# Patient Record
Sex: MALE | Race: WHITE | Employment: OTHER | URBAN - METROPOLITAN AREA
[De-identification: names, ages, dates, MRNs, and addresses within clinical notes are randomized per-mention and may not be internally consistent; named-entity substitution may affect disease eponyms.]

---

## 2017-01-01 ENCOUNTER — HOSPITAL ENCOUNTER (OUTPATIENT)
Dept: GENERAL RADIOLOGY | Age: 82
Discharge: HOME | End: 2017-09-13
Payer: MEDICARE

## 2017-01-01 ENCOUNTER — ANESTHESIA EVENT (OUTPATIENT)
Dept: ENDOSCOPY | Age: 82
End: 2017-01-01
Payer: MEDICARE

## 2017-01-01 ENCOUNTER — ANESTHESIA (OUTPATIENT)
Dept: ENDOSCOPY | Age: 82
End: 2017-01-01
Payer: MEDICARE

## 2017-01-01 ENCOUNTER — OFFICE VISIT (OUTPATIENT)
Dept: CARDIOLOGY CLINIC | Age: 82
End: 2017-01-01

## 2017-01-01 ENCOUNTER — TELEPHONE (OUTPATIENT)
Dept: NEUROLOGY | Age: 82
End: 2017-01-01

## 2017-01-01 ENCOUNTER — HOSPITAL ENCOUNTER (OUTPATIENT)
Age: 82
Setting detail: OUTPATIENT SURGERY
Discharge: HOME | End: 2017-03-06
Attending: INTERNAL MEDICINE | Admitting: INTERNAL MEDICINE
Payer: MEDICARE

## 2017-01-01 VITALS
HEART RATE: 72 BPM | BODY MASS INDEX: 29.31 KG/M2 | HEIGHT: 72 IN | DIASTOLIC BLOOD PRESSURE: 78 MMHG | SYSTOLIC BLOOD PRESSURE: 138 MMHG | WEIGHT: 216.4 LBS

## 2017-01-01 VITALS
HEART RATE: 56 BPM | BODY MASS INDEX: 27.36 KG/M2 | RESPIRATION RATE: 19 BRPM | OXYGEN SATURATION: 97 % | HEIGHT: 72 IN | TEMPERATURE: 97.6 F | SYSTOLIC BLOOD PRESSURE: 146 MMHG | WEIGHT: 202 LBS | DIASTOLIC BLOOD PRESSURE: 67 MMHG

## 2017-01-01 DIAGNOSIS — E78.2 MIXED HYPERLIPIDEMIA: ICD-10-CM

## 2017-01-01 DIAGNOSIS — Z01.818 PRE-OP EVALUATION: ICD-10-CM

## 2017-01-01 DIAGNOSIS — M54.10 RADICULOPATHY: ICD-10-CM

## 2017-01-01 DIAGNOSIS — I10 ESSENTIAL HYPERTENSION: Primary | ICD-10-CM

## 2017-01-01 PROCEDURE — 77030009426 HC FCPS BIOP ENDOSC BSC -B: Performed by: INTERNAL MEDICINE

## 2017-01-01 PROCEDURE — 88305 TISSUE EXAM BY PATHOLOGIST: CPT | Performed by: INTERNAL MEDICINE

## 2017-01-01 PROCEDURE — 77030027957 HC TBNG IRR ENDOGTR BUSS -B: Performed by: INTERNAL MEDICINE

## 2017-01-01 PROCEDURE — 76060000031 HC ANESTHESIA FIRST 0.5 HR: Performed by: INTERNAL MEDICINE

## 2017-01-01 PROCEDURE — 72100 X-RAY EXAM L-S SPINE 2/3 VWS: CPT

## 2017-01-01 PROCEDURE — 76040000019: Performed by: INTERNAL MEDICINE

## 2017-01-01 PROCEDURE — 74011000250 HC RX REV CODE- 250

## 2017-01-01 PROCEDURE — 74011250636 HC RX REV CODE- 250/636

## 2017-01-01 RX ORDER — PROPOFOL 10 MG/ML
INJECTION, EMULSION INTRAVENOUS AS NEEDED
Status: DISCONTINUED | OUTPATIENT
Start: 2017-01-01 | End: 2017-01-01 | Stop reason: HOSPADM

## 2017-01-01 RX ORDER — ATROPINE SULFATE 0.1 MG/ML
0.5 INJECTION INTRAVENOUS
Status: DISCONTINUED | OUTPATIENT
Start: 2017-01-01 | End: 2017-01-01 | Stop reason: HOSPADM

## 2017-01-01 RX ORDER — SODIUM CHLORIDE 0.9 % (FLUSH) 0.9 %
5-10 SYRINGE (ML) INJECTION AS NEEDED
Status: DISCONTINUED | OUTPATIENT
Start: 2017-01-01 | End: 2017-01-01 | Stop reason: HOSPADM

## 2017-01-01 RX ORDER — DEXTROMETHORPHAN/PSEUDOEPHED 2.5-7.5/.8
1.2 DROPS ORAL
Status: DISCONTINUED | OUTPATIENT
Start: 2017-01-01 | End: 2017-01-01 | Stop reason: HOSPADM

## 2017-01-01 RX ORDER — SODIUM CHLORIDE 0.9 % (FLUSH) 0.9 %
5-10 SYRINGE (ML) INJECTION EVERY 8 HOURS
Status: DISCONTINUED | OUTPATIENT
Start: 2017-01-01 | End: 2017-01-01 | Stop reason: HOSPADM

## 2017-01-01 RX ORDER — EPINEPHRINE 0.1 MG/ML
1 INJECTION INTRACARDIAC; INTRAVENOUS
Status: DISCONTINUED | OUTPATIENT
Start: 2017-01-01 | End: 2017-01-01 | Stop reason: HOSPADM

## 2017-01-01 RX ORDER — FENTANYL CITRATE 50 UG/ML
200 INJECTION, SOLUTION INTRAMUSCULAR; INTRAVENOUS
Status: DISCONTINUED | OUTPATIENT
Start: 2017-01-01 | End: 2017-01-01 | Stop reason: HOSPADM

## 2017-01-01 RX ORDER — SODIUM CHLORIDE 9 MG/ML
100 INJECTION, SOLUTION INTRAVENOUS CONTINUOUS
Status: DISCONTINUED | OUTPATIENT
Start: 2017-01-01 | End: 2017-01-01 | Stop reason: HOSPADM

## 2017-01-01 RX ORDER — SODIUM CHLORIDE 9 MG/ML
INJECTION, SOLUTION INTRAVENOUS
Status: DISCONTINUED | OUTPATIENT
Start: 2017-01-01 | End: 2017-01-01 | Stop reason: HOSPADM

## 2017-01-01 RX ORDER — NALOXONE HYDROCHLORIDE 0.4 MG/ML
0.4 INJECTION, SOLUTION INTRAMUSCULAR; INTRAVENOUS; SUBCUTANEOUS
Status: DISCONTINUED | OUTPATIENT
Start: 2017-01-01 | End: 2017-01-01 | Stop reason: HOSPADM

## 2017-01-01 RX ORDER — MIDAZOLAM HYDROCHLORIDE 1 MG/ML
.25-1 INJECTION, SOLUTION INTRAMUSCULAR; INTRAVENOUS
Status: DISCONTINUED | OUTPATIENT
Start: 2017-01-01 | End: 2017-01-01 | Stop reason: HOSPADM

## 2017-01-01 RX ORDER — LIDOCAINE HYDROCHLORIDE 20 MG/ML
INJECTION, SOLUTION EPIDURAL; INFILTRATION; INTRACAUDAL; PERINEURAL AS NEEDED
Status: DISCONTINUED | OUTPATIENT
Start: 2017-01-01 | End: 2017-01-01 | Stop reason: HOSPADM

## 2017-01-01 RX ORDER — FLUMAZENIL 0.1 MG/ML
0.2 INJECTION INTRAVENOUS
Status: DISCONTINUED | OUTPATIENT
Start: 2017-01-01 | End: 2017-01-01 | Stop reason: HOSPADM

## 2017-01-01 RX ADMIN — PROPOFOL 25 MG: 10 INJECTION, EMULSION INTRAVENOUS at 08:53

## 2017-01-01 RX ADMIN — PROPOFOL 25 MG: 10 INJECTION, EMULSION INTRAVENOUS at 08:47

## 2017-01-01 RX ADMIN — PROPOFOL 25 MG: 10 INJECTION, EMULSION INTRAVENOUS at 08:50

## 2017-01-01 RX ADMIN — LIDOCAINE HYDROCHLORIDE 40 MG: 20 INJECTION, SOLUTION EPIDURAL; INFILTRATION; INTRACAUDAL; PERINEURAL at 08:47

## 2017-01-01 RX ADMIN — PROPOFOL 25 MG: 10 INJECTION, EMULSION INTRAVENOUS at 08:51

## 2017-01-01 RX ADMIN — PROPOFOL 25 MG: 10 INJECTION, EMULSION INTRAVENOUS at 08:48

## 2017-01-01 RX ADMIN — SODIUM CHLORIDE: 9 INJECTION, SOLUTION INTRAVENOUS at 08:25

## 2017-01-18 ENCOUNTER — TELEPHONE (OUTPATIENT)
Dept: CARDIOLOGY CLINIC | Age: 82
End: 2017-01-18

## 2017-01-18 NOTE — TELEPHONE ENCOUNTER
Dr. Juanis Koehler called regarding patient. She wanted to discuss whether patient should be on blood thinner and and if he was to f/u with us (I believe he told her we would see prn, but he never followed as planned after echo). I told her I see Dr. Salud Cook noted patient should be on blood thinner. I let her know when I called patient, he did not wish to make f/u with Dr. Salud Cook at that time and had told me he was going to be seeing PCP. She would like to discuss patient's case with Dr. Salud Cook. I told her he would be back tomorrow. She said that was fine and gave phone # [phone]. Dr. Salud Cook- please call Dr. Juanis Koehler when you have a chance. Thank you. phone # [phone].

## 2017-03-06 NOTE — PROGRESS NOTES
Anesthesia reports 125mg Propofol, 0mg Lidocaine and 4000mL NS given during procedure. Received report from anesthesia staff on vital signs and status of patient.

## 2017-03-06 NOTE — H&P
The patient is a [de-identified]year old male who presents for a screening colonscopy. The patient presents for a screening colonoscopy evaluation (had a colonic resection for colon cancer last year. Due for his routine colonoscopy post surgery. CEA was normal at Dr Taran Strauss). There has been no associated family history of colon cancer, change in bowel habits, hematochezia, weight loss or abdominal pain. The frequency of bowel movements has been the quality of stools show of normal consistency, without bright red blood per rectum. The patient had a colonoscopy 1 year(s) ago. Problem List/Past Medical Vivienne Sandoval; 2/9/2017 9:55 AM)  Arthritis   History of colon polyps (V12.72  Z86.010)   GERD (gastroesophageal reflux disease) (530.81  K21.9)   Colon Cancer   Diabetes Mellitus, Type II   Hypertension   Hypercholesterolemia     Past Surgical History Vivienne Sandoval; 2/9/2017 9:55 AM)  History of colon resection (V45.89  Z90.49)     Allergies (Silas Sandoval; 2/9/2017 9:55 AM)  Losartan Potassium *ANTIHYPERTENSIVES*  In larger dosages. Penicillins   Xarelto *ANTICOAGULANTS*   Doxazosin Mesylate *ANTIHYPERTENSIVES*   AmLODIPine Besylate *CALCIUM CHANNEL BLOCKERS*   Lodine *ANALGESICS - ANTI-INFLAMMATORY*   TraMADol HCl *ANALGESICS - OPIOID*     Medication History (Silas Sandoval; 2/9/2017 9:58 AM)  MetFORMIN HCl  (500MG Tablet, Oral twice a day) Active. GlipiZIDE  (5MG Tablet, Oral twice a day) Active. Eliquis  (2.5MG Tablet, Oral twice a day) Active. Maxzide-25  (37.5-25MG Tablet, Oral daily) Active. Losartan Potassium  (25MG Tablet, Oral daily) Active. Atorvastatin Calcium  (40MG Tablet, Oral daily) Active. Pantoprazole Sodium  (40MG Tablet DR, Oral daily) Active. Aspirin  (81MG Tablet DR, Oral daily) Active. Tylenol Extra Strength  (500MG Tablet, Oral 2 tabs in the morning) Active.   Medications Reconciled     Family History (Silas Sandoval; 2/9/2017 9:59 AM)  No pertinent family history  First Degree Relatives. Social History (Chuy Olvera; 2/9/2017 9:55 AM)  Marital status  Single. Tobacco Use  Former smoker. Employment status  Retired. Blood Transfusion  Yes. Alcohol Use  Occasional alcohol use. Diagnostic Studies History Skip BRADFORD Walker; 2/9/2017 9:55 AM)  Colonoscopy     Health Maintenance History Skip Sandoval; 2/9/2017 9:55 AM)  Flu Vaccine  none  Pneumovax  Date: 1/18/2017. Review of Systems (Silas Sandoval; 2/9/2017 9:59 AM)  General Present- Weight Loss. Not Present- Chronic Fatigue, Poor Appetite and Weight Gain. Skin Not Present- Itching, Rash and Skin Color Changes. HEENT Not Present- Hearing Loss and Vertigo. Respiratory Not Present- Difficulty Breathing and TB exposure. Cardiovascular Not Present- Chest Pain, Use of Antibiotics before Dental Procedures and Use of Blood Thinners. Gastrointestinal Present- See HPI. Musculoskeletal Present- Arthritis. Not Present- Hip Replacement Surgery and Knee Replacement Surgery. Neurological Not Present- Weakness. Psychiatric Not Present- Depression. Endocrine Present- Diabetes. Not Present- Thyroid Problems. Hematology Not Present- Anemia. Vitals (Silas Miguel Severe; 2/9/2017 9:58 AM)  2/9/2017 9:55 AM  Weight: 200 lb   Height: 72 in    Body Surface Area: 2.15 m²   Body Mass Index: 27.12 kg/m²  Pulse: 72 (Regular)    Resp.: 20 (Unlabored)     BP: 170/98 (Sitting, Left Arm, Standard)        Physical Exam (Angela Castro [facility]; 2/9/2017 12:10 PM)  General  Posture - Normal posture. Integumentary  Global Assessment  Examination of related systems reveals - Well-developed, well-nourished and in no acute distress; alert and oriented x 3. Eye  Pupil - Bilateral - Normal, Equal and Round. ENMT  Global Assessment  Examination of related systems reveals - normal voice with no communication aids required. Abdomen  Inspection  Inspection of the abdomen reveals - Soft. Contour - Obese.     Peripheral Vascular  Upper Extremity  Inspection - Bilateral - Acrocyanotic. Neurologic  Neurologic evaluation reveals  - normal attention span and ability to concentrate. Neuropsychiatric  Mental status exam performed with findings of - thought content normal with ability to perform basic computations and apply abstract reasoning, associations are intact and demonstrates appropriate judgment and insight. The patient's mood and affect are described as  - full, not anxious, not agitated. Assessment & Plan (Angela Mir; 2/9/2017 12:12 PM)  Colon cancer (153.9  C18.9)  Impression: s/p resection last year. Takes a Eliquis for h/o strokes. This will need to be help prior to the procedure. We have also discussed holding his oral diabetic medications the day of the procedure and prep as his blood sugars are very well controlled. Sample of Suprep given  Current Plans    Colonoscopy (20959) (Discussed risks and benefits with the patient to include:; perforation, post polypectomy, or post biopsy bleeding, missed lesions, and sedation reactions.)  Pt Education - How to access health information online: discussed with patient and provided information. Date of Surgery Update:  Kb Pichardo was seen and examined. History and physical has been reviewed. The patient has been examined.  There have been no significant clinical changes since the completion of the originally dated History and Physical.    Signed By: Leonidas Radford MD     March 6, 2017 8:42 AM

## 2017-03-06 NOTE — DISCHARGE INSTRUCTIONS
[address] [facility]    COLON DISCHARGE INSTRUCTIONS    Doreen Recinos  [id number]  1935    Discomfort:  Redness at IV site- apply warm compress to area; if redness or soreness persist- contact your physician  There may be a slight amount of blood passed from the rectum  Gaseous discomfort- walking, belching will help relieve any discomfort  You may not operate a vehicle for 12 hours  You may not engage in an occupation involving machinery or appliances for rest of today  You may not drink alcoholic beverages for at least 12 hours  Avoid making any critical decisions for at least 24 hour  DIET:  You may resume your regular diet - however -  remember your colon is empty and a heavy meal will produce gas. Avoid these foods:  vegetables, fried / greasy foods, carbonated drinks     ACTIVITY:  You may  resume your normal daily activities it is recommended that you spend the remainder of the day resting -  avoid any strenuous activity. CALL M.D. ANY SIGN OF:   Increasing pain, nausea, vomiting  Abdominal distension (swelling)  New increased bleeding (oral or rectal)  Fever (chills)  Pain in chest area  Bloody discharge from nose or mouth  Shortness of breath      Follow-up Instructions:   Call Dr. Conor Horton for any questions or problems at [address] [place] [address] tomorrow on 3/7/17   High fiber diet          ENDOSCOPY FINDINGS:   Your colonoscopy showed 3 small polyps i removed and diverticulosis.   Telephone # [phone]      Signed By: Conor Horton MD     3/6/2017  9:07 AM       DISCHARGE SUMMARY from Nurse    The following personal items collected during your admission are returned to you:   Dental Appliance: Dental Appliances: None  Vision: Visual Aid: None  Hearing Aid:    Jewelry:    Clothing:    Other Valuables:    Valuables sent to safe:

## 2017-03-06 NOTE — IP AVS SNAPSHOT
[address] 
[phone] Patient: Joshua Echevarria MRN: [id number] WBD:[id number] You are allergic to the following Allergen Reactions Penicillins Rash Metoprolol Rash Tramadol Other (comments) Unsure of reaction. Lodine (Etodolac) Vertigo Recent Documentation Height Weight BMI Smoking Status 1.829 m 91.6 kg 27.4 kg/m2 Former Smoker Emergency Contacts Name Discharge Info Relation Home Work Mobile Nicole Lewisstefany DISCHARGE CAREGIVER [3] Girlfriend [18] [phone] Tevin Harris  Child [2] [phone] About your hospitalization You were admitted on:  March 6, 2017 You last received care in the:  [facility] ENDOSCOPY You were discharged on:  March 6, 2017 Unit phone number:  [phone] Why you were hospitalized Your primary diagnosis was:  Not on File Providers Seen During Your Hospitalizations Provider Role Specialty Primary office phone Earle Walker MD Attending Provider Gastroenterology [phone] Your Primary Care Physician (PCP) Primary Care Physician Office Phone Office Fax Julia Tai [phone] Follow-up Information None Current Discharge Medication List  
  
CONTINUE these medications which have NOT CHANGED Dose & Instructions Dispensing Information Comments Morning Noon Evening Bedtime  
 amLODIPine 2.5 mg tablet Commonly known as:  Alma Nagy Your next dose is: Today, Tomorrow Other:  _________ Dose:  2.5 mg Take 2.5 mg by mouth nightly. Refills:  0  
     
   
   
   
  
 aspirin delayed-release 81 mg tablet Your next dose is: Today, Tomorrow Other:  _________ Dose:  81 mg Take 1 Tab by mouth daily. Refills:  0  
     
   
   
   
  
 atorvastatin 40 mg tablet Commonly known as:  LIPITOR Your next dose is: Today, Tomorrow Other:  _________ Take  by mouth daily. Refills:  0  
     
   
   
   
  
 ferrous sulfate 325 mg (65 mg iron) tablet Your next dose is: Today, Tomorrow Other:  _________ Dose:  325 mg Take 325 mg by mouth daily. Refills:  0  
     
   
   
   
  
 glipiZIDE 5 mg tablet Commonly known as:  Brooksie Fillers Your next dose is: Today, Tomorrow Other:  _________ Dose:  5 mg Take 5 mg by mouth two (2) times a day. Refills:  0  
     
   
   
   
  
 losartan 50 mg tablet Commonly known as:  COZAAR Your next dose is: Today, Tomorrow Other:  _________ Dose:  25 mg Take 25 mg by mouth daily. (dose = 0.5 x 50 mg tablet) Refills:  0  
     
   
   
   
  
 lovastatin 40 mg tablet Commonly known as:  MEVACOR Your next dose is: Today, Tomorrow Other:  _________ Dose:  40 mg Take 40 mg by mouth nightly. Refills:  0  
     
   
   
   
  
 metFORMIN 500 mg tablet Commonly known as:  GLUCOPHAGE Your next dose is: Today, Tomorrow Other:  _________ Dose:  500 mg Take 500 mg by mouth two (2) times daily (with meals). Refills:  0  
     
   
   
   
  
 OTHER Your next dose is: Today, Tomorrow Other:  _________ Outpatient PT/OT evaluation Dx: stroke Quantity:  1 Device Refills:  0  
     
   
   
   
  
 pantoprazole 40 mg tablet Commonly known as:  PROTONIX Your next dose is: Today, Tomorrow Other:  _________ Dose:  40 mg Take 40 mg by mouth daily. Refills:  0  
     
   
   
   
  
 triamterene-hydroCHLOROthiazide 37.5-25 mg per tablet Commonly known as:  Rocio Bong Your next dose is: Today, Tomorrow Other:  _________ Dose:  1 Tab Take 1 Tab by mouth daily. Refills:  0 STOP taking these medications   
 apixaban 2.5 mg tablet Commonly known as:  Julio Cesar Bailon Discharge Instructions [address] 
[address] [facility], [facility] COLON DISCHARGE INSTRUCTIONS Abbey Lopez [id number] 
1935 Discomfort: 
Redness at IV site- apply warm compress to area; if redness or soreness persist- contact your physician There may be a slight amount of blood passed from the rectum Gaseous discomfort- walking, belching will help relieve any discomfort You may not operate a vehicle for 12 hours You may not engage in an occupation involving machinery or appliances for rest of today You may not drink alcoholic beverages for at least 12 hours Avoid making any critical decisions for at least 24 hour DIET: 
You may resume your regular diet  however -  remember your colon is empty and a heavy meal will produce gas. Avoid these foods:  vegetables, fried / greasy foods, carbonated drinks ACTIVITY: 
You may  resume your normal daily activities it is recommended that you spend the remainder of the day resting -  avoid any strenuous activity. CALL M.D. ANY SIGN OF: Increasing pain, nausea, vomiting Abdominal distension (swelling) New increased bleeding (oral or rectal) Fever (chills) Pain in chest area Bloody discharge from nose or mouth Shortness of breath Follow-up Instructions: 
 Call Dr. Marvin Martinez for any questions or problems at [phone] Resume jeromeis tomorrow on 3/7/17 High fiber diet ENDOSCOPY FINDINGS: 
 Your colonoscopy showed 3 small polyps i removed and diverticulosis. Telephone # [phone] Signed By: Marvin Martinez MD   
 3/6/2017  9:07 AM 
  
 
DISCHARGE SUMMARY from Nurse The following personal items collected during your admission are returned to you:  
Dental Appliance: Dental Appliances: None Vision: Visual Aid: None Hearing Aid:   
Jewelry:   
Clothing: Other Valuables:   
Valuables sent to safe:   
 
 
 
Discharge Orders None [facility]! Dear Sushil Palisades: 
Thank you for requesting a [employer] account. Our records indicate that you already have an active [employer] account. You can access your account anytime at [email or web address]. [email or web address]/[email or web address] Did you know that you can access your hospital and ER discharge instructions at any time in [employer]? You can also review all of your test results from your hospital stay or ER visit. Additional Information If you have questions, please visit the Frequently Asked Questions section of the [employer] website at [email or web address]. [email or web address]/[facility]/. Remember, [employer] is NOT to be used for urgent needs. For medical emergencies, dial 911. Now available from your iPhone and Android! General Information Please provide this summary of care documentation to your next provider. Patient Signature:  ____________________________________________________________ Date:  ____________________________________________________________  
  
Caleb Causey Provider Signature:  ____________________________________________________________ Date:  ____________________________________________________________

## 2017-03-06 NOTE — PROCEDURES
[address]  [facility], [address] [facility]      Colonoscopy Operative Report    Radu Bernal  [id number]  1935      Procedure Type:   Colonoscopy with polypectomy (hot biopsy)     Indications:    Personal history of colon cancer (screening only) s/p right hemicolectomy last year by Dr. Thompson Penn  Date of last colonoscopy: last year, Polyps  Yes    Pre-operative Diagnosis: see indication above    Post-operative Diagnosis:  See findings below    :  Pratibha Trent MD      Referring Provider: Raul Fox MD      Sedation:  MAC anesthesia Propofol      Procedure Details:  After informed consent was obtained with all risks and benefits of procedure explained and preoperative exam completed, the patient was taken to the endoscopy suite and placed in the left lateral decubitus position. Upon sequential sedation as per above, a digital rectal exam was performed demonstrating internal hemorrhoids. The Olympus videocolonoscope  was inserted in the rectum and carefully advanced to the terminal ileum. The cecum was identified by the ileocecal valve and appendiceal orifice. The quality of preparation was excellent. The colonoscope was slowly withdrawn with careful evaluation between folds. Retroflexion in the rectum was completed . Findings:   Rectum: normal  Sigmoid: moderate diverticulosis  Descending Colon: mild diverticulosis  Transverse Colon: mild diverticulosis  3 polyps around 9 mm each removed by hot biopsies  Ascending Colon: no mucosal lesion appreciated  Cecum: surgically absent  Terminal Ileum: normal      Specimen Removed:  as above    Complications: None. EBL:  None. Impression:    see findings    Recommendations: --Await pathology.       Recommendation for next colonscopy in 1 year  Resume eliquis since needed because of h/o stroke    Signed By: Pratibha Trent MD     3/6/2017  9:03 AM

## 2017-03-06 NOTE — ROUTINE PROCESS
[address]  1935  [id number]    Situation:  Verbal report received from: Leisa Nieves  Procedure: Procedure(s):  COLONOSCOPY  ENDOSCOPIC POLYPECTOMY    Background:    Preoperative diagnosis: COLON CANCER   Postoperative diagnosis: transverse polyp; diverticulosiss     :  Dr. Benji Rodriguez  Assistant(s): Endoscopy Technician-1: Marcy Claire  Endoscopy RN-1: Lupis Spann RN    Specimens:   ID Type Source Tests Collected by Time Destination   1 : transverse polyps bx Preservative   Amanda Mendez MD 3/6/2017 1098 Pathology     H. Pylori  no    Assessment:  Intra-procedure medications   Anesthesia gave intra-procedure sedation and medications, see anesthesia flow sheet yes    Intravenous fluids: NS@ KVO     Vital signs stable yes    Abdominal assessment: round and soft  Yes     Recommendation:  Discharge patient per MD order yes .   Return to floor na  Family or Friend  fam  Permission to share finding with family or friend yes

## 2017-03-06 NOTE — ANESTHESIA PREPROCEDURE EVALUATION
Anesthetic History   No history of anesthetic complications            Review of Systems / Medical History  Patient summary reviewed, nursing notes reviewed and pertinent labs reviewed    Pulmonary        Sleep apnea        Comments: Does not use cpap because of sinus problems, uses breathe right strips   Neuro/Psych       CVA  TIA     Cardiovascular    Hypertension: well controlled              Exercise tolerance: >4 METS     GI/Hepatic/Renal     GERD      PUD    Comments: Post hemicolectomy Endo/Other    Diabetes    Arthritis     Other Findings            Physical Exam    Airway  Mallampati: II  TM Distance: > 6 cm  Neck ROM: normal range of motion   Mouth opening: Normal     Cardiovascular    Rhythm: regular  Rate: normal         Dental  No notable dental hx       Pulmonary  Breath sounds clear to auscultation               Abdominal  Abdominal exam normal       Other Findings            Anesthetic Plan    ASA: 3  Anesthesia type: MAC          Induction: Intravenous  Anesthetic plan and risks discussed with: Patient

## 2017-03-06 NOTE — ANESTHESIA POSTPROCEDURE EVALUATION
Post-Anesthesia Evaluation and Assessment    Patient: Darylene Smock MRN: [id number]  SSN: [id number]    YOB: 1935  Age: [de-identified] y.o. Sex: male       Cardiovascular Function/Vital Signs  Visit Vitals    /67    Pulse (!) 56    Temp 36.4 °C (97.6 °F)    Resp 19    Ht 6' (1.829 m)    Wt 91.6 kg (202 lb)    SpO2 97%    BMI 27.4 kg/m2       Patient is status post MAC anesthesia for Procedure(s):  COLONOSCOPY  ENDOSCOPIC POLYPECTOMY. Nausea/Vomiting: None    Postoperative hydration reviewed and adequate. Pain:  Pain Scale 1: Numeric (0 - 10) (03/06/17 0929)  Pain Intensity 1: 0 (03/06/17 0929)   Managed    Neurological Status: At baseline    Mental Status and Level of Consciousness: Arousable    Pulmonary Status:   O2 Device: Room air (03/06/17 0929)   Adequate oxygenation and airway patent    Complications related to anesthesia: None    Post-anesthesia assessment completed.  No concerns    Signed By: Santosh العلي MD     March 6, 2017

## 2017-12-19 NOTE — TELEPHONE ENCOUNTER
Dr. Nika Herrmann calling to speak to Dr. Akash Noonan in regards to patient.  Please call Dr. Nika Herrmann directly, [phone]

## 2017-12-29 NOTE — PROGRESS NOTES
HISTORY OF PRESENT ILLNESS  Esa Cline is a 81 y.o. 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 (HCC) (Chronic) ICD-10-CM: I82.5Z1  ICD-9-CM: 453.52  6/16/2016        History of GI bleed (Chronic) ICD-10-CM: Z87.19  ICD-9-CM: V12.79  6/16/2016        Colon cancer ([facility] ICD-10-CM: C18.9  ICD-9-CM: 153.9  5/16/2016        Pre-op evaluation ICD-10-CM: A62.573  ICD-9-CM: V72.84  4/8/2016        Cerebral thrombosis with cerebral infarction [facility]) ICD-10-CM: I63.30  ICD-9-CM: 434.01  1/22/2015        Uncontrolled hypertension ICD-10-CM: I10  ICD-9-CM: 401.9  1/22/2015        History of prostate cancer ICD-10-CM: Z85.46  ICD-9-CM: V10.46  3/7/2013        Cervical spondylosis ICD-10-CM: M47.812  ICD-9-CM: 721.0  7/11/2012        Balance disorder ICD-10-CM: R26.89  ICD-9-CM: 781.99  3/7/2012        Diabetes mellitus ([facility] ICD-10-CM: E11.9  ICD-9-CM: 250.00  11/2/2011        Sinusitis ICD-10-CM: J32.9  ICD-9-CM: 473.9  11/2/2011        Hypertension ICD-10-CM: I10  ICD-9-CM: 401.9  11/2/2011    Overview Addendum 8/1/2016  7:03 AM by George Stoll MD     4/16 abnormal lexiscan cardiolyte with fixed inf defect, lvef 37%  4/16 echo normal lvef, lae, mild mr, mild tr with pa pressure 37mm. Lipomatous hypertrophy intraatrial septum without defect                   Current Outpatient Prescriptions on File Prior to Visit   Medication Sig    atorvastatin (LIPITOR) 40 mg tablet Take  by mouth daily.  aspirin delayed-release 81 mg tablet Take 1 Tab by mouth daily.     OTHER Outpatient PT/OT evaluation  Dx: stroke    losartan (COZAAR) 50 mg tablet Take 25 mg by mouth daily. (dose = 0.5 x 50 mg tablet)    pantoprazole (PROTONIX) 40 mg tablet Take 40 mg by mouth daily.  glipiZIDE (GLUCOTROL) 5 mg tablet Take 5 mg by mouth two (2) times a day.  metFORMIN (GLUCOPHAGE) 500 mg tablet Take 500 mg by mouth two (2) times daily (with meals).  triamterene-hydrochlorothiazide (MAXZIDE) 37.5-25 mg per tablet Take 1 Tab by mouth daily.  amLODIPine (NORVASC) 2.5 mg tablet Take 2.5 mg by mouth nightly.  ferrous sulfate 325 mg (65 mg iron) tablet Take 325 mg by mouth daily.  lovastatin (MEVACOR) 40 mg tablet Take 40 mg by mouth nightly. No current facility-administered medications on file prior to visit. CARDIOLOGY STUDIES TO DATE:  4/16 abnormal lexiscan cardiolyte with fixed inf defect, lvef 37%  4/16 echo normal lvef, lae, mild mr, mild tr with pa pressure 37mm. Lipomatous hypertrophy intraatrial septum without defect  8/16 positive echo bubble study with evidence of probe patent pfo        Chief Complaint   Patient presents with    Pre-op Exam     HPI :  Mr. Kenya Hicks needs to have a cystoscopy and that is one of the reasons he is here today. He also brought up the issue of whether he needs to continue on Eliquis indefinitely. He had one fall a couple of months ago, but other than that no bleeding or previous events.           CARDIAC ROS:   negative for chest pain, dyspnea, palpitations, syncope, orthopnea, paroxysmal nocturnal dyspnea, exertional chest pressure/discomfort, claudication, lower extremity edema    Family History   Problem Relation Age of Onset    Heart Disease Father     Lung Disease Father      EMPHASEMA    Cancer Brother      SKIN    Heart Attack Brother     Heart Disease Brother     Anesth Problems Neg Hx        Past Medical History:   Diagnosis Date    Arthritis     neck,chronic    Bladder cancer ([address] 3/13    bladder    Chronic pain     NECK    Colon cancer ([address] 3/16    COLON    Diabetes [facility]     GERD (gastroesophageal reflux disease) 2008    at times, NOT continous    Hypercholesterolemia     Hypertension     Kidney stones 1969    SEVERAL     Nephrolithiasis     Prostate cancer ([facility] 9/2003    prostate cancer - radiation    PUD (peptic ulcer disease) 2008    Thromboembolus ([facility] 2/8/16    2 RIGHT LEG, 3 IN LUNGS (STARTED ON XARELTO IN ER, 1 WEEK LATER WAS IN ER WITH INTERNAL BLEEDING)    Transient ischemic attack 2005    TIA - no deficits 2005 AND 2015    Unspecified sleep apnea     does not use CPAP/uses nasal strips       GENERAL ROS:  A comprehensive review of systems was negative except for that written in the HPI.     Visit Vitals    /78    Pulse 72    Ht 6' (1.829 m)    Wt 216 lb 6.4 oz (98.2 kg)    BMI 29.35 kg/m2       Wt Readings from Last 3 Encounters:   12/29/17 216 lb 6.4 oz (98.2 kg)   03/06/17 202 lb (91.6 kg)   09/27/16 206 lb (93.4 kg)            BP Readings from Last 3 Encounters:   12/29/17 138/78   03/06/17 146/67   09/27/16 146/82       PHYSICAL EXAM  General appearance: alert, cooperative, no distress, appears stated age  Neck: supple, symmetrical, trachea midline, no adenopathy, no carotid bruit and no JVD  Lungs: clear to auscultation bilaterally  Heart: regular rate and rhythm, S1, S2 normal, no murmur, click, rub or gallop  Extremities: extremities normal, atraumatic, no cyanosis or edema    Lab Results   Component Value Date/Time    Cholesterol, total 110 06/17/2016 02:24 AM    Cholesterol, total 149 01/23/2015 02:47 AM    Cholesterol, total 168 03/04/2013 09:14 AM    Cholesterol, total 164 07/09/2012 12:00 AM    Cholesterol, total 176 10/31/2011 08:51 AM    HDL Cholesterol 35 06/17/2016 02:24 AM    HDL Cholesterol 50 01/23/2015 02:47 AM    HDL Cholesterol 57 03/04/2013 09:14 AM    HDL Cholesterol 53 07/09/2012 12:00 AM    HDL Cholesterol 62 10/31/2011 08:51 AM    LDL, calculated 50 06/17/2016 02:24 AM    LDL, calculated 66.2 01/23/2015 02:47 AM    LDL, calculated 81 03/04/2013 09:14 AM    LDL, calculated 78 07/09/2012 12:00 AM    LDL, calculated 85 10/31/2011 08:51 AM    Triglyceride 125 06/17/2016 02:24 AM    Triglyceride 164 01/23/2015 02:47 AM    Triglyceride 150 03/04/2013 09:14 AM    Triglyceride 165 07/09/2012 12:00 AM    Triglyceride 146 10/31/2011 08:51 AM    CHOL/HDL Ratio 3.1 06/17/2016 02:24 AM    CHOL/HDL Ratio 3.0 01/23/2015 02:47 AM    CHOL/HDL Ratio 2.7 03/10/2010 09:45 AM    CHOL/HDL Ratio 2.8 04/13/2009 09:22 AM     ASSESSMENT  Mr. Efraín Nicole is stable from a cardiac standpoint and should be fine for bladder surgery without special precautions or further cardiac testing. He should be off aspirin for seven days and Eliquis for two days prior to the procedure. Given his history of TIA and associated with a DVT and then a subsequent middle cerebral artery stroke and the finding of a probe patent PFO, I think he should be on aspirin and Eliquis indefinitely unless he starts having frequent falls or unexplained bleeding. current treatment plan is effective, no change in therapy  lab results and schedule of future lab studies reviewed with patient  reviewed diet, exercise and weight control    Encounter Diagnoses   Name Primary?  Essential hypertension Yes    Mixed hyperlipidemia     Pre-op evaluation      Orders Placed This Encounter    AMB POC EKG ROUTINE W/ 12 LEADS, INTER & REP       Follow-up Disposition:  Return in about 1 year (around 12/29/2018).     Masoud Crenshaw MD  12/29/2017

## 2017-12-29 NOTE — MR AVS SNAPSHOT
Visit Information Date & Time Provider Department Dept. Phone Encounter #  
 12/29/2017 10:20 AM Jean Carlos Underwood MD [facility] [phone] [id number] Follow-up Instructions Return in about 1 year (around 12/29/2018). Your Appointments 12/28/2018 10:20 AM  
ESTABLISHED PATIENT with Jean Carlos Underwood MD  
[facility] ([facility]) Appt Note: 1 yr f/u  
 [address] [place] [address]  
[address] [id number] Upcoming Health Maintenance Date Due  
 FOOT EXAM Q1 8/23/1946 DTaP/Tdap/Td series (1 - Tdap) 8/23/1957 ZOSTER VACCINE AGE 60> 6/23/1996 Pneumococcal 65+ High/Highest Risk (1 of 2 - PCV13) 8/23/2001 MEDICARE YEARLY EXAM 8/23/2001 EYE EXAM RETINAL OR DILATED Q1 12/26/2013 MICROALBUMIN Q1 3/4/2014 GLAUCOMA SCREENING Q2Y 12/26/2014 HEMOGLOBIN A1C Q6M 12/17/2016 LIPID PANEL Q1 6/17/2017 Influenza Age 5 to Adult 8/1/2017 Allergies as of 12/29/2017  Review Complete On: 12/29/2017 By: Jean Carlos Underwood MD  
  
 Severity Noted Reaction Type Reactions Penicillins Medium 08/01/2011   Topical Rash Metoprolol  11/05/2014    Rash Tramadol  11/02/2011   Intolerance Other (comments) Unsure of reaction. Lodine [Etodolac] Low 11/07/2012   Side Effect Vertigo Current Immunizations  Reviewed on 5/17/2016 No immunizations on file. Not reviewed this visit You Were Diagnosed With   
  
 Codes Comments Essential hypertension    -  Primary ICD-10-CM: I10 
ICD-9-CM: 401.9 Mixed hyperlipidemia     ICD-10-CM: E78.2 ICD-9-CM: 272.2 Pre-op evaluation     ICD-10-CM: N55.265 ICD-9-CM: V72.84 Vitals BP Pulse Height(growth percentile) Weight(growth percentile) BMI Smoking Status 138/78 72 6' (1.829 m) 216 lb 6.4 oz (98.2 kg) 29.35 kg/m2 Former Smoker Vitals History BMI and BSA Data Body Mass Index Body Surface Area  
 29.35 kg/m 2 2.23 m 2 Preferred Pharmacy Pharmacy Name Phone [facility] [address] [facility] [phone] Your Updated Medication List  
  
   
This list is accurate as of: 12/29/17 10:51 AM.  Always use your most recent med list. amLODIPine 2.5 mg tablet Commonly known as:  Fay Cater Take 2.5 mg by mouth nightly. aspirin delayed-release 81 mg tablet Take 1 Tab by mouth daily. atorvastatin 40 mg tablet Commonly known as:  LIPITOR Take  by mouth daily. ELIQUIS 5 mg tablet Generic drug:  apixaban Take 5 mg by mouth two (2) times a day. ferrous sulfate 325 mg (65 mg iron) tablet Take 325 mg by mouth daily. glipiZIDE 5 mg tablet Commonly known as:  Loura Allison Take 5 mg by mouth two (2) times a day. losartan 50 mg tablet Commonly known as:  COZAAR Take 25 mg by mouth daily. (dose = 0.5 x 50 mg tablet) lovastatin 40 mg tablet Commonly known as:  MEVACOR Take 40 mg by mouth nightly. metFORMIN 500 mg tablet Commonly known as:  GLUCOPHAGE Take 500 mg by mouth two (2) times daily (with meals). OTHER Outpatient PT/OT evaluation Dx: stroke  
  
 pantoprazole 40 mg tablet Commonly known as:  PROTONIX Take 40 mg by mouth daily. triamterene-hydroCHLOROthiazide 37.5-25 mg per tablet Commonly known as:  Lourdes Prescott Take 1 Tab by mouth daily. We Performed the Following AMB POC EKG ROUTINE W/ 12 LEADS, INTER & REP [77799 CPT(R)] Follow-up Instructions Return in about 1 year (around 12/29/2018). Introducing [address]! [employer]: 
Thank you for requesting a [facility] account. Our records indicate that you already have an active [facility] account. You can access your account anytime at [email or web address] Did you know that you can access your hospital and ER discharge instructions at any time in [employer]? You can also review all of your test results from your hospital stay or ER visit. Additional Information If you have questions, please visit the Frequently Asked Questions section of the [employer] website at [email or web address] Remember, [employer] is NOT to be used for urgent needs. For medical emergencies, dial 911. Now available from your iPhone and Android! Please provide this summary of care documentation to your next provider. Your primary care clinician is listed as [address]. If you have any questions after today's visit, please call [phone].

## 2018-01-01 ENCOUNTER — APPOINTMENT (OUTPATIENT)
Dept: GENERAL RADIOLOGY | Age: 83
DRG: 025 | End: 2018-01-01
Attending: INTERNAL MEDICINE
Payer: MEDICARE

## 2018-01-01 ENCOUNTER — APPOINTMENT (OUTPATIENT)
Dept: GENERAL RADIOLOGY | Age: 83
DRG: 025 | End: 2018-01-01
Attending: HOSPITALIST
Payer: MEDICARE

## 2018-01-01 ENCOUNTER — HOSPITAL ENCOUNTER (INPATIENT)
Age: 83
LOS: 24 days | DRG: 025 | End: 2018-02-16
Attending: EMERGENCY MEDICINE | Admitting: HOSPITALIST
Payer: MEDICARE

## 2018-01-01 ENCOUNTER — APPOINTMENT (OUTPATIENT)
Dept: CT IMAGING | Age: 83
DRG: 025 | End: 2018-01-01
Attending: INTERNAL MEDICINE
Payer: MEDICARE

## 2018-01-01 ENCOUNTER — APPOINTMENT (OUTPATIENT)
Dept: CT IMAGING | Age: 83
DRG: 025 | End: 2018-01-01
Attending: NURSE PRACTITIONER
Payer: MEDICARE

## 2018-01-01 ENCOUNTER — APPOINTMENT (OUTPATIENT)
Dept: CT IMAGING | Age: 83
DRG: 025 | End: 2018-01-01
Attending: NEUROLOGICAL SURGERY
Payer: MEDICARE

## 2018-01-01 ENCOUNTER — APPOINTMENT (OUTPATIENT)
Dept: GENERAL RADIOLOGY | Age: 83
DRG: 025 | End: 2018-01-01
Attending: FAMILY MEDICINE
Payer: MEDICARE

## 2018-01-01 ENCOUNTER — ANESTHESIA (OUTPATIENT)
Dept: SURGERY | Age: 83
DRG: 025 | End: 2018-01-01
Payer: MEDICARE

## 2018-01-01 ENCOUNTER — ANESTHESIA EVENT (OUTPATIENT)
Dept: SURGERY | Age: 83
DRG: 025 | End: 2018-01-01
Payer: MEDICARE

## 2018-01-01 ENCOUNTER — APPOINTMENT (OUTPATIENT)
Dept: CT IMAGING | Age: 83
DRG: 025 | End: 2018-01-01
Attending: EMERGENCY MEDICINE
Payer: MEDICARE

## 2018-01-01 VITALS
SYSTOLIC BLOOD PRESSURE: 64 MMHG | DIASTOLIC BLOOD PRESSURE: 40 MMHG | BODY MASS INDEX: 32.6 KG/M2 | HEART RATE: 75 BPM | HEIGHT: 72 IN | RESPIRATION RATE: 21 BRPM | WEIGHT: 240.7 LBS | TEMPERATURE: 98.4 F | OXYGEN SATURATION: 63 %

## 2018-01-01 DIAGNOSIS — R53.81 DEBILITY: ICD-10-CM

## 2018-01-01 DIAGNOSIS — R41.82 ALTERED MENTAL STATUS, UNSPECIFIED ALTERED MENTAL STATUS TYPE: ICD-10-CM

## 2018-01-01 DIAGNOSIS — S06.5XAA SDH (SUBDURAL HEMATOMA): ICD-10-CM

## 2018-01-01 DIAGNOSIS — S06.5XAA SUBDURAL HEMATOMA: Primary | ICD-10-CM

## 2018-01-01 DIAGNOSIS — R06.02 SHORTNESS OF BREATH: ICD-10-CM

## 2018-01-01 DIAGNOSIS — R40.0 SOMNOLENCE: ICD-10-CM

## 2018-01-01 DIAGNOSIS — Z71.89 GOALS OF CARE, COUNSELING/DISCUSSION: ICD-10-CM

## 2018-01-01 DIAGNOSIS — R63.30 FEEDING DIFFICULTIES: ICD-10-CM

## 2018-01-01 LAB
ALBUMIN SERPL-MCNC: 1.6 G/DL (ref 3.5–5)
ALBUMIN SERPL-MCNC: 1.7 G/DL (ref 3.5–5)
ALBUMIN SERPL-MCNC: 1.7 G/DL (ref 3.5–5)
ALBUMIN SERPL-MCNC: 1.8 G/DL (ref 3.5–5)
ALBUMIN SERPL-MCNC: 2.1 G/DL (ref 3.5–5)
ALBUMIN SERPL-MCNC: 2.1 G/DL (ref 3.5–5)
ALBUMIN SERPL-MCNC: 2.2 G/DL (ref 3.5–5)
ALBUMIN SERPL-MCNC: 2.3 G/DL (ref 3.5–5)
ALBUMIN SERPL-MCNC: 3.1 G/DL (ref 3.5–5)
ALBUMIN SERPL-MCNC: 3.1 G/DL (ref 3.5–5)
ALBUMIN SERPL-MCNC: 3.5 G/DL (ref 3.5–5)
ALBUMIN/GLOB SERPL: 0.4 {RATIO} (ref 1.1–2.2)
ALBUMIN/GLOB SERPL: 0.5 {RATIO} (ref 1.1–2.2)
ALBUMIN/GLOB SERPL: 0.6 {RATIO} (ref 1.1–2.2)
ALBUMIN/GLOB SERPL: 0.7 {RATIO} (ref 1.1–2.2)
ALBUMIN/GLOB SERPL: 0.9 {RATIO} (ref 1.1–2.2)
ALBUMIN/GLOB SERPL: 1 {RATIO} (ref 1.1–2.2)
ALBUMIN/GLOB SERPL: 1 {RATIO} (ref 1.1–2.2)
ALP SERPL-CCNC: 102 U/L (ref 45–117)
ALP SERPL-CCNC: 109 U/L (ref 45–117)
ALP SERPL-CCNC: 50 U/L (ref 45–117)
ALP SERPL-CCNC: 50 U/L (ref 45–117)
ALP SERPL-CCNC: 55 U/L (ref 45–117)
ALP SERPL-CCNC: 56 U/L (ref 45–117)
ALP SERPL-CCNC: 58 U/L (ref 45–117)
ALP SERPL-CCNC: 61 U/L (ref 45–117)
ALP SERPL-CCNC: 65 U/L (ref 45–117)
ALP SERPL-CCNC: 69 U/L (ref 45–117)
ALP SERPL-CCNC: 77 U/L (ref 45–117)
ALP SERPL-CCNC: 86 U/L (ref 45–117)
ALP SERPL-CCNC: 91 U/L (ref 45–117)
ALT SERPL-CCNC: 14 U/L (ref 12–78)
ALT SERPL-CCNC: 20 U/L (ref 12–78)
ALT SERPL-CCNC: 21 U/L (ref 12–78)
ALT SERPL-CCNC: 21 U/L (ref 12–78)
ALT SERPL-CCNC: 29 U/L (ref 12–78)
ALT SERPL-CCNC: 39 U/L (ref 12–78)
ALT SERPL-CCNC: 47 U/L (ref 12–78)
ALT SERPL-CCNC: 53 U/L (ref 12–78)
ALT SERPL-CCNC: 57 U/L (ref 12–78)
ALT SERPL-CCNC: 60 U/L (ref 12–78)
ALT SERPL-CCNC: 60 U/L (ref 12–78)
AMMONIA PLAS-SCNC: 17 UMOL/L
ANION GAP SERPL CALC-SCNC: 10 MMOL/L (ref 5–15)
ANION GAP SERPL CALC-SCNC: 10 MMOL/L (ref 5–15)
ANION GAP SERPL CALC-SCNC: 11 MMOL/L (ref 5–15)
ANION GAP SERPL CALC-SCNC: 11 MMOL/L (ref 5–15)
ANION GAP SERPL CALC-SCNC: 4 MMOL/L (ref 5–15)
ANION GAP SERPL CALC-SCNC: 5 MMOL/L (ref 5–15)
ANION GAP SERPL CALC-SCNC: 5 MMOL/L (ref 5–15)
ANION GAP SERPL CALC-SCNC: 6 MMOL/L (ref 5–15)
ANION GAP SERPL CALC-SCNC: 6 MMOL/L (ref 5–15)
ANION GAP SERPL CALC-SCNC: 7 MMOL/L (ref 5–15)
ANION GAP SERPL CALC-SCNC: 8 MMOL/L (ref 5–15)
ANION GAP SERPL CALC-SCNC: 9 MMOL/L (ref 5–15)
APPEARANCE FLD: ABNORMAL
APTT PPP: 25.8 SEC (ref 22.1–32.5)
APTT PPP: 28.6 SEC (ref 22.1–32.5)
ARTERIAL PATENCY WRIST A: ABNORMAL
ARTERIAL PATENCY WRIST A: NO
ARTERIAL PATENCY WRIST A: YES
AST SERPL-CCNC: 14 U/L (ref 15–37)
AST SERPL-CCNC: 15 U/L (ref 15–37)
AST SERPL-CCNC: 19 U/L (ref 15–37)
AST SERPL-CCNC: 20 U/L (ref 15–37)
AST SERPL-CCNC: 21 U/L (ref 15–37)
AST SERPL-CCNC: 27 U/L (ref 15–37)
AST SERPL-CCNC: 27 U/L (ref 15–37)
AST SERPL-CCNC: 29 U/L (ref 15–37)
AST SERPL-CCNC: 30 U/L (ref 15–37)
AST SERPL-CCNC: 30 U/L (ref 15–37)
AST SERPL-CCNC: 37 U/L (ref 15–37)
AST SERPL-CCNC: 42 U/L (ref 15–37)
AST SERPL-CCNC: 44 U/L (ref 15–37)
ATRIAL RATE: 468 BPM
ATRIAL RATE: 94 BPM
BACTERIA SPEC CULT: ABNORMAL
BACTERIA SPEC CULT: NORMAL
BACTERIA SPEC CULT: NORMAL
BASE DEFICIT BLD-SCNC: 1 MMOL/L
BASE DEFICIT BLD-SCNC: 2 MMOL/L
BASE DEFICIT BLD-SCNC: 4 MMOL/L
BASE DEFICIT BLD-SCNC: 5 MMOL/L
BASE EXCESS BLD CALC-SCNC: 0 MMOL/L
BASE EXCESS BLD CALC-SCNC: 1 MMOL/L
BASE EXCESS BLD CALC-SCNC: 1 MMOL/L
BASE EXCESS BLD CALC-SCNC: 2 MMOL/L
BASE EXCESS BLD CALC-SCNC: 4 MMOL/L
BASE EXCESS BLD CALC-SCNC: 4 MMOL/L
BASE EXCESS BLD CALC-SCNC: 6 MMOL/L
BASOPHILS # BLD: 0 K/UL (ref 0–0.1)
BASOPHILS NFR BLD: 0 % (ref 0–1)
BASOPHILS NFR BLD: 1 % (ref 0–1)
BDY SITE: ABNORMAL
BDY SITE: NORMAL
BILIRUB SERPL-MCNC: 0.3 MG/DL (ref 0.2–1)
BILIRUB SERPL-MCNC: 0.4 MG/DL (ref 0.2–1)
BILIRUB SERPL-MCNC: 0.5 MG/DL (ref 0.2–1)
BILIRUB SERPL-MCNC: 0.6 MG/DL (ref 0.2–1)
BILIRUB SERPL-MCNC: 0.7 MG/DL (ref 0.2–1)
BILIRUB SERPL-MCNC: 0.8 MG/DL (ref 0.2–1)
BILIRUB SERPL-MCNC: 0.9 MG/DL (ref 0.2–1)
BUN SERPL-MCNC: 26 MG/DL (ref 6–20)
BUN SERPL-MCNC: 28 MG/DL (ref 6–20)
BUN SERPL-MCNC: 31 MG/DL (ref 6–20)
BUN SERPL-MCNC: 33 MG/DL (ref 6–20)
BUN SERPL-MCNC: 34 MG/DL (ref 6–20)
BUN SERPL-MCNC: 35 MG/DL (ref 6–20)
BUN SERPL-MCNC: 37 MG/DL (ref 6–20)
BUN SERPL-MCNC: 39 MG/DL (ref 6–20)
BUN SERPL-MCNC: 40 MG/DL (ref 6–20)
BUN SERPL-MCNC: 41 MG/DL (ref 6–20)
BUN SERPL-MCNC: 41 MG/DL (ref 6–20)
BUN SERPL-MCNC: 43 MG/DL (ref 6–20)
BUN SERPL-MCNC: 44 MG/DL (ref 6–20)
BUN SERPL-MCNC: 45 MG/DL (ref 6–20)
BUN SERPL-MCNC: 46 MG/DL (ref 6–20)
BUN SERPL-MCNC: 47 MG/DL (ref 6–20)
BUN SERPL-MCNC: 48 MG/DL (ref 6–20)
BUN SERPL-MCNC: 48 MG/DL (ref 6–20)
BUN SERPL-MCNC: 52 MG/DL (ref 6–20)
BUN/CREAT SERPL: 15 (ref 12–20)
BUN/CREAT SERPL: 16 (ref 12–20)
BUN/CREAT SERPL: 16 (ref 12–20)
BUN/CREAT SERPL: 17 (ref 12–20)
BUN/CREAT SERPL: 17 (ref 12–20)
BUN/CREAT SERPL: 19 (ref 12–20)
BUN/CREAT SERPL: 19 (ref 12–20)
BUN/CREAT SERPL: 21 (ref 12–20)
BUN/CREAT SERPL: 21 (ref 12–20)
BUN/CREAT SERPL: 22 (ref 12–20)
BUN/CREAT SERPL: 23 (ref 12–20)
BUN/CREAT SERPL: 24 (ref 12–20)
BUN/CREAT SERPL: 25 (ref 12–20)
BUN/CREAT SERPL: 25 (ref 12–20)
BUN/CREAT SERPL: 26 (ref 12–20)
BUN/CREAT SERPL: 26 (ref 12–20)
BUN/CREAT SERPL: 27 (ref 12–20)
BUN/CREAT SERPL: 28 (ref 12–20)
BUN/CREAT SERPL: 29 (ref 12–20)
C DIFF TOX GENS STL QL NAA+PROBE: NEGATIVE
CALCIUM SERPL-MCNC: 8.2 MG/DL (ref 8.5–10.1)
CALCIUM SERPL-MCNC: 8.3 MG/DL (ref 8.5–10.1)
CALCIUM SERPL-MCNC: 8.3 MG/DL (ref 8.5–10.1)
CALCIUM SERPL-MCNC: 8.4 MG/DL (ref 8.5–10.1)
CALCIUM SERPL-MCNC: 8.5 MG/DL (ref 8.5–10.1)
CALCIUM SERPL-MCNC: 8.6 MG/DL (ref 8.5–10.1)
CALCIUM SERPL-MCNC: 8.7 MG/DL (ref 8.5–10.1)
CALCIUM SERPL-MCNC: 8.8 MG/DL (ref 8.5–10.1)
CALCIUM SERPL-MCNC: 8.8 MG/DL (ref 8.5–10.1)
CALCIUM SERPL-MCNC: 8.9 MG/DL (ref 8.5–10.1)
CALCIUM SERPL-MCNC: 9 MG/DL (ref 8.5–10.1)
CALCIUM SERPL-MCNC: 9.1 MG/DL (ref 8.5–10.1)
CALCIUM SERPL-MCNC: 9.3 MG/DL (ref 8.5–10.1)
CALCULATED P AXIS, ECG09: 33 DEGREES
CALCULATED R AXIS, ECG10: -41 DEGREES
CALCULATED R AXIS, ECG10: 11 DEGREES
CALCULATED T AXIS, ECG11: 105 DEGREES
CALCULATED T AXIS, ECG11: 15 DEGREES
CHLORIDE SERPL-SCNC: 103 MMOL/L (ref 97–108)
CHLORIDE SERPL-SCNC: 105 MMOL/L (ref 97–108)
CHLORIDE SERPL-SCNC: 106 MMOL/L (ref 97–108)
CHLORIDE SERPL-SCNC: 107 MMOL/L (ref 97–108)
CHLORIDE SERPL-SCNC: 108 MMOL/L (ref 97–108)
CHLORIDE SERPL-SCNC: 108 MMOL/L (ref 97–108)
CHLORIDE SERPL-SCNC: 109 MMOL/L (ref 97–108)
CHLORIDE SERPL-SCNC: 111 MMOL/L (ref 97–108)
CHLORIDE SERPL-SCNC: 111 MMOL/L (ref 97–108)
CHLORIDE SERPL-SCNC: 112 MMOL/L (ref 97–108)
CHLORIDE SERPL-SCNC: 112 MMOL/L (ref 97–108)
CHLORIDE SERPL-SCNC: 113 MMOL/L (ref 97–108)
CHLORIDE SERPL-SCNC: 113 MMOL/L (ref 97–108)
CHLORIDE SERPL-SCNC: 114 MMOL/L (ref 97–108)
CHLORIDE SERPL-SCNC: 114 MMOL/L (ref 97–108)
CHLORIDE SERPL-SCNC: 115 MMOL/L (ref 97–108)
CHLORIDE SERPL-SCNC: 115 MMOL/L (ref 97–108)
CHLORIDE SERPL-SCNC: 116 MMOL/L (ref 97–108)
CHLORIDE SERPL-SCNC: 117 MMOL/L (ref 97–108)
CHLORIDE SERPL-SCNC: 119 MMOL/L (ref 97–108)
CHOLEST SERPL-MCNC: 118 MG/DL
CK MB CFR SERPL CALC: 1.1 % (ref 0–2.5)
CK MB SERPL-MCNC: 2.3 NG/ML (ref 5–25)
CK SERPL-CCNC: 215 U/L (ref 39–308)
CLOSURE TME COLL+EPINEP BLD: 45 SEC (ref 82–150)
CO2 SERPL-SCNC: 24 MMOL/L (ref 21–32)
CO2 SERPL-SCNC: 25 MMOL/L (ref 21–32)
CO2 SERPL-SCNC: 26 MMOL/L (ref 21–32)
CO2 SERPL-SCNC: 27 MMOL/L (ref 21–32)
CO2 SERPL-SCNC: 28 MMOL/L (ref 21–32)
CO2 SERPL-SCNC: 30 MMOL/L (ref 21–32)
COLOR FLD: ABNORMAL
CREAT SERPL-MCNC: 1.53 MG/DL (ref 0.7–1.3)
CREAT SERPL-MCNC: 1.55 MG/DL (ref 0.7–1.3)
CREAT SERPL-MCNC: 1.6 MG/DL (ref 0.7–1.3)
CREAT SERPL-MCNC: 1.61 MG/DL (ref 0.7–1.3)
CREAT SERPL-MCNC: 1.62 MG/DL (ref 0.7–1.3)
CREAT SERPL-MCNC: 1.64 MG/DL (ref 0.7–1.3)
CREAT SERPL-MCNC: 1.66 MG/DL (ref 0.7–1.3)
CREAT SERPL-MCNC: 1.69 MG/DL (ref 0.7–1.3)
CREAT SERPL-MCNC: 1.69 MG/DL (ref 0.7–1.3)
CREAT SERPL-MCNC: 1.77 MG/DL (ref 0.7–1.3)
CREAT SERPL-MCNC: 1.78 MG/DL (ref 0.7–1.3)
CREAT SERPL-MCNC: 1.81 MG/DL (ref 0.7–1.3)
CREAT SERPL-MCNC: 1.81 MG/DL (ref 0.7–1.3)
CREAT SERPL-MCNC: 1.83 MG/DL (ref 0.7–1.3)
CREAT SERPL-MCNC: 1.88 MG/DL (ref 0.7–1.3)
CREAT SERPL-MCNC: 1.94 MG/DL (ref 0.7–1.3)
CREAT SERPL-MCNC: 1.97 MG/DL (ref 0.7–1.3)
CREAT SERPL-MCNC: 2 MG/DL (ref 0.7–1.3)
CREAT SERPL-MCNC: 2.03 MG/DL (ref 0.7–1.3)
CREAT SERPL-MCNC: 2.03 MG/DL (ref 0.7–1.3)
CREAT SERPL-MCNC: 2.07 MG/DL (ref 0.7–1.3)
CREAT SERPL-MCNC: 2.1 MG/DL (ref 0.7–1.3)
CREAT SERPL-MCNC: 2.11 MG/DL (ref 0.7–1.3)
CREAT SERPL-MCNC: 2.12 MG/DL (ref 0.7–1.3)
CREAT SERPL-MCNC: 2.12 MG/DL (ref 0.7–1.3)
CREAT SERPL-MCNC: 2.13 MG/DL (ref 0.7–1.3)
CREAT SERPL-MCNC: 2.47 MG/DL (ref 0.7–1.3)
DATE LAST DOSE: ABNORMAL
DIAGNOSIS, 93000: NORMAL
DIAGNOSIS, 93000: NORMAL
DIFFERENTIAL METHOD BLD: ABNORMAL
EOSINOPHIL # BLD: 0 K/UL (ref 0–0.4)
EOSINOPHIL # BLD: 0.1 K/UL (ref 0–0.4)
EOSINOPHIL # BLD: 0.2 K/UL (ref 0–0.4)
EOSINOPHIL # BLD: 0.3 K/UL (ref 0–0.4)
EOSINOPHIL # BLD: 0.4 K/UL (ref 0–0.4)
EOSINOPHIL NFR BLD: 0 % (ref 0–7)
EOSINOPHIL NFR BLD: 2 % (ref 0–7)
EOSINOPHIL NFR BLD: 3 % (ref 0–7)
EOSINOPHIL NFR BLD: 3 % (ref 0–7)
EOSINOPHIL NFR BLD: 4 % (ref 0–7)
EOSINOPHIL NFR BLD: 5 % (ref 0–7)
EOSINOPHIL NFR BLD: 6 % (ref 0–7)
EOSINOPHIL NFR BLD: 6 % (ref 0–7)
EOSINOPHIL NFR BLD: 7 % (ref 0–7)
EOSINOPHIL NFR BLD: 7 % (ref 0–7)
ERYTHROCYTE [DISTWIDTH] IN BLOOD BY AUTOMATED COUNT: 13.2 % (ref 11.5–14.5)
ERYTHROCYTE [DISTWIDTH] IN BLOOD BY AUTOMATED COUNT: 13.3 % (ref 11.5–14.5)
ERYTHROCYTE [DISTWIDTH] IN BLOOD BY AUTOMATED COUNT: 13.4 % (ref 11.5–14.5)
ERYTHROCYTE [DISTWIDTH] IN BLOOD BY AUTOMATED COUNT: 13.5 % (ref 11.5–14.5)
ERYTHROCYTE [DISTWIDTH] IN BLOOD BY AUTOMATED COUNT: 13.6 % (ref 11.5–14.5)
ERYTHROCYTE [DISTWIDTH] IN BLOOD BY AUTOMATED COUNT: 13.7 % (ref 11.5–14.5)
ERYTHROCYTE [DISTWIDTH] IN BLOOD BY AUTOMATED COUNT: 13.9 % (ref 11.5–14.5)
ERYTHROCYTE [DISTWIDTH] IN BLOOD BY AUTOMATED COUNT: 14 % (ref 11.5–14.5)
ERYTHROCYTE [DISTWIDTH] IN BLOOD BY AUTOMATED COUNT: 14.2 % (ref 11.5–14.5)
ERYTHROCYTE [DISTWIDTH] IN BLOOD BY AUTOMATED COUNT: 14.2 % (ref 11.5–14.5)
EST. AVERAGE GLUCOSE BLD GHB EST-MCNC: 137 MG/DL
GAS FLOW.O2 O2 DELIVERY SYS: ABNORMAL L/MIN
GAS FLOW.O2 O2 DELIVERY SYS: NORMAL L/MIN
GAS FLOW.O2 SETTING OXYMISER: 12 BPM
GAS FLOW.O2 SETTING OXYMISER: 2 L/M
GAS FLOW.O2 SETTING OXYMISER: 6 L/M
GLOBULIN SER CALC-MCNC: 3.2 G/DL (ref 2–4)
GLOBULIN SER CALC-MCNC: 3.3 G/DL (ref 2–4)
GLOBULIN SER CALC-MCNC: 3.5 G/DL (ref 2–4)
GLOBULIN SER CALC-MCNC: 3.6 G/DL (ref 2–4)
GLOBULIN SER CALC-MCNC: 3.7 G/DL (ref 2–4)
GLOBULIN SER CALC-MCNC: 3.8 G/DL (ref 2–4)
GLUCOSE BLD STRIP.AUTO-MCNC: 103 MG/DL (ref 65–100)
GLUCOSE BLD STRIP.AUTO-MCNC: 115 MG/DL (ref 65–100)
GLUCOSE BLD STRIP.AUTO-MCNC: 117 MG/DL (ref 65–100)
GLUCOSE BLD STRIP.AUTO-MCNC: 119 MG/DL (ref 65–100)
GLUCOSE BLD STRIP.AUTO-MCNC: 125 MG/DL (ref 65–100)
GLUCOSE BLD STRIP.AUTO-MCNC: 125 MG/DL (ref 65–100)
GLUCOSE BLD STRIP.AUTO-MCNC: 126 MG/DL (ref 65–100)
GLUCOSE BLD STRIP.AUTO-MCNC: 127 MG/DL (ref 65–100)
GLUCOSE BLD STRIP.AUTO-MCNC: 133 MG/DL (ref 65–100)
GLUCOSE BLD STRIP.AUTO-MCNC: 134 MG/DL (ref 65–100)
GLUCOSE BLD STRIP.AUTO-MCNC: 135 MG/DL (ref 65–100)
GLUCOSE BLD STRIP.AUTO-MCNC: 137 MG/DL (ref 65–100)
GLUCOSE BLD STRIP.AUTO-MCNC: 138 MG/DL (ref 65–100)
GLUCOSE BLD STRIP.AUTO-MCNC: 143 MG/DL (ref 65–100)
GLUCOSE BLD STRIP.AUTO-MCNC: 145 MG/DL (ref 65–100)
GLUCOSE BLD STRIP.AUTO-MCNC: 147 MG/DL (ref 65–100)
GLUCOSE BLD STRIP.AUTO-MCNC: 155 MG/DL (ref 65–100)
GLUCOSE BLD STRIP.AUTO-MCNC: 161 MG/DL (ref 65–100)
GLUCOSE BLD STRIP.AUTO-MCNC: 164 MG/DL (ref 65–100)
GLUCOSE BLD STRIP.AUTO-MCNC: 165 MG/DL (ref 65–100)
GLUCOSE BLD STRIP.AUTO-MCNC: 165 MG/DL (ref 65–100)
GLUCOSE BLD STRIP.AUTO-MCNC: 167 MG/DL (ref 65–100)
GLUCOSE BLD STRIP.AUTO-MCNC: 167 MG/DL (ref 65–100)
GLUCOSE BLD STRIP.AUTO-MCNC: 168 MG/DL (ref 65–100)
GLUCOSE BLD STRIP.AUTO-MCNC: 172 MG/DL (ref 65–100)
GLUCOSE BLD STRIP.AUTO-MCNC: 175 MG/DL (ref 65–100)
GLUCOSE BLD STRIP.AUTO-MCNC: 175 MG/DL (ref 65–100)
GLUCOSE BLD STRIP.AUTO-MCNC: 178 MG/DL (ref 65–100)
GLUCOSE BLD STRIP.AUTO-MCNC: 182 MG/DL (ref 65–100)
GLUCOSE BLD STRIP.AUTO-MCNC: 184 MG/DL (ref 65–100)
GLUCOSE BLD STRIP.AUTO-MCNC: 187 MG/DL (ref 65–100)
GLUCOSE BLD STRIP.AUTO-MCNC: 188 MG/DL (ref 65–100)
GLUCOSE BLD STRIP.AUTO-MCNC: 190 MG/DL (ref 65–100)
GLUCOSE BLD STRIP.AUTO-MCNC: 190 MG/DL (ref 65–100)
GLUCOSE BLD STRIP.AUTO-MCNC: 191 MG/DL (ref 65–100)
GLUCOSE BLD STRIP.AUTO-MCNC: 192 MG/DL (ref 65–100)
GLUCOSE BLD STRIP.AUTO-MCNC: 194 MG/DL (ref 65–100)
GLUCOSE BLD STRIP.AUTO-MCNC: 195 MG/DL (ref 65–100)
GLUCOSE BLD STRIP.AUTO-MCNC: 196 MG/DL (ref 65–100)
GLUCOSE BLD STRIP.AUTO-MCNC: 196 MG/DL (ref 65–100)
GLUCOSE BLD STRIP.AUTO-MCNC: 197 MG/DL (ref 65–100)
GLUCOSE BLD STRIP.AUTO-MCNC: 198 MG/DL (ref 65–100)
GLUCOSE BLD STRIP.AUTO-MCNC: 198 MG/DL (ref 65–100)
GLUCOSE BLD STRIP.AUTO-MCNC: 199 MG/DL (ref 65–100)
GLUCOSE BLD STRIP.AUTO-MCNC: 199 MG/DL (ref 65–100)
GLUCOSE BLD STRIP.AUTO-MCNC: 201 MG/DL (ref 65–100)
GLUCOSE BLD STRIP.AUTO-MCNC: 201 MG/DL (ref 65–100)
GLUCOSE BLD STRIP.AUTO-MCNC: 202 MG/DL (ref 65–100)
GLUCOSE BLD STRIP.AUTO-MCNC: 202 MG/DL (ref 65–100)
GLUCOSE BLD STRIP.AUTO-MCNC: 204 MG/DL (ref 65–100)
GLUCOSE BLD STRIP.AUTO-MCNC: 205 MG/DL (ref 65–100)
GLUCOSE BLD STRIP.AUTO-MCNC: 206 MG/DL (ref 65–100)
GLUCOSE BLD STRIP.AUTO-MCNC: 207 MG/DL (ref 65–100)
GLUCOSE BLD STRIP.AUTO-MCNC: 207 MG/DL (ref 65–100)
GLUCOSE BLD STRIP.AUTO-MCNC: 210 MG/DL (ref 65–100)
GLUCOSE BLD STRIP.AUTO-MCNC: 210 MG/DL (ref 65–100)
GLUCOSE BLD STRIP.AUTO-MCNC: 213 MG/DL (ref 65–100)
GLUCOSE BLD STRIP.AUTO-MCNC: 214 MG/DL (ref 65–100)
GLUCOSE BLD STRIP.AUTO-MCNC: 218 MG/DL (ref 65–100)
GLUCOSE BLD STRIP.AUTO-MCNC: 219 MG/DL (ref 65–100)
GLUCOSE BLD STRIP.AUTO-MCNC: 221 MG/DL (ref 65–100)
GLUCOSE BLD STRIP.AUTO-MCNC: 222 MG/DL (ref 65–100)
GLUCOSE BLD STRIP.AUTO-MCNC: 224 MG/DL (ref 65–100)
GLUCOSE BLD STRIP.AUTO-MCNC: 226 MG/DL (ref 65–100)
GLUCOSE BLD STRIP.AUTO-MCNC: 230 MG/DL (ref 65–100)
GLUCOSE BLD STRIP.AUTO-MCNC: 230 MG/DL (ref 65–100)
GLUCOSE BLD STRIP.AUTO-MCNC: 234 MG/DL (ref 65–100)
GLUCOSE BLD STRIP.AUTO-MCNC: 234 MG/DL (ref 65–100)
GLUCOSE BLD STRIP.AUTO-MCNC: 235 MG/DL (ref 65–100)
GLUCOSE BLD STRIP.AUTO-MCNC: 237 MG/DL (ref 65–100)
GLUCOSE BLD STRIP.AUTO-MCNC: 239 MG/DL (ref 65–100)
GLUCOSE BLD STRIP.AUTO-MCNC: 240 MG/DL (ref 65–100)
GLUCOSE BLD STRIP.AUTO-MCNC: 241 MG/DL (ref 65–100)
GLUCOSE BLD STRIP.AUTO-MCNC: 244 MG/DL (ref 65–100)
GLUCOSE BLD STRIP.AUTO-MCNC: 244 MG/DL (ref 65–100)
GLUCOSE BLD STRIP.AUTO-MCNC: 245 MG/DL (ref 65–100)
GLUCOSE BLD STRIP.AUTO-MCNC: 251 MG/DL (ref 65–100)
GLUCOSE BLD STRIP.AUTO-MCNC: 252 MG/DL (ref 65–100)
GLUCOSE BLD STRIP.AUTO-MCNC: 255 MG/DL (ref 65–100)
GLUCOSE BLD STRIP.AUTO-MCNC: 257 MG/DL (ref 65–100)
GLUCOSE BLD STRIP.AUTO-MCNC: 258 MG/DL (ref 65–100)
GLUCOSE BLD STRIP.AUTO-MCNC: 261 MG/DL (ref 65–100)
GLUCOSE BLD STRIP.AUTO-MCNC: 267 MG/DL (ref 65–100)
GLUCOSE BLD STRIP.AUTO-MCNC: 272 MG/DL (ref 65–100)
GLUCOSE BLD STRIP.AUTO-MCNC: 273 MG/DL (ref 65–100)
GLUCOSE BLD STRIP.AUTO-MCNC: 275 MG/DL (ref 65–100)
GLUCOSE BLD STRIP.AUTO-MCNC: 276 MG/DL (ref 65–100)
GLUCOSE BLD STRIP.AUTO-MCNC: 278 MG/DL (ref 65–100)
GLUCOSE BLD STRIP.AUTO-MCNC: 284 MG/DL (ref 65–100)
GLUCOSE BLD STRIP.AUTO-MCNC: 289 MG/DL (ref 65–100)
GLUCOSE BLD STRIP.AUTO-MCNC: 294 MG/DL (ref 65–100)
GLUCOSE BLD STRIP.AUTO-MCNC: 81 MG/DL (ref 65–100)
GLUCOSE BLD STRIP.AUTO-MCNC: 87 MG/DL (ref 65–100)
GLUCOSE BLD STRIP.AUTO-MCNC: 95 MG/DL (ref 65–100)
GLUCOSE SERPL-MCNC: 102 MG/DL (ref 65–100)
GLUCOSE SERPL-MCNC: 118 MG/DL (ref 65–100)
GLUCOSE SERPL-MCNC: 124 MG/DL (ref 65–100)
GLUCOSE SERPL-MCNC: 127 MG/DL (ref 65–100)
GLUCOSE SERPL-MCNC: 160 MG/DL (ref 65–100)
GLUCOSE SERPL-MCNC: 162 MG/DL (ref 65–100)
GLUCOSE SERPL-MCNC: 167 MG/DL (ref 65–100)
GLUCOSE SERPL-MCNC: 168 MG/DL (ref 65–100)
GLUCOSE SERPL-MCNC: 174 MG/DL (ref 65–100)
GLUCOSE SERPL-MCNC: 183 MG/DL (ref 65–100)
GLUCOSE SERPL-MCNC: 190 MG/DL (ref 65–100)
GLUCOSE SERPL-MCNC: 191 MG/DL (ref 65–100)
GLUCOSE SERPL-MCNC: 194 MG/DL (ref 65–100)
GLUCOSE SERPL-MCNC: 195 MG/DL (ref 65–100)
GLUCOSE SERPL-MCNC: 200 MG/DL (ref 65–100)
GLUCOSE SERPL-MCNC: 203 MG/DL (ref 65–100)
GLUCOSE SERPL-MCNC: 207 MG/DL (ref 65–100)
GLUCOSE SERPL-MCNC: 210 MG/DL (ref 65–100)
GLUCOSE SERPL-MCNC: 217 MG/DL (ref 65–100)
GLUCOSE SERPL-MCNC: 227 MG/DL (ref 65–100)
GLUCOSE SERPL-MCNC: 236 MG/DL (ref 65–100)
GLUCOSE SERPL-MCNC: 240 MG/DL (ref 65–100)
GLUCOSE SERPL-MCNC: 241 MG/DL (ref 65–100)
GLUCOSE SERPL-MCNC: 258 MG/DL (ref 65–100)
GLUCOSE SERPL-MCNC: 261 MG/DL (ref 65–100)
GLUCOSE SERPL-MCNC: 277 MG/DL (ref 65–100)
GLUCOSE SERPL-MCNC: 87 MG/DL (ref 65–100)
GRAM STN SPEC: ABNORMAL
GRAM STN SPEC: NORMAL
HBA1C MFR BLD: 6.4 % (ref 4.2–6.3)
HCO3 BLD-SCNC: 19.7 MMOL/L (ref 22–26)
HCO3 BLD-SCNC: 21.1 MMOL/L (ref 22–26)
HCO3 BLD-SCNC: 22.4 MMOL/L (ref 22–26)
HCO3 BLD-SCNC: 23.8 MMOL/L (ref 22–26)
HCO3 BLD-SCNC: 24 MMOL/L (ref 22–26)
HCO3 BLD-SCNC: 24.7 MMOL/L (ref 22–26)
HCO3 BLD-SCNC: 25.4 MMOL/L (ref 22–26)
HCO3 BLD-SCNC: 25.5 MMOL/L (ref 22–26)
HCO3 BLD-SCNC: 25.9 MMOL/L (ref 22–26)
HCO3 BLD-SCNC: 25.9 MMOL/L (ref 22–26)
HCO3 BLD-SCNC: 26.5 MMOL/L (ref 22–26)
HCO3 BLD-SCNC: 27.3 MMOL/L (ref 22–26)
HCO3 BLD-SCNC: 27.9 MMOL/L (ref 22–26)
HCO3 BLD-SCNC: 28.1 MMOL/L (ref 22–26)
HCO3 BLD-SCNC: 29.7 MMOL/L (ref 22–26)
HCT VFR BLD AUTO: 25.4 % (ref 36.6–50.3)
HCT VFR BLD AUTO: 25.5 % (ref 36.6–50.3)
HCT VFR BLD AUTO: 25.7 % (ref 36.6–50.3)
HCT VFR BLD AUTO: 26 % (ref 36.6–50.3)
HCT VFR BLD AUTO: 26.2 % (ref 36.6–50.3)
HCT VFR BLD AUTO: 26.3 % (ref 36.6–50.3)
HCT VFR BLD AUTO: 28.3 % (ref 36.6–50.3)
HCT VFR BLD AUTO: 28.6 % (ref 36.6–50.3)
HCT VFR BLD AUTO: 28.6 % (ref 36.6–50.3)
HCT VFR BLD AUTO: 28.7 % (ref 36.6–50.3)
HCT VFR BLD AUTO: 29.6 % (ref 36.6–50.3)
HCT VFR BLD AUTO: 29.7 % (ref 36.6–50.3)
HCT VFR BLD AUTO: 29.9 % (ref 36.6–50.3)
HCT VFR BLD AUTO: 29.9 % (ref 36.6–50.3)
HCT VFR BLD AUTO: 30.2 % (ref 36.6–50.3)
HCT VFR BLD AUTO: 30.4 % (ref 36.6–50.3)
HCT VFR BLD AUTO: 34.8 % (ref 36.6–50.3)
HCT VFR BLD AUTO: 35.1 % (ref 36.6–50.3)
HCT VFR BLD AUTO: 36.5 % (ref 36.6–50.3)
HCT VFR BLD AUTO: 37.7 % (ref 36.6–50.3)
HCT VFR BLD AUTO: 39.9 % (ref 36.6–50.3)
HDLC SERPL-MCNC: 53 MG/DL
HDLC SERPL: 2.2 {RATIO} (ref 0–5)
HGB BLD-MCNC: 10.7 G/DL (ref 12.1–17)
HGB BLD-MCNC: 11.3 G/DL (ref 12.1–17)
HGB BLD-MCNC: 11.7 G/DL (ref 12.1–17)
HGB BLD-MCNC: 13 G/DL (ref 12.1–17)
HGB BLD-MCNC: 8 G/DL (ref 12.1–17)
HGB BLD-MCNC: 8.1 G/DL (ref 12.1–17)
HGB BLD-MCNC: 8.1 G/DL (ref 12.1–17)
HGB BLD-MCNC: 8.2 G/DL (ref 12.1–17)
HGB BLD-MCNC: 8.3 G/DL (ref 12.1–17)
HGB BLD-MCNC: 8.3 G/DL (ref 12.1–17)
HGB BLD-MCNC: 8.7 G/DL (ref 12.1–17)
HGB BLD-MCNC: 8.9 G/DL (ref 12.1–17)
HGB BLD-MCNC: 9.2 G/DL (ref 12.1–17)
HGB BLD-MCNC: 9.3 G/DL (ref 12.1–17)
HGB BLD-MCNC: 9.4 G/DL (ref 12.1–17)
HGB BLD-MCNC: 9.5 G/DL (ref 12.1–17)
HGB BLD-MCNC: 9.6 G/DL (ref 12.1–17)
IMM GRANULOCYTES # BLD: 0 K/UL
IMM GRANULOCYTES # BLD: 0 K/UL (ref 0–0.04)
IMM GRANULOCYTES # BLD: 0.1 K/UL (ref 0–0.04)
IMM GRANULOCYTES NFR BLD AUTO: 0 %
IMM GRANULOCYTES NFR BLD AUTO: 0 % (ref 0–0.5)
IMM GRANULOCYTES NFR BLD AUTO: 1 % (ref 0–0.5)
INR PPP: 1 (ref 0.9–1.1)
INR PPP: 1 (ref 0.9–1.1)
KOH PREP SPEC: NORMAL
LDLC SERPL CALC-MCNC: 20 MG/DL (ref 0–100)
LIPID PROFILE,FLP: ABNORMAL
LYMPHOCYTES # BLD: 0.4 K/UL (ref 0.8–3.5)
LYMPHOCYTES # BLD: 0.5 K/UL (ref 0.8–3.5)
LYMPHOCYTES # BLD: 0.6 K/UL (ref 0.8–3.5)
LYMPHOCYTES # BLD: 0.6 K/UL (ref 0.8–3.5)
LYMPHOCYTES # BLD: 0.7 K/UL (ref 0.8–3.5)
LYMPHOCYTES # BLD: 0.8 K/UL (ref 0.8–3.5)
LYMPHOCYTES # BLD: 1.3 K/UL (ref 0.8–3.5)
LYMPHOCYTES NFR BLD: 10 % (ref 12–49)
LYMPHOCYTES NFR BLD: 11 % (ref 12–49)
LYMPHOCYTES NFR BLD: 11 % (ref 12–49)
LYMPHOCYTES NFR BLD: 12 % (ref 12–49)
LYMPHOCYTES NFR BLD: 19 % (ref 12–49)
LYMPHOCYTES NFR BLD: 5 % (ref 12–49)
LYMPHOCYTES NFR BLD: 6 % (ref 12–49)
LYMPHOCYTES NFR BLD: 6 % (ref 12–49)
LYMPHOCYTES NFR BLD: 7 % (ref 12–49)
LYMPHOCYTES NFR BLD: 8 % (ref 12–49)
LYMPHOCYTES NFR BLD: 9 % (ref 12–49)
LYMPHOCYTES NFR FLD: 17 %
MAGNESIUM SERPL-MCNC: 1.4 MG/DL (ref 1.6–2.4)
MAGNESIUM SERPL-MCNC: 1.8 MG/DL (ref 1.6–2.4)
MAGNESIUM SERPL-MCNC: 1.9 MG/DL (ref 1.6–2.4)
MAGNESIUM SERPL-MCNC: 2.1 MG/DL (ref 1.6–2.4)
MAGNESIUM SERPL-MCNC: 2.2 MG/DL (ref 1.6–2.4)
MAGNESIUM SERPL-MCNC: 2.3 MG/DL (ref 1.6–2.4)
MCH RBC QN AUTO: 29 PG (ref 26–34)
MCH RBC QN AUTO: 29.1 PG (ref 26–34)
MCH RBC QN AUTO: 29.2 PG (ref 26–34)
MCH RBC QN AUTO: 29.5 PG (ref 26–34)
MCH RBC QN AUTO: 29.6 PG (ref 26–34)
MCH RBC QN AUTO: 29.8 PG (ref 26–34)
MCH RBC QN AUTO: 29.9 PG (ref 26–34)
MCH RBC QN AUTO: 29.9 PG (ref 26–34)
MCH RBC QN AUTO: 30.1 PG (ref 26–34)
MCH RBC QN AUTO: 30.2 PG (ref 26–34)
MCH RBC QN AUTO: 30.2 PG (ref 26–34)
MCH RBC QN AUTO: 30.3 PG (ref 26–34)
MCH RBC QN AUTO: 30.3 PG (ref 26–34)
MCH RBC QN AUTO: 30.5 PG (ref 26–34)
MCH RBC QN AUTO: 30.6 PG (ref 26–34)
MCH RBC QN AUTO: 30.8 PG (ref 26–34)
MCHC RBC AUTO-ENTMCNC: 30.4 G/DL (ref 30–36.5)
MCHC RBC AUTO-ENTMCNC: 30.5 G/DL (ref 30–36.5)
MCHC RBC AUTO-ENTMCNC: 30.9 G/DL (ref 30–36.5)
MCHC RBC AUTO-ENTMCNC: 31.3 G/DL (ref 30–36.5)
MCHC RBC AUTO-ENTMCNC: 31.4 G/DL (ref 30–36.5)
MCHC RBC AUTO-ENTMCNC: 31.4 G/DL (ref 30–36.5)
MCHC RBC AUTO-ENTMCNC: 31.5 G/DL (ref 30–36.5)
MCHC RBC AUTO-ENTMCNC: 31.5 G/DL (ref 30–36.5)
MCHC RBC AUTO-ENTMCNC: 31.6 G/DL (ref 30–36.5)
MCHC RBC AUTO-ENTMCNC: 31.6 G/DL (ref 30–36.5)
MCHC RBC AUTO-ENTMCNC: 31.8 G/DL (ref 30–36.5)
MCHC RBC AUTO-ENTMCNC: 31.9 G/DL (ref 30–36.5)
MCHC RBC AUTO-ENTMCNC: 32.1 G/DL (ref 30–36.5)
MCHC RBC AUTO-ENTMCNC: 32.1 G/DL (ref 30–36.5)
MCHC RBC AUTO-ENTMCNC: 32.5 G/DL (ref 30–36.5)
MCHC RBC AUTO-ENTMCNC: 32.5 G/DL (ref 30–36.5)
MCHC RBC AUTO-ENTMCNC: 32.6 G/DL (ref 30–36.5)
MCV RBC AUTO: 90.9 FL (ref 80–99)
MCV RBC AUTO: 90.9 FL (ref 80–99)
MCV RBC AUTO: 91.7 FL (ref 80–99)
MCV RBC AUTO: 92.3 FL (ref 80–99)
MCV RBC AUTO: 92.5 FL (ref 80–99)
MCV RBC AUTO: 92.6 FL (ref 80–99)
MCV RBC AUTO: 93.1 FL (ref 80–99)
MCV RBC AUTO: 93.5 FL (ref 80–99)
MCV RBC AUTO: 94.8 FL (ref 80–99)
MCV RBC AUTO: 95 FL (ref 80–99)
MCV RBC AUTO: 95.3 FL (ref 80–99)
MCV RBC AUTO: 95.5 FL (ref 80–99)
MCV RBC AUTO: 95.5 FL (ref 80–99)
MCV RBC AUTO: 95.6 FL (ref 80–99)
MCV RBC AUTO: 95.8 FL (ref 80–99)
MCV RBC AUTO: 96.1 FL (ref 80–99)
MCV RBC AUTO: 96.1 FL (ref 80–99)
MCV RBC AUTO: 96.3 FL (ref 80–99)
MCV RBC AUTO: 96.8 FL (ref 80–99)
MCV RBC AUTO: 97.3 FL (ref 80–99)
MONOCYTES # BLD: 0.2 K/UL (ref 0–1)
MONOCYTES # BLD: 0.2 K/UL (ref 0–1)
MONOCYTES # BLD: 0.4 K/UL (ref 0–1)
MONOCYTES # BLD: 0.4 K/UL (ref 0–1)
MONOCYTES # BLD: 0.5 K/UL (ref 0–1)
MONOCYTES # BLD: 0.6 K/UL (ref 0–1)
MONOCYTES # BLD: 0.7 K/UL (ref 0–1)
MONOCYTES # BLD: 0.7 K/UL (ref 0–1)
MONOCYTES # BLD: 1 K/UL (ref 0–1)
MONOCYTES # BLD: 1.2 K/UL (ref 0–1)
MONOCYTES NFR BLD: 10 % (ref 5–13)
MONOCYTES NFR BLD: 11 % (ref 5–13)
MONOCYTES NFR BLD: 12 % (ref 5–13)
MONOCYTES NFR BLD: 3 % (ref 5–13)
MONOCYTES NFR BLD: 4 % (ref 5–13)
MONOCYTES NFR BLD: 5 % (ref 5–13)
MONOCYTES NFR BLD: 7 % (ref 5–13)
MONOCYTES NFR BLD: 8 % (ref 5–13)
MONOCYTES NFR BLD: 9 % (ref 5–13)
MONOS+MACROS NFR FLD: 13 %
NEUTROPHILS NFR FLD: 70 %
NEUTS SEG # BLD: 10.5 K/UL (ref 1.8–8)
NEUTS SEG # BLD: 3.5 K/UL (ref 1.8–8)
NEUTS SEG # BLD: 3.6 K/UL (ref 1.8–8)
NEUTS SEG # BLD: 3.6 K/UL (ref 1.8–8)
NEUTS SEG # BLD: 3.7 K/UL (ref 1.8–8)
NEUTS SEG # BLD: 3.8 K/UL (ref 1.8–8)
NEUTS SEG # BLD: 3.9 K/UL (ref 1.8–8)
NEUTS SEG # BLD: 4 K/UL (ref 1.8–8)
NEUTS SEG # BLD: 4.1 K/UL (ref 1.8–8)
NEUTS SEG # BLD: 4.4 K/UL (ref 1.8–8)
NEUTS SEG # BLD: 4.5 K/UL (ref 1.8–8)
NEUTS SEG # BLD: 4.6 K/UL (ref 1.8–8)
NEUTS SEG # BLD: 4.8 K/UL (ref 1.8–8)
NEUTS SEG # BLD: 5 K/UL (ref 1.8–8)
NEUTS SEG # BLD: 5.2 K/UL (ref 1.8–8)
NEUTS SEG # BLD: 5.7 K/UL (ref 1.8–8)
NEUTS SEG # BLD: 6.4 K/UL (ref 1.8–8)
NEUTS SEG # BLD: 6.7 K/UL (ref 1.8–8)
NEUTS SEG # BLD: 7.7 K/UL (ref 1.8–8)
NEUTS SEG # BLD: 9 K/UL (ref 1.8–8)
NEUTS SEG NFR BLD: 69 % (ref 32–75)
NEUTS SEG NFR BLD: 72 % (ref 32–75)
NEUTS SEG NFR BLD: 73 % (ref 32–75)
NEUTS SEG NFR BLD: 74 % (ref 32–75)
NEUTS SEG NFR BLD: 75 % (ref 32–75)
NEUTS SEG NFR BLD: 76 % (ref 32–75)
NEUTS SEG NFR BLD: 78 % (ref 32–75)
NEUTS SEG NFR BLD: 79 % (ref 32–75)
NEUTS SEG NFR BLD: 80 % (ref 32–75)
NEUTS SEG NFR BLD: 80 % (ref 32–75)
NEUTS SEG NFR BLD: 83 % (ref 32–75)
NEUTS SEG NFR BLD: 84 % (ref 32–75)
NEUTS SEG NFR BLD: 87 % (ref 32–75)
NEUTS SEG NFR BLD: 89 % (ref 32–75)
NRBC # BLD: 0 K/UL (ref 0–0.01)
NRBC BLD-RTO: 0 PER 100 WBC
NUC CELL # FLD: 860 /CU MM
O2/TOTAL GAS SETTING VFR VENT: 0.24 %
O2/TOTAL GAS SETTING VFR VENT: 0.4 %
O2/TOTAL GAS SETTING VFR VENT: 0.5 %
O2/TOTAL GAS SETTING VFR VENT: 100 %
O2/TOTAL GAS SETTING VFR VENT: 100 %
O2/TOTAL GAS SETTING VFR VENT: 30 %
O2/TOTAL GAS SETTING VFR VENT: 40 %
O2/TOTAL GAS SETTING VFR VENT: 40 %
O2/TOTAL GAS SETTING VFR VENT: 50 %
O2/TOTAL GAS SETTING VFR VENT: 50 %
P-R INTERVAL, ECG05: 132 MS
PCO2 BLD: 32.2 MMHG (ref 35–45)
PCO2 BLD: 33.1 MMHG (ref 35–45)
PCO2 BLD: 33.8 MMHG (ref 35–45)
PCO2 BLD: 34.2 MMHG (ref 35–45)
PCO2 BLD: 36 MMHG (ref 35–45)
PCO2 BLD: 36 MMHG (ref 35–45)
PCO2 BLD: 36.1 MMHG (ref 35–45)
PCO2 BLD: 37.2 MMHG (ref 35–45)
PCO2 BLD: 37.5 MMHG (ref 35–45)
PCO2 BLD: 37.6 MMHG (ref 35–45)
PCO2 BLD: 38.1 MMHG (ref 35–45)
PCO2 BLD: 40.3 MMHG (ref 35–45)
PCO2 BLD: 42.9 MMHG (ref 35–45)
PCO2 BLD: 43.3 MMHG (ref 35–45)
PCO2 BLD: 51.9 MMHG (ref 35–45)
PEEP RESPIRATORY: 5 CMH2O
PEEP RESPIRATORY: 8 CMH2O
PH BLD: 7.34 [PH] (ref 7.35–7.45)
PH BLD: 7.39 [PH] (ref 7.35–7.45)
PH BLD: 7.4 [PH] (ref 7.35–7.45)
PH BLD: 7.4 [PH] (ref 7.35–7.45)
PH BLD: 7.41 [PH] (ref 7.35–7.45)
PH BLD: 7.43 [PH] (ref 7.35–7.45)
PH BLD: 7.44 [PH] (ref 7.35–7.45)
PH BLD: 7.45 [PH] (ref 7.35–7.45)
PH BLD: 7.46 [PH] (ref 7.35–7.45)
PH BLD: 7.47 [PH] (ref 7.35–7.45)
PH BLD: 7.48 [PH] (ref 7.35–7.45)
PHOSPHATE SERPL-MCNC: 1.7 MG/DL (ref 2.6–4.7)
PHOSPHATE SERPL-MCNC: 2.1 MG/DL (ref 2.6–4.7)
PHOSPHATE SERPL-MCNC: 2.2 MG/DL (ref 2.6–4.7)
PHOSPHATE SERPL-MCNC: 2.5 MG/DL (ref 2.6–4.7)
PHOSPHATE SERPL-MCNC: 2.5 MG/DL (ref 2.6–4.7)
PHOSPHATE SERPL-MCNC: 2.8 MG/DL (ref 2.6–4.7)
PHOSPHATE SERPL-MCNC: 3.1 MG/DL (ref 2.6–4.7)
PHOSPHATE SERPL-MCNC: 3.1 MG/DL (ref 2.6–4.7)
PHOSPHATE SERPL-MCNC: 3.5 MG/DL (ref 2.6–4.7)
PHOSPHATE SERPL-MCNC: 4 MG/DL (ref 2.6–4.7)
PIP ISTAT,IPIP: 12
PIP ISTAT,IPIP: 20
PIP ISTAT,IPIP: 25
PLATELET # BLD AUTO: 101 K/UL (ref 150–400)
PLATELET # BLD AUTO: 102 K/UL (ref 150–400)
PLATELET # BLD AUTO: 107 K/UL (ref 150–400)
PLATELET # BLD AUTO: 111 K/UL (ref 150–400)
PLATELET # BLD AUTO: 112 K/UL (ref 150–400)
PLATELET # BLD AUTO: 120 K/UL (ref 150–400)
PLATELET # BLD AUTO: 123 K/UL (ref 150–400)
PLATELET # BLD AUTO: 126 K/UL (ref 150–400)
PLATELET # BLD AUTO: 136 K/UL (ref 150–400)
PLATELET # BLD AUTO: 137 K/UL (ref 150–400)
PLATELET # BLD AUTO: 142 K/UL (ref 150–400)
PLATELET # BLD AUTO: 144 K/UL (ref 150–400)
PLATELET # BLD AUTO: 144 K/UL (ref 150–400)
PLATELET # BLD AUTO: 146 K/UL (ref 150–400)
PLATELET # BLD AUTO: 148 K/UL (ref 150–400)
PLATELET # BLD AUTO: 151 K/UL (ref 150–400)
PLATELET # BLD AUTO: 157 K/UL (ref 150–400)
PLATELET # BLD AUTO: 167 K/UL (ref 150–400)
PLATELET # BLD AUTO: 185 K/UL (ref 150–400)
PLATELET # BLD AUTO: 189 K/UL (ref 150–400)
PLATELET # BLD AUTO: 98 K/UL (ref 150–400)
PLATELET COMMENTS,PCOM: ABNORMAL
PLATELET COMMENTS,PCOM: ABNORMAL
PMV BLD AUTO: 12.2 FL (ref 8.9–12.9)
PMV BLD AUTO: 12.3 FL (ref 8.9–12.9)
PMV BLD AUTO: 12.5 FL (ref 8.9–12.9)
PMV BLD AUTO: 12.7 FL (ref 8.9–12.9)
PMV BLD AUTO: 12.8 FL (ref 8.9–12.9)
PMV BLD AUTO: 12.8 FL (ref 8.9–12.9)
PMV BLD AUTO: 12.9 FL (ref 8.9–12.9)
PMV BLD AUTO: 13 FL (ref 8.9–12.9)
PMV BLD AUTO: 13 FL (ref 8.9–12.9)
PO2 BLD: 101 MMHG (ref 80–100)
PO2 BLD: 115 MMHG (ref 80–100)
PO2 BLD: 115 MMHG (ref 80–100)
PO2 BLD: 151 MMHG (ref 80–100)
PO2 BLD: 207 MMHG (ref 80–100)
PO2 BLD: 446 MMHG (ref 80–100)
PO2 BLD: 52 MMHG (ref 80–100)
PO2 BLD: 60 MMHG (ref 80–100)
PO2 BLD: 62 MMHG (ref 80–100)
PO2 BLD: 63 MMHG (ref 80–100)
PO2 BLD: 64 MMHG (ref 80–100)
PO2 BLD: 65 MMHG (ref 80–100)
PO2 BLD: 80 MMHG (ref 80–100)
PO2 BLD: 82 MMHG (ref 80–100)
PO2 BLD: 83 MMHG (ref 80–100)
POTASSIUM SERPL-SCNC: 3.1 MMOL/L (ref 3.5–5.1)
POTASSIUM SERPL-SCNC: 3.3 MMOL/L (ref 3.5–5.1)
POTASSIUM SERPL-SCNC: 3.3 MMOL/L (ref 3.5–5.1)
POTASSIUM SERPL-SCNC: 3.4 MMOL/L (ref 3.5–5.1)
POTASSIUM SERPL-SCNC: 3.4 MMOL/L (ref 3.5–5.1)
POTASSIUM SERPL-SCNC: 3.5 MMOL/L (ref 3.5–5.1)
POTASSIUM SERPL-SCNC: 3.5 MMOL/L (ref 3.5–5.1)
POTASSIUM SERPL-SCNC: 3.6 MMOL/L (ref 3.5–5.1)
POTASSIUM SERPL-SCNC: 3.7 MMOL/L (ref 3.5–5.1)
POTASSIUM SERPL-SCNC: 3.8 MMOL/L (ref 3.5–5.1)
POTASSIUM SERPL-SCNC: 3.9 MMOL/L (ref 3.5–5.1)
POTASSIUM SERPL-SCNC: 4 MMOL/L (ref 3.5–5.1)
POTASSIUM SERPL-SCNC: 4.1 MMOL/L (ref 3.5–5.1)
POTASSIUM SERPL-SCNC: 4.2 MMOL/L (ref 3.5–5.1)
POTASSIUM SERPL-SCNC: 4.5 MMOL/L (ref 3.5–5.1)
PRESSURE SUPPORT SETTING VENT: 5 CMH2O
PRESSURE SUPPORT SETTING VENT: 7 CMH2O
PROT SERPL-MCNC: 5.1 G/DL (ref 6.4–8.2)
PROT SERPL-MCNC: 5.2 G/DL (ref 6.4–8.2)
PROT SERPL-MCNC: 5.4 G/DL (ref 6.4–8.2)
PROT SERPL-MCNC: 5.5 G/DL (ref 6.4–8.2)
PROT SERPL-MCNC: 5.6 G/DL (ref 6.4–8.2)
PROT SERPL-MCNC: 5.8 G/DL (ref 6.4–8.2)
PROT SERPL-MCNC: 5.8 G/DL (ref 6.4–8.2)
PROT SERPL-MCNC: 6.3 G/DL (ref 6.4–8.2)
PROT SERPL-MCNC: 6.7 G/DL (ref 6.4–8.2)
PROT SERPL-MCNC: 7.1 G/DL (ref 6.4–8.2)
PROTHROMBIN TIME: 10.4 SEC (ref 9–11.1)
PROTHROMBIN TIME: 10.6 SEC (ref 9–11.1)
Q-T INTERVAL, ECG07: 320 MS
Q-T INTERVAL, ECG07: 364 MS
QRS DURATION, ECG06: 126 MS
QRS DURATION, ECG06: 82 MS
QTC CALCULATION (BEZET), ECG08: 400 MS
QTC CALCULATION (BEZET), ECG08: 440 MS
RBC # BLD AUTO: 2.68 M/UL (ref 4.1–5.7)
RBC # BLD AUTO: 2.74 M/UL (ref 4.1–5.7)
RBC # BLD AUTO: 2.75 M/UL (ref 4.1–5.7)
RBC # BLD AUTO: 2.81 M/UL (ref 4.1–5.7)
RBC # BLD AUTO: 2.83 M/UL (ref 4.1–5.7)
RBC # BLD AUTO: 2.85 M/UL (ref 4.1–5.7)
RBC # BLD AUTO: 2.94 M/UL (ref 4.1–5.7)
RBC # BLD AUTO: 2.94 M/UL (ref 4.1–5.7)
RBC # BLD AUTO: 3.01 M/UL (ref 4.1–5.7)
RBC # BLD AUTO: 3.1 M/UL (ref 4.1–5.7)
RBC # BLD AUTO: 3.11 M/UL (ref 4.1–5.7)
RBC # BLD AUTO: 3.11 M/UL (ref 4.1–5.7)
RBC # BLD AUTO: 3.12 M/UL (ref 4.1–5.7)
RBC # BLD AUTO: 3.12 M/UL (ref 4.1–5.7)
RBC # BLD AUTO: 3.14 M/UL (ref 4.1–5.7)
RBC # BLD AUTO: 3.18 M/UL (ref 4.1–5.7)
RBC # BLD AUTO: 3.67 M/UL (ref 4.1–5.7)
RBC # BLD AUTO: 3.8 M/UL (ref 4.1–5.7)
RBC # BLD AUTO: 3.83 M/UL (ref 4.1–5.7)
RBC # BLD AUTO: 4.39 M/UL (ref 4.1–5.7)
RBC # FLD: >100 /CU MM
RBC MORPH BLD: ABNORMAL
REPORTED DOSE,DOSE: ABNORMAL UNITS
REPORTED DOSE/TIME,TMG: 1419
SAO2 % BLD: 100 % (ref 92–97)
SAO2 % BLD: 100 % (ref 92–97)
SAO2 % BLD: 89 % (ref 92–97)
SAO2 % BLD: 92 % (ref 92–97)
SAO2 % BLD: 92 % (ref 92–97)
SAO2 % BLD: 93 % (ref 92–97)
SAO2 % BLD: 93 % (ref 92–97)
SAO2 % BLD: 94 % (ref 92–97)
SAO2 % BLD: 96 % (ref 92–97)
SAO2 % BLD: 98 % (ref 92–97)
SAO2 % BLD: 99 % (ref 92–97)
SERVICE CMNT-IMP: ABNORMAL
SERVICE CMNT-IMP: NORMAL
SODIUM SERPL-SCNC: 141 MMOL/L (ref 136–145)
SODIUM SERPL-SCNC: 142 MMOL/L (ref 136–145)
SODIUM SERPL-SCNC: 144 MMOL/L (ref 136–145)
SODIUM SERPL-SCNC: 144 MMOL/L (ref 136–145)
SODIUM SERPL-SCNC: 145 MMOL/L (ref 136–145)
SODIUM SERPL-SCNC: 146 MMOL/L (ref 136–145)
SODIUM SERPL-SCNC: 147 MMOL/L (ref 136–145)
SODIUM SERPL-SCNC: 148 MMOL/L (ref 136–145)
SODIUM SERPL-SCNC: 150 MMOL/L (ref 136–145)
SODIUM SERPL-SCNC: 150 MMOL/L (ref 136–145)
SODIUM SERPL-SCNC: 152 MMOL/L (ref 136–145)
SODIUM SERPL-SCNC: 152 MMOL/L (ref 136–145)
SPECIMEN SOURCE FLD: ABNORMAL
SPECIMEN SOURCE: ABNORMAL
SPECIMEN SOURCE: ABNORMAL
SPECIMEN TYPE: ABNORMAL
SPECIMEN TYPE: NORMAL
T4 FREE SERPL-MCNC: 1 NG/DL (ref 0.8–1.5)
THERAPEUTIC RANGE,PTTT: NORMAL SECS (ref 58–77)
THERAPEUTIC RANGE,PTTT: NORMAL SECS (ref 58–77)
TOTAL RESP. RATE, ITRR: 12
TOTAL RESP. RATE, ITRR: 14
TOTAL RESP. RATE, ITRR: 18
TOTAL RESP. RATE, ITRR: 24
TOTAL RESP. RATE, ITRR: 24
TOTAL RESP. RATE, ITRR: 26
TOTAL RESP. RATE, ITRR: 29
TOTAL RESP. RATE, ITRR: 29
TOTAL RESP. RATE, ITRR: 30
TOTAL RESP. RATE, ITRR: 35
TRIGL SERPL-MCNC: 225 MG/DL (ref ?–150)
TROPONIN I SERPL-MCNC: 0.05 NG/ML
TSH SERPL DL<=0.05 MIU/L-ACNC: 0.16 UIU/ML (ref 0.36–3.74)
TSH SERPL DL<=0.05 MIU/L-ACNC: 1.33 UIU/ML (ref 0.36–3.74)
VANCOMYCIN SERPL-MCNC: 11.8 UG/ML
VANCOMYCIN SERPL-MCNC: 16.6 UG/ML
VANCOMYCIN SERPL-MCNC: 16.7 UG/ML
VANCOMYCIN SERPL-MCNC: 20 UG/ML
VANCOMYCIN SERPL-MCNC: 21.2 UG/ML
VANCOMYCIN SERPL-MCNC: 26.6 UG/ML
VANCOMYCIN TROUGH SERPL-MCNC: 22.4 UG/ML (ref 5–10)
VENTILATION MODE VENT: ABNORMAL
VENTRICULAR RATE, ECG03: 88 BPM
VENTRICULAR RATE, ECG03: 94 BPM
VIRUS SPEC CULT: ABNORMAL
VIRUS SPEC CULT: ABNORMAL
VLDLC SERPL CALC-MCNC: 45 MG/DL
VOLUME CONTROL IVLC: YES
VOLUME CONTROL IVLC: YES
VT SETTING VENT: 550 ML
VT SETTING VENT: 600 ML
WBC # BLD AUTO: 10.1 K/UL (ref 4.1–11.1)
WBC # BLD AUTO: 12.5 K/UL (ref 4.1–11.1)
WBC # BLD AUTO: 4.6 K/UL (ref 4.1–11.1)
WBC # BLD AUTO: 4.9 K/UL (ref 4.1–11.1)
WBC # BLD AUTO: 5 K/UL (ref 4.1–11.1)
WBC # BLD AUTO: 5 K/UL (ref 4.1–11.1)
WBC # BLD AUTO: 5.1 K/UL (ref 4.1–11.1)
WBC # BLD AUTO: 5.2 K/UL (ref 4.1–11.1)
WBC # BLD AUTO: 5.3 K/UL (ref 4.1–11.1)
WBC # BLD AUTO: 5.6 K/UL (ref 4.1–11.1)
WBC # BLD AUTO: 5.7 K/UL (ref 4.1–11.1)
WBC # BLD AUTO: 5.8 K/UL (ref 4.1–11.1)
WBC # BLD AUTO: 6.4 K/UL (ref 4.1–11.1)
WBC # BLD AUTO: 6.4 K/UL (ref 4.1–11.1)
WBC # BLD AUTO: 6.7 K/UL (ref 4.1–11.1)
WBC # BLD AUTO: 6.9 K/UL (ref 4.1–11.1)
WBC # BLD AUTO: 7.3 K/UL (ref 4.1–11.1)
WBC # BLD AUTO: 7.4 K/UL (ref 4.1–11.1)
WBC # BLD AUTO: 8.3 K/UL (ref 4.1–11.1)
WBC # BLD AUTO: 9.3 K/UL (ref 4.1–11.1)

## 2018-01-01 PROCEDURE — 93306 TTE W/DOPPLER COMPLETE: CPT

## 2018-01-01 PROCEDURE — 74011000250 HC RX REV CODE- 250: Performed by: INTERNAL MEDICINE

## 2018-01-01 PROCEDURE — 94003 VENT MGMT INPAT SUBQ DAY: CPT

## 2018-01-01 PROCEDURE — 83735 ASSAY OF MAGNESIUM: CPT | Performed by: INTERNAL MEDICINE

## 2018-01-01 PROCEDURE — 74011250636 HC RX REV CODE- 250/636: Performed by: HOSPITALIST

## 2018-01-01 PROCEDURE — 36415 COLL VENOUS BLD VENIPUNCTURE: CPT | Performed by: NEUROLOGICAL SURGERY

## 2018-01-01 PROCEDURE — 74011250637 HC RX REV CODE- 250/637: Performed by: INTERNAL MEDICINE

## 2018-01-01 PROCEDURE — 77030018836 HC SOL IRR NACL ICUM -A: Performed by: NEUROLOGICAL SURGERY

## 2018-01-01 PROCEDURE — 74011250636 HC RX REV CODE- 250/636: Performed by: INTERNAL MEDICINE

## 2018-01-01 PROCEDURE — 74011250636 HC RX REV CODE- 250/636

## 2018-01-01 PROCEDURE — 80053 COMPREHEN METABOLIC PANEL: CPT | Performed by: INTERNAL MEDICINE

## 2018-01-01 PROCEDURE — 71045 X-RAY EXAM CHEST 1 VIEW: CPT

## 2018-01-01 PROCEDURE — 74011000258 HC RX REV CODE- 258: Performed by: INTERNAL MEDICINE

## 2018-01-01 PROCEDURE — 74011636637 HC RX REV CODE- 636/637: Performed by: FAMILY MEDICINE

## 2018-01-01 PROCEDURE — 65610000006 HC RM INTENSIVE CARE

## 2018-01-01 PROCEDURE — 82803 BLOOD GASES ANY COMBINATION: CPT

## 2018-01-01 PROCEDURE — 74011636637 HC RX REV CODE- 636/637: Performed by: INTERNAL MEDICINE

## 2018-01-01 PROCEDURE — 77030013140 HC MSK NEB VYRM -A

## 2018-01-01 PROCEDURE — 80048 BASIC METABOLIC PNL TOTAL CA: CPT | Performed by: FAMILY MEDICINE

## 2018-01-01 PROCEDURE — 94640 AIRWAY INHALATION TREATMENT: CPT

## 2018-01-01 PROCEDURE — 77030014355 HC CVR BUR H TI BIOM -C: Performed by: NEUROLOGICAL SURGERY

## 2018-01-01 PROCEDURE — 84100 ASSAY OF PHOSPHORUS: CPT | Performed by: INTERNAL MEDICINE

## 2018-01-01 PROCEDURE — 74018 RADEX ABDOMEN 1 VIEW: CPT

## 2018-01-01 PROCEDURE — 85025 COMPLETE CBC W/AUTO DIFF WBC: CPT | Performed by: INTERNAL MEDICINE

## 2018-01-01 PROCEDURE — 36415 COLL VENOUS BLD VENIPUNCTURE: CPT | Performed by: INTERNAL MEDICINE

## 2018-01-01 PROCEDURE — 36569 INSJ PICC 5 YR+ W/O IMAGING: CPT | Performed by: INTERNAL MEDICINE

## 2018-01-01 PROCEDURE — 85014 HEMATOCRIT: CPT | Performed by: NEUROLOGICAL SURGERY

## 2018-01-01 PROCEDURE — 80202 ASSAY OF VANCOMYCIN: CPT

## 2018-01-01 PROCEDURE — 80048 BASIC METABOLIC PNL TOTAL CA: CPT | Performed by: HOSPITALIST

## 2018-01-01 PROCEDURE — 74011250636 HC RX REV CODE- 250/636: Performed by: NEUROLOGICAL SURGERY

## 2018-01-01 PROCEDURE — 82962 GLUCOSE BLOOD TEST: CPT

## 2018-01-01 PROCEDURE — 70450 CT HEAD/BRAIN W/O DYE: CPT

## 2018-01-01 PROCEDURE — 80048 BASIC METABOLIC PNL TOTAL CA: CPT | Performed by: INTERNAL MEDICINE

## 2018-01-01 PROCEDURE — 36600 WITHDRAWAL OF ARTERIAL BLOOD: CPT

## 2018-01-01 PROCEDURE — 94002 VENT MGMT INPAT INIT DAY: CPT

## 2018-01-01 PROCEDURE — 87210 SMEAR WET MOUNT SALINE/INK: CPT | Performed by: INTERNAL MEDICINE

## 2018-01-01 PROCEDURE — 74011636637 HC RX REV CODE- 636/637: Performed by: HOSPITALIST

## 2018-01-01 PROCEDURE — 36591 DRAW BLOOD OFF VENOUS DEVICE: CPT

## 2018-01-01 PROCEDURE — 77030002946 HC SUT NRLN J&J -B: Performed by: NEUROLOGICAL SURGERY

## 2018-01-01 PROCEDURE — 77030012879 HC MSK CPAP FLL FAC PHIL -B

## 2018-01-01 PROCEDURE — C9254 INJECTION, LACOSAMIDE: HCPCS | Performed by: NURSE PRACTITIONER

## 2018-01-01 PROCEDURE — 97164 PT RE-EVAL EST PLAN CARE: CPT

## 2018-01-01 PROCEDURE — 85025 COMPLETE CBC W/AUTO DIFF WBC: CPT | Performed by: EMERGENCY MEDICINE

## 2018-01-01 PROCEDURE — 74011250637 HC RX REV CODE- 250/637: Performed by: NURSE PRACTITIONER

## 2018-01-01 PROCEDURE — 87186 SC STD MICRODIL/AGAR DIL: CPT | Performed by: INTERNAL MEDICINE

## 2018-01-01 PROCEDURE — 89050 BODY FLUID CELL COUNT: CPT | Performed by: INTERNAL MEDICINE

## 2018-01-01 PROCEDURE — 77030014647 HC SEAL FBRN TISSL BAXT -D: Performed by: NEUROLOGICAL SURGERY

## 2018-01-01 PROCEDURE — 77030004472 HC BUR TAPR MEDT -B: Performed by: NEUROLOGICAL SURGERY

## 2018-01-01 PROCEDURE — 92526 ORAL FUNCTION THERAPY: CPT | Performed by: SPEECH-LANGUAGE PATHOLOGIST

## 2018-01-01 PROCEDURE — 77010033678 HC OXYGEN DAILY

## 2018-01-01 PROCEDURE — C9113 INJ PANTOPRAZOLE SODIUM, VIA: HCPCS | Performed by: INTERNAL MEDICINE

## 2018-01-01 PROCEDURE — 74011250637 HC RX REV CODE- 250/637: Performed by: FAMILY MEDICINE

## 2018-01-01 PROCEDURE — 94660 CPAP INITIATION&MGMT: CPT

## 2018-01-01 PROCEDURE — 36592 COLLECT BLOOD FROM PICC: CPT

## 2018-01-01 PROCEDURE — 74176 CT ABD & PELVIS W/O CONTRAST: CPT

## 2018-01-01 PROCEDURE — 97110 THERAPEUTIC EXERCISES: CPT

## 2018-01-01 PROCEDURE — 74011000258 HC RX REV CODE- 258: Performed by: NURSE PRACTITIONER

## 2018-01-01 PROCEDURE — 31500 INSERT EMERGENCY AIRWAY: CPT

## 2018-01-01 PROCEDURE — 87147 CULTURE TYPE IMMUNOLOGIC: CPT | Performed by: INTERNAL MEDICINE

## 2018-01-01 PROCEDURE — 76010000379 HC BRONCHOSCOPY DIAG/THERAPEUTIC

## 2018-01-01 PROCEDURE — 74011250636 HC RX REV CODE- 250/636: Performed by: NURSE PRACTITIONER

## 2018-01-01 PROCEDURE — 84443 ASSAY THYROID STIM HORMONE: CPT | Performed by: HOSPITALIST

## 2018-01-01 PROCEDURE — 85025 COMPLETE CBC W/AUTO DIFF WBC: CPT | Performed by: NEUROLOGICAL SURGERY

## 2018-01-01 PROCEDURE — 74011000250 HC RX REV CODE- 250: Performed by: NURSE PRACTITIONER

## 2018-01-01 PROCEDURE — 82140 ASSAY OF AMMONIA: CPT | Performed by: NURSE PRACTITIONER

## 2018-01-01 PROCEDURE — 87070 CULTURE OTHR SPECIMN AEROBIC: CPT | Performed by: INTERNAL MEDICINE

## 2018-01-01 PROCEDURE — 80061 LIPID PANEL: CPT | Performed by: FAMILY MEDICINE

## 2018-01-01 PROCEDURE — 76010000171 HC OR TIME 2 TO 2.5 HR INTENSV-TIER 1: Performed by: NEUROLOGICAL SURGERY

## 2018-01-01 PROCEDURE — 85025 COMPLETE CBC W/AUTO DIFF WBC: CPT | Performed by: HOSPITALIST

## 2018-01-01 PROCEDURE — 74011000250 HC RX REV CODE- 250: Performed by: PHYSICAL MEDICINE & REHABILITATION

## 2018-01-01 PROCEDURE — 77030004391 HC BUR FLUT MEDT -C: Performed by: NEUROLOGICAL SURGERY

## 2018-01-01 PROCEDURE — 77030012940 HC DRSG HYDRCOIL BMS -B

## 2018-01-01 PROCEDURE — 77030020847 HC STATLOK BARD -A

## 2018-01-01 PROCEDURE — 85730 THROMBOPLASTIN TIME PARTIAL: CPT | Performed by: NEUROLOGICAL SURGERY

## 2018-01-01 PROCEDURE — 76210000002 HC OR PH I REC 3 TO 3.5 HR: Performed by: NEUROLOGICAL SURGERY

## 2018-01-01 PROCEDURE — 36415 COLL VENOUS BLD VENIPUNCTURE: CPT | Performed by: NURSE PRACTITIONER

## 2018-01-01 PROCEDURE — 77030010545

## 2018-01-01 PROCEDURE — 87102 FUNGUS ISOLATION CULTURE: CPT | Performed by: INTERNAL MEDICINE

## 2018-01-01 PROCEDURE — 77030013567 HC DRN WND RESERV BARD -A: Performed by: NEUROLOGICAL SURGERY

## 2018-01-01 PROCEDURE — 76937 US GUIDE VASCULAR ACCESS: CPT

## 2018-01-01 PROCEDURE — 76060000035 HC ANESTHESIA 2 TO 2.5 HR: Performed by: NEUROLOGICAL SURGERY

## 2018-01-01 PROCEDURE — 97162 PT EVAL MOD COMPLEX 30 MIN: CPT

## 2018-01-01 PROCEDURE — 36415 COLL VENOUS BLD VENIPUNCTURE: CPT

## 2018-01-01 PROCEDURE — 83735 ASSAY OF MAGNESIUM: CPT | Performed by: NEUROLOGICAL SURGERY

## 2018-01-01 PROCEDURE — 5A1955Z RESPIRATORY VENTILATION, GREATER THAN 96 CONSECUTIVE HOURS: ICD-10-PCS | Performed by: INTERNAL MEDICINE

## 2018-01-01 PROCEDURE — 80048 BASIC METABOLIC PNL TOTAL CA: CPT | Performed by: NEUROLOGICAL SURGERY

## 2018-01-01 PROCEDURE — 77030032490 HC SLV COMPR SCD KNE COVD -B

## 2018-01-01 PROCEDURE — 76450000000

## 2018-01-01 PROCEDURE — 36415 COLL VENOUS BLD VENIPUNCTURE: CPT | Performed by: HOSPITALIST

## 2018-01-01 PROCEDURE — 92610 EVALUATE SWALLOWING FUNCTION: CPT | Performed by: SPEECH-LANGUAGE PATHOLOGIST

## 2018-01-01 PROCEDURE — 77030002996 HC SUT SLK J&J -A

## 2018-01-01 PROCEDURE — 84100 ASSAY OF PHOSPHORUS: CPT | Performed by: NEUROLOGICAL SURGERY

## 2018-01-01 PROCEDURE — 80053 COMPREHEN METABOLIC PANEL: CPT | Performed by: EMERGENCY MEDICINE

## 2018-01-01 PROCEDURE — 77030037404 HC CATH FOL COUDE SURESTEP BARD -B

## 2018-01-01 PROCEDURE — 77030008895 HC ADPT UROSTMY TU HOLL -A

## 2018-01-01 PROCEDURE — 87252 VIRUS INOCULATION TISSUE: CPT | Performed by: INTERNAL MEDICINE

## 2018-01-01 PROCEDURE — 77030011256 HC DRSG MEPILEX <16IN NO BORD MOLN -A

## 2018-01-01 PROCEDURE — 85730 THROMBOPLASTIN TIME PARTIAL: CPT | Performed by: EMERGENCY MEDICINE

## 2018-01-01 PROCEDURE — 88112 CYTOPATH CELL ENHANCE TECH: CPT | Performed by: FAMILY MEDICINE

## 2018-01-01 PROCEDURE — 77030020186 HC BOOT HL PROTCT SAGE -B

## 2018-01-01 PROCEDURE — 77030010520

## 2018-01-01 PROCEDURE — 77030002916 HC SUT ETHLN J&J -A: Performed by: NEUROLOGICAL SURGERY

## 2018-01-01 PROCEDURE — 77030020268 HC MISC GENERAL SUPPLY: Performed by: NEUROLOGICAL SURGERY

## 2018-01-01 PROCEDURE — 0BC78ZZ EXTIRPATION OF MATTER FROM LEFT MAIN BRONCHUS, VIA NATURAL OR ARTIFICIAL OPENING ENDOSCOPIC: ICD-10-PCS | Performed by: INTERNAL MEDICINE

## 2018-01-01 PROCEDURE — 74011000258 HC RX REV CODE- 258: Performed by: PHYSICAL MEDICINE & REHABILITATION

## 2018-01-01 PROCEDURE — 77030014007 HC SPNG HEMSTAT J&J -B: Performed by: NEUROLOGICAL SURGERY

## 2018-01-01 PROCEDURE — 74011000272 HC RX REV CODE- 272: Performed by: NEUROLOGICAL SURGERY

## 2018-01-01 PROCEDURE — 77030018719 HC DRSG PTCH ANTIMIC J&J -A

## 2018-01-01 PROCEDURE — 77030003029 HC SUT VCRL J&J -B: Performed by: NEUROLOGICAL SURGERY

## 2018-01-01 PROCEDURE — 87205 SMEAR GRAM STAIN: CPT | Performed by: INTERNAL MEDICINE

## 2018-01-01 PROCEDURE — 74011250636 HC RX REV CODE- 250/636: Performed by: PHYSICAL MEDICINE & REHABILITATION

## 2018-01-01 PROCEDURE — 87077 CULTURE AEROBIC IDENTIFY: CPT | Performed by: INTERNAL MEDICINE

## 2018-01-01 PROCEDURE — 0B978ZZ DRAINAGE OF LEFT MAIN BRONCHUS, VIA NATURAL OR ARTIFICIAL OPENING ENDOSCOPIC: ICD-10-PCS | Performed by: INTERNAL MEDICINE

## 2018-01-01 PROCEDURE — 74011000258 HC RX REV CODE- 258: Performed by: NEUROLOGICAL SURGERY

## 2018-01-01 PROCEDURE — 77030012602 HC SPNG PTTY NEUR J&J -B: Performed by: NEUROLOGICAL SURGERY

## 2018-01-01 PROCEDURE — 77030032490 HC SLV COMPR SCD KNE COVD -B: Performed by: NEUROLOGICAL SURGERY

## 2018-01-01 PROCEDURE — 84100 ASSAY OF PHOSPHORUS: CPT | Performed by: HOSPITALIST

## 2018-01-01 PROCEDURE — 0B938ZZ DRAINAGE OF RIGHT MAIN BRONCHUS, VIA NATURAL OR ARTIFICIAL OPENING ENDOSCOPIC: ICD-10-PCS | Performed by: INTERNAL MEDICINE

## 2018-01-01 PROCEDURE — C1713 ANCHOR/SCREW BN/BN,TIS/BN: HCPCS | Performed by: NEUROLOGICAL SURGERY

## 2018-01-01 PROCEDURE — 83735 ASSAY OF MAGNESIUM: CPT | Performed by: HOSPITALIST

## 2018-01-01 PROCEDURE — 93005 ELECTROCARDIOGRAM TRACING: CPT

## 2018-01-01 PROCEDURE — 77030018798 HC PMP KT ENTRL FED COVD -A

## 2018-01-01 PROCEDURE — 77030008683 HC TU ET CUF COVD -A

## 2018-01-01 PROCEDURE — 77030010541

## 2018-01-01 PROCEDURE — 74011250636 HC RX REV CODE- 250/636: Performed by: ANESTHESIOLOGY

## 2018-01-01 PROCEDURE — 80202 ASSAY OF VANCOMYCIN: CPT | Performed by: HOSPITALIST

## 2018-01-01 PROCEDURE — 0BH17EZ INSERTION OF ENDOTRACHEAL AIRWAY INTO TRACHEA, VIA NATURAL OR ARTIFICIAL OPENING: ICD-10-PCS | Performed by: INTERNAL MEDICINE

## 2018-01-01 PROCEDURE — 77030034850

## 2018-01-01 PROCEDURE — 80048 BASIC METABOLIC PNL TOTAL CA: CPT

## 2018-01-01 PROCEDURE — 92610 EVALUATE SWALLOWING FUNCTION: CPT

## 2018-01-01 PROCEDURE — 74011250637 HC RX REV CODE- 250/637: Performed by: HOSPITALIST

## 2018-01-01 PROCEDURE — 0BH17EZ INSERTION OF ENDOTRACHEAL AIRWAY INTO TRACHEA, VIA NATURAL OR ARTIFICIAL OPENING: ICD-10-PCS | Performed by: ANESTHESIOLOGY

## 2018-01-01 PROCEDURE — 84439 ASSAY OF FREE THYROXINE: CPT | Performed by: HOSPITALIST

## 2018-01-01 PROCEDURE — 80202 ASSAY OF VANCOMYCIN: CPT | Performed by: INTERNAL MEDICINE

## 2018-01-01 PROCEDURE — 82553 CREATINE MB FRACTION: CPT | Performed by: INTERNAL MEDICINE

## 2018-01-01 PROCEDURE — 85610 PROTHROMBIN TIME: CPT | Performed by: NEUROLOGICAL SURGERY

## 2018-01-01 PROCEDURE — 77030019908 HC STETH ESOPH SIMS -A: Performed by: ANESTHESIOLOGY

## 2018-01-01 PROCEDURE — 87493 C DIFF AMPLIFIED PROBE: CPT | Performed by: INTERNAL MEDICINE

## 2018-01-01 PROCEDURE — 77030005402 HC CATH RAD ART LN KT TELE -B

## 2018-01-01 PROCEDURE — 85610 PROTHROMBIN TIME: CPT | Performed by: EMERGENCY MEDICINE

## 2018-01-01 PROCEDURE — 77030019563 HC DEV ATTCH FEED HOLL -A

## 2018-01-01 PROCEDURE — 77030012406 HC DRN WND PENRS BARD -A: Performed by: NEUROLOGICAL SURGERY

## 2018-01-01 PROCEDURE — C9254 INJECTION, LACOSAMIDE: HCPCS | Performed by: PHYSICAL MEDICINE & REHABILITATION

## 2018-01-01 PROCEDURE — 74011000250 HC RX REV CODE- 250: Performed by: NEUROLOGICAL SURGERY

## 2018-01-01 PROCEDURE — 009200Z DRAINAGE OF DURA MATER WITH DRAINAGE DEVICE, OPEN APPROACH: ICD-10-PCS | Performed by: NEUROLOGICAL SURGERY

## 2018-01-01 PROCEDURE — 85025 COMPLETE CBC W/AUTO DIFF WBC: CPT | Performed by: NURSE PRACTITIONER

## 2018-01-01 PROCEDURE — 97165 OT EVAL LOW COMPLEX 30 MIN: CPT

## 2018-01-01 PROCEDURE — 5A1945Z RESPIRATORY VENTILATION, 24-96 CONSECUTIVE HOURS: ICD-10-PCS | Performed by: ANESTHESIOLOGY

## 2018-01-01 PROCEDURE — G8988 SELF CARE GOAL STATUS: HCPCS

## 2018-01-01 PROCEDURE — G8987 SELF CARE CURRENT STATUS: HCPCS

## 2018-01-01 PROCEDURE — 87116 MYCOBACTERIA CULTURE: CPT | Performed by: INTERNAL MEDICINE

## 2018-01-01 PROCEDURE — C1751 CATH, INF, PER/CENT/MIDLINE: HCPCS

## 2018-01-01 PROCEDURE — 95816 EEG AWAKE AND DROWSY: CPT | Performed by: PHYSICAL MEDICINE & REHABILITATION

## 2018-01-01 PROCEDURE — 02HV33Z INSERTION OF INFUSION DEVICE INTO SUPERIOR VENA CAVA, PERCUTANEOUS APPROACH: ICD-10-PCS | Performed by: INTERNAL MEDICINE

## 2018-01-01 PROCEDURE — 74011636320 HC RX REV CODE- 636/320: Performed by: HOSPITALIST

## 2018-01-01 PROCEDURE — 99285 EMERGENCY DEPT VISIT HI MDM: CPT

## 2018-01-01 PROCEDURE — 77030020782 HC GWN BAIR PAWS FLX 3M -B

## 2018-01-01 PROCEDURE — 83036 HEMOGLOBIN GLYCOSYLATED A1C: CPT | Performed by: FAMILY MEDICINE

## 2018-01-01 PROCEDURE — 77030008684 HC TU ET CUF COVD -B: Performed by: ANESTHESIOLOGY

## 2018-01-01 PROCEDURE — 93041 RHYTHM ECG TRACING: CPT

## 2018-01-01 PROCEDURE — 74011000250 HC RX REV CODE- 250

## 2018-01-01 PROCEDURE — 77030013797 HC KT TRNSDUC PRSSR EDWD -A

## 2018-01-01 PROCEDURE — 95816 EEG AWAKE AND DROWSY: CPT | Performed by: NURSE PRACTITIONER

## 2018-01-01 PROCEDURE — 77030020365 HC SOL INJ SOD CL 0.9% 50ML

## 2018-01-01 PROCEDURE — 84484 ASSAY OF TROPONIN QUANT: CPT | Performed by: INTERNAL MEDICINE

## 2018-01-01 PROCEDURE — 77030026438 HC STYL ET INTUB CARD -A: Performed by: ANESTHESIOLOGY

## 2018-01-01 PROCEDURE — 84443 ASSAY THYROID STIM HORMONE: CPT | Performed by: INTERNAL MEDICINE

## 2018-01-01 DEVICE — COVER BUR H L DIA18.5MM THK0.5MM 5 H NEURO TI W/O TAB: Type: IMPLANTABLE DEVICE | Site: SKULL | Status: FUNCTIONAL

## 2018-01-01 DEVICE — SCREW BNE L4MM DIA1.5MM CORT MAXILLOMANDIBULAR GRN TI SELF: Type: IMPLANTABLE DEVICE | Site: SKULL | Status: FUNCTIONAL

## 2018-01-01 RX ORDER — VANCOMYCIN HYDROCHLORIDE
1250 ONCE
Status: COMPLETED | OUTPATIENT
Start: 2018-01-01 | End: 2018-01-01

## 2018-01-01 RX ORDER — SODIUM CHLORIDE, SODIUM LACTATE, POTASSIUM CHLORIDE, CALCIUM CHLORIDE 600; 310; 30; 20 MG/100ML; MG/100ML; MG/100ML; MG/100ML
1000 INJECTION, SOLUTION INTRAVENOUS CONTINUOUS
Status: DISCONTINUED | OUTPATIENT
Start: 2018-01-01 | End: 2018-01-01 | Stop reason: HOSPADM

## 2018-01-01 RX ORDER — DEXTROSE 50 % IN WATER (D50W) INTRAVENOUS SYRINGE
12.5-25 AS NEEDED
Status: DISCONTINUED | OUTPATIENT
Start: 2018-01-01 | End: 2018-01-01 | Stop reason: SDUPTHER

## 2018-01-01 RX ORDER — LOSARTAN POTASSIUM 25 MG/1
50 TABLET ORAL DAILY
Status: DISCONTINUED | OUTPATIENT
Start: 2018-01-01 | End: 2018-01-01

## 2018-01-01 RX ORDER — BUPIVACAINE HYDROCHLORIDE AND EPINEPHRINE 5; 5 MG/ML; UG/ML
INJECTION, SOLUTION EPIDURAL; INTRACAUDAL; PERINEURAL AS NEEDED
Status: DISCONTINUED | OUTPATIENT
Start: 2018-01-01 | End: 2018-01-01 | Stop reason: HOSPADM

## 2018-01-01 RX ORDER — DILTIAZEM HCL/D5W 125 MG/125
0-15 PLASTIC BAG, INJECTION (ML) INTRAVENOUS
Status: DISCONTINUED | OUTPATIENT
Start: 2018-01-01 | End: 2018-01-01 | Stop reason: SDUPTHER

## 2018-01-01 RX ORDER — INSULIN LISPRO 100 [IU]/ML
INJECTION, SOLUTION INTRAVENOUS; SUBCUTANEOUS EVERY 6 HOURS
Status: DISCONTINUED | OUTPATIENT
Start: 2018-01-01 | End: 2018-01-01

## 2018-01-01 RX ORDER — PROPOFOL 10 MG/ML
0-50 VIAL (ML) INTRAVENOUS
Status: DISCONTINUED | OUTPATIENT
Start: 2018-01-01 | End: 2018-01-01

## 2018-01-01 RX ORDER — LORAZEPAM 2 MG/ML
INJECTION INTRAMUSCULAR
Status: COMPLETED
Start: 2018-01-01 | End: 2018-01-01

## 2018-01-01 RX ORDER — SUCCINYLCHOLINE CHLORIDE 20 MG/ML
INJECTION INTRAMUSCULAR; INTRAVENOUS
Status: COMPLETED
Start: 2018-01-01 | End: 2018-01-01

## 2018-01-01 RX ORDER — DEXAMETHASONE SODIUM PHOSPHATE 4 MG/ML
INJECTION, SOLUTION INTRA-ARTICULAR; INTRALESIONAL; INTRAMUSCULAR; INTRAVENOUS; SOFT TISSUE AS NEEDED
Status: DISCONTINUED | OUTPATIENT
Start: 2018-01-01 | End: 2018-01-01 | Stop reason: HOSPADM

## 2018-01-01 RX ORDER — ETOMIDATE 2 MG/ML
20 INJECTION INTRAVENOUS ONCE
Status: COMPLETED | OUTPATIENT
Start: 2018-01-01 | End: 2018-01-01

## 2018-01-01 RX ORDER — SODIUM CHLORIDE 0.9 % (FLUSH) 0.9 %
10-40 SYRINGE (ML) INJECTION EVERY 8 HOURS
Status: DISCONTINUED | OUTPATIENT
Start: 2018-01-01 | End: 2018-01-01 | Stop reason: HOSPADM

## 2018-01-01 RX ORDER — FENTANYL CITRATE 50 UG/ML
25 INJECTION, SOLUTION INTRAMUSCULAR; INTRAVENOUS
Status: DISCONTINUED | OUTPATIENT
Start: 2018-01-01 | End: 2018-01-01 | Stop reason: HOSPADM

## 2018-01-01 RX ORDER — SODIUM CHLORIDE 0.9 % (FLUSH) 0.9 %
10-30 SYRINGE (ML) INJECTION AS NEEDED
Status: DISCONTINUED | OUTPATIENT
Start: 2018-01-01 | End: 2018-01-01 | Stop reason: HOSPADM

## 2018-01-01 RX ORDER — HYDROMORPHONE HYDROCHLORIDE 1 MG/ML
.5-1 INJECTION, SOLUTION INTRAMUSCULAR; INTRAVENOUS; SUBCUTANEOUS
Status: DISCONTINUED | OUTPATIENT
Start: 2018-01-01 | End: 2018-01-01 | Stop reason: HOSPADM

## 2018-01-01 RX ORDER — VANCOMYCIN 2 GRAM/500 ML IN 0.9 % SODIUM CHLORIDE INTRAVENOUS
2000 ONCE
Status: COMPLETED | OUTPATIENT
Start: 2018-01-01 | End: 2018-01-01

## 2018-01-01 RX ORDER — VANCOMYCIN/0.9 % SOD CHLORIDE 1.5G/250ML
1500 PLASTIC BAG, INJECTION (ML) INTRAVENOUS ONCE
Status: COMPLETED | OUTPATIENT
Start: 2018-01-01 | End: 2018-01-01

## 2018-01-01 RX ORDER — SODIUM CHLORIDE 0.9 % (FLUSH) 0.9 %
5-10 SYRINGE (ML) INJECTION EVERY 8 HOURS
Status: DISCONTINUED | OUTPATIENT
Start: 2018-01-01 | End: 2018-01-01 | Stop reason: HOSPADM

## 2018-01-01 RX ORDER — SODIUM CHLORIDE 0.9 % (FLUSH) 0.9 %
5-10 SYRINGE (ML) INJECTION AS NEEDED
Status: DISCONTINUED | OUTPATIENT
Start: 2018-01-01 | End: 2018-01-01 | Stop reason: HOSPADM

## 2018-01-01 RX ORDER — BUDESONIDE 0.5 MG/2ML
500 INHALANT ORAL
Status: DISCONTINUED | OUTPATIENT
Start: 2018-01-01 | End: 2018-01-01 | Stop reason: HOSPADM

## 2018-01-01 RX ORDER — HYDROCODONE BITARTRATE AND ACETAMINOPHEN 5; 325 MG/1; MG/1
1 TABLET ORAL AS NEEDED
Status: DISCONTINUED | OUTPATIENT
Start: 2018-01-01 | End: 2018-01-01 | Stop reason: HOSPADM

## 2018-01-01 RX ORDER — METOPROLOL TARTRATE 5 MG/5ML
2.5 INJECTION INTRAVENOUS EVERY 6 HOURS
Status: DISCONTINUED | OUTPATIENT
Start: 2018-01-01 | End: 2018-01-01

## 2018-01-01 RX ORDER — FAMOTIDINE 20 MG/1
20 TABLET, FILM COATED ORAL DAILY
Status: DISCONTINUED | OUTPATIENT
Start: 2018-01-01 | End: 2018-01-01

## 2018-01-01 RX ORDER — MIDAZOLAM HYDROCHLORIDE 1 MG/ML
1 INJECTION, SOLUTION INTRAMUSCULAR; INTRAVENOUS AS NEEDED
Status: DISCONTINUED | OUTPATIENT
Start: 2018-01-01 | End: 2018-01-01 | Stop reason: HOSPADM

## 2018-01-01 RX ORDER — ALBUMIN HUMAN 250 G/1000ML
SOLUTION INTRAVENOUS
Status: DISPENSED
Start: 2018-01-01 | End: 2018-01-01

## 2018-01-01 RX ORDER — PROPOFOL 10 MG/ML
100 INJECTION, EMULSION INTRAVENOUS
Status: COMPLETED | OUTPATIENT
Start: 2018-01-01 | End: 2018-01-01

## 2018-01-01 RX ORDER — VANCOMYCIN/0.9 % SOD CHLORIDE 1.5G/250ML
1500 PLASTIC BAG, INJECTION (ML) INTRAVENOUS EVERY 24 HOURS
Status: DISCONTINUED | OUTPATIENT
Start: 2018-01-01 | End: 2018-01-01

## 2018-01-01 RX ORDER — PROPOFOL 10 MG/ML
INJECTION, EMULSION INTRAVENOUS
Status: DISPENSED
Start: 2018-01-01 | End: 2018-01-01

## 2018-01-01 RX ORDER — PROPOFOL 10 MG/ML
50 INJECTION, EMULSION INTRAVENOUS
Status: COMPLETED | OUTPATIENT
Start: 2018-01-01 | End: 2018-01-01

## 2018-01-01 RX ORDER — POTASSIUM CHLORIDE 20MEQ/15ML
40 LIQUID (ML) ORAL
Status: COMPLETED | OUTPATIENT
Start: 2018-01-01 | End: 2018-01-01

## 2018-01-01 RX ORDER — SODIUM CHLORIDE 9 MG/ML
INJECTION, SOLUTION INTRAVENOUS
Status: DISCONTINUED | OUTPATIENT
Start: 2018-01-01 | End: 2018-01-01 | Stop reason: HOSPADM

## 2018-01-01 RX ORDER — MAGNESIUM SULFATE 100 %
4 CRYSTALS MISCELLANEOUS AS NEEDED
Status: DISCONTINUED | OUTPATIENT
Start: 2018-01-01 | End: 2018-01-01 | Stop reason: HOSPADM

## 2018-01-01 RX ORDER — POTASSIUM CHLORIDE AND SODIUM CHLORIDE 900; 300 MG/100ML; MG/100ML
INJECTION, SOLUTION INTRAVENOUS CONTINUOUS
Status: DISCONTINUED | OUTPATIENT
Start: 2018-01-01 | End: 2018-01-01

## 2018-01-01 RX ORDER — VANCOMYCIN/0.9 % SOD CHLORIDE 1.5G/250ML
1500 PLASTIC BAG, INJECTION (ML) INTRAVENOUS
Status: DISCONTINUED | OUTPATIENT
Start: 2018-01-01 | End: 2018-01-01 | Stop reason: HOSPADM

## 2018-01-01 RX ORDER — ALBUTEROL SULFATE 0.83 MG/ML
2.5 SOLUTION RESPIRATORY (INHALATION) AS NEEDED
Status: DISCONTINUED | OUTPATIENT
Start: 2018-01-01 | End: 2018-01-01 | Stop reason: HOSPADM

## 2018-01-01 RX ORDER — INSULIN LISPRO 100 [IU]/ML
INJECTION, SOLUTION INTRAVENOUS; SUBCUTANEOUS EVERY 6 HOURS
Status: DISCONTINUED | OUTPATIENT
Start: 2018-01-01 | End: 2018-01-01 | Stop reason: HOSPADM

## 2018-01-01 RX ORDER — SODIUM CHLORIDE 0.9 % (FLUSH) 0.9 %
20 SYRINGE (ML) INJECTION EVERY 24 HOURS
Status: DISCONTINUED | OUTPATIENT
Start: 2018-01-01 | End: 2018-01-01 | Stop reason: HOSPADM

## 2018-01-01 RX ORDER — SODIUM CHLORIDE 9 MG/ML
25 INJECTION, SOLUTION INTRAVENOUS CONTINUOUS
Status: DISCONTINUED | OUTPATIENT
Start: 2018-01-01 | End: 2018-01-01

## 2018-01-01 RX ORDER — ATORVASTATIN CALCIUM 40 MG/1
40 TABLET, FILM COATED ORAL DAILY
Status: DISCONTINUED | OUTPATIENT
Start: 2018-01-01 | End: 2018-01-01

## 2018-01-01 RX ORDER — ETOMIDATE 2 MG/ML
INJECTION INTRAVENOUS
Status: DISPENSED
Start: 2018-01-01 | End: 2018-01-01

## 2018-01-01 RX ORDER — METFORMIN HYDROCHLORIDE 500 MG/1
1000 TABLET ORAL DAILY
Status: DISCONTINUED | OUTPATIENT
Start: 2018-01-01 | End: 2018-01-01

## 2018-01-01 RX ORDER — INSULIN GLARGINE 100 [IU]/ML
10 INJECTION, SOLUTION SUBCUTANEOUS
Status: DISCONTINUED | OUTPATIENT
Start: 2018-01-01 | End: 2018-01-01

## 2018-01-01 RX ORDER — SODIUM CHLORIDE 0.9 % (FLUSH) 0.9 %
10 SYRINGE (ML) INJECTION EVERY 24 HOURS
Status: DISCONTINUED | OUTPATIENT
Start: 2018-01-01 | End: 2018-01-01 | Stop reason: HOSPADM

## 2018-01-01 RX ORDER — MORPHINE SULFATE 10 MG/ML
2 INJECTION, SOLUTION INTRAMUSCULAR; INTRAVENOUS
Status: DISCONTINUED | OUTPATIENT
Start: 2018-01-01 | End: 2018-01-01 | Stop reason: HOSPADM

## 2018-01-01 RX ORDER — SODIUM,POTASSIUM PHOSPHATES 280-250MG
1 POWDER IN PACKET (EA) ORAL 4 TIMES DAILY
Status: COMPLETED | OUTPATIENT
Start: 2018-01-01 | End: 2018-01-01

## 2018-01-01 RX ORDER — DIPHENHYDRAMINE HYDROCHLORIDE 50 MG/ML
12.5 INJECTION, SOLUTION INTRAMUSCULAR; INTRAVENOUS AS NEEDED
Status: DISCONTINUED | OUTPATIENT
Start: 2018-01-01 | End: 2018-01-01 | Stop reason: HOSPADM

## 2018-01-01 RX ORDER — VANCOMYCIN HYDROCHLORIDE
1250 EVERY 24 HOURS
Status: DISCONTINUED | OUTPATIENT
Start: 2018-01-01 | End: 2018-01-01 | Stop reason: DRUGHIGH

## 2018-01-01 RX ORDER — DILTIAZEM HYDROCHLORIDE 120 MG/1
120 CAPSULE, COATED, EXTENDED RELEASE ORAL DAILY
Status: DISCONTINUED | OUTPATIENT
Start: 2018-01-01 | End: 2018-01-01

## 2018-01-01 RX ORDER — FENTANYL CITRATE 50 UG/ML
50 INJECTION, SOLUTION INTRAMUSCULAR; INTRAVENOUS AS NEEDED
Status: DISCONTINUED | OUTPATIENT
Start: 2018-01-01 | End: 2018-01-01 | Stop reason: HOSPADM

## 2018-01-01 RX ORDER — SODIUM CHLORIDE 0.9 % (FLUSH) 0.9 %
10 SYRINGE (ML) INJECTION AS NEEDED
Status: DISCONTINUED | OUTPATIENT
Start: 2018-01-01 | End: 2018-01-01 | Stop reason: HOSPADM

## 2018-01-01 RX ORDER — DEXTROSE 50 % IN WATER (D50W) INTRAVENOUS SYRINGE
12.5-25 AS NEEDED
Status: DISCONTINUED | OUTPATIENT
Start: 2018-01-01 | End: 2018-01-01 | Stop reason: HOSPADM

## 2018-01-01 RX ORDER — MAGNESIUM SULFATE HEPTAHYDRATE 40 MG/ML
2 INJECTION, SOLUTION INTRAVENOUS ONCE
Status: COMPLETED | OUTPATIENT
Start: 2018-01-01 | End: 2018-01-01

## 2018-01-01 RX ORDER — LORAZEPAM 2 MG/ML
1 INJECTION INTRAMUSCULAR
Status: DISCONTINUED | OUTPATIENT
Start: 2018-01-01 | End: 2018-01-01 | Stop reason: HOSPADM

## 2018-01-01 RX ORDER — DEXTROSE MONOHYDRATE 50 MG/ML
100 INJECTION, SOLUTION INTRAVENOUS CONTINUOUS
Status: DISCONTINUED | OUTPATIENT
Start: 2018-01-01 | End: 2018-01-01

## 2018-01-01 RX ORDER — BACITRACIN 500 UNIT/G
1 PACKET (EA) TOPICAL AS NEEDED
Status: DISCONTINUED | OUTPATIENT
Start: 2018-01-01 | End: 2018-01-01 | Stop reason: HOSPADM

## 2018-01-01 RX ORDER — INSULIN GLARGINE 100 [IU]/ML
50 INJECTION, SOLUTION SUBCUTANEOUS
Status: DISCONTINUED | OUTPATIENT
Start: 2018-01-01 | End: 2018-01-01 | Stop reason: HOSPADM

## 2018-01-01 RX ORDER — NEOSTIGMINE METHYLSULFATE 1 MG/ML
INJECTION INTRAVENOUS AS NEEDED
Status: DISCONTINUED | OUTPATIENT
Start: 2018-01-01 | End: 2018-01-01 | Stop reason: HOSPADM

## 2018-01-01 RX ORDER — INSULIN GLARGINE 100 [IU]/ML
30 INJECTION, SOLUTION SUBCUTANEOUS
Status: DISCONTINUED | OUTPATIENT
Start: 2018-01-01 | End: 2018-01-01

## 2018-01-01 RX ORDER — FENTANYL CITRATE 50 UG/ML
100 INJECTION, SOLUTION INTRAMUSCULAR; INTRAVENOUS
Status: ACTIVE | OUTPATIENT
Start: 2018-01-01 | End: 2018-01-01

## 2018-01-01 RX ORDER — HYDROCODONE BITARTRATE AND ACETAMINOPHEN 5; 325 MG/1; MG/1
1 TABLET ORAL
Status: DISCONTINUED | OUTPATIENT
Start: 2018-01-01 | End: 2018-01-01 | Stop reason: HOSPADM

## 2018-01-01 RX ORDER — INSULIN GLARGINE 100 [IU]/ML
20 INJECTION, SOLUTION SUBCUTANEOUS
Status: DISCONTINUED | OUTPATIENT
Start: 2018-01-01 | End: 2018-01-01

## 2018-01-01 RX ORDER — METFORMIN HYDROCHLORIDE 1000 MG/1
1000 TABLET ORAL DAILY
COMMUNITY

## 2018-01-01 RX ORDER — FENTANYL CITRATE 50 UG/ML
50-100 INJECTION, SOLUTION INTRAMUSCULAR; INTRAVENOUS
Status: DISCONTINUED | OUTPATIENT
Start: 2018-01-01 | End: 2018-01-01

## 2018-01-01 RX ORDER — GLYCOPYRROLATE 0.2 MG/ML
0.2 INJECTION INTRAMUSCULAR; INTRAVENOUS
Status: DISCONTINUED | OUTPATIENT
Start: 2018-01-01 | End: 2018-01-01 | Stop reason: HOSPADM

## 2018-01-01 RX ORDER — ONDANSETRON 2 MG/ML
4 INJECTION INTRAMUSCULAR; INTRAVENOUS AS NEEDED
Status: DISCONTINUED | OUTPATIENT
Start: 2018-01-01 | End: 2018-01-01 | Stop reason: HOSPADM

## 2018-01-01 RX ORDER — FUROSEMIDE 10 MG/ML
40 INJECTION INTRAMUSCULAR; INTRAVENOUS ONCE
Status: COMPLETED | OUTPATIENT
Start: 2018-01-01 | End: 2018-01-01

## 2018-01-01 RX ORDER — MORPHINE SULFATE 2 MG/ML
2 INJECTION, SOLUTION INTRAMUSCULAR; INTRAVENOUS
Status: DISCONTINUED | OUTPATIENT
Start: 2018-01-01 | End: 2018-01-01 | Stop reason: SDUPTHER

## 2018-01-01 RX ORDER — CHLORHEXIDINE GLUCONATE 1.2 MG/ML
15 RINSE ORAL 2 TIMES DAILY
Status: DISCONTINUED | OUTPATIENT
Start: 2018-01-01 | End: 2018-01-01 | Stop reason: HOSPADM

## 2018-01-01 RX ORDER — ACETAMINOPHEN 325 MG/1
650 TABLET ORAL
Status: DISCONTINUED | OUTPATIENT
Start: 2018-01-01 | End: 2018-01-01 | Stop reason: HOSPADM

## 2018-01-01 RX ORDER — LEVOFLOXACIN 5 MG/ML
750 INJECTION, SOLUTION INTRAVENOUS
Status: DISCONTINUED | OUTPATIENT
Start: 2018-01-01 | End: 2018-01-01

## 2018-01-01 RX ORDER — SODIUM,POTASSIUM PHOSPHATES 280-250MG
2 POWDER IN PACKET (EA) ORAL 4 TIMES DAILY
Status: DISCONTINUED | OUTPATIENT
Start: 2018-01-01 | End: 2018-01-01

## 2018-01-01 RX ORDER — VANCOMYCIN HYDROCHLORIDE
1250 EVERY 24 HOURS
Status: DISCONTINUED | OUTPATIENT
Start: 2018-01-01 | End: 2018-01-01

## 2018-01-01 RX ORDER — SODIUM CHLORIDE 450 MG/100ML
75 INJECTION, SOLUTION INTRAVENOUS CONTINUOUS
Status: DISCONTINUED | OUTPATIENT
Start: 2018-01-01 | End: 2018-01-01

## 2018-01-01 RX ORDER — FUROSEMIDE 10 MG/ML
INJECTION INTRAMUSCULAR; INTRAVENOUS
Status: COMPLETED
Start: 2018-01-01 | End: 2018-01-01

## 2018-01-01 RX ORDER — SUCCINYLCHOLINE CHLORIDE 20 MG/ML
INJECTION INTRAMUSCULAR; INTRAVENOUS
Status: DISPENSED
Start: 2018-01-01 | End: 2018-01-01

## 2018-01-01 RX ORDER — INSULIN GLARGINE 100 [IU]/ML
45 INJECTION, SOLUTION SUBCUTANEOUS
Status: DISCONTINUED | OUTPATIENT
Start: 2018-01-01 | End: 2018-01-01

## 2018-01-01 RX ORDER — LIDOCAINE HYDROCHLORIDE 10 MG/ML
0.1 INJECTION, SOLUTION EPIDURAL; INFILTRATION; INTRACAUDAL; PERINEURAL AS NEEDED
Status: DISCONTINUED | OUTPATIENT
Start: 2018-01-01 | End: 2018-01-01 | Stop reason: HOSPADM

## 2018-01-01 RX ORDER — FAMOTIDINE 40 MG/5ML
20 POWDER, FOR SUSPENSION ORAL DAILY
Status: DISCONTINUED | OUTPATIENT
Start: 2018-01-01 | End: 2018-01-01 | Stop reason: HOSPADM

## 2018-01-01 RX ORDER — NALOXONE HYDROCHLORIDE 0.4 MG/ML
0.4 INJECTION, SOLUTION INTRAMUSCULAR; INTRAVENOUS; SUBCUTANEOUS AS NEEDED
Status: DISCONTINUED | OUTPATIENT
Start: 2018-01-01 | End: 2018-01-01 | Stop reason: HOSPADM

## 2018-01-01 RX ORDER — MIDAZOLAM HYDROCHLORIDE 1 MG/ML
0.5 INJECTION, SOLUTION INTRAMUSCULAR; INTRAVENOUS
Status: DISCONTINUED | OUTPATIENT
Start: 2018-01-01 | End: 2018-01-01 | Stop reason: HOSPADM

## 2018-01-01 RX ORDER — ONDANSETRON 2 MG/ML
4 INJECTION INTRAMUSCULAR; INTRAVENOUS
Status: DISCONTINUED | OUTPATIENT
Start: 2018-01-01 | End: 2018-01-01 | Stop reason: SDUPTHER

## 2018-01-01 RX ORDER — IPRATROPIUM BROMIDE AND ALBUTEROL SULFATE 2.5; .5 MG/3ML; MG/3ML
3 SOLUTION RESPIRATORY (INHALATION)
Status: DISCONTINUED | OUTPATIENT
Start: 2018-01-01 | End: 2018-01-01 | Stop reason: HOSPADM

## 2018-01-01 RX ORDER — ACETAMINOPHEN 650 MG/1
650 SUPPOSITORY RECTAL
Status: DISCONTINUED | OUTPATIENT
Start: 2018-01-01 | End: 2018-01-01 | Stop reason: HOSPADM

## 2018-01-01 RX ORDER — INSULIN GLARGINE 100 [IU]/ML
40 INJECTION, SOLUTION SUBCUTANEOUS
Status: DISCONTINUED | OUTPATIENT
Start: 2018-01-01 | End: 2018-01-01

## 2018-01-01 RX ORDER — ONDANSETRON 2 MG/ML
INJECTION INTRAMUSCULAR; INTRAVENOUS AS NEEDED
Status: DISCONTINUED | OUTPATIENT
Start: 2018-01-01 | End: 2018-01-01 | Stop reason: HOSPADM

## 2018-01-01 RX ORDER — VANCOMYCIN 2 GRAM/500 ML IN 0.9 % SODIUM CHLORIDE INTRAVENOUS
2000
Status: COMPLETED | OUTPATIENT
Start: 2018-01-01 | End: 2018-01-01

## 2018-01-01 RX ORDER — ROPIVACAINE HYDROCHLORIDE 5 MG/ML
30 INJECTION, SOLUTION EPIDURAL; INFILTRATION; PERINEURAL AS NEEDED
Status: DISCONTINUED | OUTPATIENT
Start: 2018-01-01 | End: 2018-01-01 | Stop reason: HOSPADM

## 2018-01-01 RX ORDER — TRIAMTERENE/HYDROCHLOROTHIAZID 37.5-25 MG
1 TABLET ORAL DAILY
Status: DISCONTINUED | OUTPATIENT
Start: 2018-01-01 | End: 2018-01-01

## 2018-01-01 RX ORDER — FAMOTIDINE 40 MG/5ML
20 POWDER, FOR SUSPENSION ORAL DAILY
Status: DISCONTINUED | OUTPATIENT
Start: 2018-01-01 | End: 2018-01-01

## 2018-01-01 RX ORDER — SUCCINYLCHOLINE CHLORIDE 20 MG/ML
140 INJECTION INTRAMUSCULAR; INTRAVENOUS
Status: COMPLETED | OUTPATIENT
Start: 2018-01-01 | End: 2018-01-01

## 2018-01-01 RX ORDER — FENTANYL CITRATE 50 UG/ML
25 INJECTION, SOLUTION INTRAMUSCULAR; INTRAVENOUS
Status: DISCONTINUED | OUTPATIENT
Start: 2018-01-01 | End: 2018-01-01

## 2018-01-01 RX ORDER — LIDOCAINE HYDROCHLORIDE 20 MG/ML
INJECTION, SOLUTION EPIDURAL; INFILTRATION; INTRACAUDAL; PERINEURAL AS NEEDED
Status: DISCONTINUED | OUTPATIENT
Start: 2018-01-01 | End: 2018-01-01 | Stop reason: HOSPADM

## 2018-01-01 RX ORDER — SODIUM CHLORIDE 9 MG/ML
25 INJECTION, SOLUTION INTRAVENOUS CONTINUOUS
Status: DISCONTINUED | OUTPATIENT
Start: 2018-01-01 | End: 2018-01-01 | Stop reason: HOSPADM

## 2018-01-01 RX ORDER — SODIUM CHLORIDE 9 MG/ML
100 INJECTION, SOLUTION INTRAVENOUS CONTINUOUS
Status: DISCONTINUED | OUTPATIENT
Start: 2018-01-01 | End: 2018-01-01

## 2018-01-01 RX ORDER — POLYVINYL ALCOHOL 14 MG/ML
2 SOLUTION/ DROPS OPHTHALMIC EVERY 8 HOURS
Status: DISCONTINUED | OUTPATIENT
Start: 2018-01-01 | End: 2018-01-01 | Stop reason: HOSPADM

## 2018-01-01 RX ORDER — CEFAZOLIN SODIUM/WATER 2 G/20 ML
2 SYRINGE (ML) INTRAVENOUS EVERY 8 HOURS
Status: COMPLETED | OUTPATIENT
Start: 2018-01-01 | End: 2018-01-01

## 2018-01-01 RX ORDER — PROPOFOL 10 MG/ML
INJECTION, EMULSION INTRAVENOUS AS NEEDED
Status: DISCONTINUED | OUTPATIENT
Start: 2018-01-01 | End: 2018-01-01 | Stop reason: HOSPADM

## 2018-01-01 RX ORDER — LORAZEPAM 2 MG/ML
0.5 INJECTION INTRAMUSCULAR ONCE
Status: DISCONTINUED | OUTPATIENT
Start: 2018-01-01 | End: 2018-01-01 | Stop reason: HOSPADM

## 2018-01-01 RX ORDER — LOSARTAN POTASSIUM 50 MG/1
50 TABLET ORAL DAILY
COMMUNITY

## 2018-01-01 RX ORDER — ACETAMINOPHEN 500 MG
1000 TABLET ORAL DAILY
COMMUNITY

## 2018-01-01 RX ORDER — PHENYLEPHRINE HCL IN 0.9% NACL 0.4MG/10ML
SYRINGE (ML) INTRAVENOUS AS NEEDED
Status: DISCONTINUED | OUTPATIENT
Start: 2018-01-01 | End: 2018-01-01 | Stop reason: HOSPADM

## 2018-01-01 RX ORDER — METRONIDAZOLE 500 MG/100ML
500 INJECTION, SOLUTION INTRAVENOUS EVERY 8 HOURS
Status: DISCONTINUED | OUTPATIENT
Start: 2018-01-01 | End: 2018-01-01

## 2018-01-01 RX ORDER — CHLORHEXIDINE GLUCONATE 1.2 MG/ML
15 RINSE ORAL 2 TIMES DAILY
Status: DISCONTINUED | OUTPATIENT
Start: 2018-01-01 | End: 2018-01-01

## 2018-01-01 RX ORDER — LORAZEPAM 2 MG/ML
0.5 INJECTION INTRAMUSCULAR ONCE
Status: COMPLETED | OUTPATIENT
Start: 2018-01-01 | End: 2018-01-01

## 2018-01-01 RX ORDER — SUFENTANIL CITRATE 50 UG/ML
INJECTION EPIDURAL; INTRAVENOUS AS NEEDED
Status: DISCONTINUED | OUTPATIENT
Start: 2018-01-01 | End: 2018-01-01 | Stop reason: HOSPADM

## 2018-01-01 RX ORDER — POTASSIUM CHLORIDE 20MEQ/15ML
40 LIQUID (ML) ORAL 2 TIMES DAILY
Status: COMPLETED | OUTPATIENT
Start: 2018-01-01 | End: 2018-01-01

## 2018-01-01 RX ORDER — MIDAZOLAM HYDROCHLORIDE 1 MG/ML
5 INJECTION, SOLUTION INTRAMUSCULAR; INTRAVENOUS
Status: DISCONTINUED | OUTPATIENT
Start: 2018-01-01 | End: 2018-01-01 | Stop reason: HOSPADM

## 2018-01-01 RX ORDER — IPRATROPIUM BROMIDE AND ALBUTEROL SULFATE 2.5; .5 MG/3ML; MG/3ML
3 SOLUTION RESPIRATORY (INHALATION)
Status: DISCONTINUED | OUTPATIENT
Start: 2018-01-01 | End: 2018-01-01

## 2018-01-01 RX ORDER — ARFORMOTEROL TARTRATE 15 UG/2ML
15 SOLUTION RESPIRATORY (INHALATION)
Status: DISCONTINUED | OUTPATIENT
Start: 2018-01-01 | End: 2018-01-01

## 2018-01-01 RX ORDER — GLYCOPYRROLATE 0.2 MG/ML
INJECTION INTRAMUSCULAR; INTRAVENOUS AS NEEDED
Status: DISCONTINUED | OUTPATIENT
Start: 2018-01-01 | End: 2018-01-01 | Stop reason: HOSPADM

## 2018-01-01 RX ORDER — LABETALOL HYDROCHLORIDE 5 MG/ML
INJECTION, SOLUTION INTRAVENOUS AS NEEDED
Status: DISCONTINUED | OUTPATIENT
Start: 2018-01-01 | End: 2018-01-01 | Stop reason: HOSPADM

## 2018-01-01 RX ORDER — LACOSAMIDE 50 MG/1
100 TABLET ORAL EVERY 12 HOURS
Status: DISCONTINUED | OUTPATIENT
Start: 2018-01-01 | End: 2018-01-01

## 2018-01-01 RX ORDER — FENTANYL CITRATE 50 UG/ML
INJECTION, SOLUTION INTRAMUSCULAR; INTRAVENOUS
Status: COMPLETED
Start: 2018-01-01 | End: 2018-01-01

## 2018-01-01 RX ORDER — ATORVASTATIN CALCIUM 40 MG/1
40 TABLET, FILM COATED ORAL DAILY
Status: DISCONTINUED | OUTPATIENT
Start: 2018-01-01 | End: 2018-01-01 | Stop reason: HOSPADM

## 2018-01-01 RX ORDER — MIDAZOLAM HYDROCHLORIDE 1 MG/ML
INJECTION, SOLUTION INTRAMUSCULAR; INTRAVENOUS
Status: COMPLETED
Start: 2018-01-01 | End: 2018-01-01

## 2018-01-01 RX ORDER — DEXMEDETOMIDINE HYDROCHLORIDE 4 UG/ML
INJECTION, SOLUTION INTRAVENOUS AS NEEDED
Status: DISCONTINUED | OUTPATIENT
Start: 2018-01-01 | End: 2018-01-01 | Stop reason: HOSPADM

## 2018-01-01 RX ORDER — SUCCINYLCHOLINE CHLORIDE 20 MG/ML
60 INJECTION INTRAMUSCULAR; INTRAVENOUS
Status: ACTIVE | OUTPATIENT
Start: 2018-01-01 | End: 2018-01-01

## 2018-01-01 RX ORDER — INSULIN GLARGINE 100 [IU]/ML
35 INJECTION, SOLUTION SUBCUTANEOUS
Status: DISCONTINUED | OUTPATIENT
Start: 2018-01-01 | End: 2018-01-01

## 2018-01-01 RX ORDER — HYDRALAZINE HYDROCHLORIDE 20 MG/ML
10 INJECTION INTRAMUSCULAR; INTRAVENOUS
Status: DISCONTINUED | OUTPATIENT
Start: 2018-01-01 | End: 2018-01-01 | Stop reason: HOSPADM

## 2018-01-01 RX ORDER — ROCURONIUM BROMIDE 10 MG/ML
INJECTION, SOLUTION INTRAVENOUS AS NEEDED
Status: DISCONTINUED | OUTPATIENT
Start: 2018-01-01 | End: 2018-01-01 | Stop reason: HOSPADM

## 2018-01-01 RX ORDER — SUFENTANIL CITRATE 50 UG/ML
INJECTION EPIDURAL; INTRAVENOUS
Status: DISCONTINUED | OUTPATIENT
Start: 2018-01-01 | End: 2018-01-01 | Stop reason: HOSPADM

## 2018-01-01 RX ORDER — ONDANSETRON 2 MG/ML
4 INJECTION INTRAMUSCULAR; INTRAVENOUS
Status: DISCONTINUED | OUTPATIENT
Start: 2018-01-01 | End: 2018-01-01 | Stop reason: HOSPADM

## 2018-01-01 RX ADMIN — Medication 10 ML: at 15:01

## 2018-01-01 RX ADMIN — CEFEPIME 2 G: 2 INJECTION, POWDER, FOR SOLUTION INTRAVENOUS at 23:39

## 2018-01-01 RX ADMIN — INSULIN LISPRO 4 UNITS: 100 INJECTION, SOLUTION INTRAVENOUS; SUBCUTANEOUS at 12:36

## 2018-01-01 RX ADMIN — POTASSIUM CHLORIDE 40 MEQ: 20 SOLUTION ORAL at 17:31

## 2018-01-01 RX ADMIN — Medication 10 ML: at 21:13

## 2018-01-01 RX ADMIN — INSULIN LISPRO 3 UNITS: 100 INJECTION, SOLUTION INTRAVENOUS; SUBCUTANEOUS at 17:07

## 2018-01-01 RX ADMIN — Medication 10 ML: at 13:51

## 2018-01-01 RX ADMIN — MORPHINE SULFATE 2 MG: 2 INJECTION, SOLUTION INTRAMUSCULAR; INTRAVENOUS at 08:52

## 2018-01-01 RX ADMIN — PROPOFOL 30 MCG/KG/MIN: 10 INJECTION, EMULSION INTRAVENOUS at 22:45

## 2018-01-01 RX ADMIN — IPRATROPIUM BROMIDE AND ALBUTEROL SULFATE 3 ML: .5; 3 SOLUTION RESPIRATORY (INHALATION) at 16:19

## 2018-01-01 RX ADMIN — Medication 10 ML: at 14:00

## 2018-01-01 RX ADMIN — CEFEPIME 2 G: 2 INJECTION, POWDER, FOR SOLUTION INTRAVENOUS at 23:47

## 2018-01-01 RX ADMIN — VANCOMYCIN HYDROCHLORIDE 2000 MG: 10 INJECTION, POWDER, LYOPHILIZED, FOR SOLUTION INTRAVENOUS at 10:06

## 2018-01-01 RX ADMIN — LACOSAMIDE 100 MG: 50 TABLET, FILM COATED ORAL at 08:55

## 2018-01-01 RX ADMIN — FENTANYL CITRATE 25 MCG: 50 INJECTION, SOLUTION INTRAMUSCULAR; INTRAVENOUS at 20:43

## 2018-01-01 RX ADMIN — Medication 10 ML: at 07:00

## 2018-01-01 RX ADMIN — INSULIN LISPRO 4 UNITS: 100 INJECTION, SOLUTION INTRAVENOUS; SUBCUTANEOUS at 13:00

## 2018-01-01 RX ADMIN — Medication 10 ML: at 05:26

## 2018-01-01 RX ADMIN — INSULIN LISPRO 3 UNITS: 100 INJECTION, SOLUTION INTRAVENOUS; SUBCUTANEOUS at 18:04

## 2018-01-01 RX ADMIN — Medication 10 ML: at 06:06

## 2018-01-01 RX ADMIN — INSULIN GLARGINE 45 UNITS: 100 INJECTION, SOLUTION SUBCUTANEOUS at 21:00

## 2018-01-01 RX ADMIN — Medication 10 ML: at 15:34

## 2018-01-01 RX ADMIN — POTASSIUM & SODIUM PHOSPHATES POWDER PACK 280-160-250 MG 2 PACKET: 280-160-250 PACK at 12:21

## 2018-01-01 RX ADMIN — SUFENTANIL CITRATE 5 MCG: 50 INJECTION EPIDURAL; INTRAVENOUS at 17:23

## 2018-01-01 RX ADMIN — SODIUM CHLORIDE 0.4 MCG/KG/HR: 900 INJECTION, SOLUTION INTRAVENOUS at 16:28

## 2018-01-01 RX ADMIN — Medication 10 ML: at 22:41

## 2018-01-01 RX ADMIN — CHLORHEXIDINE GLUCONATE 15 ML: 1.2 RINSE ORAL at 18:37

## 2018-01-01 RX ADMIN — INSULIN LISPRO 5 UNITS: 100 INJECTION, SOLUTION INTRAVENOUS; SUBCUTANEOUS at 18:49

## 2018-01-01 RX ADMIN — VANCOMYCIN HYDROCHLORIDE 1500 MG: 10 INJECTION, POWDER, LYOPHILIZED, FOR SOLUTION INTRAVENOUS at 06:58

## 2018-01-01 RX ADMIN — Medication 10 ML: at 14:17

## 2018-01-01 RX ADMIN — Medication 10 ML: at 06:35

## 2018-01-01 RX ADMIN — Medication 10 ML: at 13:00

## 2018-01-01 RX ADMIN — LACOSAMIDE 100 MG: 50 TABLET, FILM COATED ORAL at 08:34

## 2018-01-01 RX ADMIN — IPRATROPIUM BROMIDE AND ALBUTEROL SULFATE 3 ML: .5; 3 SOLUTION RESPIRATORY (INHALATION) at 14:20

## 2018-01-01 RX ADMIN — POLYVINYL ALCOHOL 2 DROP: 14 SOLUTION/ DROPS OPHTHALMIC at 21:34

## 2018-01-01 RX ADMIN — POLYVINYL ALCOHOL 2 DROP: 14 SOLUTION/ DROPS OPHTHALMIC at 14:28

## 2018-01-01 RX ADMIN — POLYVINYL ALCOHOL 2 DROP: 14 SOLUTION/ DROPS OPHTHALMIC at 21:45

## 2018-01-01 RX ADMIN — Medication 10 ML: at 21:11

## 2018-01-01 RX ADMIN — SODIUM CHLORIDE 0.2 MCG/KG/HR: 900 INJECTION, SOLUTION INTRAVENOUS at 12:34

## 2018-01-01 RX ADMIN — SODIUM CHLORIDE 500 MG: 900 INJECTION, SOLUTION INTRAVENOUS at 21:14

## 2018-01-01 RX ADMIN — Medication 10 ML: at 22:40

## 2018-01-01 RX ADMIN — IPRATROPIUM BROMIDE AND ALBUTEROL SULFATE 3 ML: .5; 3 SOLUTION RESPIRATORY (INHALATION) at 20:10

## 2018-01-01 RX ADMIN — FAMOTIDINE 20 MG: 40 POWDER, FOR SUSPENSION ORAL at 08:40

## 2018-01-01 RX ADMIN — PROPOFOL 25 MCG/KG/MIN: 10 INJECTION, EMULSION INTRAVENOUS at 11:38

## 2018-01-01 RX ADMIN — SODIUM CHLORIDE 5 MG/HR: 900 INJECTION, SOLUTION INTRAVENOUS at 12:00

## 2018-01-01 RX ADMIN — METRONIDAZOLE 500 MG: 500 INJECTION, SOLUTION INTRAVENOUS at 20:14

## 2018-01-01 RX ADMIN — BUDESONIDE 500 MCG: 0.5 INHALANT RESPIRATORY (INHALATION) at 08:47

## 2018-01-01 RX ADMIN — POTASSIUM & SODIUM PHOSPHATES POWDER PACK 280-160-250 MG 2 PACKET: 280-160-250 PACK at 12:57

## 2018-01-01 RX ADMIN — INSULIN LISPRO 5 UNITS: 100 INJECTION, SOLUTION INTRAVENOUS; SUBCUTANEOUS at 00:27

## 2018-01-01 RX ADMIN — INSULIN LISPRO 2 UNITS: 100 INJECTION, SOLUTION INTRAVENOUS; SUBCUTANEOUS at 12:56

## 2018-01-01 RX ADMIN — CASTOR OIL AND BALSAM, PERU: 788; 87 OINTMENT TOPICAL at 18:25

## 2018-01-01 RX ADMIN — LACOSAMIDE 100 MG: 50 TABLET, FILM COATED ORAL at 20:52

## 2018-01-01 RX ADMIN — LABETALOL HYDROCHLORIDE 5 MG: 5 INJECTION, SOLUTION INTRAVENOUS at 19:05

## 2018-01-01 RX ADMIN — DEXMEDETOMIDINE HYDROCHLORIDE 8 MCG: 4 INJECTION, SOLUTION INTRAVENOUS at 17:12

## 2018-01-01 RX ADMIN — ACYCLOVIR SODIUM 575 MG: 50 INJECTION, SOLUTION INTRAVENOUS at 09:31

## 2018-01-01 RX ADMIN — CASTOR OIL AND BALSAM, PERU: 788; 87 OINTMENT TOPICAL at 17:40

## 2018-01-01 RX ADMIN — PROPOFOL 100 MG: 10 INJECTION, EMULSION INTRAVENOUS at 22:27

## 2018-01-01 RX ADMIN — SUFENTANIL CITRATE 0.2 MCG/KG/HR: 50 INJECTION EPIDURAL; INTRAVENOUS at 17:52

## 2018-01-01 RX ADMIN — IPRATROPIUM BROMIDE AND ALBUTEROL SULFATE 3 ML: .5; 3 SOLUTION RESPIRATORY (INHALATION) at 20:18

## 2018-01-01 RX ADMIN — Medication 20 ML: at 13:51

## 2018-01-01 RX ADMIN — IPRATROPIUM BROMIDE AND ALBUTEROL SULFATE 3 ML: .5; 3 SOLUTION RESPIRATORY (INHALATION) at 03:36

## 2018-01-01 RX ADMIN — PROPOFOL 20 MCG/KG/MIN: 10 INJECTION, EMULSION INTRAVENOUS at 18:13

## 2018-01-01 RX ADMIN — LEVETIRACETAM 1000 MG: 100 SOLUTION ORAL at 20:24

## 2018-01-01 RX ADMIN — LOSARTAN POTASSIUM 50 MG: 25 TABLET ORAL at 15:13

## 2018-01-01 RX ADMIN — Medication 10 ML: at 13:44

## 2018-01-01 RX ADMIN — INSULIN LISPRO 4 UNITS: 100 INJECTION, SOLUTION INTRAVENOUS; SUBCUTANEOUS at 00:00

## 2018-01-01 RX ADMIN — BUDESONIDE 500 MCG: 0.5 INHALANT RESPIRATORY (INHALATION) at 08:16

## 2018-01-01 RX ADMIN — PROPOFOL 10 MCG/KG/MIN: 10 INJECTION, EMULSION INTRAVENOUS at 07:10

## 2018-01-01 RX ADMIN — BUDESONIDE 500 MCG: 0.5 INHALANT RESPIRATORY (INHALATION) at 07:06

## 2018-01-01 RX ADMIN — METRONIDAZOLE 500 MG: 500 INJECTION, SOLUTION INTRAVENOUS at 11:36

## 2018-01-01 RX ADMIN — IPRATROPIUM BROMIDE AND ALBUTEROL SULFATE 3 ML: .5; 3 SOLUTION RESPIRATORY (INHALATION) at 18:12

## 2018-01-01 RX ADMIN — METRONIDAZOLE 500 MG: 500 INJECTION, SOLUTION INTRAVENOUS at 11:12

## 2018-01-01 RX ADMIN — INSULIN LISPRO 5 UNITS: 100 INJECTION, SOLUTION INTRAVENOUS; SUBCUTANEOUS at 11:52

## 2018-01-01 RX ADMIN — POTASSIUM & SODIUM PHOSPHATES POWDER PACK 280-160-250 MG 2 PACKET: 280-160-250 PACK at 12:23

## 2018-01-01 RX ADMIN — ACYCLOVIR SODIUM 575 MG: 50 INJECTION, SOLUTION INTRAVENOUS at 21:00

## 2018-01-01 RX ADMIN — INSULIN GLARGINE 40 UNITS: 100 INJECTION, SOLUTION SUBCUTANEOUS at 21:12

## 2018-01-01 RX ADMIN — CHLORHEXIDINE GLUCONATE 15 ML: 1.2 RINSE ORAL at 08:28

## 2018-01-01 RX ADMIN — MORPHINE SULFATE 2 MG: 2 INJECTION, SOLUTION INTRAMUSCULAR; INTRAVENOUS at 21:01

## 2018-01-01 RX ADMIN — INSULIN LISPRO 5 UNITS: 100 INJECTION, SOLUTION INTRAVENOUS; SUBCUTANEOUS at 17:30

## 2018-01-01 RX ADMIN — BUDESONIDE 500 MCG: 0.5 INHALANT RESPIRATORY (INHALATION) at 09:53

## 2018-01-01 RX ADMIN — Medication 10 ML: at 14:28

## 2018-01-01 RX ADMIN — LEVETIRACETAM 1000 MG: 100 SOLUTION ORAL at 21:10

## 2018-01-01 RX ADMIN — Medication 10 ML: at 12:59

## 2018-01-01 RX ADMIN — Medication 20 ML: at 12:32

## 2018-01-01 RX ADMIN — SODIUM CHLORIDE 75 ML/HR: 450 INJECTION, SOLUTION INTRAVENOUS at 10:02

## 2018-01-01 RX ADMIN — MAGNESIUM SULFATE HEPTAHYDRATE 2 G: 40 INJECTION, SOLUTION INTRAVENOUS at 10:08

## 2018-01-01 RX ADMIN — IPRATROPIUM BROMIDE AND ALBUTEROL SULFATE 3 ML: .5; 3 SOLUTION RESPIRATORY (INHALATION) at 03:06

## 2018-01-01 RX ADMIN — ACETAMINOPHEN 650 MG: 650 SOLUTION ORAL at 08:03

## 2018-01-01 RX ADMIN — POLYVINYL ALCOHOL 2 DROP: 14 SOLUTION/ DROPS OPHTHALMIC at 15:01

## 2018-01-01 RX ADMIN — Medication 10 ML: at 13:02

## 2018-01-01 RX ADMIN — Medication 10 ML: at 22:05

## 2018-01-01 RX ADMIN — CEFEPIME 2 G: 2 INJECTION, POWDER, FOR SOLUTION INTRAVENOUS at 12:30

## 2018-01-01 RX ADMIN — INSULIN GLARGINE 30 UNITS: 100 INJECTION, SOLUTION SUBCUTANEOUS at 22:01

## 2018-01-01 RX ADMIN — LEVETIRACETAM 1000 MG: 100 SOLUTION ORAL at 10:09

## 2018-01-01 RX ADMIN — Medication 10 ML: at 21:01

## 2018-01-01 RX ADMIN — Medication 10 ML: at 06:00

## 2018-01-01 RX ADMIN — LACOSAMIDE 100 MG: 50 TABLET, FILM COATED ORAL at 08:25

## 2018-01-01 RX ADMIN — LORAZEPAM 0.5 MG: 2 INJECTION INTRAMUSCULAR; INTRAVENOUS at 22:20

## 2018-01-01 RX ADMIN — ATORVASTATIN CALCIUM 40 MG: 40 TABLET, FILM COATED ORAL at 09:00

## 2018-01-01 RX ADMIN — POLYVINYL ALCOHOL 2 DROP: 14 SOLUTION/ DROPS OPHTHALMIC at 05:38

## 2018-01-01 RX ADMIN — BUDESONIDE 500 MCG: 0.5 INHALANT RESPIRATORY (INHALATION) at 20:16

## 2018-01-01 RX ADMIN — SODIUM CHLORIDE 500 MG: 900 INJECTION, SOLUTION INTRAVENOUS at 08:59

## 2018-01-01 RX ADMIN — CHLORHEXIDINE GLUCONATE 15 ML: 1.2 RINSE ORAL at 08:39

## 2018-01-01 RX ADMIN — SODIUM CHLORIDE 100 MG: 900 INJECTION, SOLUTION INTRAVENOUS at 18:37

## 2018-01-01 RX ADMIN — INSULIN LISPRO 3 UNITS: 100 INJECTION, SOLUTION INTRAVENOUS; SUBCUTANEOUS at 12:52

## 2018-01-01 RX ADMIN — BUDESONIDE 500 MCG: 0.5 INHALANT RESPIRATORY (INHALATION) at 08:19

## 2018-01-01 RX ADMIN — MIDAZOLAM HYDROCHLORIDE 2 MG: 1 INJECTION, SOLUTION INTRAMUSCULAR; INTRAVENOUS at 11:30

## 2018-01-01 RX ADMIN — Medication 10 ML: at 06:16

## 2018-01-01 RX ADMIN — POTASSIUM & SODIUM PHOSPHATES POWDER PACK 280-160-250 MG 2 PACKET: 280-160-250 PACK at 17:35

## 2018-01-01 RX ADMIN — LEVETIRACETAM 1000 MG: 100 INJECTION, SOLUTION, CONCENTRATE INTRAVENOUS at 08:44

## 2018-01-01 RX ADMIN — ACYCLOVIR SODIUM 575 MG: 50 INJECTION, SOLUTION INTRAVENOUS at 21:14

## 2018-01-01 RX ADMIN — METRONIDAZOLE 500 MG: 500 INJECTION, SOLUTION INTRAVENOUS at 04:13

## 2018-01-01 RX ADMIN — DEXTROSE MONOHYDRATE 100 ML/HR: 5 INJECTION, SOLUTION INTRAVENOUS at 03:26

## 2018-01-01 RX ADMIN — TRIAMTERENE AND HYDROCHLOROTHIAZIDE 1 TABLET: 37.5; 25 TABLET ORAL at 08:31

## 2018-01-01 RX ADMIN — Medication 10 ML: at 22:50

## 2018-01-01 RX ADMIN — CHLORHEXIDINE GLUCONATE 15 ML: 1.2 RINSE ORAL at 09:23

## 2018-01-01 RX ADMIN — CHLORHEXIDINE GLUCONATE 15 ML: 1.2 RINSE ORAL at 12:32

## 2018-01-01 RX ADMIN — SODIUM CHLORIDE 5 MG/HR: 900 INJECTION, SOLUTION INTRAVENOUS at 06:01

## 2018-01-01 RX ADMIN — SODIUM CHLORIDE, SODIUM LACTATE, POTASSIUM CHLORIDE, AND CALCIUM CHLORIDE 1000 ML: 600; 310; 30; 20 INJECTION, SOLUTION INTRAVENOUS at 15:20

## 2018-01-01 RX ADMIN — ACYCLOVIR SODIUM 575 MG: 50 INJECTION, SOLUTION INTRAVENOUS at 10:13

## 2018-01-01 RX ADMIN — INSULIN LISPRO 3 UNITS: 100 INJECTION, SOLUTION INTRAVENOUS; SUBCUTANEOUS at 11:37

## 2018-01-01 RX ADMIN — INSULIN LISPRO 3 UNITS: 100 INJECTION, SOLUTION INTRAVENOUS; SUBCUTANEOUS at 18:26

## 2018-01-01 RX ADMIN — SODIUM CHLORIDE 0.4 MCG/KG/HR: 900 INJECTION, SOLUTION INTRAVENOUS at 21:39

## 2018-01-01 RX ADMIN — Medication 10 ML: at 21:35

## 2018-01-01 RX ADMIN — Medication 10 ML: at 12:11

## 2018-01-01 RX ADMIN — FAMOTIDINE 20 MG: 40 POWDER, FOR SUSPENSION ORAL at 08:07

## 2018-01-01 RX ADMIN — SODIUM CHLORIDE 500 MG: 900 INJECTION, SOLUTION INTRAVENOUS at 08:32

## 2018-01-01 RX ADMIN — POTASSIUM CHLORIDE 40 MEQ: 20 SOLUTION ORAL at 11:37

## 2018-01-01 RX ADMIN — BUDESONIDE 500 MCG: 0.5 INHALANT RESPIRATORY (INHALATION) at 20:33

## 2018-01-01 RX ADMIN — LEVETIRACETAM 1000 MG: 100 SOLUTION ORAL at 08:49

## 2018-01-01 RX ADMIN — INSULIN LISPRO 5 UNITS: 100 INJECTION, SOLUTION INTRAVENOUS; SUBCUTANEOUS at 05:28

## 2018-01-01 RX ADMIN — ACYCLOVIR SODIUM 575 MG: 50 INJECTION, SOLUTION INTRAVENOUS at 10:10

## 2018-01-01 RX ADMIN — ACYCLOVIR SODIUM 575 MG: 50 INJECTION, SOLUTION INTRAVENOUS at 21:02

## 2018-01-01 RX ADMIN — INSULIN LISPRO 3 UNITS: 100 INJECTION, SOLUTION INTRAVENOUS; SUBCUTANEOUS at 06:32

## 2018-01-01 RX ADMIN — POTASSIUM & SODIUM PHOSPHATES POWDER PACK 280-160-250 MG 2 PACKET: 280-160-250 PACK at 09:09

## 2018-01-01 RX ADMIN — POLYVINYL ALCOHOL 2 DROP: 14 SOLUTION/ DROPS OPHTHALMIC at 21:50

## 2018-01-01 RX ADMIN — Medication 10 ML: at 12:45

## 2018-01-01 RX ADMIN — LEVETIRACETAM 1000 MG: 100 SOLUTION ORAL at 09:17

## 2018-01-01 RX ADMIN — FAMOTIDINE 20 MG: 40 POWDER, FOR SUSPENSION ORAL at 08:49

## 2018-01-01 RX ADMIN — SODIUM CHLORIDE 100 MG: 900 INJECTION, SOLUTION INTRAVENOUS at 22:26

## 2018-01-01 RX ADMIN — CHLORHEXIDINE GLUCONATE 15 ML: 1.2 RINSE ORAL at 12:29

## 2018-01-01 RX ADMIN — CASTOR OIL AND BALSAM, PERU: 788; 87 OINTMENT TOPICAL at 08:34

## 2018-01-01 RX ADMIN — POLYVINYL ALCOHOL 2 DROP: 14 SOLUTION/ DROPS OPHTHALMIC at 06:52

## 2018-01-01 RX ADMIN — LACOSAMIDE 100 MG: 50 TABLET, FILM COATED ORAL at 08:00

## 2018-01-01 RX ADMIN — LEVETIRACETAM 1000 MG: 100 INJECTION, SOLUTION, CONCENTRATE INTRAVENOUS at 20:30

## 2018-01-01 RX ADMIN — POTASSIUM & SODIUM PHOSPHATES POWDER PACK 280-160-250 MG 2 PACKET: 280-160-250 PACK at 08:10

## 2018-01-01 RX ADMIN — IPRATROPIUM BROMIDE AND ALBUTEROL SULFATE 3 ML: .5; 3 SOLUTION RESPIRATORY (INHALATION) at 08:19

## 2018-01-01 RX ADMIN — METRONIDAZOLE 500 MG: 500 INJECTION, SOLUTION INTRAVENOUS at 12:25

## 2018-01-01 RX ADMIN — PROPOFOL 20 MCG/KG/MIN: 10 INJECTION, EMULSION INTRAVENOUS at 01:00

## 2018-01-01 RX ADMIN — Medication 10 ML: at 16:08

## 2018-01-01 RX ADMIN — INSULIN LISPRO 3 UNITS: 100 INJECTION, SOLUTION INTRAVENOUS; SUBCUTANEOUS at 21:17

## 2018-01-01 RX ADMIN — POLYVINYL ALCOHOL 2 DROP: 14 SOLUTION/ DROPS OPHTHALMIC at 14:30

## 2018-01-01 RX ADMIN — INSULIN LISPRO 2 UNITS: 100 INJECTION, SOLUTION INTRAVENOUS; SUBCUTANEOUS at 00:22

## 2018-01-01 RX ADMIN — Medication 10 ML: at 05:56

## 2018-01-01 RX ADMIN — CEFEPIME 2 G: 2 INJECTION, POWDER, FOR SOLUTION INTRAVENOUS at 00:20

## 2018-01-01 RX ADMIN — METRONIDAZOLE 500 MG: 500 INJECTION, SOLUTION INTRAVENOUS at 20:17

## 2018-01-01 RX ADMIN — FAMOTIDINE 20 MG: 40 POWDER, FOR SUSPENSION ORAL at 08:48

## 2018-01-01 RX ADMIN — IPRATROPIUM BROMIDE AND ALBUTEROL SULFATE 3 ML: .5; 3 SOLUTION RESPIRATORY (INHALATION) at 23:56

## 2018-01-01 RX ADMIN — ACYCLOVIR SODIUM 575 MG: 50 INJECTION, SOLUTION INTRAVENOUS at 10:31

## 2018-01-01 RX ADMIN — SODIUM CHLORIDE 100 MG: 900 INJECTION, SOLUTION INTRAVENOUS at 05:13

## 2018-01-01 RX ADMIN — FAMOTIDINE 20 MG: 10 INJECTION, SOLUTION INTRAVENOUS at 08:32

## 2018-01-01 RX ADMIN — POLYVINYL ALCOHOL 2 DROP: 14 SOLUTION/ DROPS OPHTHALMIC at 05:56

## 2018-01-01 RX ADMIN — SODIUM CHLORIDE 100 MG: 900 INJECTION, SOLUTION INTRAVENOUS at 06:07

## 2018-01-01 RX ADMIN — ACETAMINOPHEN 650 MG: 650 SOLUTION ORAL at 17:57

## 2018-01-01 RX ADMIN — INSULIN LISPRO 3 UNITS: 100 INJECTION, SOLUTION INTRAVENOUS; SUBCUTANEOUS at 11:11

## 2018-01-01 RX ADMIN — ACYCLOVIR SODIUM 575 MG: 50 INJECTION, SOLUTION INTRAVENOUS at 21:01

## 2018-01-01 RX ADMIN — Medication 20 ML: at 14:17

## 2018-01-01 RX ADMIN — Medication 10 ML: at 05:05

## 2018-01-01 RX ADMIN — LEVETIRACETAM 1000 MG: 100 INJECTION, SOLUTION, CONCENTRATE INTRAVENOUS at 20:17

## 2018-01-01 RX ADMIN — IPRATROPIUM BROMIDE AND ALBUTEROL SULFATE 3 ML: .5; 3 SOLUTION RESPIRATORY (INHALATION) at 09:16

## 2018-01-01 RX ADMIN — SODIUM CHLORIDE 0.7 MCG/KG/HR: 900 INJECTION, SOLUTION INTRAVENOUS at 23:12

## 2018-01-01 RX ADMIN — SUFENTANIL CITRATE 5 MCG: 50 INJECTION EPIDURAL; INTRAVENOUS at 17:26

## 2018-01-01 RX ADMIN — INSULIN GLARGINE 40 UNITS: 100 INJECTION, SOLUTION SUBCUTANEOUS at 20:58

## 2018-01-01 RX ADMIN — SUFENTANIL CITRATE 5 MCG: 50 INJECTION EPIDURAL; INTRAVENOUS at 17:50

## 2018-01-01 RX ADMIN — ETOMIDATE 20 MG: 2 INJECTION, SOLUTION INTRAVENOUS at 11:33

## 2018-01-01 RX ADMIN — IPRATROPIUM BROMIDE AND ALBUTEROL SULFATE 3 ML: .5; 3 SOLUTION RESPIRATORY (INHALATION) at 15:34

## 2018-01-01 RX ADMIN — IPRATROPIUM BROMIDE AND ALBUTEROL SULFATE 3 ML: .5; 3 SOLUTION RESPIRATORY (INHALATION) at 23:29

## 2018-01-01 RX ADMIN — ONDANSETRON 4 MG: 2 INJECTION INTRAMUSCULAR; INTRAVENOUS at 18:41

## 2018-01-01 RX ADMIN — INSULIN LISPRO 3 UNITS: 100 INJECTION, SOLUTION INTRAVENOUS; SUBCUTANEOUS at 23:43

## 2018-01-01 RX ADMIN — INSULIN LISPRO 3 UNITS: 100 INJECTION, SOLUTION INTRAVENOUS; SUBCUTANEOUS at 18:05

## 2018-01-01 RX ADMIN — SODIUM CHLORIDE 75 ML/HR: 450 INJECTION, SOLUTION INTRAVENOUS at 04:13

## 2018-01-01 RX ADMIN — CHLORHEXIDINE GLUCONATE 15 ML: 1.2 RINSE ORAL at 08:15

## 2018-01-01 RX ADMIN — METRONIDAZOLE 500 MG: 500 INJECTION, SOLUTION INTRAVENOUS at 12:40

## 2018-01-01 RX ADMIN — Medication 20 ML: at 14:15

## 2018-01-01 RX ADMIN — LEVETIRACETAM 1000 MG: 100 SOLUTION ORAL at 08:47

## 2018-01-01 RX ADMIN — INSULIN GLARGINE 20 UNITS: 100 INJECTION, SOLUTION SUBCUTANEOUS at 21:35

## 2018-01-01 RX ADMIN — INSULIN LISPRO 4 UNITS: 100 INJECTION, SOLUTION INTRAVENOUS; SUBCUTANEOUS at 01:06

## 2018-01-01 RX ADMIN — CASTOR OIL AND BALSAM, PERU: 788; 87 OINTMENT TOPICAL at 08:00

## 2018-01-01 RX ADMIN — LACOSAMIDE 100 MG: 50 TABLET, FILM COATED ORAL at 21:10

## 2018-01-01 RX ADMIN — PROPOFOL 25 MCG/KG/MIN: 10 INJECTION, EMULSION INTRAVENOUS at 16:26

## 2018-01-01 RX ADMIN — IPRATROPIUM BROMIDE AND ALBUTEROL SULFATE 3 ML: .5; 3 SOLUTION RESPIRATORY (INHALATION) at 09:41

## 2018-01-01 RX ADMIN — POTASSIUM & SODIUM PHOSPHATES POWDER PACK 280-160-250 MG 1 PACKET: 280-160-250 PACK at 08:03

## 2018-01-01 RX ADMIN — BUDESONIDE 500 MCG: 0.5 INHALANT RESPIRATORY (INHALATION) at 08:17

## 2018-01-01 RX ADMIN — CASTOR OIL AND BALSAM, PERU: 788; 87 OINTMENT TOPICAL at 17:43

## 2018-01-01 RX ADMIN — CHLORHEXIDINE GLUCONATE 15 ML: 1.2 RINSE ORAL at 09:00

## 2018-01-01 RX ADMIN — Medication 10 ML: at 05:39

## 2018-01-01 RX ADMIN — ATORVASTATIN CALCIUM 40 MG: 40 TABLET, FILM COATED ORAL at 08:25

## 2018-01-01 RX ADMIN — INSULIN LISPRO 3 UNITS: 100 INJECTION, SOLUTION INTRAVENOUS; SUBCUTANEOUS at 03:00

## 2018-01-01 RX ADMIN — SODIUM CHLORIDE 500 MG: 900 INJECTION, SOLUTION INTRAVENOUS at 22:20

## 2018-01-01 RX ADMIN — POLYVINYL ALCOHOL 2 DROP: 14 SOLUTION/ DROPS OPHTHALMIC at 06:38

## 2018-01-01 RX ADMIN — METRONIDAZOLE 500 MG: 500 INJECTION, SOLUTION INTRAVENOUS at 04:08

## 2018-01-01 RX ADMIN — INSULIN LISPRO 4 UNITS: 100 INJECTION, SOLUTION INTRAVENOUS; SUBCUTANEOUS at 05:57

## 2018-01-01 RX ADMIN — Medication 80 MCG: at 17:23

## 2018-01-01 RX ADMIN — Medication 20 ML: at 13:57

## 2018-01-01 RX ADMIN — CHLORHEXIDINE GLUCONATE 15 ML: 1.2 RINSE ORAL at 21:13

## 2018-01-01 RX ADMIN — ACYCLOVIR SODIUM 575 MG: 50 INJECTION, SOLUTION INTRAVENOUS at 21:33

## 2018-01-01 RX ADMIN — FAMOTIDINE 20 MG: 40 POWDER, FOR SUSPENSION ORAL at 08:03

## 2018-01-01 RX ADMIN — Medication 80 MCG: at 18:06

## 2018-01-01 RX ADMIN — SODIUM CHLORIDE 5 MG/HR: 900 INJECTION, SOLUTION INTRAVENOUS at 06:26

## 2018-01-01 RX ADMIN — SODIUM CHLORIDE 100 MG: 900 INJECTION, SOLUTION INTRAVENOUS at 18:23

## 2018-01-01 RX ADMIN — LACOSAMIDE 100 MG: 50 TABLET, FILM COATED ORAL at 21:08

## 2018-01-01 RX ADMIN — IPRATROPIUM BROMIDE AND ALBUTEROL SULFATE 3 ML: .5; 3 SOLUTION RESPIRATORY (INHALATION) at 14:54

## 2018-01-01 RX ADMIN — POLYVINYL ALCOHOL 2 DROP: 14 SOLUTION/ DROPS OPHTHALMIC at 21:12

## 2018-01-01 RX ADMIN — BUDESONIDE 500 MCG: 0.5 INHALANT RESPIRATORY (INHALATION) at 19:48

## 2018-01-01 RX ADMIN — SODIUM CHLORIDE 9 MG/HR: 900 INJECTION, SOLUTION INTRAVENOUS at 09:12

## 2018-01-01 RX ADMIN — SODIUM CHLORIDE AND POTASSIUM CHLORIDE: 9; 2.98 INJECTION, SOLUTION INTRAVENOUS at 08:26

## 2018-01-01 RX ADMIN — INSULIN LISPRO 2 UNITS: 100 INJECTION, SOLUTION INTRAVENOUS; SUBCUTANEOUS at 10:03

## 2018-01-01 RX ADMIN — CHLORHEXIDINE GLUCONATE 15 ML: 1.2 RINSE ORAL at 21:00

## 2018-01-01 RX ADMIN — HYDRALAZINE HYDROCHLORIDE 10 MG: 20 INJECTION INTRAMUSCULAR; INTRAVENOUS at 13:34

## 2018-01-01 RX ADMIN — IPRATROPIUM BROMIDE AND ALBUTEROL SULFATE 3 ML: .5; 3 SOLUTION RESPIRATORY (INHALATION) at 22:32

## 2018-01-01 RX ADMIN — CASTOR OIL AND BALSAM, PERU: 788; 87 OINTMENT TOPICAL at 08:07

## 2018-01-01 RX ADMIN — ACYCLOVIR SODIUM 575 MG: 50 INJECTION, SOLUTION INTRAVENOUS at 09:43

## 2018-01-01 RX ADMIN — FAMOTIDINE 20 MG: 40 POWDER, FOR SUSPENSION ORAL at 08:00

## 2018-01-01 RX ADMIN — FAMOTIDINE 20 MG: 40 POWDER, FOR SUSPENSION ORAL at 10:19

## 2018-01-01 RX ADMIN — CASTOR OIL AND BALSAM, PERU: 788; 87 OINTMENT TOPICAL at 09:16

## 2018-01-01 RX ADMIN — HYDRALAZINE HYDROCHLORIDE 10 MG: 20 INJECTION INTRAMUSCULAR; INTRAVENOUS at 03:03

## 2018-01-01 RX ADMIN — MORPHINE SULFATE 2 MG: 2 INJECTION, SOLUTION INTRAMUSCULAR; INTRAVENOUS at 03:40

## 2018-01-01 RX ADMIN — ACETAMINOPHEN 650 MG: 650 SOLUTION ORAL at 07:51

## 2018-01-01 RX ADMIN — FENTANYL CITRATE 25 MCG: 50 INJECTION, SOLUTION INTRAMUSCULAR; INTRAVENOUS at 15:49

## 2018-01-01 RX ADMIN — ATORVASTATIN CALCIUM 40 MG: 40 TABLET, FILM COATED ORAL at 08:06

## 2018-01-01 RX ADMIN — Medication 20 ML: at 05:45

## 2018-01-01 RX ADMIN — POLYVINYL ALCOHOL 2 DROP: 14 SOLUTION/ DROPS OPHTHALMIC at 06:39

## 2018-01-01 RX ADMIN — CHLORHEXIDINE GLUCONATE 15 ML: 1.2 RINSE ORAL at 18:36

## 2018-01-01 RX ADMIN — Medication 10 ML: at 05:15

## 2018-01-01 RX ADMIN — Medication 10 ML: at 21:40

## 2018-01-01 RX ADMIN — VANCOMYCIN HYDROCHLORIDE 2 G: 10 INJECTION, POWDER, LYOPHILIZED, FOR SOLUTION INTRAVENOUS at 17:07

## 2018-01-01 RX ADMIN — PROPOFOL 30 MCG/KG/MIN: 10 INJECTION, EMULSION INTRAVENOUS at 22:50

## 2018-01-01 RX ADMIN — IPRATROPIUM BROMIDE AND ALBUTEROL SULFATE 3 ML: .5; 3 SOLUTION RESPIRATORY (INHALATION) at 07:06

## 2018-01-01 RX ADMIN — INSULIN LISPRO 3 UNITS: 100 INJECTION, SOLUTION INTRAVENOUS; SUBCUTANEOUS at 18:35

## 2018-01-01 RX ADMIN — LACOSAMIDE 100 MG: 50 TABLET, FILM COATED ORAL at 21:05

## 2018-01-01 RX ADMIN — VANCOMYCIN HYDROCHLORIDE 1250 MG: 10 INJECTION, POWDER, LYOPHILIZED, FOR SOLUTION INTRAVENOUS at 14:19

## 2018-01-01 RX ADMIN — Medication 10 ML: at 06:52

## 2018-01-01 RX ADMIN — SODIUM CHLORIDE 0.5 MCG/KG/HR: 900 INJECTION, SOLUTION INTRAVENOUS at 00:17

## 2018-01-01 RX ADMIN — LEVETIRACETAM 1000 MG: 100 SOLUTION ORAL at 08:34

## 2018-01-01 RX ADMIN — PROPOFOL 150 MG: 10 INJECTION, EMULSION INTRAVENOUS at 17:20

## 2018-01-01 RX ADMIN — ATORVASTATIN CALCIUM 40 MG: 40 TABLET, FILM COATED ORAL at 08:00

## 2018-01-01 RX ADMIN — POLYVINYL ALCOHOL 2 DROP: 14 SOLUTION/ DROPS OPHTHALMIC at 06:14

## 2018-01-01 RX ADMIN — LEVETIRACETAM 1000 MG: 100 SOLUTION ORAL at 08:07

## 2018-01-01 RX ADMIN — INSULIN LISPRO 3 UNITS: 100 INJECTION, SOLUTION INTRAVENOUS; SUBCUTANEOUS at 05:45

## 2018-01-01 RX ADMIN — ATORVASTATIN CALCIUM 40 MG: 40 TABLET, FILM COATED ORAL at 10:10

## 2018-01-01 RX ADMIN — INSULIN LISPRO 3 UNITS: 100 INJECTION, SOLUTION INTRAVENOUS; SUBCUTANEOUS at 11:48

## 2018-01-01 RX ADMIN — INSULIN LISPRO 2 UNITS: 100 INJECTION, SOLUTION INTRAVENOUS; SUBCUTANEOUS at 12:46

## 2018-01-01 RX ADMIN — INSULIN LISPRO 7 UNITS: 100 INJECTION, SOLUTION INTRAVENOUS; SUBCUTANEOUS at 06:36

## 2018-01-01 RX ADMIN — FAMOTIDINE 20 MG: 40 POWDER, FOR SUSPENSION ORAL at 08:33

## 2018-01-01 RX ADMIN — IPRATROPIUM BROMIDE AND ALBUTEROL SULFATE 3 ML: .5; 3 SOLUTION RESPIRATORY (INHALATION) at 20:26

## 2018-01-01 RX ADMIN — IPRATROPIUM BROMIDE AND ALBUTEROL SULFATE 3 ML: .5; 3 SOLUTION RESPIRATORY (INHALATION) at 13:25

## 2018-01-01 RX ADMIN — Medication 20 ML: at 12:59

## 2018-01-01 RX ADMIN — SODIUM CHLORIDE 100 MG: 900 INJECTION, SOLUTION INTRAVENOUS at 18:04

## 2018-01-01 RX ADMIN — POTASSIUM & SODIUM PHOSPHATES POWDER PACK 280-160-250 MG 2 PACKET: 280-160-250 PACK at 22:16

## 2018-01-01 RX ADMIN — Medication 10 ML: at 13:55

## 2018-01-01 RX ADMIN — Medication 10 ML: at 12:30

## 2018-01-01 RX ADMIN — CHLORHEXIDINE GLUCONATE 15 ML: 1.2 RINSE ORAL at 17:40

## 2018-01-01 RX ADMIN — IPRATROPIUM BROMIDE AND ALBUTEROL SULFATE 3 ML: .5; 3 SOLUTION RESPIRATORY (INHALATION) at 20:28

## 2018-01-01 RX ADMIN — FAMOTIDINE 20 MG: 10 INJECTION, SOLUTION INTRAVENOUS at 10:06

## 2018-01-01 RX ADMIN — LORAZEPAM 1 MG: 2 INJECTION INTRAMUSCULAR; INTRAVENOUS at 23:27

## 2018-01-01 RX ADMIN — INSULIN LISPRO 5 UNITS: 100 INJECTION, SOLUTION INTRAVENOUS; SUBCUTANEOUS at 12:21

## 2018-01-01 RX ADMIN — SODIUM CHLORIDE 0.4 MCG/KG/HR: 900 INJECTION, SOLUTION INTRAVENOUS at 14:00

## 2018-01-01 RX ADMIN — LEVETIRACETAM 1000 MG: 100 INJECTION, SOLUTION, CONCENTRATE INTRAVENOUS at 08:38

## 2018-01-01 RX ADMIN — POLYVINYL ALCOHOL 2 DROP: 14 SOLUTION/ DROPS OPHTHALMIC at 14:00

## 2018-01-01 RX ADMIN — Medication 10 ML: at 15:03

## 2018-01-01 RX ADMIN — LEVETIRACETAM 1000 MG: 100 INJECTION, SOLUTION, CONCENTRATE INTRAVENOUS at 22:02

## 2018-01-01 RX ADMIN — PROPOFOL 25 MCG/KG/MIN: 10 INJECTION, EMULSION INTRAVENOUS at 08:15

## 2018-01-01 RX ADMIN — HYDRALAZINE HYDROCHLORIDE 10 MG: 20 INJECTION INTRAMUSCULAR; INTRAVENOUS at 19:21

## 2018-01-01 RX ADMIN — CHLORHEXIDINE GLUCONATE 15 ML: 1.2 RINSE ORAL at 21:44

## 2018-01-01 RX ADMIN — LEVETIRACETAM 1000 MG: 100 INJECTION, SOLUTION, CONCENTRATE INTRAVENOUS at 22:25

## 2018-01-01 RX ADMIN — Medication 20 ML: at 12:30

## 2018-01-01 RX ADMIN — POTASSIUM & SODIUM PHOSPHATES POWDER PACK 280-160-250 MG 1 PACKET: 280-160-250 PACK at 17:31

## 2018-01-01 RX ADMIN — IPRATROPIUM BROMIDE AND ALBUTEROL SULFATE 3 ML: .5; 3 SOLUTION RESPIRATORY (INHALATION) at 08:18

## 2018-01-01 RX ADMIN — INSULIN GLARGINE 35 UNITS: 100 INJECTION, SOLUTION SUBCUTANEOUS at 21:00

## 2018-01-01 RX ADMIN — POLYVINYL ALCOHOL 2 DROP: 14 SOLUTION/ DROPS OPHTHALMIC at 05:06

## 2018-01-01 RX ADMIN — BUDESONIDE 500 MCG: 0.5 INHALANT RESPIRATORY (INHALATION) at 20:39

## 2018-01-01 RX ADMIN — INSULIN GLARGINE 50 UNITS: 100 INJECTION, SOLUTION SUBCUTANEOUS at 21:14

## 2018-01-01 RX ADMIN — FENTANYL CITRATE 25 MCG: 50 INJECTION, SOLUTION INTRAMUSCULAR; INTRAVENOUS at 16:43

## 2018-01-01 RX ADMIN — Medication 10 ML: at 14:15

## 2018-01-01 RX ADMIN — VANCOMYCIN HYDROCHLORIDE 2000 MG: 10 INJECTION, POWDER, LYOPHILIZED, FOR SOLUTION INTRAVENOUS at 12:31

## 2018-01-01 RX ADMIN — ACYCLOVIR SODIUM 575 MG: 50 INJECTION, SOLUTION INTRAVENOUS at 10:40

## 2018-01-01 RX ADMIN — PROPOFOL 100 MG: 10 INJECTION, EMULSION INTRAVENOUS at 10:00

## 2018-01-01 RX ADMIN — IPRATROPIUM BROMIDE AND ALBUTEROL SULFATE 3 ML: .5; 3 SOLUTION RESPIRATORY (INHALATION) at 13:02

## 2018-01-01 RX ADMIN — INSULIN GLARGINE 50 UNITS: 100 INJECTION, SOLUTION SUBCUTANEOUS at 22:25

## 2018-01-01 RX ADMIN — POLYVINYL ALCOHOL 2 DROP: 14 SOLUTION/ DROPS OPHTHALMIC at 21:03

## 2018-01-01 RX ADMIN — IPRATROPIUM BROMIDE AND ALBUTEROL SULFATE 3 ML: .5; 3 SOLUTION RESPIRATORY (INHALATION) at 20:39

## 2018-01-01 RX ADMIN — HYDROMORPHONE HYDROCHLORIDE 1 MG: 1 INJECTION, SOLUTION INTRAMUSCULAR; INTRAVENOUS; SUBCUTANEOUS at 23:58

## 2018-01-01 RX ADMIN — NEOSTIGMINE METHYLSULFATE 3 MG: 1 INJECTION INTRAVENOUS at 18:56

## 2018-01-01 RX ADMIN — ATORVASTATIN CALCIUM 40 MG: 40 TABLET, FILM COATED ORAL at 08:38

## 2018-01-01 RX ADMIN — Medication 80 MCG: at 18:00

## 2018-01-01 RX ADMIN — SODIUM CHLORIDE 0.8 MCG/KG/HR: 900 INJECTION, SOLUTION INTRAVENOUS at 21:43

## 2018-01-01 RX ADMIN — IPRATROPIUM BROMIDE AND ALBUTEROL SULFATE 3 ML: .5; 3 SOLUTION RESPIRATORY (INHALATION) at 00:47

## 2018-01-01 RX ADMIN — INSULIN GLARGINE 40 UNITS: 100 INJECTION, SOLUTION SUBCUTANEOUS at 22:39

## 2018-01-01 RX ADMIN — IPRATROPIUM BROMIDE AND ALBUTEROL SULFATE 3 ML: .5; 3 SOLUTION RESPIRATORY (INHALATION) at 19:48

## 2018-01-01 RX ADMIN — IPRATROPIUM BROMIDE AND ALBUTEROL SULFATE 3 ML: .5; 3 SOLUTION RESPIRATORY (INHALATION) at 08:56

## 2018-01-01 RX ADMIN — FAMOTIDINE 20 MG: 40 POWDER, FOR SUSPENSION ORAL at 08:54

## 2018-01-01 RX ADMIN — DEXMEDETOMIDINE HYDROCHLORIDE 8 MCG: 4 INJECTION, SOLUTION INTRAVENOUS at 17:07

## 2018-01-01 RX ADMIN — INSULIN LISPRO 5 UNITS: 100 INJECTION, SOLUTION INTRAVENOUS; SUBCUTANEOUS at 04:29

## 2018-01-01 RX ADMIN — POLYVINYL ALCOHOL 2 DROP: 14 SOLUTION/ DROPS OPHTHALMIC at 15:02

## 2018-01-01 RX ADMIN — IPRATROPIUM BROMIDE AND ALBUTEROL SULFATE 3 ML: .5; 3 SOLUTION RESPIRATORY (INHALATION) at 00:57

## 2018-01-01 RX ADMIN — CEFEPIME 2 G: 2 INJECTION, POWDER, FOR SOLUTION INTRAVENOUS at 12:40

## 2018-01-01 RX ADMIN — Medication 10 ML: at 21:00

## 2018-01-01 RX ADMIN — INSULIN LISPRO 4 UNITS: 100 INJECTION, SOLUTION INTRAVENOUS; SUBCUTANEOUS at 23:50

## 2018-01-01 RX ADMIN — POLYVINYL ALCOHOL 2 DROP: 14 SOLUTION/ DROPS OPHTHALMIC at 22:27

## 2018-01-01 RX ADMIN — ALTEPLASE 1 MG: 2.2 INJECTION, POWDER, LYOPHILIZED, FOR SOLUTION INTRAVENOUS at 00:26

## 2018-01-01 RX ADMIN — Medication 2 G: at 21:56

## 2018-01-01 RX ADMIN — POLYVINYL ALCOHOL 2 DROP: 14 SOLUTION/ DROPS OPHTHALMIC at 14:32

## 2018-01-01 RX ADMIN — CHLORHEXIDINE GLUCONATE 15 ML: 1.2 RINSE ORAL at 08:08

## 2018-01-01 RX ADMIN — LACOSAMIDE 100 MG: 50 TABLET, FILM COATED ORAL at 21:50

## 2018-01-01 RX ADMIN — INSULIN LISPRO 3 UNITS: 100 INJECTION, SOLUTION INTRAVENOUS; SUBCUTANEOUS at 18:00

## 2018-01-01 RX ADMIN — LACOSAMIDE 100 MG: 50 TABLET, FILM COATED ORAL at 08:07

## 2018-01-01 RX ADMIN — Medication 10 ML: at 12:01

## 2018-01-01 RX ADMIN — POTASSIUM & SODIUM PHOSPHATES POWDER PACK 280-160-250 MG 1 PACKET: 280-160-250 PACK at 12:01

## 2018-01-01 RX ADMIN — LEVETIRACETAM 1000 MG: 100 SOLUTION ORAL at 21:01

## 2018-01-01 RX ADMIN — SODIUM CHLORIDE 0.4 MCG/KG/HR: 900 INJECTION, SOLUTION INTRAVENOUS at 08:38

## 2018-01-01 RX ADMIN — INSULIN LISPRO 2 UNITS: 100 INJECTION, SOLUTION INTRAVENOUS; SUBCUTANEOUS at 04:17

## 2018-01-01 RX ADMIN — ACYCLOVIR SODIUM 575 MG: 50 INJECTION, SOLUTION INTRAVENOUS at 09:35

## 2018-01-01 RX ADMIN — Medication 10 ML: at 22:17

## 2018-01-01 RX ADMIN — Medication 20 ML: at 12:12

## 2018-01-01 RX ADMIN — SODIUM CHLORIDE 0.5 MCG/KG/HR: 900 INJECTION, SOLUTION INTRAVENOUS at 04:50

## 2018-01-01 RX ADMIN — INSULIN LISPRO 3 UNITS: 100 INJECTION, SOLUTION INTRAVENOUS; SUBCUTANEOUS at 05:55

## 2018-01-01 RX ADMIN — POLYVINYL ALCOHOL 2 DROP: 14 SOLUTION/ DROPS OPHTHALMIC at 05:24

## 2018-01-01 RX ADMIN — Medication 10 ML: at 14:14

## 2018-01-01 RX ADMIN — Medication 10 ML: at 22:08

## 2018-01-01 RX ADMIN — SODIUM CHLORIDE 7.5 MG/HR: 900 INJECTION, SOLUTION INTRAVENOUS at 06:12

## 2018-01-01 RX ADMIN — INSULIN GLARGINE 45 UNITS: 100 INJECTION, SOLUTION SUBCUTANEOUS at 21:50

## 2018-01-01 RX ADMIN — CEFEPIME 2 G: 2 INJECTION, POWDER, FOR SOLUTION INTRAVENOUS at 11:21

## 2018-01-01 RX ADMIN — Medication 10 ML: at 22:26

## 2018-01-01 RX ADMIN — LEVETIRACETAM 1000 MG: 100 INJECTION, SOLUTION, CONCENTRATE INTRAVENOUS at 21:34

## 2018-01-01 RX ADMIN — CEFEPIME 2 G: 2 INJECTION, POWDER, FOR SOLUTION INTRAVENOUS at 00:23

## 2018-01-01 RX ADMIN — Medication 10 ML: at 21:12

## 2018-01-01 RX ADMIN — INSULIN LISPRO 3 UNITS: 100 INJECTION, SOLUTION INTRAVENOUS; SUBCUTANEOUS at 17:39

## 2018-01-01 RX ADMIN — IPRATROPIUM BROMIDE AND ALBUTEROL SULFATE 3 ML: .5; 3 SOLUTION RESPIRATORY (INHALATION) at 08:47

## 2018-01-01 RX ADMIN — LACOSAMIDE 100 MG: 50 TABLET, FILM COATED ORAL at 10:09

## 2018-01-01 RX ADMIN — INSULIN LISPRO 4 UNITS: 100 INJECTION, SOLUTION INTRAVENOUS; SUBCUTANEOUS at 00:51

## 2018-01-01 RX ADMIN — IPRATROPIUM BROMIDE AND ALBUTEROL SULFATE 3 ML: .5; 3 SOLUTION RESPIRATORY (INHALATION) at 20:16

## 2018-01-01 RX ADMIN — SODIUM CHLORIDE AND POTASSIUM CHLORIDE: 9; 2.98 INJECTION, SOLUTION INTRAVENOUS at 06:36

## 2018-01-01 RX ADMIN — ACYCLOVIR SODIUM 575 MG: 50 INJECTION, SOLUTION INTRAVENOUS at 10:18

## 2018-01-01 RX ADMIN — POTASSIUM & SODIUM PHOSPHATES POWDER PACK 280-160-250 MG 2 PACKET: 280-160-250 PACK at 18:58

## 2018-01-01 RX ADMIN — SODIUM CHLORIDE 100 MG: 900 INJECTION, SOLUTION INTRAVENOUS at 17:40

## 2018-01-01 RX ADMIN — INSULIN LISPRO 3 UNITS: 100 INJECTION, SOLUTION INTRAVENOUS; SUBCUTANEOUS at 22:25

## 2018-01-01 RX ADMIN — HYDRALAZINE HYDROCHLORIDE 10 MG: 20 INJECTION INTRAMUSCULAR; INTRAVENOUS at 22:27

## 2018-01-01 RX ADMIN — Medication 10 ML: at 12:32

## 2018-01-01 RX ADMIN — Medication 10 ML: at 22:39

## 2018-01-01 RX ADMIN — SODIUM CHLORIDE 100 MG: 900 INJECTION, SOLUTION INTRAVENOUS at 05:56

## 2018-01-01 RX ADMIN — SODIUM CHLORIDE 100 MG: 900 INJECTION, SOLUTION INTRAVENOUS at 18:12

## 2018-01-01 RX ADMIN — IPRATROPIUM BROMIDE AND ALBUTEROL SULFATE 3 ML: .5; 3 SOLUTION RESPIRATORY (INHALATION) at 11:36

## 2018-01-01 RX ADMIN — CEFEPIME 2 G: 2 INJECTION, POWDER, FOR SOLUTION INTRAVENOUS at 00:16

## 2018-01-01 RX ADMIN — Medication 10 ML: at 23:08

## 2018-01-01 RX ADMIN — Medication 10 ML: at 23:21

## 2018-01-01 RX ADMIN — ACETAMINOPHEN 650 MG: 650 SOLUTION ORAL at 21:52

## 2018-01-01 RX ADMIN — POLYVINYL ALCOHOL 2 DROP: 14 SOLUTION/ DROPS OPHTHALMIC at 13:00

## 2018-01-01 RX ADMIN — INSULIN GLARGINE 30 UNITS: 100 INJECTION, SOLUTION SUBCUTANEOUS at 22:30

## 2018-01-01 RX ADMIN — IPRATROPIUM BROMIDE AND ALBUTEROL SULFATE 3 ML: .5; 3 SOLUTION RESPIRATORY (INHALATION) at 13:53

## 2018-01-01 RX ADMIN — FUROSEMIDE 40 MG: 10 INJECTION, SOLUTION INTRAMUSCULAR; INTRAVENOUS at 14:17

## 2018-01-01 RX ADMIN — Medication 10 ML: at 13:41

## 2018-01-01 RX ADMIN — LACOSAMIDE 100 MG: 50 TABLET, FILM COATED ORAL at 20:58

## 2018-01-01 RX ADMIN — PROPOFOL 20 MCG/KG/MIN: 10 INJECTION, EMULSION INTRAVENOUS at 01:36

## 2018-01-01 RX ADMIN — Medication 20 ML: at 12:11

## 2018-01-01 RX ADMIN — BUDESONIDE 500 MCG: 0.5 INHALANT RESPIRATORY (INHALATION) at 20:08

## 2018-01-01 RX ADMIN — PROPOFOL 20 MCG/KG/MIN: 10 INJECTION, EMULSION INTRAVENOUS at 16:30

## 2018-01-01 RX ADMIN — Medication 10 ML: at 15:14

## 2018-01-01 RX ADMIN — SODIUM CHLORIDE 100 ML/HR: 450 INJECTION, SOLUTION INTRAVENOUS at 23:28

## 2018-01-01 RX ADMIN — Medication 10 ML: at 13:53

## 2018-01-01 RX ADMIN — FAMOTIDINE 20 MG: 40 POWDER, FOR SUSPENSION ORAL at 09:17

## 2018-01-01 RX ADMIN — DEXTROSE MONOHYDRATE 100 ML/HR: 5 INJECTION, SOLUTION INTRAVENOUS at 14:41

## 2018-01-01 RX ADMIN — CASTOR OIL AND BALSAM, PERU: 788; 87 OINTMENT TOPICAL at 18:04

## 2018-01-01 RX ADMIN — FENTANYL CITRATE 25 MCG: 50 INJECTION, SOLUTION INTRAMUSCULAR; INTRAVENOUS at 23:58

## 2018-01-01 RX ADMIN — Medication 10 ML: at 05:23

## 2018-01-01 RX ADMIN — POLYVINYL ALCOHOL 2 DROP: 14 SOLUTION/ DROPS OPHTHALMIC at 15:34

## 2018-01-01 RX ADMIN — CHLORHEXIDINE GLUCONATE 15 ML: 1.2 RINSE ORAL at 08:07

## 2018-01-01 RX ADMIN — CHLORHEXIDINE GLUCONATE 15 ML: 1.2 RINSE ORAL at 21:09

## 2018-01-01 RX ADMIN — POTASSIUM & SODIUM PHOSPHATES POWDER PACK 280-160-250 MG 2 PACKET: 280-160-250 PACK at 08:54

## 2018-01-01 RX ADMIN — POLYVINYL ALCOHOL 2 DROP: 14 SOLUTION/ DROPS OPHTHALMIC at 21:07

## 2018-01-01 RX ADMIN — LORAZEPAM 1 MG: 2 INJECTION INTRAMUSCULAR; INTRAVENOUS at 16:07

## 2018-01-01 RX ADMIN — SUCCINYLCHOLINE CHLORIDE 140 MG: 20 INJECTION, SOLUTION INTRAMUSCULAR; INTRAVENOUS; PARENTERAL at 22:28

## 2018-01-01 RX ADMIN — BUDESONIDE 500 MCG: 0.5 INHALANT RESPIRATORY (INHALATION) at 20:26

## 2018-01-01 RX ADMIN — ALTEPLASE 1 MG: 2.2 INJECTION, POWDER, LYOPHILIZED, FOR SOLUTION INTRAVENOUS at 10:51

## 2018-01-01 RX ADMIN — HYDRALAZINE HYDROCHLORIDE 10 MG: 20 INJECTION INTRAMUSCULAR; INTRAVENOUS at 23:42

## 2018-01-01 RX ADMIN — CHLORHEXIDINE GLUCONATE 15 ML: 1.2 RINSE ORAL at 17:31

## 2018-01-01 RX ADMIN — LEVETIRACETAM 1000 MG: 100 SOLUTION ORAL at 08:33

## 2018-01-01 RX ADMIN — SODIUM CHLORIDE: 900 INJECTION, SOLUTION INTRAVENOUS at 11:02

## 2018-01-01 RX ADMIN — POLYVINYL ALCOHOL 2 DROP: 14 SOLUTION/ DROPS OPHTHALMIC at 06:08

## 2018-01-01 RX ADMIN — ACYCLOVIR SODIUM 575 MG: 50 INJECTION, SOLUTION INTRAVENOUS at 22:26

## 2018-01-01 RX ADMIN — ACETAMINOPHEN 650 MG: 650 SOLUTION ORAL at 01:06

## 2018-01-01 RX ADMIN — PROPOFOL 10 MCG/KG/MIN: 10 INJECTION, EMULSION INTRAVENOUS at 10:21

## 2018-01-01 RX ADMIN — METOPROLOL TARTRATE 2.5 MG: 5 INJECTION, SOLUTION INTRAVENOUS at 16:00

## 2018-01-01 RX ADMIN — INSULIN LISPRO 2 UNITS: 100 INJECTION, SOLUTION INTRAVENOUS; SUBCUTANEOUS at 21:12

## 2018-01-01 RX ADMIN — FAMOTIDINE 20 MG: 40 POWDER, FOR SUSPENSION ORAL at 08:38

## 2018-01-01 RX ADMIN — SODIUM CHLORIDE 40 MG: 9 INJECTION INTRAMUSCULAR; INTRAVENOUS; SUBCUTANEOUS at 09:09

## 2018-01-01 RX ADMIN — LEVETIRACETAM 1000 MG: 100 SOLUTION ORAL at 08:00

## 2018-01-01 RX ADMIN — POLYVINYL ALCOHOL 2 DROP: 14 SOLUTION/ DROPS OPHTHALMIC at 13:55

## 2018-01-01 RX ADMIN — INSULIN GLARGINE 40 UNITS: 100 INJECTION, SOLUTION SUBCUTANEOUS at 21:09

## 2018-01-01 RX ADMIN — LEVETIRACETAM 1000 MG: 100 INJECTION, SOLUTION, CONCENTRATE INTRAVENOUS at 21:32

## 2018-01-01 RX ADMIN — INSULIN GLARGINE 40 UNITS: 100 INJECTION, SOLUTION SUBCUTANEOUS at 21:44

## 2018-01-01 RX ADMIN — CASTOR OIL AND BALSAM, PERU: 788; 87 OINTMENT TOPICAL at 17:07

## 2018-01-01 RX ADMIN — BUDESONIDE 500 MCG: 0.5 INHALANT RESPIRATORY (INHALATION) at 08:58

## 2018-01-01 RX ADMIN — FENTANYL CITRATE 25 MCG: 50 INJECTION, SOLUTION INTRAMUSCULAR; INTRAVENOUS at 20:35

## 2018-01-01 RX ADMIN — CASTOR OIL AND BALSAM, PERU: 788; 87 OINTMENT TOPICAL at 18:08

## 2018-01-01 RX ADMIN — ATORVASTATIN CALCIUM 40 MG: 40 TABLET, FILM COATED ORAL at 09:17

## 2018-01-01 RX ADMIN — HYDRALAZINE HYDROCHLORIDE 10 MG: 20 INJECTION INTRAMUSCULAR; INTRAVENOUS at 06:20

## 2018-01-01 RX ADMIN — Medication 20 ML: at 12:01

## 2018-01-01 RX ADMIN — Medication 10 ML: at 21:51

## 2018-01-01 RX ADMIN — POLYVINYL ALCOHOL 2 DROP: 14 SOLUTION/ DROPS OPHTHALMIC at 00:47

## 2018-01-01 RX ADMIN — HYDRALAZINE HYDROCHLORIDE 10 MG: 20 INJECTION INTRAMUSCULAR; INTRAVENOUS at 10:17

## 2018-01-01 RX ADMIN — ACETAMINOPHEN 650 MG: 650 SOLUTION ORAL at 14:59

## 2018-01-01 RX ADMIN — LACOSAMIDE 100 MG: 50 TABLET, FILM COATED ORAL at 20:25

## 2018-01-01 RX ADMIN — Medication 20 ML: at 13:02

## 2018-01-01 RX ADMIN — CASTOR OIL AND BALSAM, PERU: 788; 87 OINTMENT TOPICAL at 08:29

## 2018-01-01 RX ADMIN — DEXMEDETOMIDINE HYDROCHLORIDE 8 MCG: 4 INJECTION, SOLUTION INTRAVENOUS at 17:20

## 2018-01-01 RX ADMIN — LABETALOL HYDROCHLORIDE 2.5 MG: 5 INJECTION, SOLUTION INTRAVENOUS at 18:59

## 2018-01-01 RX ADMIN — POLYVINYL ALCOHOL 2 DROP: 14 SOLUTION/ DROPS OPHTHALMIC at 23:02

## 2018-01-01 RX ADMIN — INSULIN LISPRO 7 UNITS: 100 INJECTION, SOLUTION INTRAVENOUS; SUBCUTANEOUS at 17:54

## 2018-01-01 RX ADMIN — INSULIN LISPRO 3 UNITS: 100 INJECTION, SOLUTION INTRAVENOUS; SUBCUTANEOUS at 06:15

## 2018-01-01 RX ADMIN — MORPHINE SULFATE 2 MG: 2 INJECTION, SOLUTION INTRAMUSCULAR; INTRAVENOUS at 02:52

## 2018-01-01 RX ADMIN — IPRATROPIUM BROMIDE AND ALBUTEROL SULFATE 3 ML: .5; 3 SOLUTION RESPIRATORY (INHALATION) at 20:51

## 2018-01-01 RX ADMIN — HYDRALAZINE HYDROCHLORIDE 10 MG: 20 INJECTION INTRAMUSCULAR; INTRAVENOUS at 07:12

## 2018-01-01 RX ADMIN — INSULIN LISPRO 2 UNITS: 100 INJECTION, SOLUTION INTRAVENOUS; SUBCUTANEOUS at 05:23

## 2018-01-01 RX ADMIN — INSULIN LISPRO 3 UNITS: 100 INJECTION, SOLUTION INTRAVENOUS; SUBCUTANEOUS at 17:47

## 2018-01-01 RX ADMIN — IPRATROPIUM BROMIDE AND ALBUTEROL SULFATE 3 ML: .5; 3 SOLUTION RESPIRATORY (INHALATION) at 20:08

## 2018-01-01 RX ADMIN — CEFEPIME 2 G: 2 INJECTION, POWDER, FOR SOLUTION INTRAVENOUS at 12:05

## 2018-01-01 RX ADMIN — Medication 2 G: at 13:00

## 2018-01-01 RX ADMIN — INSULIN LISPRO 4 UNITS: 100 INJECTION, SOLUTION INTRAVENOUS; SUBCUTANEOUS at 23:22

## 2018-01-01 RX ADMIN — Medication 20 ML: at 12:39

## 2018-01-01 RX ADMIN — INSULIN GLARGINE 30 UNITS: 100 INJECTION, SOLUTION SUBCUTANEOUS at 21:06

## 2018-01-01 RX ADMIN — IPRATROPIUM BROMIDE AND ALBUTEROL SULFATE 3 ML: .5; 3 SOLUTION RESPIRATORY (INHALATION) at 10:13

## 2018-01-01 RX ADMIN — INSULIN LISPRO 7 UNITS: 100 INJECTION, SOLUTION INTRAVENOUS; SUBCUTANEOUS at 12:21

## 2018-01-01 RX ADMIN — INSULIN LISPRO 4 UNITS: 100 INJECTION, SOLUTION INTRAVENOUS; SUBCUTANEOUS at 01:40

## 2018-01-01 RX ADMIN — IPRATROPIUM BROMIDE AND ALBUTEROL SULFATE 3 ML: .5; 3 SOLUTION RESPIRATORY (INHALATION) at 09:53

## 2018-01-01 RX ADMIN — POTASSIUM & SODIUM PHOSPHATES POWDER PACK 280-160-250 MG 2 PACKET: 280-160-250 PACK at 10:02

## 2018-01-01 RX ADMIN — POTASSIUM & SODIUM PHOSPHATES POWDER PACK 280-160-250 MG 2 PACKET: 280-160-250 PACK at 12:40

## 2018-01-01 RX ADMIN — CHLORHEXIDINE GLUCONATE 15 ML: 1.2 RINSE ORAL at 18:26

## 2018-01-01 RX ADMIN — Medication 10 ML: at 13:57

## 2018-01-01 RX ADMIN — SODIUM CHLORIDE 0.5 MCG/KG/HR: 900 INJECTION, SOLUTION INTRAVENOUS at 04:36

## 2018-01-01 RX ADMIN — SODIUM CHLORIDE 0.5 MCG/KG/HR: 900 INJECTION, SOLUTION INTRAVENOUS at 13:43

## 2018-01-01 RX ADMIN — POLYVINYL ALCOHOL 2 DROP: 14 SOLUTION/ DROPS OPHTHALMIC at 06:01

## 2018-01-01 RX ADMIN — PROPOFOL 10 MCG/KG/MIN: 10 INJECTION, EMULSION INTRAVENOUS at 14:25

## 2018-01-01 RX ADMIN — ATORVASTATIN CALCIUM 40 MG: 40 TABLET, FILM COATED ORAL at 09:09

## 2018-01-01 RX ADMIN — DEXTROSE MONOHYDRATE 100 ML/HR: 5 INJECTION, SOLUTION INTRAVENOUS at 14:04

## 2018-01-01 RX ADMIN — SODIUM CHLORIDE 40 MG: 9 INJECTION INTRAMUSCULAR; INTRAVENOUS; SUBCUTANEOUS at 12:40

## 2018-01-01 RX ADMIN — CEFEPIME 2 G: 2 INJECTION, POWDER, FOR SOLUTION INTRAVENOUS at 12:00

## 2018-01-01 RX ADMIN — FAMOTIDINE 20 MG: 40 POWDER, FOR SUSPENSION ORAL at 10:09

## 2018-01-01 RX ADMIN — CASTOR OIL AND BALSAM, PERU: 788; 87 OINTMENT TOPICAL at 18:00

## 2018-01-01 RX ADMIN — CHLORHEXIDINE GLUCONATE 15 ML: 1.2 RINSE ORAL at 08:42

## 2018-01-01 RX ADMIN — POLYVINYL ALCOHOL 2 DROP: 14 SOLUTION/ DROPS OPHTHALMIC at 22:58

## 2018-01-01 RX ADMIN — LEVETIRACETAM 1000 MG: 100 INJECTION, SOLUTION, CONCENTRATE INTRAVENOUS at 08:15

## 2018-01-01 RX ADMIN — Medication 20 ML: at 12:03

## 2018-01-01 RX ADMIN — LACOSAMIDE 100 MG: 50 TABLET, FILM COATED ORAL at 21:00

## 2018-01-01 RX ADMIN — SODIUM CHLORIDE 5 MG/HR: 900 INJECTION, SOLUTION INTRAVENOUS at 20:26

## 2018-01-01 RX ADMIN — ACYCLOVIR SODIUM 575 MG: 50 INJECTION, SOLUTION INTRAVENOUS at 10:38

## 2018-01-01 RX ADMIN — VANCOMYCIN HYDROCHLORIDE 1500 MG: 10 INJECTION, POWDER, LYOPHILIZED, FOR SOLUTION INTRAVENOUS at 09:01

## 2018-01-01 RX ADMIN — Medication 20 ML: at 12:02

## 2018-01-01 RX ADMIN — LEVETIRACETAM 1000 MG: 100 SOLUTION ORAL at 21:40

## 2018-01-01 RX ADMIN — PROPOFOL 30 MCG/KG/MIN: 10 INJECTION, EMULSION INTRAVENOUS at 03:43

## 2018-01-01 RX ADMIN — CEFEPIME 2 G: 2 INJECTION, POWDER, FOR SOLUTION INTRAVENOUS at 00:28

## 2018-01-01 RX ADMIN — ACYCLOVIR SODIUM 575 MG: 50 INJECTION, SOLUTION INTRAVENOUS at 10:19

## 2018-01-01 RX ADMIN — POLYVINYL ALCOHOL 2 DROP: 14 SOLUTION/ DROPS OPHTHALMIC at 21:36

## 2018-01-01 RX ADMIN — VANCOMYCIN HYDROCHLORIDE 1500 MG: 10 INJECTION, POWDER, LYOPHILIZED, FOR SOLUTION INTRAVENOUS at 15:38

## 2018-01-01 RX ADMIN — IPRATROPIUM BROMIDE AND ALBUTEROL SULFATE 3 ML: .5; 3 SOLUTION RESPIRATORY (INHALATION) at 16:55

## 2018-01-01 RX ADMIN — SODIUM CHLORIDE 100 MG: 900 INJECTION, SOLUTION INTRAVENOUS at 06:26

## 2018-01-01 RX ADMIN — Medication 10 ML: at 06:08

## 2018-01-01 RX ADMIN — POLYVINYL ALCOHOL 2 DROP: 14 SOLUTION/ DROPS OPHTHALMIC at 06:07

## 2018-01-01 RX ADMIN — ACYCLOVIR SODIUM 575 MG: 50 INJECTION, SOLUTION INTRAVENOUS at 21:45

## 2018-01-01 RX ADMIN — INSULIN LISPRO 3 UNITS: 100 INJECTION, SOLUTION INTRAVENOUS; SUBCUTANEOUS at 05:43

## 2018-01-01 RX ADMIN — ATORVASTATIN CALCIUM 40 MG: 40 TABLET, FILM COATED ORAL at 08:48

## 2018-01-01 RX ADMIN — SODIUM CHLORIDE 0.6 MCG/KG/HR: 900 INJECTION, SOLUTION INTRAVENOUS at 06:35

## 2018-01-01 RX ADMIN — PROPOFOL 25 MCG/KG/MIN: 10 INJECTION, EMULSION INTRAVENOUS at 02:40

## 2018-01-01 RX ADMIN — LEVETIRACETAM 1000 MG: 100 SOLUTION ORAL at 20:55

## 2018-01-01 RX ADMIN — Medication 10 ML: at 06:07

## 2018-01-01 RX ADMIN — ACYCLOVIR SODIUM 575 MG: 50 INJECTION, SOLUTION INTRAVENOUS at 22:41

## 2018-01-01 RX ADMIN — SUCCINYLCHOLINE CHLORIDE 140 MG: 20 INJECTION INTRAMUSCULAR; INTRAVENOUS at 22:28

## 2018-01-01 RX ADMIN — CASTOR OIL AND BALSAM, PERU: 788; 87 OINTMENT TOPICAL at 18:07

## 2018-01-01 RX ADMIN — MORPHINE SULFATE 2 MG: 2 INJECTION, SOLUTION INTRAMUSCULAR; INTRAVENOUS at 08:00

## 2018-01-01 RX ADMIN — INSULIN LISPRO 3 UNITS: 100 INJECTION, SOLUTION INTRAVENOUS; SUBCUTANEOUS at 06:13

## 2018-01-01 RX ADMIN — Medication 20 ML: at 12:33

## 2018-01-01 RX ADMIN — BUDESONIDE 500 MCG: 0.5 INHALANT RESPIRATORY (INHALATION) at 19:46

## 2018-01-01 RX ADMIN — CHLORHEXIDINE GLUCONATE 15 ML: 1.2 RINSE ORAL at 09:35

## 2018-01-01 RX ADMIN — SODIUM CHLORIDE 0.6 MCG/KG/HR: 900 INJECTION, SOLUTION INTRAVENOUS at 18:25

## 2018-01-01 RX ADMIN — POLYVINYL ALCOHOL 2 DROP: 14 SOLUTION/ DROPS OPHTHALMIC at 14:48

## 2018-01-01 RX ADMIN — Medication 30 ML: at 06:35

## 2018-01-01 RX ADMIN — LORAZEPAM 0.5 MG: 2 INJECTION, SOLUTION INTRAMUSCULAR; INTRAVENOUS at 20:48

## 2018-01-01 RX ADMIN — IPRATROPIUM BROMIDE AND ALBUTEROL SULFATE 3 ML: .5; 3 SOLUTION RESPIRATORY (INHALATION) at 14:02

## 2018-01-01 RX ADMIN — METRONIDAZOLE 500 MG: 500 INJECTION, SOLUTION INTRAVENOUS at 04:26

## 2018-01-01 RX ADMIN — CEFEPIME 2 G: 2 INJECTION, POWDER, FOR SOLUTION INTRAVENOUS at 12:38

## 2018-01-01 RX ADMIN — LACOSAMIDE 100 MG: 50 TABLET, FILM COATED ORAL at 21:09

## 2018-01-01 RX ADMIN — Medication 10 ML: at 06:09

## 2018-01-01 RX ADMIN — INSULIN GLARGINE 50 UNITS: 100 INJECTION, SOLUTION SUBCUTANEOUS at 21:39

## 2018-01-01 RX ADMIN — IPRATROPIUM BROMIDE AND ALBUTEROL SULFATE 3 ML: .5; 3 SOLUTION RESPIRATORY (INHALATION) at 08:16

## 2018-01-01 RX ADMIN — INSULIN LISPRO 3 UNITS: 100 INJECTION, SOLUTION INTRAVENOUS; SUBCUTANEOUS at 00:50

## 2018-01-01 RX ADMIN — POLYVINYL ALCOHOL 2 DROP: 14 SOLUTION/ DROPS OPHTHALMIC at 11:02

## 2018-01-01 RX ADMIN — POLYVINYL ALCOHOL 2 DROP: 14 SOLUTION/ DROPS OPHTHALMIC at 05:26

## 2018-01-01 RX ADMIN — SODIUM CHLORIDE: 9 INJECTION, SOLUTION INTRAVENOUS at 17:20

## 2018-01-01 RX ADMIN — SODIUM CHLORIDE 500 MG: 900 INJECTION, SOLUTION INTRAVENOUS at 21:01

## 2018-01-01 RX ADMIN — CASTOR OIL AND BALSAM, PERU: 788; 87 OINTMENT TOPICAL at 08:08

## 2018-01-01 RX ADMIN — INSULIN LISPRO 5 UNITS: 100 INJECTION, SOLUTION INTRAVENOUS; SUBCUTANEOUS at 23:32

## 2018-01-01 RX ADMIN — POTASSIUM & SODIUM PHOSPHATES POWDER PACK 280-160-250 MG 2 PACKET: 280-160-250 PACK at 08:15

## 2018-01-01 RX ADMIN — DILTIAZEM HYDROCHLORIDE 5 MG/HR: 5 INJECTION INTRAVENOUS at 15:25

## 2018-01-01 RX ADMIN — DEXTROSE MONOHYDRATE 100 ML/HR: 5 INJECTION, SOLUTION INTRAVENOUS at 03:15

## 2018-01-01 RX ADMIN — Medication 10 ML: at 06:36

## 2018-01-01 RX ADMIN — LACOSAMIDE 100 MG: 50 TABLET, FILM COATED ORAL at 21:34

## 2018-01-01 RX ADMIN — CHLORHEXIDINE GLUCONATE 15 ML: 1.2 RINSE ORAL at 17:54

## 2018-01-01 RX ADMIN — Medication 10 ML: at 23:02

## 2018-01-01 RX ADMIN — INSULIN LISPRO 3 UNITS: 100 INJECTION, SOLUTION INTRAVENOUS; SUBCUTANEOUS at 06:59

## 2018-01-01 RX ADMIN — Medication 10 ML: at 12:33

## 2018-01-01 RX ADMIN — INSULIN LISPRO 3 UNITS: 100 INJECTION, SOLUTION INTRAVENOUS; SUBCUTANEOUS at 03:14

## 2018-01-01 RX ADMIN — VANCOMYCIN HYDROCHLORIDE 1250 MG: 10 INJECTION, POWDER, LYOPHILIZED, FOR SOLUTION INTRAVENOUS at 13:39

## 2018-01-01 RX ADMIN — METRONIDAZOLE 500 MG: 500 INJECTION, SOLUTION INTRAVENOUS at 11:02

## 2018-01-01 RX ADMIN — VANCOMYCIN HYDROCHLORIDE 1500 MG: 10 INJECTION, POWDER, LYOPHILIZED, FOR SOLUTION INTRAVENOUS at 13:44

## 2018-01-01 RX ADMIN — PROPOFOL 25 MCG/KG/MIN: 10 INJECTION, EMULSION INTRAVENOUS at 09:46

## 2018-01-01 RX ADMIN — INSULIN LISPRO 2 UNITS: 100 INJECTION, SOLUTION INTRAVENOUS; SUBCUTANEOUS at 17:02

## 2018-01-01 RX ADMIN — Medication 10 ML: at 05:45

## 2018-01-01 RX ADMIN — ACYCLOVIR SODIUM 575 MG: 50 INJECTION, SOLUTION INTRAVENOUS at 22:02

## 2018-01-01 RX ADMIN — INSULIN LISPRO 7 UNITS: 100 INJECTION, SOLUTION INTRAVENOUS; SUBCUTANEOUS at 23:42

## 2018-01-01 RX ADMIN — HYDRALAZINE HYDROCHLORIDE 10 MG: 20 INJECTION INTRAMUSCULAR; INTRAVENOUS at 04:03

## 2018-01-01 RX ADMIN — ATORVASTATIN CALCIUM 40 MG: 40 TABLET, FILM COATED ORAL at 08:31

## 2018-01-01 RX ADMIN — Medication 10 ML: at 05:38

## 2018-01-01 RX ADMIN — LEVETIRACETAM 1000 MG: 100 INJECTION, SOLUTION, CONCENTRATE INTRAVENOUS at 20:10

## 2018-01-01 RX ADMIN — INSULIN GLARGINE 30 UNITS: 100 INJECTION, SOLUTION SUBCUTANEOUS at 20:22

## 2018-01-01 RX ADMIN — CHLORHEXIDINE GLUCONATE 15 ML: 1.2 RINSE ORAL at 09:31

## 2018-01-01 RX ADMIN — LACOSAMIDE 100 MG: 50 TABLET, FILM COATED ORAL at 08:06

## 2018-01-01 RX ADMIN — CHLORHEXIDINE GLUCONATE 15 ML: 1.2 RINSE ORAL at 10:38

## 2018-01-01 RX ADMIN — ACETAMINOPHEN 650 MG: 650 SOLUTION ORAL at 08:22

## 2018-01-01 RX ADMIN — Medication 10 ML: at 05:18

## 2018-01-01 RX ADMIN — PROPOFOL 40 MCG/KG/MIN: 10 INJECTION, EMULSION INTRAVENOUS at 13:56

## 2018-01-01 RX ADMIN — ATORVASTATIN CALCIUM 40 MG: 40 TABLET, FILM COATED ORAL at 08:33

## 2018-01-01 RX ADMIN — POLYVINYL ALCOHOL 2 DROP: 14 SOLUTION/ DROPS OPHTHALMIC at 14:15

## 2018-01-01 RX ADMIN — Medication 80 MCG: at 17:28

## 2018-01-01 RX ADMIN — MAGNESIUM SULFATE HEPTAHYDRATE 2 G: 40 INJECTION, SOLUTION INTRAVENOUS at 11:49

## 2018-01-01 RX ADMIN — ATORVASTATIN CALCIUM 40 MG: 40 TABLET, FILM COATED ORAL at 08:46

## 2018-01-01 RX ADMIN — INSULIN LISPRO 3 UNITS: 100 INJECTION, SOLUTION INTRAVENOUS; SUBCUTANEOUS at 23:44

## 2018-01-01 RX ADMIN — POLYVINYL ALCOHOL 2 DROP: 14 SOLUTION/ DROPS OPHTHALMIC at 21:40

## 2018-01-01 RX ADMIN — LACOSAMIDE 100 MG: 50 TABLET, FILM COATED ORAL at 08:21

## 2018-01-01 RX ADMIN — LEVETIRACETAM 1000 MG: 100 SOLUTION ORAL at 21:09

## 2018-01-01 RX ADMIN — IPRATROPIUM BROMIDE AND ALBUTEROL SULFATE 3 ML: .5; 3 SOLUTION RESPIRATORY (INHALATION) at 23:39

## 2018-01-01 RX ADMIN — INSULIN LISPRO 5 UNITS: 100 INJECTION, SOLUTION INTRAVENOUS; SUBCUTANEOUS at 06:26

## 2018-01-01 RX ADMIN — PROPOFOL 50 MG: 10 INJECTION, EMULSION INTRAVENOUS at 11:34

## 2018-01-01 RX ADMIN — METRONIDAZOLE 500 MG: 500 INJECTION, SOLUTION INTRAVENOUS at 03:43

## 2018-01-01 RX ADMIN — PROPOFOL 20 MCG/KG/MIN: 10 INJECTION, EMULSION INTRAVENOUS at 23:52

## 2018-01-01 RX ADMIN — HYDROMORPHONE HYDROCHLORIDE 0.5 MG: 1 INJECTION, SOLUTION INTRAMUSCULAR; INTRAVENOUS; SUBCUTANEOUS at 14:14

## 2018-01-01 RX ADMIN — INSULIN LISPRO 3 UNITS: 100 INJECTION, SOLUTION INTRAVENOUS; SUBCUTANEOUS at 11:51

## 2018-01-01 RX ADMIN — POLYVINYL ALCOHOL 2 DROP: 14 SOLUTION/ DROPS OPHTHALMIC at 21:00

## 2018-01-01 RX ADMIN — SODIUM CHLORIDE 500 MG: 900 INJECTION, SOLUTION INTRAVENOUS at 08:30

## 2018-01-01 RX ADMIN — CHLORHEXIDINE GLUCONATE 15 ML: 1.2 RINSE ORAL at 08:00

## 2018-01-01 RX ADMIN — CHLORHEXIDINE GLUCONATE 15 ML: 1.2 RINSE ORAL at 21:04

## 2018-01-01 RX ADMIN — BUDESONIDE 500 MCG: 0.5 INHALANT RESPIRATORY (INHALATION) at 20:10

## 2018-01-01 RX ADMIN — ETOMIDATE 20 MG: 2 INJECTION, SOLUTION INTRAVENOUS at 10:11

## 2018-01-01 RX ADMIN — Medication 10 ML: at 21:41

## 2018-01-01 RX ADMIN — FAMOTIDINE 20 MG: 40 POWDER, FOR SUSPENSION ORAL at 08:15

## 2018-01-01 RX ADMIN — ATORVASTATIN CALCIUM 40 MG: 40 TABLET, FILM COATED ORAL at 08:53

## 2018-01-01 RX ADMIN — FAMOTIDINE 20 MG: 40 POWDER, FOR SUSPENSION ORAL at 08:10

## 2018-01-01 RX ADMIN — PROPOFOL 50 MG: 10 INJECTION, EMULSION INTRAVENOUS at 18:54

## 2018-01-01 RX ADMIN — FENTANYL CITRATE 100 MCG: 50 INJECTION, SOLUTION INTRAMUSCULAR; INTRAVENOUS at 09:53

## 2018-01-01 RX ADMIN — CEFEPIME 2 G: 2 INJECTION, POWDER, FOR SOLUTION INTRAVENOUS at 23:51

## 2018-01-01 RX ADMIN — INSULIN LISPRO 5 UNITS: 100 INJECTION, SOLUTION INTRAVENOUS; SUBCUTANEOUS at 18:11

## 2018-01-01 RX ADMIN — CEFEPIME 2 G: 2 INJECTION, POWDER, FOR SOLUTION INTRAVENOUS at 00:14

## 2018-01-01 RX ADMIN — IPRATROPIUM BROMIDE AND ALBUTEROL SULFATE 3 ML: .5; 3 SOLUTION RESPIRATORY (INHALATION) at 19:46

## 2018-01-01 RX ADMIN — CHLORHEXIDINE GLUCONATE 15 ML: 1.2 RINSE ORAL at 22:27

## 2018-01-01 RX ADMIN — ATORVASTATIN CALCIUM 40 MG: 40 TABLET, FILM COATED ORAL at 08:34

## 2018-01-01 RX ADMIN — SODIUM CHLORIDE AND POTASSIUM CHLORIDE: 9; 2.98 INJECTION, SOLUTION INTRAVENOUS at 23:19

## 2018-01-01 RX ADMIN — Medication 2 G: at 04:31

## 2018-01-01 RX ADMIN — POLYVINYL ALCOHOL 2 DROP: 14 SOLUTION/ DROPS OPHTHALMIC at 13:03

## 2018-01-01 RX ADMIN — CHLORHEXIDINE GLUCONATE 15 ML: 1.2 RINSE ORAL at 21:20

## 2018-01-01 RX ADMIN — HYDRALAZINE HYDROCHLORIDE 10 MG: 20 INJECTION INTRAMUSCULAR; INTRAVENOUS at 21:04

## 2018-01-01 RX ADMIN — LEVETIRACETAM 1000 MG: 100 SOLUTION ORAL at 08:25

## 2018-01-01 RX ADMIN — Medication 10 ML: at 12:03

## 2018-01-01 RX ADMIN — METOPROLOL TARTRATE 2.5 MG: 5 INJECTION, SOLUTION INTRAVENOUS at 11:03

## 2018-01-01 RX ADMIN — Medication 10 ML: at 21:05

## 2018-01-01 RX ADMIN — Medication 10 ML: at 13:56

## 2018-01-01 RX ADMIN — ACYCLOVIR SODIUM 575 MG: 50 INJECTION, SOLUTION INTRAVENOUS at 21:13

## 2018-01-01 RX ADMIN — POTASSIUM & SODIUM PHOSPHATES POWDER PACK 280-160-250 MG 2 PACKET: 280-160-250 PACK at 22:40

## 2018-01-01 RX ADMIN — POLYVINYL ALCOHOL 2 DROP: 14 SOLUTION/ DROPS OPHTHALMIC at 22:04

## 2018-01-01 RX ADMIN — POLYVINYL ALCOHOL 2 DROP: 14 SOLUTION/ DROPS OPHTHALMIC at 23:09

## 2018-01-01 RX ADMIN — LEVETIRACETAM 1000 MG: 100 INJECTION, SOLUTION, CONCENTRATE INTRAVENOUS at 08:03

## 2018-01-01 RX ADMIN — MORPHINE SULFATE 2 MG: 2 INJECTION, SOLUTION INTRAMUSCULAR; INTRAVENOUS at 03:10

## 2018-01-01 RX ADMIN — Medication 10 ML: at 15:17

## 2018-01-01 RX ADMIN — FAMOTIDINE 20 MG: 40 POWDER, FOR SUSPENSION ORAL at 08:44

## 2018-01-01 RX ADMIN — METRONIDAZOLE 500 MG: 500 INJECTION, SOLUTION INTRAVENOUS at 03:54

## 2018-01-01 RX ADMIN — INSULIN GLARGINE 45 UNITS: 100 INJECTION, SOLUTION SUBCUTANEOUS at 21:04

## 2018-01-01 RX ADMIN — INSULIN LISPRO 3 UNITS: 100 INJECTION, SOLUTION INTRAVENOUS; SUBCUTANEOUS at 00:22

## 2018-01-01 RX ADMIN — INSULIN LISPRO 3 UNITS: 100 INJECTION, SOLUTION INTRAVENOUS; SUBCUTANEOUS at 17:35

## 2018-01-01 RX ADMIN — BUDESONIDE 500 MCG: 0.5 INHALANT RESPIRATORY (INHALATION) at 08:56

## 2018-01-01 RX ADMIN — CHLORHEXIDINE GLUCONATE 15 ML: 1.2 RINSE ORAL at 08:52

## 2018-01-01 RX ADMIN — POTASSIUM & SODIUM PHOSPHATES POWDER PACK 280-160-250 MG 2 PACKET: 280-160-250 PACK at 23:00

## 2018-01-01 RX ADMIN — SODIUM CHLORIDE 100 ML/HR: 900 INJECTION, SOLUTION INTRAVENOUS at 13:35

## 2018-01-01 RX ADMIN — LOSARTAN POTASSIUM 50 MG: 25 TABLET ORAL at 08:31

## 2018-01-01 RX ADMIN — CEFEPIME 2 G: 2 INJECTION, POWDER, FOR SOLUTION INTRAVENOUS at 12:52

## 2018-01-01 RX ADMIN — IPRATROPIUM BROMIDE AND ALBUTEROL SULFATE 3 ML: .5; 3 SOLUTION RESPIRATORY (INHALATION) at 14:14

## 2018-01-01 RX ADMIN — LIDOCAINE HYDROCHLORIDE 80 MG: 20 INJECTION, SOLUTION EPIDURAL; INFILTRATION; INTRACAUDAL; PERINEURAL at 17:20

## 2018-01-01 RX ADMIN — Medication 10 ML: at 06:38

## 2018-01-01 RX ADMIN — INSULIN LISPRO 2 UNITS: 100 INJECTION, SOLUTION INTRAVENOUS; SUBCUTANEOUS at 10:17

## 2018-01-01 RX ADMIN — GLYCOPYRROLATE 0.2 MG: 0.2 INJECTION, SOLUTION INTRAMUSCULAR; INTRAVENOUS at 23:49

## 2018-01-01 RX ADMIN — SODIUM CHLORIDE 100 MG: 900 INJECTION, SOLUTION INTRAVENOUS at 05:22

## 2018-01-01 RX ADMIN — BUDESONIDE 500 MCG: 0.5 INHALANT RESPIRATORY (INHALATION) at 10:13

## 2018-01-01 RX ADMIN — FENTANYL CITRATE 50 MCG: 50 INJECTION, SOLUTION INTRAMUSCULAR; INTRAVENOUS at 11:30

## 2018-01-01 RX ADMIN — SODIUM CHLORIDE 75 ML/HR: 450 INJECTION, SOLUTION INTRAVENOUS at 18:12

## 2018-01-01 RX ADMIN — ATORVASTATIN CALCIUM 40 MG: 40 TABLET, FILM COATED ORAL at 08:07

## 2018-01-01 RX ADMIN — POTASSIUM & SODIUM PHOSPHATES POWDER PACK 280-160-250 MG 2 PACKET: 280-160-250 PACK at 21:35

## 2018-01-01 RX ADMIN — Medication 10 ML: at 21:07

## 2018-01-01 RX ADMIN — POLYVINYL ALCOHOL 2 DROP: 14 SOLUTION/ DROPS OPHTHALMIC at 06:06

## 2018-01-01 RX ADMIN — VANCOMYCIN HYDROCHLORIDE 2000 MG: 10 INJECTION, POWDER, LYOPHILIZED, FOR SOLUTION INTRAVENOUS at 13:58

## 2018-01-01 RX ADMIN — CHLORHEXIDINE GLUCONATE 15 ML: 1.2 RINSE ORAL at 17:48

## 2018-01-01 RX ADMIN — LEVOFLOXACIN 750 MG: 5 INJECTION, SOLUTION INTRAVENOUS at 11:24

## 2018-01-01 RX ADMIN — CEFEPIME 2 G: 2 INJECTION, POWDER, FOR SOLUTION INTRAVENOUS at 00:27

## 2018-01-01 RX ADMIN — INSULIN LISPRO 3 UNITS: 100 INJECTION, SOLUTION INTRAVENOUS; SUBCUTANEOUS at 11:06

## 2018-01-01 RX ADMIN — INSULIN LISPRO 4 UNITS: 100 INJECTION, SOLUTION INTRAVENOUS; SUBCUTANEOUS at 18:20

## 2018-01-01 RX ADMIN — PROPOFOL 20 MCG/KG/MIN: 10 INJECTION, EMULSION INTRAVENOUS at 22:40

## 2018-01-01 RX ADMIN — INSULIN GLARGINE 10 UNITS: 100 INJECTION, SOLUTION SUBCUTANEOUS at 22:19

## 2018-01-01 RX ADMIN — INSULIN GLARGINE 40 UNITS: 100 INJECTION, SOLUTION SUBCUTANEOUS at 22:40

## 2018-01-01 RX ADMIN — IPRATROPIUM BROMIDE AND ALBUTEROL SULFATE 3 ML: .5; 3 SOLUTION RESPIRATORY (INHALATION) at 03:38

## 2018-01-01 RX ADMIN — Medication 10 ML: at 12:39

## 2018-01-01 RX ADMIN — CHLORHEXIDINE GLUCONATE 15 ML: 1.2 RINSE ORAL at 21:40

## 2018-01-01 RX ADMIN — ACETAMINOPHEN 650 MG: 650 SOLUTION ORAL at 16:02

## 2018-01-01 RX ADMIN — LACOSAMIDE 100 MG: 50 TABLET, FILM COATED ORAL at 09:17

## 2018-01-01 RX ADMIN — ACETAMINOPHEN 650 MG: 650 SOLUTION ORAL at 12:23

## 2018-01-01 RX ADMIN — INSULIN LISPRO 3 UNITS: 100 INJECTION, SOLUTION INTRAVENOUS; SUBCUTANEOUS at 05:32

## 2018-01-01 RX ADMIN — GLYCOPYRROLATE 0.4 MG: 0.2 INJECTION INTRAMUSCULAR; INTRAVENOUS at 18:56

## 2018-01-01 RX ADMIN — INSULIN GLARGINE 45 UNITS: 100 INJECTION, SOLUTION SUBCUTANEOUS at 21:43

## 2018-01-01 RX ADMIN — LEVETIRACETAM 1000 MG: 100 INJECTION, SOLUTION, CONCENTRATE INTRAVENOUS at 22:16

## 2018-01-01 RX ADMIN — PROPOFOL 30 MCG/KG/MIN: 10 INJECTION, EMULSION INTRAVENOUS at 18:04

## 2018-01-01 RX ADMIN — PROPOFOL 20 MCG/KG/MIN: 10 INJECTION, EMULSION INTRAVENOUS at 15:49

## 2018-01-01 RX ADMIN — BUDESONIDE 500 MCG: 0.5 INHALANT RESPIRATORY (INHALATION) at 20:27

## 2018-01-01 RX ADMIN — METRONIDAZOLE 500 MG: 500 INJECTION, SOLUTION INTRAVENOUS at 20:10

## 2018-01-01 RX ADMIN — SODIUM CHLORIDE 100 MG: 900 INJECTION, SOLUTION INTRAVENOUS at 06:06

## 2018-01-01 RX ADMIN — VANCOMYCIN HYDROCHLORIDE 1500 MG: 10 INJECTION, POWDER, LYOPHILIZED, FOR SOLUTION INTRAVENOUS at 09:46

## 2018-01-01 RX ADMIN — LACOSAMIDE 100 MG: 50 TABLET, FILM COATED ORAL at 21:40

## 2018-01-01 RX ADMIN — CEFEPIME 2 G: 2 INJECTION, POWDER, FOR SOLUTION INTRAVENOUS at 12:19

## 2018-01-01 RX ADMIN — SODIUM CHLORIDE 75 ML/HR: 450 INJECTION, SOLUTION INTRAVENOUS at 17:35

## 2018-01-01 RX ADMIN — IPRATROPIUM BROMIDE AND ALBUTEROL SULFATE 3 ML: .5; 3 SOLUTION RESPIRATORY (INHALATION) at 09:09

## 2018-01-01 RX ADMIN — CHLORHEXIDINE GLUCONATE 15 ML: 1.2 RINSE ORAL at 21:50

## 2018-01-01 RX ADMIN — CASTOR OIL AND BALSAM, PERU: 788; 87 OINTMENT TOPICAL at 17:42

## 2018-01-01 RX ADMIN — Medication 10 ML: at 12:12

## 2018-01-01 RX ADMIN — IOHEXOL 50 ML: 240 INJECTION, SOLUTION INTRATHECAL; INTRAVASCULAR; INTRAVENOUS; ORAL at 12:00

## 2018-01-01 RX ADMIN — HYDRALAZINE HYDROCHLORIDE 10 MG: 20 INJECTION INTRAMUSCULAR; INTRAVENOUS at 16:04

## 2018-01-01 RX ADMIN — PROPOFOL 30 MCG/KG/MIN: 10 INJECTION, EMULSION INTRAVENOUS at 04:25

## 2018-01-01 RX ADMIN — LACOSAMIDE 100 MG: 50 TABLET, FILM COATED ORAL at 08:31

## 2018-01-01 RX ADMIN — LACOSAMIDE 100 MG: 50 TABLET, FILM COATED ORAL at 08:44

## 2018-01-01 RX ADMIN — CASTOR OIL AND BALSAM, PERU: 788; 87 OINTMENT TOPICAL at 11:33

## 2018-01-01 RX ADMIN — IPRATROPIUM BROMIDE AND ALBUTEROL SULFATE 3 ML: .5; 3 SOLUTION RESPIRATORY (INHALATION) at 12:11

## 2018-01-01 RX ADMIN — LEVETIRACETAM 1000 MG: 100 SOLUTION ORAL at 20:58

## 2018-01-01 RX ADMIN — CEFEPIME 2 G: 2 INJECTION, POWDER, FOR SOLUTION INTRAVENOUS at 23:44

## 2018-01-01 RX ADMIN — ROCURONIUM BROMIDE 40 MG: 10 INJECTION, SOLUTION INTRAVENOUS at 17:20

## 2018-01-01 RX ADMIN — ACETAMINOPHEN 650 MG: 650 SOLUTION ORAL at 05:46

## 2018-01-01 RX ADMIN — BUDESONIDE 500 MCG: 0.5 INHALANT RESPIRATORY (INHALATION) at 09:16

## 2018-01-01 RX ADMIN — FAMOTIDINE 20 MG: 40 POWDER, FOR SUSPENSION ORAL at 08:50

## 2018-01-01 RX ADMIN — SODIUM CHLORIDE 500 MG: 900 INJECTION, SOLUTION INTRAVENOUS at 11:02

## 2018-01-01 RX ADMIN — FAMOTIDINE 20 MG: 40 POWDER, FOR SUSPENSION ORAL at 08:08

## 2018-01-01 RX ADMIN — LEVETIRACETAM 1000 MG: 100 INJECTION, SOLUTION, CONCENTRATE INTRAVENOUS at 11:01

## 2018-01-01 RX ADMIN — LEVETIRACETAM 1000 MG: 100 SOLUTION ORAL at 21:50

## 2018-01-01 RX ADMIN — SODIUM CHLORIDE 100 ML/HR: 900 INJECTION, SOLUTION INTRAVENOUS at 09:07

## 2018-01-01 RX ADMIN — LOSARTAN POTASSIUM 50 MG: 25 TABLET ORAL at 08:41

## 2018-01-01 RX ADMIN — IPRATROPIUM BROMIDE AND ALBUTEROL SULFATE 3 ML: .5; 3 SOLUTION RESPIRATORY (INHALATION) at 08:12

## 2018-01-01 RX ADMIN — BUDESONIDE 500 MCG: 0.5 INHALANT RESPIRATORY (INHALATION) at 20:18

## 2018-01-01 RX ADMIN — POTASSIUM & SODIUM PHOSPHATES POWDER PACK 280-160-250 MG 2 PACKET: 280-160-250 PACK at 18:44

## 2018-01-01 RX ADMIN — Medication 10 ML: at 21:50

## 2018-01-01 RX ADMIN — MIDAZOLAM 2 MG: 1 INJECTION INTRAMUSCULAR; INTRAVENOUS at 11:30

## 2018-01-01 RX ADMIN — Medication 10 ML: at 21:36

## 2018-01-01 RX ADMIN — SODIUM CHLORIDE AND POTASSIUM CHLORIDE: 9; 2.98 INJECTION, SOLUTION INTRAVENOUS at 22:10

## 2018-01-01 RX ADMIN — Medication 10 ML: at 15:18

## 2018-01-01 RX ADMIN — IPRATROPIUM BROMIDE AND ALBUTEROL SULFATE 3 ML: .5; 3 SOLUTION RESPIRATORY (INHALATION) at 15:21

## 2018-01-01 RX ADMIN — IPRATROPIUM BROMIDE AND ALBUTEROL SULFATE 3 ML: .5; 3 SOLUTION RESPIRATORY (INHALATION) at 15:30

## 2018-01-01 RX ADMIN — INSULIN LISPRO 4 UNITS: 100 INJECTION, SOLUTION INTRAVENOUS; SUBCUTANEOUS at 00:26

## 2018-01-01 RX ADMIN — DEXAMETHASONE SODIUM PHOSPHATE 4 MG: 4 INJECTION, SOLUTION INTRA-ARTICULAR; INTRALESIONAL; INTRAMUSCULAR; INTRAVENOUS; SOFT TISSUE at 17:30

## 2018-01-01 RX ADMIN — Medication 10 ML: at 06:13

## 2018-01-01 RX ADMIN — HYDRALAZINE HYDROCHLORIDE 10 MG: 20 INJECTION INTRAMUSCULAR; INTRAVENOUS at 08:03

## 2018-01-01 RX ADMIN — INSULIN LISPRO 3 UNITS: 100 INJECTION, SOLUTION INTRAVENOUS; SUBCUTANEOUS at 17:46

## 2018-01-01 RX ADMIN — SODIUM CHLORIDE 100 MG: 900 INJECTION, SOLUTION INTRAVENOUS at 18:28

## 2018-01-01 RX ADMIN — ACYCLOVIR SODIUM 575 MG: 50 INJECTION, SOLUTION INTRAVENOUS at 21:48

## 2018-01-01 RX ADMIN — POLYVINYL ALCOHOL 2 DROP: 14 SOLUTION/ DROPS OPHTHALMIC at 05:18

## 2018-01-01 RX ADMIN — ATORVASTATIN CALCIUM 40 MG: 40 TABLET, FILM COATED ORAL at 08:21

## 2018-01-01 RX ADMIN — PROPOFOL 25 MCG/KG/MIN: 10 INJECTION, EMULSION INTRAVENOUS at 14:59

## 2018-01-01 RX ADMIN — CHLORHEXIDINE GLUCONATE 15 ML: 1.2 RINSE ORAL at 18:20

## 2018-01-01 RX ADMIN — POLYVINYL ALCOHOL 2 DROP: 14 SOLUTION/ DROPS OPHTHALMIC at 14:18

## 2018-01-01 RX ADMIN — ALTEPLASE 1 MG: 2.2 INJECTION, POWDER, LYOPHILIZED, FOR SOLUTION INTRAVENOUS at 17:04

## 2018-01-01 RX ADMIN — IPRATROPIUM BROMIDE AND ALBUTEROL SULFATE 3 ML: .5; 3 SOLUTION RESPIRATORY (INHALATION) at 12:54

## 2018-01-01 RX ADMIN — PROPOFOL 15 MCG/KG/MIN: 10 INJECTION, EMULSION INTRAVENOUS at 06:00

## 2018-01-01 RX ADMIN — IPRATROPIUM BROMIDE AND ALBUTEROL SULFATE 3 ML: .5; 3 SOLUTION RESPIRATORY (INHALATION) at 04:58

## 2018-01-01 RX ADMIN — LEVETIRACETAM 1000 MG: 100 SOLUTION ORAL at 21:34

## 2018-01-01 RX ADMIN — ATORVASTATIN CALCIUM 40 MG: 40 TABLET, FILM COATED ORAL at 08:50

## 2018-01-01 RX ADMIN — POLYVINYL ALCOHOL 2 DROP: 14 SOLUTION/ DROPS OPHTHALMIC at 07:00

## 2018-01-01 RX ADMIN — SODIUM CHLORIDE 100 MG: 900 INJECTION, SOLUTION INTRAVENOUS at 17:35

## 2018-01-01 RX ADMIN — LEVETIRACETAM 1000 MG: 100 INJECTION, SOLUTION, CONCENTRATE INTRAVENOUS at 09:01

## 2018-01-01 RX ADMIN — IPRATROPIUM BROMIDE AND ALBUTEROL SULFATE 3 ML: .5; 3 SOLUTION RESPIRATORY (INHALATION) at 17:32

## 2018-01-01 RX ADMIN — DEXTROSE MONOHYDRATE 100 ML/HR: 5 INJECTION, SOLUTION INTRAVENOUS at 03:25

## 2018-01-01 RX ADMIN — Medication 10 ML: at 22:27

## 2018-01-01 RX ADMIN — ACYCLOVIR SODIUM 575 MG: 50 INJECTION, SOLUTION INTRAVENOUS at 22:08

## 2018-01-01 RX ADMIN — LEVETIRACETAM 1000 MG: 100 INJECTION, SOLUTION, CONCENTRATE INTRAVENOUS at 08:11

## 2018-01-01 RX ADMIN — INSULIN LISPRO 2 UNITS: 100 INJECTION, SOLUTION INTRAVENOUS; SUBCUTANEOUS at 10:15

## 2018-01-01 RX ADMIN — POLYVINYL ALCOHOL 2 DROP: 14 SOLUTION/ DROPS OPHTHALMIC at 21:10

## 2018-01-01 RX ADMIN — Medication 20 ML: at 12:45

## 2018-01-01 RX ADMIN — SUFENTANIL CITRATE 5 MCG: 50 INJECTION EPIDURAL; INTRAVENOUS at 17:20

## 2018-01-01 RX ADMIN — INSULIN LISPRO 4 UNITS: 100 INJECTION, SOLUTION INTRAVENOUS; SUBCUTANEOUS at 23:48

## 2018-01-01 RX ADMIN — POTASSIUM & SODIUM PHOSPHATES POWDER PACK 280-160-250 MG 2 PACKET: 280-160-250 PACK at 18:28

## 2018-01-01 RX ADMIN — ATORVASTATIN CALCIUM 40 MG: 40 TABLET, FILM COATED ORAL at 08:41

## 2018-01-01 RX ADMIN — CHLORHEXIDINE GLUCONATE 15 ML: 1.2 RINSE ORAL at 10:10

## 2018-01-01 RX ADMIN — PROPOFOL 25 MCG/KG/MIN: 10 INJECTION, EMULSION INTRAVENOUS at 10:03

## 2018-01-01 RX ADMIN — CASTOR OIL AND BALSAM, PERU: 788; 87 OINTMENT TOPICAL at 08:27

## 2018-01-01 RX ADMIN — FENTANYL CITRATE 25 MCG: 50 INJECTION, SOLUTION INTRAMUSCULAR; INTRAVENOUS at 20:15

## 2018-01-01 RX ADMIN — CHLORHEXIDINE GLUCONATE 15 ML: 1.2 RINSE ORAL at 08:34

## 2018-01-01 RX ADMIN — SODIUM CHLORIDE 5 MG/HR: 900 INJECTION, SOLUTION INTRAVENOUS at 01:15

## 2018-01-01 RX ADMIN — SODIUM CHLORIDE 0.4 MCG/KG/HR: 900 INJECTION, SOLUTION INTRAVENOUS at 08:22

## 2018-01-01 RX ADMIN — BUDESONIDE 500 MCG: 0.5 INHALANT RESPIRATORY (INHALATION) at 20:51

## 2018-01-01 RX ADMIN — ACYCLOVIR SODIUM 575 MG: 50 INJECTION, SOLUTION INTRAVENOUS at 21:40

## 2018-01-01 RX ADMIN — INSULIN LISPRO 4 UNITS: 100 INJECTION, SOLUTION INTRAVENOUS; SUBCUTANEOUS at 12:08

## 2018-01-01 RX ADMIN — PROPOFOL 20 MCG/KG/MIN: 10 INJECTION, EMULSION INTRAVENOUS at 12:00

## 2018-01-01 RX ADMIN — IPRATROPIUM BROMIDE AND ALBUTEROL SULFATE 3 ML: .5; 3 SOLUTION RESPIRATORY (INHALATION) at 19:56

## 2018-01-01 RX ADMIN — SODIUM CHLORIDE 5 MG/HR: 900 INJECTION, SOLUTION INTRAVENOUS at 17:20

## 2018-01-01 RX ADMIN — POLYVINYL ALCOHOL 2 DROP: 14 SOLUTION/ DROPS OPHTHALMIC at 22:50

## 2018-01-01 RX ADMIN — CHLORHEXIDINE GLUCONATE 15 ML: 1.2 RINSE ORAL at 18:06

## 2018-01-01 RX ADMIN — FUROSEMIDE 40 MG: 10 INJECTION, SOLUTION INTRAMUSCULAR; INTRAVENOUS at 20:38

## 2018-01-01 RX ADMIN — FUROSEMIDE 40 MG: 10 INJECTION INTRAMUSCULAR; INTRAVENOUS at 20:38

## 2018-01-01 RX ADMIN — ACYCLOVIR SODIUM 575 MG: 50 INJECTION, SOLUTION INTRAVENOUS at 11:36

## 2018-01-01 RX ADMIN — BUDESONIDE 500 MCG: 0.5 INHALANT RESPIRATORY (INHALATION) at 09:42

## 2018-01-01 RX ADMIN — Medication 10 ML: at 22:09

## 2018-01-01 RX ADMIN — LEVETIRACETAM 1000 MG: 100 SOLUTION ORAL at 21:05

## 2018-01-01 RX ADMIN — VANCOMYCIN HYDROCHLORIDE 1500 MG: 10 INJECTION, POWDER, LYOPHILIZED, FOR SOLUTION INTRAVENOUS at 14:10

## 2018-01-01 RX ADMIN — CEFEPIME 2 G: 2 INJECTION, POWDER, FOR SOLUTION INTRAVENOUS at 23:37

## 2018-01-01 RX ADMIN — FENTANYL CITRATE 25 MCG: 50 INJECTION, SOLUTION INTRAMUSCULAR; INTRAVENOUS at 16:36

## 2018-01-01 RX ADMIN — INSULIN LISPRO 2 UNITS: 100 INJECTION, SOLUTION INTRAVENOUS; SUBCUTANEOUS at 06:05

## 2018-01-01 RX ADMIN — POLYVINYL ALCOHOL 2 DROP: 14 SOLUTION/ DROPS OPHTHALMIC at 05:45

## 2018-01-01 RX ADMIN — INSULIN LISPRO 3 UNITS: 100 INJECTION, SOLUTION INTRAVENOUS; SUBCUTANEOUS at 23:15

## 2018-01-01 RX ADMIN — SODIUM CHLORIDE 2.5 MG/HR: 900 INJECTION, SOLUTION INTRAVENOUS at 21:14

## 2018-01-01 RX ADMIN — ACETAMINOPHEN 650 MG: 650 SOLUTION ORAL at 16:03

## 2018-01-01 RX ADMIN — LACOSAMIDE 100 MG: 50 TABLET, FILM COATED ORAL at 08:48

## 2018-01-01 RX ADMIN — INSULIN LISPRO 2 UNITS: 100 INJECTION, SOLUTION INTRAVENOUS; SUBCUTANEOUS at 18:53

## 2018-01-01 RX ADMIN — POLYVINYL ALCOHOL 2 DROP: 14 SOLUTION/ DROPS OPHTHALMIC at 13:45

## 2018-01-01 RX ADMIN — Medication 20 ML: at 12:16

## 2018-01-01 RX ADMIN — POLYVINYL ALCOHOL 2 DROP: 14 SOLUTION/ DROPS OPHTHALMIC at 21:04

## 2018-01-01 RX ADMIN — IPRATROPIUM BROMIDE AND ALBUTEROL SULFATE 3 ML: .5; 3 SOLUTION RESPIRATORY (INHALATION) at 08:58

## 2018-01-01 RX ADMIN — SODIUM CHLORIDE 100 ML/HR: 450 INJECTION, SOLUTION INTRAVENOUS at 10:03

## 2018-01-01 RX ADMIN — CHLORHEXIDINE GLUCONATE 15 ML: 1.2 RINSE ORAL at 08:27

## 2018-01-01 RX ADMIN — BUDESONIDE 500 MCG: 0.5 INHALANT RESPIRATORY (INHALATION) at 08:18

## 2018-01-01 RX ADMIN — HYDRALAZINE HYDROCHLORIDE 10 MG: 20 INJECTION INTRAMUSCULAR; INTRAVENOUS at 10:38

## 2018-01-01 RX ADMIN — IPRATROPIUM BROMIDE AND ALBUTEROL SULFATE 3 ML: .5; 3 SOLUTION RESPIRATORY (INHALATION) at 20:33

## 2018-01-01 RX ADMIN — CHLORHEXIDINE GLUCONATE 15 ML: 1.2 RINSE ORAL at 08:03

## 2018-01-01 RX ADMIN — CHLORHEXIDINE GLUCONATE 15 ML: 1.2 RINSE ORAL at 20:58

## 2018-01-01 RX ADMIN — POLYVINYL ALCOHOL 2 DROP: 14 SOLUTION/ DROPS OPHTHALMIC at 13:56

## 2018-01-01 RX ADMIN — IPRATROPIUM BROMIDE AND ALBUTEROL SULFATE 3 ML: .5; 3 SOLUTION RESPIRATORY (INHALATION) at 08:17

## 2018-01-01 RX ADMIN — FAMOTIDINE 20 MG: 40 POWDER, FOR SUSPENSION ORAL at 08:21

## 2018-01-01 RX ADMIN — IPRATROPIUM BROMIDE AND ALBUTEROL SULFATE 3 ML: .5; 3 SOLUTION RESPIRATORY (INHALATION) at 12:00

## 2018-01-01 RX ADMIN — POTASSIUM CHLORIDE 40 MEQ: 20 SOLUTION ORAL at 11:52

## 2018-01-01 RX ADMIN — INSULIN LISPRO 3 UNITS: 100 INJECTION, SOLUTION INTRAVENOUS; SUBCUTANEOUS at 12:04

## 2018-01-01 RX ADMIN — CASTOR OIL AND BALSAM, PERU: 788; 87 OINTMENT TOPICAL at 08:47

## 2018-01-01 RX ADMIN — METRONIDAZOLE 500 MG: 500 INJECTION, SOLUTION INTRAVENOUS at 20:28

## 2018-01-01 RX ADMIN — HYDRALAZINE HYDROCHLORIDE 10 MG: 20 INJECTION INTRAMUSCULAR; INTRAVENOUS at 19:19

## 2018-01-01 RX ADMIN — LEVETIRACETAM 1000 MG: 100 SOLUTION ORAL at 21:00

## 2018-01-01 RX ADMIN — INSULIN LISPRO 3 UNITS: 100 INJECTION, SOLUTION INTRAVENOUS; SUBCUTANEOUS at 18:25

## 2018-01-01 RX ADMIN — SODIUM CHLORIDE 75 ML/HR: 450 INJECTION, SOLUTION INTRAVENOUS at 07:23

## 2018-01-01 RX ADMIN — LEVETIRACETAM 1000 MG: 100 SOLUTION ORAL at 21:12

## 2018-01-01 RX ADMIN — BUDESONIDE 500 MCG: 0.5 INHALANT RESPIRATORY (INHALATION) at 19:56

## 2018-01-01 RX ADMIN — POTASSIUM & SODIUM PHOSPHATES POWDER PACK 280-160-250 MG 2 PACKET: 280-160-250 PACK at 12:28

## 2018-01-01 RX ADMIN — PROPOFOL 20 MCG/KG/MIN: 10 INJECTION, EMULSION INTRAVENOUS at 05:13

## 2018-01-01 RX ADMIN — VANCOMYCIN HYDROCHLORIDE 1250 MG: 10 INJECTION, POWDER, LYOPHILIZED, FOR SOLUTION INTRAVENOUS at 11:53

## 2018-01-01 RX ADMIN — INSULIN LISPRO 5 UNITS: 100 INJECTION, SOLUTION INTRAVENOUS; SUBCUTANEOUS at 00:20

## 2018-01-01 RX ADMIN — DEXTROSE MONOHYDRATE 100 ML/HR: 5 INJECTION, SOLUTION INTRAVENOUS at 13:44

## 2018-01-01 RX ADMIN — ACYCLOVIR SODIUM 575 MG: 50 INJECTION, SOLUTION INTRAVENOUS at 10:34

## 2018-01-01 RX ADMIN — INSULIN LISPRO 3 UNITS: 100 INJECTION, SOLUTION INTRAVENOUS; SUBCUTANEOUS at 12:37

## 2018-01-01 RX ADMIN — ACYCLOVIR SODIUM 575 MG: 50 INJECTION, SOLUTION INTRAVENOUS at 21:32

## 2018-01-01 RX ADMIN — POLYVINYL ALCOHOL 2 DROP: 14 SOLUTION/ DROPS OPHTHALMIC at 22:16

## 2018-01-01 RX ADMIN — CHLORHEXIDINE GLUCONATE 15 ML: 1.2 RINSE ORAL at 11:02

## 2018-01-01 RX ADMIN — ALTEPLASE 1 MG: 2.2 INJECTION, POWDER, LYOPHILIZED, FOR SOLUTION INTRAVENOUS at 22:39

## 2018-01-01 RX ADMIN — POLYVINYL ALCOHOL 2 DROP: 14 SOLUTION/ DROPS OPHTHALMIC at 05:14

## 2018-01-01 RX ADMIN — IPRATROPIUM BROMIDE AND ALBUTEROL SULFATE 3 ML: .5; 3 SOLUTION RESPIRATORY (INHALATION) at 03:19

## 2018-01-01 RX ADMIN — HYDRALAZINE HYDROCHLORIDE 10 MG: 20 INJECTION INTRAMUSCULAR; INTRAVENOUS at 09:05

## 2018-01-01 RX ADMIN — ATORVASTATIN CALCIUM 40 MG: 40 TABLET, FILM COATED ORAL at 08:10

## 2018-01-01 RX ADMIN — CHLORHEXIDINE GLUCONATE 15 ML: 1.2 RINSE ORAL at 08:06

## 2018-01-01 RX ADMIN — POLYVINYL ALCOHOL 2 DROP: 14 SOLUTION/ DROPS OPHTHALMIC at 13:59

## 2018-01-01 RX ADMIN — Medication 10 ML: at 06:14

## 2018-01-01 RX ADMIN — IPRATROPIUM BROMIDE AND ALBUTEROL SULFATE 3 ML: .5; 3 SOLUTION RESPIRATORY (INHALATION) at 04:30

## 2018-01-01 RX ADMIN — POTASSIUM & SODIUM PHOSPHATES POWDER PACK 280-160-250 MG 2 PACKET: 280-160-250 PACK at 22:39

## 2018-01-01 RX ADMIN — ALTEPLASE 1 MG: 2.2 INJECTION, POWDER, LYOPHILIZED, FOR SOLUTION INTRAVENOUS at 14:24

## 2018-01-01 RX ADMIN — CASTOR OIL AND BALSAM, PERU: 788; 87 OINTMENT TOPICAL at 10:10

## 2018-01-01 RX ADMIN — FAMOTIDINE 20 MG: 40 POWDER, FOR SUSPENSION ORAL at 08:22

## 2018-01-01 RX ADMIN — POLYVINYL ALCOHOL 2 DROP: 14 SOLUTION/ DROPS OPHTHALMIC at 22:42

## 2018-01-01 RX ADMIN — DEXTROSE MONOHYDRATE 50 ML/HR: 5 INJECTION, SOLUTION INTRAVENOUS at 18:25

## 2018-01-01 RX ADMIN — Medication 10 ML: at 21:06

## 2018-01-01 RX ADMIN — INSULIN LISPRO 4 UNITS: 100 INJECTION, SOLUTION INTRAVENOUS; SUBCUTANEOUS at 06:04

## 2018-01-01 RX ADMIN — SODIUM CHLORIDE 0.2 MCG/KG/HR: 900 INJECTION, SOLUTION INTRAVENOUS at 15:15

## 2018-01-01 RX ADMIN — CEFEPIME 2 G: 2 INJECTION, POWDER, FOR SOLUTION INTRAVENOUS at 00:54

## 2018-01-01 RX ADMIN — METRONIDAZOLE 500 MG: 500 INJECTION, SOLUTION INTRAVENOUS at 20:47

## 2018-01-01 RX ADMIN — POTASSIUM & SODIUM PHOSPHATES POWDER PACK 280-160-250 MG 1 PACKET: 280-160-250 PACK at 21:06

## 2018-01-01 RX ADMIN — POLYVINYL ALCOHOL 2 DROP: 14 SOLUTION/ DROPS OPHTHALMIC at 06:11

## 2018-01-01 RX ADMIN — CEFEPIME 2 G: 2 INJECTION, POWDER, FOR SOLUTION INTRAVENOUS at 11:56

## 2018-01-01 RX ADMIN — ACYCLOVIR SODIUM 575 MG: 50 INJECTION, SOLUTION INTRAVENOUS at 10:50

## 2018-01-01 RX ADMIN — Medication 10 ML: at 12:16

## 2018-01-01 RX ADMIN — SODIUM CHLORIDE 0.6 MCG/KG/HR: 900 INJECTION, SOLUTION INTRAVENOUS at 20:30

## 2018-01-01 RX ADMIN — SODIUM CHLORIDE 0.6 MCG/KG/HR: 900 INJECTION, SOLUTION INTRAVENOUS at 23:44

## 2018-01-01 RX ADMIN — LEVETIRACETAM 1000 MG: 100 SOLUTION ORAL at 20:59

## 2018-01-01 RX ADMIN — POLYVINYL ALCOHOL 2 DROP: 14 SOLUTION/ DROPS OPHTHALMIC at 06:27

## 2018-01-01 RX ADMIN — SODIUM CHLORIDE 0.9 MCG/KG/HR: 900 INJECTION, SOLUTION INTRAVENOUS at 01:52

## 2018-01-01 RX ADMIN — Medication 30 ML: at 21:10

## 2018-01-01 RX ADMIN — ATORVASTATIN CALCIUM 40 MG: 40 TABLET, FILM COATED ORAL at 08:15

## 2018-01-01 RX ADMIN — INSULIN LISPRO 2 UNITS: 100 INJECTION, SOLUTION INTRAVENOUS; SUBCUTANEOUS at 06:22

## 2018-01-01 RX ADMIN — POTASSIUM & SODIUM PHOSPHATES POWDER PACK 280-160-250 MG 2 PACKET: 280-160-250 PACK at 17:40

## 2018-01-01 RX ADMIN — HYDRALAZINE HYDROCHLORIDE 10 MG: 20 INJECTION INTRAMUSCULAR; INTRAVENOUS at 16:19

## 2018-01-01 RX ADMIN — BUDESONIDE 500 MCG: 0.5 INHALANT RESPIRATORY (INHALATION) at 08:12

## 2018-01-01 RX ADMIN — LEVETIRACETAM 1000 MG: 100 SOLUTION ORAL at 08:06

## 2018-01-01 RX ADMIN — LORAZEPAM 0.5 MG: 2 INJECTION INTRAMUSCULAR at 20:48

## 2018-01-01 RX ADMIN — IPRATROPIUM BROMIDE AND ALBUTEROL SULFATE 3 ML: .5; 3 SOLUTION RESPIRATORY (INHALATION) at 13:42

## 2018-01-01 RX ADMIN — CASTOR OIL AND BALSAM, PERU: 788; 87 OINTMENT TOPICAL at 18:31

## 2018-01-01 RX ADMIN — LEVETIRACETAM 1000 MG: 100 SOLUTION ORAL at 08:21

## 2018-01-01 RX ADMIN — CEFEPIME 2 G: 2 INJECTION, POWDER, FOR SOLUTION INTRAVENOUS at 12:23

## 2018-01-01 RX ADMIN — INSULIN LISPRO 3 UNITS: 100 INJECTION, SOLUTION INTRAVENOUS; SUBCUTANEOUS at 17:36

## 2018-01-01 RX ADMIN — Medication 20 ML: at 05:14

## 2018-01-01 RX ADMIN — SODIUM CHLORIDE 100 MG: 900 INJECTION, SOLUTION INTRAVENOUS at 06:11

## 2018-01-01 RX ADMIN — CHLORHEXIDINE GLUCONATE 15 ML: 1.2 RINSE ORAL at 18:23

## 2018-01-01 RX ADMIN — INSULIN GLARGINE 30 UNITS: 100 INJECTION, SOLUTION SUBCUTANEOUS at 22:16

## 2018-01-23 PROBLEM — S06.5XAA SDH (SUBDURAL HEMATOMA): Status: ACTIVE | Noted: 2018-01-01

## 2018-01-23 NOTE — PERIOP NOTES
Received updated report from [facility] in ED  Prior to patient being transferred to preop    1520 Unable to confirm orientation due to having difficulty getting words out and expressive aphasia. When asked about date of birth and allergies patient was not in agreement. 700 Guido Had daughter Chey Mueller surgical consent due to the above.

## 2018-01-23 NOTE — PROGRESS NOTES
Problem: Falls - Risk of  Goal: *Absence of Falls  Document Arleen Fall Risk and appropriate interventions in the flowsheet.    Outcome: Progressing Towards Goal  Fall Risk Interventions:

## 2018-01-23 NOTE — ANESTHESIA PREPROCEDURE EVALUATION
Anesthetic History   No history of anesthetic complications            Review of Systems / Medical History  Patient summary reviewed, nursing notes reviewed and pertinent labs reviewed    Pulmonary        Sleep apnea        Comments: Does not use cpap because of sinus problems, uses breathe right strips   Neuro/Psych       CVA  TIA    Comments: Acute on chronic SDH for craniotomy Cardiovascular    Hypertension: well controlled              Exercise tolerance: >4 METS     GI/Hepatic/Renal     GERD      PUD    Comments: Post hemicolectomy Endo/Other    Diabetes    Arthritis     Other Findings            Physical Exam    Airway  Mallampati: II  TM Distance: > 6 cm  Neck ROM: normal range of motion   Mouth opening: Normal     Cardiovascular    Rhythm: regular  Rate: normal         Dental  No notable dental hx       Pulmonary  Breath sounds clear to auscultation               Abdominal  Abdominal exam normal       Other Findings            Anesthetic Plan    ASA: 4  Anesthesia type: general    Monitoring Plan: Arterial line      Induction: Intravenous  Anesthetic plan and risks discussed with: Patient

## 2018-01-23 NOTE — ED TRIAGE NOTES
For the last 2-3 days I have been having trouble talking. I can't get my words out. No other C/Os I just can't talk. Also I have lost 10 #s over the last 10 days without trying. No difficulty swallowing.

## 2018-01-23 NOTE — ED PROVIDER NOTES
Patient is a 81 y.o. male presenting with context. Dysarthria   Primary symptoms include speech difficulty. Associated symptoms include headaches. Pertinent negatives include no shortness of breath, no chest pain, no vomiting and no nausea. 82 yo WM presents with 2 day history of dysarthria and expressive aphasia. Pt c/o headaches \"for a while. \" Pt is on eliquis for DVT/PE. Also had ischemic CVA in 2016, MRI at that time showed chronic SDH. Pt states he fell 2 months ago off his friend's porch, was seen in the ED and head CT reportedly normal. Pt live at home by himself. After starting the eliquis, pt states he had rectal bleeding, had colonoscopy which reveal colon cancer. Per friends at bedside, pt normally has a slow gait, but has been dragging his feet more than usually over the past few days. Also reports 10lb weight loss over the past 10 days. Last dose of eliquis was this morning. Pt denies cp, sob, nausea, vomiting, fever, chills.   Past Medical History:   Diagnosis Date    Arthritis     neck,chronic    Bladder cancer ([address] 3/13    bladder    Chronic pain     NECK    Colon cancer ([address] 3/16    COLON    Diabetes ([address]     GERD (gastroesophageal reflux disease) 2008    at times, NOT continous    Hypercholesterolemia     Hypertension     Kidney stones 1969    SEVERAL     Nephrolithiasis     Prostate cancer ([address] 9/2003    prostate cancer - radiation    PUD (peptic ulcer disease) 2008    Thromboembolus ([address] 2/8/16    2 RIGHT LEG, 3 IN LUNGS (STARTED ON XARELTO IN ER, 1 WEEK LATER WAS IN ER WITH INTERNAL BLEEDING)    Transient ischemic attack 2005    TIA - no deficits 2005 AND 2015    Unspecified sleep apnea     does not use CPAP/uses nasal strips       Past Surgical History:   Procedure Laterality Date    COLONOSCOPY N/A 3/6/2017    COLONOSCOPY performed by Yury Leiva MD at [facility]  2008    HX CERVICAL FUSION  2002    HX CYST REMOVAL cyst removed from back, shoulder and scalp    HX HEENT  2006    sinus surgery    HX HEENT      STAS CATARACTS    HX LUMBAR LAMINECTOMY  2002    HX OTHER SURGICAL      nose surgery    HX OTHER SURGICAL  2008    LUMPS -BENIGN, REMOVED FROM SHOULDER, HEAD AND BACK    HX OTHER SURGICAL  3/2016    FILTER FOR BLOOD CLOTS    HX TONSILLECTOMY      HX UROLOGICAL  1969    for kidney stones         Family History:   Problem Relation Age of Onset    Heart Disease Father     Lung Disease Father      EMPHASEMA    Cancer Brother      SKIN    Heart Attack Brother     Heart Disease Brother     Anesth Problems Neg Hx        Social History     Social History    Marital status: SINGLE     Spouse name: N/A    Number of children: N/A    Years of education: N/A     Occupational History    Not on file. Social History Main Topics    Smoking status: Former Smoker     Packs/day: 1.00     Years: 36.00     Quit date: 11/2/1998    Smokeless tobacco: Never Used      Comment: former cigarette/cigar smoker  SMOKED 10 YRS FIRST TIME; QUIT FOR 10 YRS; THEN SMOKED FOR ABOUT 26 YRS    Alcohol use 3.5 oz/week     7 Glasses of wine per week      Comment: DAILY 2 DRINKS    Drug use: No    Sexual activity: Yes     Other Topics Concern    Not on file     Social History Narrative         ALLERGIES: Penicillins; Metoprolol; Tramadol; and Lodine [etodolac]    Review of Systems   Constitutional: Negative for chills and fever. Respiratory: Negative for cough and shortness of breath. Cardiovascular: Negative for chest pain. Gastrointestinal: Negative for abdominal pain, diarrhea, nausea and vomiting. Genitourinary: Negative for dysuria and hematuria. Musculoskeletal: Negative for neck pain and neck stiffness. Skin: Negative for rash and wound. Neurological: Positive for speech difficulty and headaches. Negative for syncope, weakness and numbness. All other systems reviewed and are negative.       Vitals:    01/22/18 2151 BP: 149/72   Pulse: 86   Resp: 16   Temp: 98.6 °F (37 °C)   SpO2: 94%   Weight: 96.6 kg (213 lb)   Height: 6' (1.829 m)            Physical Exam   Physical Examination: General appearance - alert, well appearing, and in no distress, oriented to person, place, and time and normal appearing weight  Eyes - pupils equal and reactive, extraocular eye movements intact  HEENT-atraumatic  Neck - supple, no significant adenopathy, no midline c-spine tenderness or pain with rom  Chest - clear to auscultation, no wheezes, rales or rhonchi, symmetric air entry  Heart - normal rate, regular rhythm, normal S1, S2, no murmurs, rubs, clicks or gallops  Abdomen - soft, nontender, nondistended, no masses or organomegaly  Back exam - full range of motion, no tenderness, palpable spasm or pain on motion  Neurological - alert, oriented, expressive aphasia and dysarthria, no focal findings or movement disorder noted, normal f-n-f, no nystagmus, no pronator drift  Musculoskeletal - no joint tenderness, deformity or swelling  Extremities - peripheral pulses normal, no pedal edema, no clubbing or cyanosis  Skin - normal coloration and turgor, no rashes, no suspicious skin lesions noted  MDM  Number of Diagnoses or Management Options     Amount and/or Complexity of Data Reviewed  Clinical lab tests: ordered and reviewed  Tests in the radiology section of CPT®: ordered and reviewed  Decide to obtain previous medical records or to obtain history from someone other than the patient: yes  Obtain history from someone other than the patient: yes (friends)  Review and summarize past medical records: yes  Discuss the patient with other providers: yes (Neurosurgery, hospitalist)  Independent visualization of images, tracings, or specimens: yes    Critical Care  Total time providing critical care: 30-74 minutes    Patient Progress  Patient progress: stable    ED Course       Procedures    EKG interpretation: (Preliminary)  Rhythm: normal sinus rhythm; and regular . Rate (approx.): 94; Axis: normal; VA interval: normal; QRS interval: normal ; ST/T wave: non-specific changes; t wave inversion III    10:56 PM  Discussed with Dr. Aris Arevalo, neurosurgery. Will see pt. Recommends stopping eliquis. Does not recommend reversal agents at this time given pt is stable. Discussed with Dr. Charly Murillo, hospitalist. Will see and admit. Pt and friends updated on test results and plan for admission.     Total critical care time spent exclusive of procedures:  30 min

## 2018-01-23 NOTE — PROGRESS NOTES
1108-Report called to [facility] in [facility]. All valuables will be sent with family.

## 2018-01-23 NOTE — PROGRESS NOTES
Admission Medication Reconciliation:    Comments/Recommendations: This medication history was obtained from patient and rx query; (s)he appears to be a poor historian. An RX Query is available. Medications added: tylenol  Medications deleted: iron, glipizide, lovastatin, protonix  Medications amended: eliquis, losartan, metformin    Last doses of medication: patient took medications yesterday am  Review of Allergies: completed      Significant PMH/Disease States:   Past Medical History:   Diagnosis Date    Arthritis     neck,chronic    Bladder cancer ([address] 3/13    bladder    Chronic pain     NECK    Colon cancer ([address] 3/16    COLON    Diabetes ([address]     GERD (gastroesophageal reflux disease) 2008    at times, NOT continous    Hypercholesterolemia     Hypertension     Kidney stones 1969    SEVERAL     Nephrolithiasis     Prostate cancer ([address] 9/2003    prostate cancer - radiation    PUD (peptic ulcer disease) 2008    Thromboembolus ([address] 2/8/16    2 RIGHT LEG, 3 IN LUNGS (STARTED ON XARELTO IN ER, 1 WEEK LATER WAS IN ER WITH INTERNAL BLEEDING)    Transient ischemic attack 2005    TIA - no deficits 2005 AND 2015    Unspecified sleep apnea     does not use CPAP/uses nasal strips       Chief Complaint for this Admission:    Chief Complaint   Patient presents with    Dysarthria       Allergies:  Penicillins; Metoprolol; Tramadol; and Lodine [etodolac]    Prior to Admission Medications:   Prior to Admission Medications   Prescriptions Last Dose Informant Patient Reported? Taking?   acetaminophen (TYLENOL EXTRA STRENGTH) 500 mg tablet 1/22/2018  Yes Yes   Sig: Take 1,000 mg by mouth daily. amLODIPine (NORVASC) 2.5 mg tablet 1/22/2018 Self Yes Yes   Sig: Take 2.5 mg by mouth nightly. apixaban (ELIQUIS) 2.5 mg tablet 1/22/2018  Yes Yes   Sig: Take 2.5 mg by mouth two (2) times a day. aspirin delayed-release 81 mg tablet 1/22/2018  Yes Yes   Sig: Take 1 Tab by mouth daily.    atorvastatin (LIPITOR) 40 mg tablet 1/22/2018  Yes Yes   Sig: Take 40 mg by mouth daily. losartan (COZAAR) 50 mg tablet 1/22/2018  Yes Yes   Sig: Take 50 mg by mouth daily. metFORMIN (GLUCOPHAGE) 1,000 mg tablet 1/22/2018  Yes Yes   Sig: Take 1,000 mg by mouth daily. triamterene-hydrochlorothiazide (MAXZIDE) 37.5-25 mg per tablet 1/22/2018  Yes Yes   Sig: Take 1 Tab by mouth daily. Facility-Administered Medications: None         Thank you for allowing me to participate in the care of this patient. Please contact the pharmacy () or the medication reconciliation pharmacy () with any questions.     Sathya Barron, PharmD

## 2018-01-23 NOTE — H&P
History & Physical    Date of admission: 1/22/2018    Patient name: Gonzalo Whipple  MRN: [id number]  YOB: 1935  Age: 81 y.o. Primary care provider:  Nini Marquez MD     Source of Information: ED and medical records                              Chief complaint:   Patient does not provide    History of present illness  Gonzalo Whipple is a 81 y.o. male with past medical history of arthritis, bladder cancer, prostate cancer, chronic neck pain, colon cancer, type 2 diabetes mellitus, GERD, hypertension, hyperlipidemia, kidney stones, PUD, DVT, PE, TIA, sleep apnea presented to the ED from home with reported inability to talk. Patient is a non-historian. As such, history was obtained per my review of ED and medical records. Per these collective reports, patient had onset of dysarthria and expressive aphasia starting approximately two days ago. Patient notedly had chronic headaches. Patient  is on eliquis for DVT/PE. Also had ischemic CVA in 2016, MRI at that time showed chronic SDH. Pt states he fell 2 months ago off his friend's porch, was seen in the ED and head CT reportedly normal. Pt live at home by himself. After starting the eliquis, pt states he had rectal bleeding, had colonoscopy which reveal colon cancer. Per friends at bedside, pt normally has a slow gait, but has been dragging his feet more than usually over the past few days. Also reports 10lb weight loss over the past 10 days. Last dose of eliquis was this morning. Pt denies cp, sob, nausea, vomiting, fever, chills. On arrival in the ED, workup included CT head without contrast showed moderate to large acute on chronic subdural hemorrhage on the left with left to right midline shift approximately 8 mm with effacement of the left lateral ventricle. Neurosurgeon was consulted. Patient is now seen for admission to the hospitalist service.   There were no reports of new onset syncope, loss of consciousness, headache, neck pain, visual disturbance, numbness, paresthesias, focal weakness, chest pain, palpitations, abdominal pain, nausea, vomiting, diarrhea, melena, dysuria, hematuria, bowel/ or bladder incontinence, calf pain, increased leg swelling/ edema, rash, fever, chills. Past Medical History:   Diagnosis Date    Arthritis     neck,chronic    Bladder cancer ([facility] [address] 3/13    bladder    Chronic pain     NECK    Colon cancer ([facility] [address] 3/16    COLON    Diabetes ([facility] [address]     GERD (gastroesophageal reflux disease) 2008    at times, NOT continous    Hypercholesterolemia     Hypertension     Kidney stones 1969    SEVERAL     Nephrolithiasis     Prostate cancer ([facility] [address] 9/2003    prostate cancer - radiation    PUD (peptic ulcer disease) 2008    Thromboembolus ([facility] [address] 2/8/16    2 RIGHT LEG, 3 IN LUNGS (STARTED ON XARELTO IN ER, 1 WEEK LATER WAS IN ER WITH INTERNAL BLEEDING)    Transient ischemic attack 2005    TIA - no deficits 2005 AND 2015    Unspecified sleep apnea     does not use CPAP/uses nasal strips      Past Surgical History:   Procedure Laterality Date    COLONOSCOPY N/A 3/6/2017    COLONOSCOPY performed by Luiza Mauricio MD at [address] [facility]  2008    HX CERVICAL FUSION  2002    HX CYST REMOVAL      cyst removed from back, shoulder and scalp    HX HEENT  2006    sinus surgery    HX HEENT      STAS CATARACTS    HX LUMBAR LAMINECTOMY  2002    HX OTHER SURGICAL      nose surgery    HX OTHER SURGICAL  2008    LUMPS -BENIGN, REMOVED FROM SHOULDER, HEAD AND BACK    HX OTHER SURGICAL  3/2016    FILTER FOR BLOOD CLOTS    HX TONSILLECTOMY      HX UROLOGICAL  1969    for kidney stones     Prior to Admission medications    Medication Sig Start Date End Date Taking? Authorizing Provider   apixaban (ELIQUIS) 5 mg tablet Take 5 mg by mouth two (2) times a day.     Historical Provider   atorvastatin (LIPITOR) 40 mg tablet Take by mouth daily. Historical Provider   aspirin delayed-release 81 mg tablet Take 1 Tab by mouth daily. 6/18/16   Yvette Dunaway MD   OTHER Outpatient PT/OT evaluation  Dx: stroke 6/18/16   Yvette Dunaway MD   ferrous sulfate 325 mg (65 mg iron) tablet Take 325 mg by mouth daily. Historical Provider   losartan (COZAAR) 50 mg tablet Take 25 mg by mouth daily. (dose = 0.5 x 50 mg tablet)    Historical Provider   pantoprazole (PROTONIX) 40 mg tablet Take 40 mg by mouth daily. Historical Provider   glipiZIDE (GLUCOTROL) 5 mg tablet Take 5 mg by mouth two (2) times a day. Historical Provider   metFORMIN (GLUCOPHAGE) 500 mg tablet Take 500 mg by mouth two (2) times daily (with meals). Historical Provider   lovastatin (MEVACOR) 40 mg tablet Take 40 mg by mouth nightly. Historical Provider   triamterene-hydrochlorothiazide (MAXZIDE) 37.5-25 mg per tablet Take 1 Tab by mouth daily. Historical Provider   amLODIPine (NORVASC) 2.5 mg tablet Take 2.5 mg by mouth nightly. Historical Provider     Allergies   Allergen Reactions    Penicillins Rash    Metoprolol Rash    Tramadol Other (comments)     Unsure of reaction.     Lodine [Etodolac] Vertigo      Family History   Problem Relation Age of Onset    Heart Disease Father     Lung Disease Father      EMPHASEMA    Cancer Brother      SKIN    Heart Attack Brother     Heart Disease Brother     Anesth Problems Neg Hx          Social history    Alcohol history     None           Smoking history      x  Former smoker         History   Smoking Status    Former Smoker    Packs/day: 1.00    Years: 36.00    Quit date: 11/2/1998   Smokeless Tobacco    Never Used     Comment: former cigarette/cigar smoker  SMOKED 10 YRS FIRST TIME; QUIT FOR 10 YRS; THEN SMOKED FOR ABOUT 26 YRS       Code status  x  Full code     Review of systems  Unable to obtain review of systems from patient due to dysarthria    Physical Examination   Visit Vitals    /85    Pulse 89    Temp 98.1 °F (36.7 °C)    Resp 15    Ht 6' (1.829 m)    Wt 96.6 kg (213 lb)    SpO2 94%    BMI 28.89 kg/m2          O2 Device: Room air    General:  Overweight, elderly male in no acute respiratory distress   Head:  Normocephalic, without obvious abnormality, atraumatic   Eyes:  Conjunctivae/corneas clear. PERRL, EOMs intact   E/N/M/T: Nares normal. Septum midline. No nasal drainage or sinus tenderness  Tongue midline/ non-edematous  Clear oropharynx   Neck: Normal appearance and movements, symmetrical, trachea midline  No palpable adenopathy  No thyroid enlargement, tenderness or nodules  No carotid bruit   No JVD   Lungs:   Symmetrical chest expansion and respiratory effort  Clear to auscultation bilaterally   Chest wall:  No tenderness or deformity   Heart:  Regular rate and rhythm   Sounds normal; no murmur, click, rub or gallop   Abdomen:   Soft, no tenderness  No rebound, guarding, or rigidity  Non-distended  Bowel sounds normal  No masses or hepatosplenomegaly  No hernias present   Back: No CVA tenderness   Extremities: Extremities normal, atraumatic  No cyanosis or edema     Pulses 2+ radial/ 1+ DP bilateral pulses   Skin: No rashes or ulcers  Warm/ dry   Musculo-      skeletal: Gait not tested  No calf tenderness   Neuro: GCS  (E4 V1 M6). Moves all extremities x 4. Dysarthria. No facial droop. Sensation grossly intact.      Psych: Aphasia             Data Review    24 Hour Results:  Recent Results (from the past 24 hour(s))   EKG, 12 LEAD, INITIAL    Collection Time: 01/22/18 10:32 PM   Result Value Ref Range    Ventricular Rate 94 BPM    Atrial Rate 94 BPM    P-R Interval 132 ms    QRS Duration 82 ms    Q-T Interval 320 ms    QTC Calculation (Bezet) 400 ms    Calculated P Axis 33 degrees    Calculated R Axis 11 degrees    Calculated T Axis 15 degrees    Diagnosis       Normal sinus rhythm  When compared with ECG of 16-JUN-2016 11:10,  Previous ECG has undetermined rhythm, needs review  QRS duration has decreased  T wave inversion no longer evident in Lateral leads     PTT    Collection Time: 01/22/18 11:27 PM   Result Value Ref Range    aPTT 25.8 22.1 - 32.5 sec    aPTT, therapeutic range     58.0 - 77.0 SECS   PROTHROMBIN TIME + INR    Collection Time: 01/22/18 11:27 PM   Result Value Ref Range    INR 1.0 0.9 - 1.1      Prothrombin time 10.4 9.0 - 11.1 sec   CBC WITH AUTOMATED DIFF    Collection Time: 01/22/18 11:28 PM   Result Value Ref Range    WBC 6.7 4.1 - 11.1 K/uL    RBC 4.39 4. 10 - 5.70 M/uL    HGB 13.0 12.1 - 17.0 g/dL    HCT 39.9 36.6 - 50.3 %    MCV 90.9 80.0 - 99.0 FL    MCH 29.6 26.0 - 34.0 PG    MCHC 32.6 30.0 - 36.5 g/dL    RDW 13.9 11.5 - 14.5 %    PLATELET 316 088 - 778 K/uL    NEUTROPHILS 69 32 - 75 %    LYMPHOCYTES 19 12 - 49 %    MONOCYTES 9 5 - 13 %    EOSINOPHILS 3 0 - 7 %    BASOPHILS 0 0 - 1 %    ABS. NEUTROPHILS 4.6 1.8 - 8.0 K/UL    ABS. LYMPHOCYTES 1.3 0.8 - 3.5 K/UL    ABS. MONOCYTES 0.6 0.0 - 1.0 K/UL    ABS. EOSINOPHILS 0.2 0.0 - 0.4 K/UL    ABS. BASOPHILS 0.0 0.0 - 0.1 K/UL   METABOLIC PANEL, COMPREHENSIVE    Collection Time: 01/22/18 11:28 PM   Result Value Ref Range    Sodium 141 136 - 145 mmol/L    Potassium 3.1 (L) 3.5 - 5.1 mmol/L    Chloride 103 97 - 108 mmol/L    CO2 27 21 - 32 mmol/L    Anion gap 11 5 - 15 mmol/L    Glucose 102 (H) 65 - 100 mg/dL    BUN 37 (H) 6 - 20 MG/DL    Creatinine 2.47 (H) 0.70 - 1.30 MG/DL    BUN/Creatinine ratio 15 12 - 20      GFR est AA 31 (L) >60 ml/min/1.73m2    GFR est non-AA 25 (L) >60 ml/min/1.73m2    Calcium 9.1 8.5 - 10.1 MG/DL    Bilirubin, total 0.5 0.2 - 1.0 MG/DL    ALT (SGPT) 20 12 - 78 U/L    AST (SGOT) 14 (L) 15 - 37 U/L    Alk.  phosphatase 65 45 - 117 U/L    Protein, total 7.1 6.4 - 8.2 g/dL    Albumin 3.5 3.5 - 5.0 g/dL    Globulin 3.6 2.0 - 4.0 g/dL    A-G Ratio 1.0 (L) 1.1 - 2.2       Recent Labs      01/22/18 2328   WBC  6.7   HGB  13.0   HCT  39.9   PLT  185     Recent Labs      01/22/18 2328 01/22/18 2327   NA  141   -- K  3.1*   --    CL  103   --    CO2  27   --    GLU  102*   --    BUN  37*   --    CREA  2.47*   --    CA  9.1   --    ALB  3.5   --    TBILI  0.5   --    SGOT  14*   --    ALT  20   --    INR   --   1.0       Imaging  CT head without contrast:  FINDINGS:  Moderate-sized mixed density acute on chronic subdural hemorrhage. There is left  to right midline shift of approximately 8 mm. Effacement of the left lateral  ventricle. Maximal dimension of approximately 22 mm. There is a moderate to  severe degree of underlying cerebral atrophy which mitigates mass effect. Ambient cisterns remain patent. . There is no significant white matter disease. .   The basilar cisterns are open. No acute infarct is identified. The bone windows  demonstrate no abnormalities. The visualized portions of the paranasal sinuses  and mastoid air cells are clear.     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 Left to  right midline shift approximately 8 mm with effacement of the left lateral  ventricle. The ambient cisterns remain patent. Assessment and Plan   1. SDH. 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 Admit to ICU. Neurosurgeon was consulted with plan for left craniotomy later today. Keep NPO. Continue IV fluids while NPO. Place on neurovascular checks and fall precautions. Monitor BP closely. 2.  Expressive aphasia/ dysarthria. Plan as above. Eventual speech therapy consultation. 3.  Long term anticoagulation on Eliquis. Hold Eliquis and all anti-coagulant medications. No reversal agents recommended at this time. 4.  Hypertension. Monitor BP closely. Keep goal SBP < 140 mmHg. 5.  Type 2 diabetes mellitus. Order Humalog insulin correctional coverage, scheduled blood glucose checks and check hemoglobin A1c level. 6.  Hyperlipidemia. Check lipid panel.     7.  VTE prophylaxis. SCDs to BLEs.                Signed by: More Guevara MD    January 23, 2018 at 12:31 AM

## 2018-01-23 NOTE — PROGRESS NOTES
PT Note:    Orders acknowledged, chart reviewed. Noted pt scheduled for craniotomy for SDH today at 1200. Will hold and follow-up POD 1 for PT evaluation as appropriate/medically stable. Thank you.

## 2018-01-23 NOTE — PROGRESS NOTES
Bedside, Verbal and Written shift change report given to Chio (oncoming nurse) by Sean Philip (offgoing nurse). Report included the following information SBAR, Kardex, MAR and Recent Results.

## 2018-01-23 NOTE — ED NOTES
Dr Maru Lai at bedside. Per MD surgery afternoon. [facility] per MD.    0671  Dr Jose Christine at bedside.

## 2018-01-23 NOTE — CDMP QUERY
Please clarify if this patient is (was) being treated/managed for:     => Compression of brain in the setting of subdural hemorrhage treated with head CT, planned craniotomy and neuro surg consult  => Other explanation of clinical findings  => Unable to determine (no explanation for clinical findings)    The medical record reflects the following clinical findings, treatment, and risk factors. 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 Left to  right midline shift approximately 8 mm with effacement of the left lateral  ventricle. The ambient cisterns remain patent. H&P IMP:   SDH. 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 Admit to ICU. Neurosurgeon was consulted with plan for left craniotomy later today    Treatment: Neuro surg; head CT; planned craniotomy    Please clarify and document your clinical opinion in the progress notes and discharge summary including the definitive and/or presumptive diagnosis, (suspected or probable), related to the above clinical findings. Please include clinical findings supporting your diagnosis. Thank You  [email or web address]  [phone]

## 2018-01-23 NOTE — ANESTHESIA PROCEDURE NOTES
Arterial Line Placement    Start time: 1/23/2018 4:44 PM  End time: 1/23/2018 4:52 PM  Performed by: Madhu Gloria  Authorized by: Madhu Gloria     Pre-Procedure  Indications:  Arterial pressure monitoring and blood sampling  Preanesthetic Checklist: patient identified, risks and benefits discussed, anesthesia consent, site marked, patient being monitored, timeout performed and patient being monitored      Procedure:   Prep:  ChloraPrep  Seldinger Technique?: Yes    Orientation:  Right  Location:  Radial artery  Catheter size:  20 G  Number of attempts:  1  Cont Cardiac Output Sensor: No      Assessment:   Post-procedure:  Line secured and sterile dressing applied  Patient Tolerance:  Patient tolerated the procedure well with no immediate complications

## 2018-01-23 NOTE — ED NOTES
Linen changed, CHG completed, consent obtained for procedure. Bedside and Verbal shift change report given to Chio RN (oncoming nurse) by Darline Gates RN (offgoing nurse). Report included the following information ED Summary.

## 2018-01-23 NOTE — ROUTINE PROCESS
TRANSFER - IN REPORT:    Verbal report received from Caterina MOSS on [facility]  being received from ER for ordered procedure      Report consisted of patients Situation, Background, Assessment and   Recommendations(SBAR). Information from the following report(s) SBAR was reviewed with the receiving nurse. Opportunity for questions and clarification was provided. Assessment completed upon patients arrival to unit and care assumed.

## 2018-01-23 NOTE — PROGRESS NOTES
Occupational Therapy Note  1/23/2018    Orders acknowledged, chart reviewed. Noted pt scheduled for craniotomy for SDH today at 1200. Will hold and follow-up POD 1 for OT evaluation as appropriate/medically stable.     Caterina Sheehan, OTR/L

## 2018-01-24 NOTE — PROGRESS NOTES
Occupational Therapy Note  1/24/2018    Orders acknowledged, chart reviewed. Spoke with RN who reports pt currently on bedrest. Will hold and follow-up tomorrow.     Caterina Gonsalves, OTR/L

## 2018-01-24 NOTE — PROGRESS NOTES
TRANSFER - OUT REPORT:    Verbal report given to Gloria pyle(name) on Jesus Briggs  being transferred to icu16(unit) for routine progression of care       Report consisted of patients Situation, Background, Assessment and   Recommendations(SBAR). Time Pre op antibiotic given:2130  Anesthesia Stop time: 3140  Lindquist Present on Transfer to floor:y  Order for Lindquist on Chart:y  Discharge Prescriptions with Chart:n    Information from the following report(s) SBAR, Kardex and OR Summary was reviewed with the receiving nurse. Opportunity for questions and clarification was provided. Is the patient on 02? YES       L/Min        Other ft 80    Is the patient on a monitor? YES    Is the nurse transporting with the patient? YES    Surgical Waiting Area notified of patient's transfer from PACU?  YES      The following personal items collected during your admission accompanied patient upon transfer:   Dental Appliance: Dental Appliances: None  Vision: Visual Aid: Glasses, With patient  Hearing Aid: Hearing Aid: Bilateral, With patient  Jewelry:    Clothing: Clothing:  (no belongings to preop)  Other Valuables:    Valuables sent to safe:

## 2018-01-24 NOTE — PROGRESS NOTES
0730 Bedside and Verbal shift change report given to Faviola Dickinson RN (oncoming nurse) by Bryce Ren RN (offgoing nurse). Report included the following information SBAR, Kardex, ED Summary, OR Summary, Procedure Summary, Intake/Output, MAR, Recent Results and Cardiac Rhythm SR.     1930 Bedside and Verbal shift change report given to Valentina Garcia (oncoming nurse) by Faviola Dickinson RN (offgoing nurse). Report included the following information SBAR, Kardex, ED Summary, OR Summary, Procedure Summary, Intake/Output, MAR, Recent Results and Cardiac Rhythm SR. Shift Summary: Periods of drowsiness to restless throughout shift, throwing legs out of bed, bilateral wrist restraints remain in place. Cardene infusing to keep SBP < 160. HOB no more than 30 degrees per Neurosurgery. 95 cc serosanguinous output from KORY drain- to thumbprint charge. Remains NPO, Speech following. PRN Morphine given x 1. Tmax 100. Lindquist cath removed & condom cath placed.

## 2018-01-24 NOTE — PERIOP NOTES
Received pt from OR incont of stool. Pt follows commands, equal hand . Strong lower body movement equal, follows commands.

## 2018-01-24 NOTE — PROGRESS NOTES
Patient admitted by out team early today,see the H&P by Dr Brandon Sagastume could not see him as he in the OR most of the day.

## 2018-01-24 NOTE — CONSULTS
[facility]    INTERVAL HISTORY / SUBJECTIVE:  Hospital Day: 3     1/24/2018    Reason For Consult:   Bob Head is a 81 y.o. WHITE OR  male  has a past medical history of Arthritis; Bladder cancer ([address] (3/13); Chronic pain; Colon cancer ([address] (3/16); Diabetes ([address] GERD (gastroesophageal reflux disease) (2008); Hypercholesterolemia; Hypertension; Kidney stones (1969); Nephrolithiasis; Prostate cancer ([address] (9/2003); PUD (peptic ulcer disease) (2008); Thromboembolus ([address] (2/8/16); Transient ischemic attack (2005); and Unspecified sleep apnea. He also has no past medical history of Unspecified adverse effect of anesthesia. is admitted on 1/22/2018 by Chris Canales MD for whom we were asked see in consultation for General ICU Care. Chief complaint of continuous moderate neurologic symptoms over several days. Course has been gradually worsening and is exacerbated by subdural hematoma but relieved by surgery. ROS:  Unable to obtain due to patient factors : delirium      ICU 16    9:25 AM    Patient is an 81year-old gentleman status post craniotomy for left sided subdural hematoma. Currently requiring ICU care for blood pressure control. Continue current ICU care. Laboratory data suggests elevated blood sugars. Insulin regimen will be adjusted. CK D. is noted creatinine 2.1.. Son updated at bedside. Patient is remaining the ICU for routine ICU care, control blood pressure. Clinically has sleep apnea.     ASSESSMENT and PLAN:    · Subdural hematoma-postop care per surgeon  · Hypertension-adjust medications  · Clinical sleep disordered breathing-monitor closely  · CK D.-adjust fluids  · Elevated blood sugars-adjust medications and sliding scale insulin    Hospital Problems  Date Reviewed: 1/23/2018          Codes Class Noted POA    * (Principal)SDH (subdural hematoma) ([facility] [address] ICD-10-CM: I62.00  ICD-9-CM: 432.1  1/23/2018 Unknown              Medical Decision Making Today  · I have reviewed the flowsheet and previous days notes  · Abrupt change in neurologic status  · Review and Order of Radiology tests  · Review and Order of Medicine tests  · Discuss case with Specialist MD  · Independent visualization of Image  · I have personally reviewed the patients ECG / Tele       VITALS    Visit Vitals    BP (!) 129/91    Pulse 83    Temp 98 °F (36.7 °C)    Resp 16    Ht 6' (1.829 m)    Wt 93.7 kg (206 lb 9.1 oz)    SpO2 100%    BMI 28.02 kg/m2        Temp (24hrs), Av °F (36.7 °C), Min:97.4 °F (36.3 °C), Max:98.6 °F (37 °C)                   Intake/Output Summary (Last 24 hours) at 18 1156  Last data filed at 18 1000   Gross per 24 hour   Intake             2715 ml   Output             2200 ml   Net              515 ml         Other:      GEN: Nontoxic Nondistressed   EYE: Anicteric Noninjected   ENT: Moist No Thrush   CARD: Regular No murmurs   RESP: Clear Equal BS   GI: NABS Nontender   : Clear Urine Normal Genitalia   SKEL: obese No Clubbing   SKIN: Perfused No drug rash   NEURO: confused delirious   PSYCH: agitated Poor insight   LYMPH: No JOLANTA No edema       Recent Labs      18   2328   NA  142   < >  141   K  4.2   < >  3.1*   CL  107   < >  103   CO2  24   < >  27   BUN  34*   < >  37*   CREA  2.10*   < >  2.47*   GLU  261*   < >  102*   CA  8.4*   < >  9.1   TBILI   --    --   0.5   ALT   --    --   20   SGOT   --    --   14*   AP   --    --   65   TP   --    --   7.1   ALB   --    --   3.5   GLOB   --    --   3.6    < > = values in this interval not displayed. Recent Labs      18   0042   WBC  10.1   --    --    HGB  11.3*   --    --    PLT  189   < >   --    INR   --    --   1.0   APTT   --    --   28.6    < > = values in this interval not displayed. No results for input(s): PHI, PCO2I, PO2I, HCO3I, FIO2I in the last 72 hours.     XRAY Result:    [facility]   XR SPINE LUMB 2 OR 3 V   Narrative INDICATION:   radiculopathy Radiculopathy, site unspecified    COMPARISON: June 16, 2016    FINDINGS:    AP, lateral, and coned down lateral views of the lumbar spine demonstrate no  acute fracture or subluxation. Moderate disc space narrowing with endplate  osteophytes and sclerosis at L2-3 and to a lesser degree L3-4. The sacroiliac  joints are intact. Impression IMPRESSION:  Degenerative changes at L2-3 and L3-4. No acute abnormality. CT Result:    Results from Hospital Encounter encounter on 01/22/18   CT HEAD WO CONT   Narrative EXAM:  CT HEAD WITHOUT CONTRAST  INDICATION: Postop evacuation of subdural hematoma. COMPARISON: 1/22/2018. CONTRAST: None. TECHNIQUE: Unenhanced CT of the head was performed using 5 mm images. Brain and  bone windows were generated. Sagittal and coronal reformations were generated. CT dose reduction was achieved through use of a standardized protocol tailored  for this examination and automatic exposure control for dose modulation. CT dose  reduction was achieved through use of a standardized protocol tailored for this  examination and automatic exposure control for dose modulation. Adaptive  statistical iterative reconstruction (ASIR) was utilized for this examination. FINDINGS:  There is a left frontal craniotomy. There is a mixed attenuation left  hemispheric subdural hematoma which now contains gas. The maximum depth has  decreased and is now 10 mm. There is a new left posterior parafalcine high  attenuation subdural hematoma and there is some new intraventricular hemorrhage. There is new left parietal parenchymal hemorrhage and bilateral subarachnoid  hemorrhage in the parietal regions, greater on the left than on the right. The  ventricles and sulci are enlarged in a generalized fashion consistent with  volume loss. .  There is no significant white matter disease. There is no  intracranial hemorrhage. There is no extra-axial collection, mass, mass effect  or midline shift. The basilar cisterns are open. No acute infarct is  identified. The bone windows demonstrate no abnormalities. The visualized  portions of the paranasal sinuses and mastoid air cells are clear. Impression IMPRESSION: Status post left craniotomy with partial evacuation of left subdural  hematoma. There is significant residual subdural hematoma as well as new left  posterior parafalcine hematoma, new intraventricular hemorrhage, new left  parietal parenchymal hemorrhage and bilateral parietal subarachnoid hemorrhage,  greater on the left than on the right. PMH:  has a past medical history of Arthritis; Bladder cancer ([address] (3/13); Chronic pain; Colon cancer ([address] (3/16); Diabetes ([address] GERD (gastroesophageal reflux disease) (2008); Hypercholesterolemia; Hypertension; Kidney stones (1969); Nephrolithiasis; Prostate cancer ([address] (9/2003); PUD (peptic ulcer disease) (2008); Thromboembolus ([facility] [address] (2/8/16); Transient ischemic attack (2005); and Unspecified sleep apnea. He also has no past medical history of Unspecified adverse effect of anesthesia. PSH:   has a past surgical history that includes hx lumbar laminectomy (2002); hx cervical fusion (2002); endoscopy, colon, diagnostic (2008); hx cyst removal; hx urological (1969); hx other surgical; hx other surgical (2008); hx other surgical (3/2016); hx heent (2006); hx tonsillectomy; hx heent; and colonoscopy (N/A, 3/6/2017). FHX: family history includes Cancer in his brother; Heart Attack in his brother; Heart Disease in his brother and father; Lung Disease in his father. There is no history of Anesth Problems. SHX: reports that he quit smoking about 19 years ago. He has a 36.00 pack-year smoking history. He has never used smokeless tobacco. He reports that he drinks about 3.5 oz of alcohol per week  He reports that he does not use illicit drugs.     ALL:   Allergies Allergen Reactions    Penicillins Rash    Metoprolol Rash    Tramadol Other (comments)     Unsure of reaction.     Lodine [Etodolac] Vertigo        MEDS:   [x] Reviewed  [] Not reviewed    Current Facility-Administered Medications   Medication Dose Route Frequency    atorvastatin (LIPITOR) tablet 40 mg  40 mg Oral DAILY    losartan (COZAAR) tablet 50 mg  50 mg Oral DAILY    metFORMIN (GLUCOPHAGE) tablet 1,000 mg  1,000 mg Oral DAILY    triamterene-hydroCHLOROthiazide (MAXZIDE) 37.5-25 mg per tablet 1 Tab  1 Tab Oral DAILY    [START ON 1/25/2018] famotidine (PEPCID) tablet 20 mg  20 mg Oral DAILY    0.9% sodium chloride infusion  100 mL/hr IntraVENous CONTINUOUS    ondansetron (ZOFRAN) injection 4 mg  4 mg IntraVENous Q6H PRN    naloxone (NARCAN) injection 0.4 mg  0.4 mg IntraVENous PRN    acetaminophen (TYLENOL) tablet 650 mg  650 mg Oral Q4H PRN    Or    acetaminophen (TYLENOL) solution 650 mg  650 mg Per NG tube Q4H PRN    Or    acetaminophen (TYLENOL) suppository 650 mg  650 mg Rectal Q4H PRN    glucose chewable tablet 16 g  4 Tab Oral PRN    dextrose (D50W) injection syrg 12.5-25 g  12.5-25 g IntraVENous PRN    glucagon (GLUCAGEN) injection 1 mg  1 mg IntraMUSCular PRN    insulin lispro (HUMALOG) injection   SubCUTAneous Q6H    hydrALAZINE (APRESOLINE) 20 mg/mL injection 10 mg  10 mg IntraVENous Q4H PRN    sodium chloride (NS) flush 5-10 mL  5-10 mL IntraVENous Q8H    sodium chloride (NS) flush 5-10 mL  5-10 mL IntraVENous PRN    acetaminophen (TYLENOL) tablet 650 mg  650 mg Oral Q4H PRN    HYDROcodone-acetaminophen (NORCO) 5-325 mg per tablet 1 Tab  1 Tab Oral Q4H PRN    morphine injection 2 mg  2 mg IntraVENous Q4H PRN    ceFAZolin (ANCEF) 2 g/20 mL in sterile water IV syringe  2 g IntraVENous Q8H    levETIRAcetam (KEPPRA) 500 mg in 0.9% sodium chloride 100 mL IVPB  500 mg IntraVENous Q12H    ondansetron (ZOFRAN) injection 4 mg  4 mg IntraVENous Q4H PRN    niCARdipine (CARDENE) 25 mg in 0.9% sodium chloride 250 mL infusion  5-15 mg/hr IntraVENous TITRATE       Heather Cochran MD [facility]

## 2018-01-24 NOTE — PROGRESS NOTES
Hospitalist Progress Note                               Tiara Ortiz MD                                     Answering service: [phone]                               OR [phone] from in house phone                               Cell: [phone]              Date of Service:  2018  NAME:  Rosario Roach  :  1935  MRN:  [id number]      Admission Summary:   Rosario Roach is a 81 y.o. male with past medical history of arthritis, bladder cancer, prostate cancer, chronic neck pain, colon cancer, type 2 diabetes mellitus, GERD, hypertension, hyperlipidemia, kidney stones, PUD, DVT, PE, TIA, sleep apnea presented to the ED from home with reported inability to talk. Patient is a non-historian. As such, history was obtained per my review of ED and medical records. Per these collective reports, patient had onset of dysarthria and expressive aphasia starting approximately two days ago. Interval history / Subjective:      Ms Jose Marie is restless, however,he is oriented to place ,person and time     Assessment & Plan:     Left moderate to large SDH with mass effect and midline shift. -S/p crani and evacuation   -Follow up CT head with residue al hematoma, and new hemorrhages  -On Cardene drip  -Neurosurgery managing      Acquired coagulopathy due to Eliquis. Eliquis stopped due to above. Hypertension. On Cardene drip. Type 2 diabetes mellitus: accucheks,humalog sliding scale.  -Target blood sugar 140-180 and hyperglycemia persists >180,will start lantus. Hyperlipidemia. CKD III with worsening of creatinine: Iv fluids. Monitor. Renal dose adjust medications,avoid nephrotoxins. Diet:NPO  Code status: Full  DVT prophylaxis: SCD  Care Plan discussed with: RN    Discharge planning/disposition:Needs rehab on discharge.     Hospital Problems  Date Reviewed: 2018          Codes Class Noted POA    * (Principal)SDH (subdural hematoma) [facility]) ICD-10-CM: I62.00  ICD-9-CM: 432.1  1/23/2018 Unknown                Review of Systems:   Pertinent items are noted in HPI. Physical Examination:      Last 24hrs VS reviewed since prior progress note. Most recent are:  Visit Vitals    /60    Pulse 92    Temp 100 °F (37.8 °C)    Resp 20    Ht 6' (1.829 m)    Wt 93.7 kg (206 lb 9.1 oz)    SpO2 100%    BMI 28.02 kg/m2           Constitutional:  Restless, awake,oriented. Eyes: Non icteric,non pallor,no erythema,discharge,PERRLA   HENT:  Head bandaged,drain in place. Oral mucous moist, oropharynx benign. Resp:  CTA bilaterally. No wheezing/rhonchi/rales. No accessory muscle use   CV:  Regular rhythm, normal rate, no murmurs, gallops, rubs    GI:  Soft, non distended, non tender. normoactive bowel sounds, no hepatosplenomegaly    :  No CVA or suprapubic tenderness   Skin  :  No erythema,rash,bullae,dipigmentation     Musculoskeletal:  SCDs on both legs ;no edema, warm, 2+ pulses throughout    Neurologic:  he is restless,but knew where he is the month and year.            Intake/Output Summary (Last 24 hours) at 01/24/18 1729  Last data filed at 01/24/18 1400   Gross per 24 hour   Intake             2615 ml   Output             2100 ml   Net              515 ml          Data Review:    Review and/or order of clinical lab test  Review and/or order of tests in the radiology section of CPT  Review and/or order of tests in the medicine section of CPT      Labs:     Recent Labs      01/24/18   0419  01/23/18   0107  01/22/18   2328   WBC  10.1   --   6.7   HGB  11.3*   --   13.0   HCT  34.8*  37.7  39.9   PLT  189  167  185     Recent Labs      01/24/18   0419  01/23/18   0500  01/22/18   2328   NA  142  142  141   K  4.2  3.3*  3.1*   CL  107  105  103   CO2  24  30  27   BUN  34*  33*  37*   CREA  2.10*  2.12*  2.47*   GLU  261*  127*  102*   CA  8.4*  8.8  9.1     Recent Labs      01/22/18   2328   SGOT  14*   ALT  20   AP  65   TBILI 0.5   TP  7.1   ALB  3.5   GLOB  3.6     Recent Labs      01/23/18   0042  01/22/18   2327   INR  1.0  1.0   PTP  10.6  10.4   APTT  28.6  25.8      No results for input(s): FE, TIBC, PSAT, FERR in the last 72 hours. No results found for: FOL, RBCF   No results for input(s): PH, PCO2, PO2 in the last 72 hours. No results for input(s): CPK, CKNDX, TROIQ in the last 72 hours.     No lab exists for component: CPKMB  Lab Results   Component Value Date/Time    Cholesterol, total 118 01/22/2018 11:28 PM    HDL Cholesterol 53 01/22/2018 11:28 PM    LDL, calculated 20 01/22/2018 11:28 PM    Triglyceride 225 01/22/2018 11:28 PM    CHOL/HDL Ratio 2.2 01/22/2018 11:28 PM     Lab Results   Component Value Date/Time    Glucose (POC) 196 01/24/2018 04:57 PM    Glucose (POC) 164 01/24/2018 10:12 AM    Glucose (POC) 275 01/24/2018 04:21 AM    Glucose (POC) 234 01/23/2018 08:03 PM    Glucose (POC) 115 01/23/2018 04:19 PM     Lab Results   Component Value Date/Time    Color Yellow 03/04/2013 09:14 AM    Appearance Cloudy 03/04/2013 09:14 AM    Specific gravity 1.015 03/10/2010 09:45 AM    pH (UA) 7.0 03/04/2013 09:14 AM    Protein NEGATIVE  03/10/2010 09:45 AM    Glucose NEGATIVE  03/10/2010 09:45 AM    Ketone Negative 03/04/2013 09:14 AM    Bilirubin Negative 03/04/2013 09:14 AM    Urobilinogen 0.2 03/10/2010 09:45 AM    Nitrites Negative 03/04/2013 09:14 AM    Leukocyte Esterase Negative 03/04/2013 09:14 AM    Bacteria Few 03/04/2013 09:14 AM    WBC 0-5 03/04/2013 09:14 AM    RBC >30 03/04/2013 09:14 AM         Medications Reviewed:     Current Facility-Administered Medications   Medication Dose Route Frequency    atorvastatin (LIPITOR) tablet 40 mg  40 mg Oral DAILY    losartan (COZAAR) tablet 50 mg  50 mg Oral DAILY    metFORMIN (GLUCOPHAGE) tablet 1,000 mg  1,000 mg Oral DAILY    triamterene-hydroCHLOROthiazide (MAXZIDE) 37.5-25 mg per tablet 1 Tab  1 Tab Oral DAILY    [START ON 1/25/2018] famotidine (PEPCID) tablet 20 mg 20 mg Oral DAILY    0.9% sodium chloride infusion  100 mL/hr IntraVENous CONTINUOUS    naloxone (NARCAN) injection 0.4 mg  0.4 mg IntraVENous PRN    acetaminophen (TYLENOL) tablet 650 mg  650 mg Oral Q4H PRN    Or    acetaminophen (TYLENOL) solution 650 mg  650 mg Per NG tube Q4H PRN    Or    acetaminophen (TYLENOL) suppository 650 mg  650 mg Rectal Q4H PRN    glucose chewable tablet 16 g  4 Tab Oral PRN    dextrose (D50W) injection syrg 12.5-25 g  12.5-25 g IntraVENous PRN    glucagon (GLUCAGEN) injection 1 mg  1 mg IntraMUSCular PRN    insulin lispro (HUMALOG) injection   SubCUTAneous Q6H    hydrALAZINE (APRESOLINE) 20 mg/mL injection 10 mg  10 mg IntraVENous Q4H PRN    sodium chloride (NS) flush 5-10 mL  5-10 mL IntraVENous Q8H    sodium chloride (NS) flush 5-10 mL  5-10 mL IntraVENous PRN    acetaminophen (TYLENOL) tablet 650 mg  650 mg Oral Q4H PRN    HYDROcodone-acetaminophen (NORCO) 5-325 mg per tablet 1 Tab  1 Tab Oral Q4H PRN    morphine injection 2 mg  2 mg IntraVENous Q4H PRN    levETIRAcetam (KEPPRA) 500 mg in 0.9% sodium chloride 100 mL IVPB  500 mg IntraVENous Q12H    ondansetron (ZOFRAN) injection 4 mg  4 mg IntraVENous Q4H PRN    niCARdipine (CARDENE) 25 mg in 0.9% sodium chloride 250 mL infusion  5-15 mg/hr IntraVENous TITRATE     ______________________________________________________________________  EXPECTED LENGTH OF STAY: 2d 4h  ACTUAL LENGTH OF STAY:          1                 Gerardo Najera MD

## 2018-01-24 NOTE — PROGRESS NOTES
Problem: Dysphagia (Adult)  Goal: *Acute Goals and Plan of Care (Insert Text)  Speech therapy goals  Initiated 1/24/2018   1. Patient will participate in re-assessment of swallow function within 7 days   2. Patient will participate in formal language/motor speech assessment within 7 days   Speech LAnguage Pathology bedside swallow evaluation  Patient: Gabriela Blood (63 y.o. male)  Date: 1/24/2018  Primary Diagnosis: SDH (subdural hematoma) (HCC)  Chronic Subdural Hematoma  Procedure(s) (LRB):  CRANIOTOMY, LEFT FOR EVACUATION OF SUBDURAL HEMATOMA (Left) 1 Day Post-Op   Precautions: aspiration, fall        ASSESSMENT :  Based on the objective data described below, the patient presents with mod/severe oropharyngeal dysphagia. Oral dysphagia characterized by significant oral motor weakness with delayed and discoordinated lingual movements with poor bolus manipulation and control and significantly delayed posterior propulsion. Delayed swallow initiation with verbal and tactile cues including tongue base massage required to initiate a swallow. Weak hyolaryngeal elevation/excursion. Patient at high risk for aspiration at this time related to oropharyngeal weakness as well as restrictions on elevating head of bed. Patient will benefit from skilled intervention to address the above impairments. Patients rehabilitation potential is considered to be Fair  Factors which may influence rehabilitation potential include:   []            None noted  [x]            Mental ability/status  [x]            Medical condition  []            Home/family situation and support systems  [x]            Safety awareness  []            Pain tolerance/management  []            Other:      PLAN :  Recommendations and Planned Interventions:  --recommend NPO with alternative route of meds. Given significant oropharyngeal weakness and incoordination, PO trials are not appropriate in this patient with head of bed restricted to 30 degrees. Will await clearance for head of bed to be elevated prior to re-assessment of swallow due to aspiration risks. --will follow for swallow as well as formal motor speech/language evaluation as alertness/mental status improve. Frequency/Duration: Patient will be followed by speech-language pathology 3 times a week to address goals. Discharge Recommendations: To Be Determined     SUBJECTIVE:   Patient intermittently alert and drowsy. Family present bedside and educated on aspiration risks and recommendations. They verbalized understanding and agreement with POC.     OBJECTIVE:     Past Medical History:   Diagnosis Date    Arthritis     neck,chronic    Bladder cancer ([address] 3/13    bladder    Chronic pain     NECK    Colon cancer ([address] 3/16    COLON    Diabetes ([address]     GERD (gastroesophageal reflux disease) 2008    at times, NOT continous    Hypercholesterolemia     Hypertension     Kidney stones 1969    SEVERAL     Nephrolithiasis     Prostate cancer ([address] 9/2003    prostate cancer - radiation    PUD (peptic ulcer disease) 2008    Thromboembolus ([address] 2/8/16    2 RIGHT LEG, 3 IN LUNGS (STARTED ON XARELTO IN ER, 1 WEEK LATER WAS IN ER WITH INTERNAL BLEEDING)    Transient ischemic attack 2005    TIA - no deficits 2005 AND 2015    Unspecified sleep apnea     does not use CPAP/uses nasal strips     Past Surgical History:   Procedure Laterality Date    COLONOSCOPY N/A 3/6/2017    COLONOSCOPY [facility] by Skyler [facility]ccord [facility] at [facility]  2008    HX CERVICAL FUSION  2002    HX CYST REMOVAL      cyst removed from back, shoulder and scalp    HX HEENT  2006    sinus surgery    HX HEENT      STAS CATARACTS    HX LUMBAR LAMINECTOMY  2002    HX OTHER SURGICAL      nose surgery    HX OTHER SURGICAL  2008    LUMPS -BENIGN, REMOVED FROM SHOULDER, HEAD AND BACK    HX OTHER SURGICAL  3/2016    FILTER FOR BLOOD CLOTS    HX TONSILLECTOMY      HX UROLOGICAL  1969    for kidney stones     Prior Level of Function/Home Situation:      Diet prior to admission: regular   Current Diet:  NPO    Cognitive and Communication Status:  Neurologic State: Alert, Drowsy, Confused  Orientation Level: Unable to verbalize (did not respond to questions )  Cognition: Decreased attention/concentration, Decreased command following        Safety/Judgement: Not assessed  Oral Assessment:  Oral Assessment  Labial: Decreased rate;Decreased seal (open at rest, difficulty to maintain closed position)  Dentition: Natural  Oral Hygiene: dry mucosa due to mouth breathing  Lingual: Other (comment); Incoordinated;Decreased strength;Decreased rate (tongue bunched in back of mouth and contacting palate)  Velum: Unable to visualize  Mandible: No impairment  P.O. Trials:  Patient Position: upright at 30 degrees per neurosurgery clearance  Vocal quality prior to P.O.: Other (comment) (nonverbal)  Consistency Presented: Ice chips  How Presented: SLP-fed/presented;Spoon     Bolus Acceptance: No impairment  Bolus Formation/Control: Impaired  Type of Impairment: Poor (poor manipulation)  Propulsion: Delayed (# of seconds); Discoordination (significant effort to propel )  Oral Residue: None  Initiation of Swallow: Delayed (# of seconds) (repeated verbal and tactile cues to swallow)  Laryngeal Elevation: Decreased;Weak  Aspiration Signs/Symptoms: None (high risk related to deficits and HOB restrictions)  Pharyngeal Phase Characteristics:  (delayed initiation, reduced strength )  Effective Modifications: Other (comment) (tongue base massage to initiate swallow)  Cues for Modifications: Maximal       Oral Phase Severity: Moderate-severe  Pharyngeal Phase Severity : Moderate-severe      G Codes: In compliance with CMSs Claims Based Outcome Reporting, the following G-code set was chosen for this patient based the use of the NOMS functional outcome to quantify this patient's level of swallowing impairment.     Using the NOMS, the patient was determined to be at level 1 for swallow function which correlates with the CN= 100% level of severity. Based on the objective assessment provided within this note, the current, goal, and discharge g-codes are as follows:    Swallow  Swallowing:   Swallow Current Status CN= 100%   Swallow Goal Status CK= 40-59%      NOMS Swallowing Levels:  Level 1 (CN): NPO  Level 2 (CM): NPO but takes consistency in therapy  Level 3 (CL): Takes less than 50% of nutrition p.o. and continues with nonoral feedings; and/or safe with mod cues; and/or max diet restriction  Level 4 (CK): Safe swallow but needs mod cues; and/or mod diet restriction; and/or still requires some nonoral feeding/supplements  Level 5 (CJ): Safe swallow with min diet restriction; and/or needs min cues  Level 6 (CI): Independent with p.o.; rare cues; usually self cues; may need to avoid some foods or needs extra time  Level 7 ([address] [place] [address]): Independent for all p.o.  DIANA. (2003). National Outcomes Measurement System (NOMS): Adult Speech-Language Pathology User's Guide. Pain:  Pain Scale 1: Adult Nonverbal Pain Scale  Pain Intensity 1: 7     After treatment:   []            Patient left in no apparent distress sitting up in chair  [x]            Patient left in no apparent distress in bed  [x]            Call bell left within reach  [x]            Nursing notified  [x]            Caregiver present  []            Bed alarm activated    COMMUNICATION/EDUCATION:   The patients plan of care including recommendations, planned interventions, and recommended diet changes were discussed with: Registered Nurse. Patient was educated regarding His deficit(s) of dysphagia as this relates to His diagnosis of SDH. He demonstrated Guarded understanding as evidenced by limited response to education. [x]            Patient/family have participated as able in goal setting and plan of care.   [x]            Patient/family agree to work toward stated goals and plan of care. []            Patient understands intent and goals of therapy, but is neutral about his/her participation. []            Patient is unable to participate in goal setting and plan of care. Thank you for this referral.  Stepan Lloyd M.CD.  CCC-SLP   Time Calculation: 13 mins

## 2018-01-24 NOTE — PROGRESS NOTES
Opens eyes to voice, JACK well  Incision clean dry intact  Follows some commands  Continue ICU care, stable, may need SNF post hospitalization

## 2018-01-24 NOTE — PROGRESS NOTES
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 He apparently had a fall about 2 months ago and was seen at [address] [place] [address] with a reportedly negative head CT. He developed a 2 day history of word finding difficulties and presented to the ER at [facility]. Found to have a large left chronic SDH with membranes and midline shift. He was taken to the OR by Dr. Danni Be yesterday evening for evacuation of the hemorrhage. Approximately 300 cc of blood was removed from the subdural space. Patient's head of bed remained flat overnight. He is a little impulsive trying to get out of bed. He did not pass a bedside STAND last night. Will have the nurse try again since his [facility] can now be raised. Objective:     Vital signs  Temp (24hrs), Av °F (36.7 °C), Min:97.4 °F (36.3 °C), Max:98.6 °F (37 °C)    0701 -  1900  In: 1250 [I.V.:1250]  Out: 275 [Urine:275]   190 -  0700  In: 1721.7 [I.V.:1721.7]  Out: 6458 [Urine:2375; Drains:200]    Visit Vitals    BP (!) 129/91    Pulse 83    Temp 98 °F (36.7 °C)    Resp 16    Ht 6' (1.829 m)    Wt 93.7 kg (206 lb 9.1 oz)    SpO2 100%    BMI 28.02 kg/m2    O2 Flow Rate (L/min): 2 l/min O2 Device: 02 face tent     Pain control  Pain Assessment  Pain Scale 1: Adult Nonverbal Pain Scale  Pain Intensity 1: 7  Pain Intervention(s) 1: Medication (see MAR)    PT/OT  Gait                 Physical Exam:  Gen:NAD. Neuro: A&Ox3. Follows commands. Speech dysartrhic. Affect flat. PERRL. EOMI. Face symmetric. Tongue midline. JACK. Strength 5/5 in UE and LE BL. Negative drift. Gait deferred. Skin: Scalp dressing C/D/I.     KORY drain 200 cc of sanguineous drainage overnight.     CT head without contrast on 01/24/18 shows status post left craniotomy with partial evacuation of left subdural hematoma. There is significant residual subdural hematoma as well as new left posterior parafalcine hematoma, new intraventricular hemorrhage, new left parietal parenchymal hemorrhage and bilateral parietal subarachnoid hemorrhage, greater on the left than on the right. 24 hour results:    Recent Results (from the past 24 hour(s))   GLUCOSE, POC    Collection Time: 01/23/18  4:19 PM   Result Value Ref Range    Glucose (POC) 115 (H) 65 - 100 mg/dL    Performed by Dyana Hayward    GLUCOSE, POC    Collection Time: 01/23/18  8:03 PM   Result Value Ref Range    Glucose (POC) 234 (H) 65 - 100 mg/dL    Performed by [address], BASIC    Collection Time: 01/24/18  4:19 AM   Result Value Ref Range    Sodium 142 136 - 145 mmol/L    Potassium 4.2 3.5 - 5.1 mmol/L    Chloride 107 97 - 108 mmol/L    CO2 24 21 - 32 mmol/L    Anion gap 11 5 - 15 mmol/L    Glucose 261 (H) 65 - 100 mg/dL    BUN 34 (H) 6 - 20 MG/DL    Creatinine 2.10 (H) 0.70 - 1.30 MG/DL    BUN/Creatinine ratio 16 12 - 20      GFR est AA 37 (L) >60 ml/min/1.73m2    GFR est non-AA 30 (L) >60 ml/min/1.73m2    Calcium 8.4 (L) 8.5 - 10.1 MG/DL   CBC WITH AUTOMATED DIFF    Collection Time: 01/24/18  4:19 AM   Result Value Ref Range    WBC 10.1 4.1 - 11.1 K/uL    RBC 3.83 (L) 4.10 - 5.70 M/uL    HGB 11.3 (L) 12.1 - 17.0 g/dL    HCT 34.8 (L) 36.6 - 50.3 %    MCV 90.9 80.0 - 99.0 FL    MCH 29.5 26.0 - 34.0 PG    MCHC 32.5 30.0 - 36.5 g/dL    RDW 14.0 11.5 - 14.5 %    PLATELET 704 125 - 901 K/uL    NEUTROPHILS 89 (H) 32 - 75 %    LYMPHOCYTES 7 (L) 12 - 49 %    MONOCYTES 4 (L) 5 - 13 %    EOSINOPHILS 0 0 - 7 %    BASOPHILS 0 0 - 1 %    ABS. NEUTROPHILS 9.0 (H) 1.8 - 8.0 K/UL    ABS. LYMPHOCYTES 0.7 (L) 0.8 - 3.5 K/UL    ABS. MONOCYTES 0.4 0.0 - 1.0 K/UL    ABS. EOSINOPHILS 0.0 0.0 - 0.4 K/UL    ABS.  BASOPHILS 0.0 0.0 - 0.1 K/UL    DF SMEAR SCANNED PLATELET COMMENTS LARGE PLATELETS      RBC COMMENTS ANISOCYTOSIS  1+        RBC COMMENTS OVALOCYTES  PRESENT       GLUCOSE, POC    Collection Time: 01/24/18  4:21 AM   Result Value Ref Range    Glucose (POC) 275 (H) 65 - 100 mg/dL    Performed by Melissa Osorio, POC    Collection Time: 01/24/18 10:12 AM   Result Value Ref Range    Glucose (POC) 164 (H) 65 - 100 mg/dL    Performed by Radu Terry           Assessment:     Principal Problem:    SDH (subdural hematoma) ([address] (1/23/2018)        Plan:   1. Left traumatic chronic subdural hematoma   - s/p crani 01/24   - Ok to raise HOB to 30 degrees today. Do not get out of bed yet. Will hold on PT/OT evals for now   - Cont Keppra   - Cont KORY drain in SD space for now   - Keep in ICU today due to KORY drain   - Speech eval for swallow and dysarthria  2. Brain compression   - due to #1   - plans as above  3. HTN   - SBP<160   - Cardene PRN   - Restart home BP meds when able to take PO   - Intensivist following  4. Diabetes mellitus, type 2   - Cont MEtofrmin   - SSI and accu-checks  5.  ABIMBOLA on CKD, stage 3   - Cont IVF   - Avoid nephrotoxic agents   - Hospitalist/intensivist following    Activity: bedrest  DVT ppx: SCDs  Dispo: tbd    Plan d/w Dr. Juan Pablo Earl, ICU nurse, family at bedside      Larry Andino NP

## 2018-01-24 NOTE — PROGRESS NOTES
TRANSFER - IN REPORT:    Verbal report received from Migel RN (name) on Cher Cosme  being received from PACU (unit) for routine post - op      Report consisted of patients Situation, Background, Assessment and   Recommendations(SBAR). Information from the following report(s) SBAR, Kardex, Procedure Summary, Intake/Output, MAR and Recent Results was reviewed with the receiving nurse. Opportunity for questions and clarification was provided. Assessment completed upon patients arrival to unit and care assumed.

## 2018-01-24 NOTE — PROGRESS NOTES
Reviewed the post op CT which looks better than pre op but will leave drain in place for residual fluid there  Continue ICU care, discussed with nursing , he sounds like he is at his pre op status JACK but speech slow  May be looking at SNF post op, discussed that with family yesterday about that

## 2018-01-24 NOTE — ANESTHESIA POSTPROCEDURE EVALUATION
Post-Anesthesia Evaluation and Assessment    Patient: Cher Cosme MRN: [id number]  SSN: [id number]    YOB: 1935  Age: 81 y.o. Sex: male       Cardiovascular Function/Vital Signs  Visit Vitals    /59    Pulse 82    Temp 36.7 °C (98 °F)    Resp 19    Ht 6' (1.829 m)    Wt 96.6 kg (213 lb)    SpO2 92%    BMI 28.89 kg/m2       Patient is status post general anesthesia for Procedure(s):  CRANIOTOMY, LEFT FOR EVACUATION OF SUBDURAL HEMATOMA. Nausea/Vomiting: None    Postoperative hydration reviewed and adequate. Pain:  Pain Scale 1:  (see note) (01/23/18 2020)  Pain Intensity 1: 0 (01/23/18 1939)   Managed    Neurological Status:   Neuro (WDL): Exceptions to WDL (01/23/18 1939)  Neuro  Neurologic State: Eyes open spontaneously (01/23/18 2020)  Orientation Level: Oriented to person (01/23/18 2020)  Cognition:  (restrained for safety pt agiated getting oob) (01/23/18 2020)  Speech: Delayed responses (01/23/18 1939)  Assessment L Pupil: Brisk (01/23/18 2020)  Size L Pupil (mm): 1 (01/23/18 2020)  Assessment R Pupil: Brisk (01/23/18 2020)  Size R Pupil (mm): 1 (01/23/18 2020)  LUE Motor Response: Spontaneous  (01/23/18 2020)  LLE Motor Response: Spontaneous  (01/23/18 2020)  RUE Motor Response: Spontaneous  (01/23/18 2020)  RLE Motor Response: Spontaneous  (01/23/18 2020)   At baseline    Mental Status and Level of Consciousness: Arousable    Pulmonary Status:   O2 Device: Nasal cannula (01/23/18 2020)   Adequate oxygenation and airway patent    Complications related to anesthesia: None    Post-anesthesia assessment completed.  No concerns    Signed By: Leonidas Saxena MD     January 23, 2018

## 2018-01-24 NOTE — PROGRESS NOTES
Physical Therapy  1/24/18    Orders acknowledged, chart reviewed. Spoke with RN who reports pt currently on bedrest. Will hold and follow-up tomorrow. Petrona Moreno, PT, DPT

## 2018-01-24 NOTE — OP NOTES
[address]  ACUTE CARE OP NOTE    Alejandra Damon  MR#: [id number]  : 1935  ACCOUNT #: [de-identified]   DATE OF SERVICE: 2018    PREOPERATIVE DIAGNOSIS:  Subacute and chronic subdural hematoma. POSTOPERATIVE DIAGNOSIS:  Subacute and chronic subdural hematoma. PROCEDURE PERFORMED:  Left frontotemporoparietal craniotomy evacuation, subacute, and chronic subdural hematoma. SURGEON:  Dinora Gutiérrez MD    ASSISTANT:  [place] FLOWESR    COMPLICATIONS:  None. ESTIMATED BLOOD LOSS:  Minimal.    ANESTHESIA:  General.    SPECIMENS REMOVED:  None. IMPLANTS:  None. OPERATIVE PROCEDURE:  This patient had a large chronic and subacute subdural hematoma over the left convexity with a change in mental status despite the fact that he had been on Eliquis and aspirin, platelet function assay appeared normal and he had been off Eliquis for more than 24 hours, so it was felt safe to proceed, particularly in light of the fact that the patient was having progressive neurologic deficits as a result of the brain compression from the subdural hematoma. After discussing risks, benefits and alternatives with the patient's family, who agreed to have him proceed with surgery, the patient was not really capable of making that decision, he was taken to the operating room where he was induced under general endotracheal anesthesia and placed on the operating room table in the supine position with the head resting on a circular headrest donut. The left frontotemporoparietal region was then shaved, scrubbed, prepped and draped in the usual sterile fashion. After a timeout was performed to identify the correct patient and procedure and appropriate antibiotics administered preoperatively and sequential stockings applied for DVT prophylaxis. A curvilinear incision in a lazy S shape was made in the left frontotemporal parietal region.   Self-retaining retractors were inserted into the edges of the incision. A free bone flap was elevated using the [employer] drill. The dura was densely adherent to the overlying bone, but it was able to be . Bleeding was actually easily controlled with bipolar electrocautery on the surface of the dura. The dura was opened in a cruciate fashion and immediately subacute and chronic blood emanated from within the subdural space corresponding to the imaging studies. We measured at least 300 mL of blood in the subdural space that was evacuated. The brain did not appear to reexpand to occupy the subdural space, so a drain was placed and brought out through a separate stab incision through one of the bur holes after irrigating the subdural space copiously to ensure that there was no evidence of any active bleeding. A gross total removal of the clot was achieved. The bone flap was replaced after suturing the dura loosely over the drain. The bone flap was secured to the surrounding bone with miniplates from the [facility] system. The galea was closed with 2-0 interrupted inverted Vicryl sutures. Staples were applied to the skin edges. The needle, sponge and instrument counts were correct at the end of the procedure.       MD Rehana Owens / Damian  D: 01/23/2018 19:16     T: 01/23/2018 19:35  JOB #: [id number]

## 2018-01-24 NOTE — DIABETES MGMT
DTC Progress Note    Recommendations/ Comments: Review for elevated POC glucose and fasting BS this am.  Pt has required 10 units of correction insulin in past 24 hours. Will continue to watch BS for a trend. Current hospital DM medication: lispro insulin correction scale; metformin 1000 mg bid    Chart reviewed on Gerhardt Helena. Patient is a 81 y.o. male with known  Type 2 Diabetes on oral agent (monotherapy): metformin 1000 mg bid (generic) at home. A1c:   Lab Results   Component Value Date/Time    Hemoglobin A1c 6.4 01/23/2018 10:43 AM    Hemoglobin A1c 6.9 06/17/2016 02:24 AM       Recent Glucose Results:   Lab Results   Component Value Date/Time     (H) 01/24/2018 04:19 AM    GLUCPOC 164 (H) 01/24/2018 10:12 AM    GLUCPOC 275 (H) 01/24/2018 04:21 AM    GLUCPOC 234 (H) 01/23/2018 08:03 PM        Lab Results   Component Value Date/Time    Creatinine 2.10 01/24/2018 04:19 AM     Estimated Creatinine Clearance: 32.8 mL/min (based on Cr of 2.1). Active Orders   There are no active orders of the following type(s): Diet. PO intake: No data found. Will continue to follow as needed.     Thank you

## 2018-01-24 NOTE — PERIOP NOTES
Patient: Bob Head MRN: [id number]  SSN: [id number]   YOB: 1935  Age: 81 y.o. Sex: male     Patient is status post Procedure(s):  CRANIOTOMY, LEFT FOR EVACUATION OF SUBDURAL HEMATOMA. Surgeon(s) and Role:     * Mame Mason MD - Primary    Local/Dose/Irrigation:  See [facility]                  Peripheral IV 01/22/18 Left Antecubital (Active)   Site Assessment Clean, dry, & intact 1/23/2018 12:00 PM   Phlebitis Assessment 0 1/23/2018 12:00 PM   Infiltration Assessment 0 1/23/2018 12:00 PM   Dressing Status Clean, dry, & intact 1/23/2018 12:00 PM   Dressing Type Tape;Transparent 1/23/2018 12:00 PM   Hub Color/Line Status Pink; Infusing 1/23/2018 12:00 PM   Action Taken Open ports on tubing capped 1/23/2018 12:00 PM   Alcohol Cap Used Yes 1/23/2018 12:00 PM       Peripheral IV 01/23/18 Right Hand (Active)   Site Assessment Clean, dry, & intact 1/23/2018  4:16 PM   Phlebitis Assessment 0 1/23/2018  4:16 PM   Infiltration Assessment 0 1/23/2018  4:16 PM   Dressing Status Clean, dry, & intact 1/23/2018  4:16 PM   Dressing Type Transparent 1/23/2018  4:16 PM   Hub Color/Line Status Green 1/23/2018  4:16 PM   Alcohol Cap Used Yes 1/23/2018  4:16 PM      Arterial Line 01/23/18 Right Radial artery (Active)   Site Assessment Clean, dry, & intact 1/23/2018  4:49 PM   Dressing Status Clean, dry, & intact 1/23/2018  4:49 PM   Dressing Type Disk with Chlorhexadine gluconate (CHG); Transparent 1/23/2018  4:49 PM   Line Status Intact and in place 1/23/2018  4:49 PM   Treatment Arm board on 1/23/2018  4:49 PM   Affected Extremity/Extremities Color distal to insertion site pink (or appropriate for race) 1/23/2018  4:49 PM        Collins-Alaniz Drain 01/23/18 Left Head (Active)      Airway - Endotracheal Tube 01/23/18 Oral (Active)                   Dressing/Packing:  Wound Head Left-DRESSING TYPE: 4 x 4;Non-adherent;Special tape (comment) (Hypafix ) (01/23/18 [id number])

## 2018-01-24 NOTE — PROGRESS NOTES
2320 Bedside and Verbal shift change report given to Karen Wright (oncoming nurse) by Jacob Vargas (offgoing nurse). Report included the following information SBAR, Kardex, Intake/Output, Recent Results, Cardiac Rhythm NSR with PVC and PAC and Alarm Parameters . [address] Primary Nurse Amy Jarvis, RN and Thiago Preston RN performed a dual skin assessment on this patient No impairment noted only post craniotomy. Nicola score is 15    2330 Pt arrived on floor via monitor and bed. Neuro assessment pt confused open eyes to verbal stimuli, PERRL 3MM, no command following, face symmetry even, JACK evenly, speech delayed with incomprehensible words. Called and informed Dr. Kalee Alanis of the following. No new orders at this time. 0730 Bedside and Verbal shift change report given to [facility] (oncoming nurse) by Karen Wright (offgoing nurse). Report included the following information SBAR, Kardex, ED Summary, Intake/Output and Cardiac Rhythm NSR.

## 2018-01-25 NOTE — PROGRESS NOTES
Patient with significant bradycardia to 30-40  - May be simple Afib with slower rate and abberancy   B Blocker held   Checking TSH and enzymes and lytes  ABG no acidosis  Checking CT  Followed by Andrey Angela --> Will have RN discuss with them

## 2018-01-25 NOTE — PROGRESS NOTES
[facility]    INTERVAL HISTORY / SUBJECTIVE:  Hospital Day: 4     1/25/2018    Reason For Consult:   Sarath Hanson is a 81 y.o. WHITE OR  male  has a past medical history of Arthritis; Bladder cancer ([address] (3/13); Chronic pain; Colon cancer ([address] (3/16); Diabetes ([address] GERD (gastroesophageal reflux disease) (2008); Hypercholesterolemia; Hypertension; Kidney stones (1969); Nephrolithiasis; Prostate cancer ([address] (9/2003); PUD (peptic ulcer disease) (2008); Thromboembolus ([address] (2/8/16); Transient ischemic attack (2005); and Unspecified sleep apnea. He also has no past medical history of Unspecified adverse effect of anesthesia. is admitted on 1/22/2018 by Dennis Vizcaino MD for whom we were asked see in consultation for General ICU Care. Chief complaint of continuous moderate neurologic symptoms over several days. Course has been gradually worsening and is exacerbated by subdural hematoma but relieved by surgery. ROS:  Unable to obtain due to patient factors : delirium      ICU 16  1/25  Seen and examined earlier today. Post op encephalopathy continues. BP is elevated and intermittent a fib was noted. Electrolytes are being replaced and low dose lopressor started which may need to be increased further. Best to avoid benzos and narcotics and agitation worsens, haldol or precedex are options    1/24  9:25 AM    Patient is an 71-year-old gentleman status post craniotomy for left sided subdural hematoma. Currently requiring ICU care for blood pressure control. Continue current ICU care. Laboratory data suggests elevated blood sugars. Insulin regimen will be adjusted. CK D. is noted creatinine 2.1.. Son updated at bedside. Patient is remaining the ICU for routine ICU care, control blood pressure. Clinically has sleep apnea.     ASSESSMENT and PLAN:    · Subdural hematoma-postop care per surgeon  · Hypertension-adjust medications  · Clinical sleep disordered breathing-monitor closely  · CK D-adjust fluids  · Elevated blood sugars-adjust medications and sliding scale insulin  · Post op encephalopathy/delirium- avoid benzos and narcotics  · Hyperchloremic hypernatremia- saline  · Continue ICU care  · Aspiration precautions  · LEUNG prophylaxis and SCDs  · Acutely ill, at risk for decline with ongoing encephalopathy  · D/W RN    Hospital Problems  Date Reviewed: 2018          Codes Class Noted POA    * (Principal)SDH (subdural hematoma) ([facility] [address] ICD-10-CM: I62.00  ICD-9-CM: 432.1  2018 Unknown              Medical Decision Making Today  · I have reviewed the flowsheet and previous days notes  · Abrupt change in neurologic status  · Review and Order of Radiology tests  · Review and Order of Medicine tests  · Discuss case with Specialist MD  · Independent visualization of Image  · I have personally reviewed the patients ECG / Tele       VITALS    Visit Vitals    /66    Pulse 89    Temp 98.8 °F (37.1 °C)    Resp 20    Ht 6' (1.829 m)    Wt 93.7 kg (206 lb 9.1 oz)    SpO2 99%    BMI 28.02 kg/m2        Temp (24hrs), Av.5 °F (37.5 °C), Min:98.8 °F (37.1 °C), Max:100 °F (37.8 °C)                   Intake/Output Summary (Last 24 hours) at 18 1141  Last data filed at 18 1008   Gross per 24 hour   Intake             2730 ml   Output              770 ml   Net             1960 ml         Other:      GEN: Nontoxic Nondistressed   EYE: Anicteric Noninjected   ENT: Moist No Thrush   CARD: Regular No murmurs   RESP: Clear Equal BS   GI: NABS Nontender   : Clear Urine Normal Genitalia   SKEL: obese No Clubbing   SKIN: Perfused No drug rash   NEURO: confused delirious   PSYCH: agitated Poor insight   LYMPH: No JOLANTA No edema       Recent Labs      18   0427   NA  148*   K  4.5   CL  113*   CO2  25   BUN  28*   CREA  1.61*   GLU  195*   CA  8.4*   MG  1.4*   PHOS  2.5*   TBILI  0.8   ALT  14   SGOT  21   AP  50   TP  6.3*   ALB  3.1*   GLOB  3.2 Recent Labs      01/25/18   0427   01/23/18   0042   WBC  9.3   < >   --    HGB  10.7*   < >   --    PLT  144*   < >   --    INR   --    --   1.0   APTT   --    --   28.6    < > = values in this interval not displayed. No results for input(s): PHI, PCO2I, PO2I, HCO3I, FIO2I in the last 72 hours. XRAY Result:    Results from Hospital Encounter encounter on 01/22/18   XR CHEST PORT   Narrative PORTABLE CHEST RADIOGRAPH/S: 1/25/2018 5:59 AM    INDICATION: Dyspnea. Hypercholesterolemia, hypertension, sleep apnea, peptic  ulcer disease, GERD, diabetes, prostate carcinoma, bladder carcinoma, colon  carcinoma, history of DVT. COMPARISON: 3/15/2016, 2/8/2016, 12/15/2005. TECHNIQUE: Portable frontal semiupright radiograph/s of the chest.    FINDINGS:   The lungs are slightly hypoinflated, and there is mild atelectasis and pulmonary  vascular crowding. The central airways are patent. No pneumothorax or pleural  effusion. Impression IMPRESSION:   No acute disease in the chest.         CT Result:    Results from Hospital Encounter encounter on 01/22/18   CT HEAD WO CONT   Narrative EXAM:  CT HEAD WO CONT    INDICATION:   SDH    COMPARISON: CT 1/24/2018. CONTRAST:  None. TECHNIQUE: Unenhanced CT of the head was performed using 5 mm images. Brain and  bone windows were generated. CT dose reduction was achieved through use of a  standardized protocol tailored for this examination and automatic exposure  control for dose modulation. FINDINGS:  There is a left subdural drain in stable position along the left convexity with  no change in mixed density left subdural collection with nondependent  pneumocephalus. Left parietal parenchymal hemorrhage is unchanged measuring 1.3  x 2.3 cm. Small right subdural hemorrhage is unchanged as well. Bilateral  parietal subarachnoid hemorrhage is not significantly changed.  Left posterior  parafalcine subdural hemorrhage is unchanged measuring up to 7 mm in thickness. Small posterior dependent intraventricular hemorrhage in the right and left  posterior horns redemonstrated. There is no evident acute infarct with stable  mild periventricular white matter hypodensity. The basilar cisterns are open. The bone windows demonstrate no new abnormalities. The visualized portions of  the paranasal sinuses and mastoid air cells are clear. Impression IMPRESSION: Post surgical changes of left craniotomy with no series change in  residual subdural hematoma, left posterior parietal parafalcine hematoma,  intraventricular hemorrhage, left parietal parenchymal hemorrhage, and bilateral  parietal subarachnoid hemorrhage. PMH:  has a past medical history of Arthritis; Bladder cancer ([address] (3/13); Chronic pain; Colon cancer ([address] (3/16); Diabetes ([address] GERD (gastroesophageal reflux disease) (2008); Hypercholesterolemia; Hypertension; Kidney stones (1969); Nephrolithiasis; Prostate cancer ([address] (9/2003); PUD (peptic ulcer disease) (2008); Thromboembolus ([address] (2/8/16); Transient ischemic attack (2005); and Unspecified sleep apnea. He also has no past medical history of Unspecified adverse effect of anesthesia. PSH:   has a past surgical history that includes hx lumbar laminectomy (2002); hx cervical fusion (2002); endoscopy, colon, diagnostic (2008); hx cyst removal; hx urological (1969); hx other surgical; hx other surgical (2008); hx other surgical (3/2016); hx heent (2006); hx tonsillectomy; hx heent; and colonoscopy (N/A, 3/6/2017). FHX: family history includes Cancer in his brother; Heart Attack in his brother; Heart Disease in his brother and father; Lung Disease in his father. There is no history of Anesth Problems. SHX: reports that he quit smoking about 19 years ago. He has a 36.00 pack-year smoking history.  He has never used smokeless tobacco. He reports that he drinks about 3.5 oz of alcohol per week  He reports that he does not use illicit drugs.    ALL:   Allergies   Allergen Reactions    Penicillins Rash    Metoprolol Rash    Tramadol Other (comments)     Unsure of reaction.     Lodine [Etodolac] Vertigo        MEDS:   [x] Reviewed  [] Not reviewed    Current Facility-Administered Medications   Medication Dose Route Frequency    metoprolol (LOPRESSOR) injection 2.5 mg  2.5 mg IntraVENous Q6H    0.45% sodium chloride infusion  100 mL/hr IntraVENous CONTINUOUS    famotidine (PF) (PEPCID) 20 mg in sodium chloride 0.9 % 10 mL injection  20 mg IntraVENous DAILY    atorvastatin (LIPITOR) tablet 40 mg  40 mg Oral DAILY    losartan (COZAAR) tablet 50 mg  50 mg Oral DAILY    metFORMIN (GLUCOPHAGE) tablet 1,000 mg  1,000 mg Oral DAILY    triamterene-hydroCHLOROthiazide (MAXZIDE) 37.5-25 mg per tablet 1 Tab  1 Tab Oral DAILY    naloxone (NARCAN) injection 0.4 mg  0.4 mg IntraVENous PRN    acetaminophen (TYLENOL) tablet 650 mg  650 mg Oral Q4H PRN    Or    acetaminophen (TYLENOL) solution 650 mg  650 mg Per NG tube Q4H PRN    Or    acetaminophen (TYLENOL) suppository 650 mg  650 mg Rectal Q4H PRN    glucose chewable tablet 16 g  4 Tab Oral PRN    dextrose (D50W) injection syrg 12.5-25 g  12.5-25 g IntraVENous PRN    glucagon (GLUCAGEN) injection 1 mg  1 mg IntraMUSCular PRN    insulin lispro (HUMALOG) injection   SubCUTAneous Q6H    hydrALAZINE (APRESOLINE) 20 mg/mL injection 10 mg  10 mg IntraVENous Q4H PRN    sodium chloride (NS) flush 5-10 mL  5-10 mL IntraVENous Q8H    sodium chloride (NS) flush 5-10 mL  5-10 mL IntraVENous PRN    acetaminophen (TYLENOL) tablet 650 mg  650 mg Oral Q4H PRN    HYDROcodone-acetaminophen (NORCO) 5-325 mg per tablet 1 Tab  1 Tab Oral Q4H PRN    morphine injection 2 mg  2 mg IntraVENous Q4H PRN    levETIRAcetam (KEPPRA) 500 mg in 0.9% sodium chloride 100 mL IVPB  500 mg IntraVENous Q12H    ondansetron (ZOFRAN) injection 4 mg  4 mg IntraVENous Q4H PRN       Branden Mcleod MD [facility]

## 2018-01-25 NOTE — DIABETES MGMT
DTC Progress Note    Recommendations/ Comments: Chart reviewed due to hyperglycemia. Note pt is on TF. Pt's blood sugars >180 and pt has required 8 units over the last 24 hours. If appropriate, please consider starting Lantus 10 units. Current hospital DM medication: lispro insulin correction scale; metformin 1000 mg bid    Chart reviewed on [facility] Freed. Patient is a 81 y.o. male with known  Type 2 Diabetes on oral agent (monotherapy): metformin 1000 mg bid (generic) at home. A1c:   Lab Results   Component Value Date/Time    Hemoglobin A1c 6.4 01/23/2018 10:43 AM    Hemoglobin A1c 6.9 06/17/2016 02:24 AM       Recent Glucose Results:   Lab Results   Component Value Date/Time     (H) 01/25/2018 04:27 AM    GLUCPOC 175 (H) 01/25/2018 10:15 AM    GLUCPOC 188 (H) 01/25/2018 02:51 AM    GLUCPOC 191 (H) 01/24/2018 09:07 PM        Lab Results   Component Value Date/Time    Creatinine 1.61 01/25/2018 04:27 AM     Estimated Creatinine Clearance: 42.8 mL/min (based on Cr of 1.61). Active Orders   Diet    DIET NPO With Tube Feedings        PO intake: No data found. Will continue to follow as needed.     Thank you  Darrian Gay RD, CDE

## 2018-01-25 NOTE — PROGRESS NOTES
Hospitalist Progress Note                               [address] [place]                                     Answering service: [phone]                               OR [phone] from in house phone                               Cell: [phone]              Date of Service:  2018  NAME:  Alison Uriostegui  :  1935  MRN:  [id number]      Admission Summary:   Alison Uriostegui is a 81 y.o. male with past medical history of arthritis, bladder cancer, prostate cancer, chronic neck pain, colon cancer, type 2 diabetes mellitus, GERD, hypertension, hyperlipidemia, kidney stones, PUD, DVT, PE, TIA, sleep apnea presented to the ED from home with reported inability to talk. Patient is a non-historian. As such, history was obtained per my review of ED and medical records. Per these collective reports, patient had onset of dysarthria and expressive aphasia starting approximately two days ago. Interval history / Subjective:      Ms Wagner Braxton is awake,delirious. No family present. Assessment & Plan:     Left moderate to large SDH with mass effect and midline shift. -S/p crani and evacuation   -Follow up CT head with residue al hematoma, and new hemorrhages  -Neurosurgery managing   -Being sent to CT for rhyth changes to make sure no acute intra cranial are evolving   Acquired coagulopathy due to Eliquis. Eliquis stopped due to above. Hypertension. On Cardene drip. Type 2 diabetes mellitus: accucheks,humalog sliding scale.  -Target blood sugar 140-180   -Stop metformin,creatinine is elevated and start Lantus     Hyperlipidemia. CKD III with worsening of creatinine: Iv fluids. Monitor. Renal dose adjust medications,avoid nephrotoxins. Diet:NPO  Code status: Full  DVT prophylaxis: SCD  Care Plan discussed with: RN    Discharge planning/disposition:Needs rehab on discharge.     Hospital Problems  Date Reviewed: 2018          Codes Class Noted POA    * (Principal)SDH (subdural hematoma) ([facility]) ICD-10-CM: I62.00  ICD-9-CM: 432.1  1/23/2018 Unknown                Review of Systems:   Pertinent items are noted in HPI. Physical Examination:      Last 24hrs VS reviewed since prior progress note. Most recent are:  Visit Vitals    /64    Pulse 78    Temp 99.1 °F (37.3 °C)    Resp 22    Ht 6' (1.829 m)    Wt 93.7 kg (206 lb 9.1 oz)    SpO2 100%    BMI 28.02 kg/m2           Constitutional:  delirious   Eyes: Non icteric,non pallor,no erythema,discharge,PERRLA   HENT:  Head bandaged,drain in place. Oral mucous moist, oropharynx benign. Resp:  CTA bilaterally. No wheezing/rhonchi/rales. No accessory muscle use   CV:  Regular rhythm, normal rate, no murmurs, gallops, rubs    GI:  Soft, non distended, non tender.  normoactive bowel sounds, no hepatosplenomegaly    :  No CVA or suprapubic tenderness   Skin  :  No erythema,rash,bullae,dipigmentation     Musculoskeletal:  SCDs on both legs ;no edema, warm, 2+ pulses throughout    Neurologic:  delirious             Intake/Output Summary (Last 24 hours) at 01/25/18 1802  Last data filed at 01/25/18 1600   Gross per 24 hour   Intake          2533.33 ml   Output              515 ml   Net          2018.33 ml          Data Review:    Review and/or order of clinical lab test  Review and/or order of tests in the radiology section of CPT  Review and/or order of tests in the medicine section of CPT      Labs:     Recent Labs      01/25/18   0427  01/24/18   0419   WBC  9.3  10.1   HGB  10.7*  11.3*   HCT  35.1*  34.8*   PLT  144*  189     Recent Labs      01/25/18   1701  01/25/18   0427  01/24/18   0419   NA  148*  148*  142   K  3.9  4.5  4.2   CL  113*  113*  107   CO2  28 25  24   BUN  26*  28*  34*   CREA  1.55*  1.61*  2.10*   GLU  210*  195*  261*   CA  8.7  8.4*  8.4*   MG  1.8  1.4*   --    PHOS  2.1*  2.5*   --      Recent Labs      01/25/18   0427  01/22/18   2328   SGOT  21  14*   ALT  14  20 AP  50  65   TBILI  0.8  0.5   TP  6.3*  7.1   ALB  3.1*  3.5   GLOB  3.2  3.6     Recent Labs      01/23/18   0042  01/22/18   2327   INR  1.0  1.0   PTP  10.6  10.4   APTT  28.6  25.8      No results for input(s): FE, TIBC, PSAT, FERR in the last 72 hours. No results found for: FOL, RBCF   No results for input(s): PH, PCO2, PO2 in the last 72 hours.   Recent Labs      01/25/18   1701   CPK  215   CKNDX  1.1   TROIQ  0.05*     Lab Results   Component Value Date/Time    Cholesterol, total 118 01/22/2018 11:28 PM    HDL Cholesterol 53 01/22/2018 11:28 PM    LDL, calculated 20 01/22/2018 11:28 PM    Triglyceride 225 01/22/2018 11:28 PM    CHOL/HDL Ratio 2.2 01/22/2018 11:28 PM     Lab Results   Component Value Date/Time    Glucose (POC) 210 01/25/2018 05:33 PM    Glucose (POC) 175 01/25/2018 10:15 AM    Glucose (POC) 188 01/25/2018 02:51 AM    Glucose (POC) 191 01/24/2018 09:07 PM    Glucose (POC) 196 01/24/2018 04:57 PM     Lab Results   Component Value Date/Time    Color Yellow 03/04/2013 09:14 AM    Appearance Cloudy 03/04/2013 09:14 AM    Specific gravity 1.015 03/10/2010 09:45 AM    pH (UA) 7.0 03/04/2013 09:14 AM    Protein NEGATIVE  03/10/2010 09:45 AM    Glucose NEGATIVE  03/10/2010 09:45 AM    Ketone Negative 03/04/2013 09:14 AM    Bilirubin Negative 03/04/2013 09:14 AM    Urobilinogen 0.2 03/10/2010 09:45 AM    Nitrites Negative 03/04/2013 09:14 AM    Leukocyte Esterase Negative 03/04/2013 09:14 AM    Bacteria Few 03/04/2013 09:14 AM    WBC 0-5 03/04/2013 09:14 AM    RBC >30 03/04/2013 09:14 AM         Medications Reviewed:     Current Facility-Administered Medications   Medication Dose Route Frequency    0.45% sodium chloride infusion  100 mL/hr IntraVENous CONTINUOUS    famotidine (PF) (PEPCID) 20 mg in sodium chloride 0.9 % 10 mL injection  20 mg IntraVENous DAILY    [START ON 1/26/2018] triamterene-hydroCHLOROthiazide (MAXZIDE) 37.5-25 mg per tablet 1 Tab  1 Tab Per NG tube DAILY    [START ON 1/26/2018] atorvastatin (LIPITOR) tablet 40 mg  40 mg Per NG tube DAILY    losartan (COZAAR) tablet 50 mg  50 mg Per NG tube DAILY    metFORMIN (GLUCOPHAGE) tablet 1,000 mg  1,000 mg Oral DAILY    naloxone (NARCAN) injection 0.4 mg  0.4 mg IntraVENous PRN    acetaminophen (TYLENOL) tablet 650 mg  650 mg Oral Q4H PRN    Or    acetaminophen (TYLENOL) solution 650 mg  650 mg Per NG tube Q4H PRN    Or    acetaminophen (TYLENOL) suppository 650 mg  650 mg Rectal Q4H PRN    glucose chewable tablet 16 g  4 Tab Oral PRN    dextrose (D50W) injection syrg 12.5-25 g  12.5-25 g IntraVENous PRN    glucagon (GLUCAGEN) injection 1 mg  1 mg IntraMUSCular PRN    insulin lispro (HUMALOG) injection   SubCUTAneous Q6H    hydrALAZINE (APRESOLINE) 20 mg/mL injection 10 mg  10 mg IntraVENous Q4H PRN    sodium chloride (NS) flush 5-10 mL  5-10 mL IntraVENous Q8H    sodium chloride (NS) flush 5-10 mL  5-10 mL IntraVENous PRN    acetaminophen (TYLENOL) tablet 650 mg  650 mg Oral Q4H PRN    HYDROcodone-acetaminophen (NORCO) 5-325 mg per tablet 1 Tab  1 Tab Oral Q4H PRN    morphine injection 2 mg  2 mg IntraVENous Q4H PRN    levETIRAcetam (KEPPRA) 500 mg in 0.9% sodium chloride 100 mL IVPB  500 mg IntraVENous Q12H    ondansetron (ZOFRAN) injection 4 mg  4 mg IntraVENous Q4H PRN     ______________________________________________________________________  EXPECTED LENGTH OF STAY: 2d 4h  ACTUAL LENGTH OF STAY:          2                 [address] MD

## 2018-01-25 NOTE — PROGRESS NOTES
NUTRITION COMPLETE ASSESSMENT    RECOMMENDATIONS:   1. Initiate tube feeds with:   - Osmolite 1.5 @ 25ml/hr advanced 15ml/hr q8hr to goal rate of 55ml/hr + 1 pkt prosource BID + 135ml flush q4hr     2. Continue to monitor BG with insulin adjustment as needed   - tube feeds provide total of 270g CHO    3. Family asking about \"oral lubricant\" - would artifial saliva be appropraite  4. Daily weights      Interventions/Plan:   Food/Nutrient Delivery:          Initiate enteral nutrition    Assessment:   Reason for Assessment: [x] Provider Consult - tube feeds    Diet: NPO  Supplements: none  Nutritionally Significant Medications: [x] Reviewed & Includes: pepcid, SSI, mag sulfate, metformin, 1/2NS @ 100ml/hr; PRN: zofran    Subjective:  Family at bedside confirm wt loss but not it was intentional. \"He's been working at it. ... Is there something we can get him for dry mouth. \"    Objective:  Pt admitted for SDH. PMHx: DM, HTN, prostate CA, colon CA, CVA, hyperlipidemia, CKD3, chronic SDH. S/p left craniotomy with evacuation of SDH on 1/24. Post-op encephalopathy noted. Hypomagnesemia and hypophosphatemia noted. DTC following. Seen by SLP for past 2 days with recommendation to remain NPO with decline in mental status overnight. Recommend start of: Osmolite 1.5 @  25ml/hr advanced 15ml/hr q8hr to goal rate of 55ml/hr + 1 pkt prosource BID + 135ml flush q4hr. Provides: 1320ml, 2100kcal, 113g protein, 270g CHO, 1005ml free fluid + 180ml w/ prosource + 810ml flush = 1995ml fluid. Meets 100% energy and protein needs. Severe wt loss of 5% x 3 weeks noted - but was intentional per family at bedside. Wt Readings from Last 10 Encounters:   01/25/18 93.7 kg (206 lb 9.1 oz)   12/29/17 98.2 kg (216 lb 6.4 oz)   03/06/17 91.6 kg (202 lb)   09/27/16 93.4 kg (206 lb)     Will continue to follow fro TF advancement/tolerance, wt trends, swallow fx, and BG.   Estimated Nutrition Needs:   Kcals/day: 2005 [id number]  Protein: 112 g (1.2g/kg)  Fluid: 2005 ml (1ml/kcal)  Based On: Phoebe Larkin (x 1.2-1.3)  Weight Used: Actual wt (93.7kg)    Pt expected to meet estimated nutrient needs:  []   Yes     [x]  No (without enteral feeds) [] Unable to predict at this time  Nutrition Diagnosis:   1. Inadequate oral intake related to swallowing difficulty 2/2 AMS as evidenced by NPO; plans for tube feeds    Goals:     EN to meet at least 90% needs until oral intake meeting 60% needs     Monitoring & Evaluation:    - Enteral/parenteral nutrition intake   - Weight/weight change (swallow fx)     Previous Nutrition Goals Met:   N/A  Previous Recommendations:    N/A    Education & Discharge Needs:   [x] None Identified   [] Identified and addressed    [] Participated in care plan, discharge planning, and/or interdisciplinary rounds        Cultural, Jewish and ethnic food preferences identified: None    Skin Integrity: [x]Intact  []Other  Edema: [x]None []Other  Last BM: 1/23  Food Allergies: [x]None []Other  Diet Restrictions: Cultural/Jewish Preference(s): None     Anthropometrics:    Weight Loss Metrics 1/25/2018 12/29/2017 3/6/2017 9/27/2016 8/1/2016 7/28/2016 6/18/2016   Today's Wt 206 lb 9.1 oz 216 lb 6.4 oz 202 lb 206 lb 208 lb 200 lb 203 lb 14.8 oz   BMI 28.02 kg/m2 29.35 kg/m2 27.4 kg/m2 27.94 kg/m2 28.2 kg/m2 27.12 kg/m2 27.65 kg/m2      Weight Source: Bed  Height: 6' (182.9 cm),    Body mass index is 28.02 kg/(m^2).   IBW : 80.7 kg (178 lb), % IBW (Calculated): 116.05 %  Usual Body Weight: 98 kg (216 lb),      Labs:    Lab Results   Component Value Date/Time    Sodium 148 01/25/2018 04:27 AM    Potassium 4.5 01/25/2018 04:27 AM    Chloride 113 01/25/2018 04:27 AM    CO2 25 01/25/2018 04:27 AM    Glucose 195 01/25/2018 04:27 AM    BUN 28 01/25/2018 04:27 AM    Creatinine 1.61 01/25/2018 04:27 AM    Calcium 8.4 01/25/2018 04:27 AM    Magnesium 1.4 01/25/2018 04:27 AM    Phosphorus 2.5 01/25/2018 04:27 AM Albumin 3.1 01/25/2018 04:27 AM     Lab Results   Component Value Date/Time    Hemoglobin A1c 6.4 01/23/2018 10:43 AM       Ilene Brown RD

## 2018-01-25 NOTE — PROGRESS NOTES
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 He apparently had a fall about 2 months ago and was seen at [address] [place] [address] with a reportedly negative head CT. He developed a 2 day history of word finding difficulties and presented to the ER at [facility]. Found to have a large left chronic SDH with membranes and midline shift. He was taken to the OR by Dr. Anneliese Jimenez yesterday evening for evacuation of the hemorrhage. Approximately 300 cc of blood was removed from the subdural space. Subjective: The patient continues to be agitated and restless. He did go into a.fib last night. His electrolytes were corrected. His KORY drain continued to put out about 170 cc total yesterday and sanguineous drainage. He continues to throw his legs over the side of the bed. Objective:     Vital signs  Temp (24hrs), Av.7 °F (37.6 °C), Min:99.3 °F (37.4 °C), Max:100 °F (37.8 °C)       1901 -  0700  In: 2675 [I.V.:4845]  Out: 7491 [Urine:1950; Drains:370]    Visit Vitals    /66    Pulse 88    Temp 99.7 °F (37.6 °C)    Resp (!) 31    Ht 6' (1.829 m)    Wt 93.7 kg (206 lb 9.1 oz)    SpO2 100%    BMI 28.02 kg/m2    O2 Flow Rate (L/min): 2 l/min O2 Device: 02 face tent     Pain control  Pain Assessment  Pain Scale 1: Adult Nonverbal Pain Scale  Pain Intensity 1: 0  Pain Intervention(s) 1: Medication (see MAR)    PT/OT  Gait                 Physical Exam:  Gen:NAD. Neuro: Awake. Eyes open. Oriented at times x3. Follows commands intermittently. Speech dysartrhic. Affect flat. Right neglect  PERRL. EOMI. Flattening of right nasolabial fold. Tongue midline. JACK spontaneously. L>R  Gait deferred.   Skin: Scalp dressing C/D/I.     KORY drain 170 cc of sanguineous drainage in 24 hours. CT head without contrast on 01/25/18 shows post surgical changes of left craniotomy with no series change in residual subdural hematoma, left posterior parietal parafalcine hematoma, intraventricular hemorrhage, left parietal parenchymal hemorrhage, and bilateral parietal subarachnoid hemorrhage. 24 hour results:    Recent Results (from the past 24 hour(s))   GLUCOSE, POC    Collection Time: 01/24/18 10:12 AM   Result Value Ref Range    Glucose (POC) 164 (H) 65 - 100 mg/dL    Performed by [address] [place] [address] [facility], POC    Collection Time: 01/24/18  4:57 PM   Result Value Ref Range    Glucose (POC) 196 (H) 65 - 100 mg/dL    Performed by [address] [place] [address] [facility], POC    Collection Time: 01/24/18  9:07 PM   Result Value Ref Range    Glucose (POC) 191 (H) 65 - 100 mg/dL    Performed by Priti Almanza    GLUCOSE, POC    Collection Time: 01/25/18  2:51 AM   Result Value Ref Range    Glucose (POC) 188 (H) 65 - 100 mg/dL    Performed by Priti Almanza    CBC WITH AUTOMATED DIFF    Collection Time: 01/25/18  4:27 AM   Result Value Ref Range    WBC 9.3 4.1 - 11.1 K/uL    RBC 3.67 (L) 4.10 - 5.70 M/uL    HGB 10.7 (L) 12.1 - 17.0 g/dL    HCT 35.1 (L) 36.6 - 50.3 %    MCV 95.6 80.0 - 99.0 FL    MCH 29.2 26.0 - 34.0 PG    MCHC 30.5 30.0 - 36.5 g/dL    RDW 14.2 11.5 - 14.5 %    PLATELET 607 (L) 714 - 400 K/uL    NEUTROPHILS 83 (H) 32 - 75 %    LYMPHOCYTES 6 (L) 12 - 49 %    MONOCYTES 11 5 - 13 %    EOSINOPHILS 0 0 - 7 %    BASOPHILS 0 0 - 1 %    ABS. NEUTROPHILS 7.7 1.8 - 8.0 K/UL    ABS. LYMPHOCYTES 0.5 (L) 0.8 - 3.5 K/UL    ABS. MONOCYTES 1.0 0.0 - 1.0 K/UL    ABS. EOSINOPHILS 0.0 0.0 - 0.4 K/UL    ABS.  BASOPHILS 0.0 0.0 - 0.1 K/UL   METABOLIC PANEL, COMPREHENSIVE    Collection Time: 01/25/18  4:27 AM   Result Value Ref Range    Sodium 148 (H) 136 - 145 mmol/L    Potassium 4.5 3.5 - 5.1 mmol/L    Chloride 113 (H) 97 - 108 mmol/L    CO2 25 21 - 32 mmol/L    Anion gap 10 5 - 15 mmol/L Glucose 195 (H) 65 - 100 mg/dL    BUN 28 (H) 6 - 20 MG/DL    Creatinine 1.61 (H) 0.70 - 1.30 MG/DL    BUN/Creatinine ratio 17 12 - 20      GFR est AA 50 (L) >60 ml/min/1.73m2    GFR est non-AA 41 (L) >60 ml/min/1.73m2    Calcium 8.4 (L) 8.5 - 10.1 MG/DL    Bilirubin, total 0.8 0.2 - 1.0 MG/DL    ALT (SGPT) 14 12 - 78 U/L    AST (SGOT) 21 15 - 37 U/L    Alk. phosphatase 50 45 - 117 U/L    Protein, total 6.3 (L) 6.4 - 8.2 g/dL    Albumin 3.1 (L) 3.5 - 5.0 g/dL    Globulin 3.2 2.0 - 4.0 g/dL    A-G Ratio 1.0 (L) 1.1 - 2.2     MAGNESIUM    Collection Time: 01/25/18  4:27 AM   Result Value Ref Range    Magnesium 1.4 (L) 1.6 - 2.4 mg/dL   PHOSPHORUS    Collection Time: 01/25/18  4:27 AM   Result Value Ref Range    Phosphorus 2.5 (L) 2.6 - 4.7 MG/DL   ECG RHYTHM ANALYSIS ADULT    Collection Time: 01/25/18  6:36 AM   Result Value Ref Range    Ventricular Rate 88 BPM    Atrial Rate 468 BPM    QRS Duration 126 ms    Q-T Interval 364 ms    QTC Calculation (Bezet) 440 ms    Calculated R Axis -41 degrees    Calculated T Axis 105 degrees    Diagnosis       Atrial fibrillation  Left axis deviation  Nonspecific intraventricular block  T wave abnormality, consider lateral ischemia or digitalis effect  When compared with ECG of 22-JAN-2018 22:32,  Atrial fibrillation has replaced Sinus rhythm  QRS duration has increased  ST now depressed in Lateral leads  T wave inversion now evident in Lateral leads  Confirmed by Davin Christie MD, Jonn Bliss ([id number]) on 1/25/2018 9:28:24 AM            Assessment:     Principal Problem:    SDH (subdural hematoma) ([address] (1/23/2018)        Plan:   1. Left traumatic chronic subdural hematoma   - s/p crani 01/24   - Ok to get OOB. [facility] for PT/OT to work with patient   - [address] in [address] space for now   - Keep in ICU today due to KORY drain   - Speech eval for swallow and dysarthria as able   - Repeat head CT stable  2. Brain compression   - due to #1   - plans as above  3.  HTN   - SBP<160   - Cardene PRN   - Hydralazine PRN   - Restart home BP meds when able to take PO   - Intensivist following  4. Diabetes mellitus, type 2   - Cont Metofrmin   - SSI and accu-checks  5. ABIMBOLA on CKD, stage 3   - Cont IVF   - Avoid nephrotoxic agents   - Hospitalist/intensivist following  6. Acute encephalopathy   - multi-factorial     - limit sedating agents   - supportive care  7. Hypernatremia   - Change IVF from 0.9% to 0.45%NS   - Check labs in am  8. Atrial fibrillation   - Metoprolol added   - Electrolytes corrected   - Hospitalist/intensivist following  9.  Hypomagenesemia   - Magnesium replaced IV   - Check labs in am   - Intensivist following    Activity: OOB with assist  DVT ppx: SCDs  Dispo: tbd    Plan d/w Dr. Preet Clarke, ICU nurse      Anjali Woodward NP

## 2018-01-25 NOTE — PROGRESS NOTES
Problem: Dysphagia (Adult)  Goal: *Acute Goals and Plan of Care (Insert Text)  Speech therapy goals  Initiated 1/24/2018   1. Patient will participate in re-assessment of swallow function within 7 days   2. Patient will participate in formal language/motor speech assessment within 7 days    Speech language pathology dysphagia treatment  Patient: Haleigh Alvarez (52 y.o. male)  Date: 1/25/2018  Diagnosis: SDH (subdural hematoma) (HCC)  Chronic Subdural Hematoma SDH (subdural hematoma) (HCC)  Procedure(s) (LRB):  CRANIOTOMY, LEFT FOR EVACUATION OF SUBDURAL HEMATOMA (Left) 2 Days Post-Op  Precautions: aspiration, fall       ASSESSMENT:  Patient with decline in mental status today with minimal command following. Patient accepted ice chip from spoon with max tactile and verbal cues, however, absent manipulation and propulsion with ice chip remaining in anterior oral cavity and needing to be removed from mouth. Unable to assess pharyngeal swallow due to oral phase deficits. Level of alertness with only brief periods of alertness further limits participation. Progression toward goals:  []         Improving appropriately and progressing toward goals  [x]         Improving slowly and progressing toward goals  []         Not making progress toward goals and plan of care will be adjusted     PLAN:  Recommendations and Planned Interventions:  --continued NPO. Would consider alternative means of nutrition   Patient continues to benefit from skilled intervention to address the above impairments. Continue treatment per established plan of care. Discharge Recommendations: To Be Determined     SUBJECTIVE:   Patient stated I don't know.  When asked who the woman was in the room (his girlfriend)    OBJECTIVE:   Cognitive and Communication Status:  Neurologic State: Alert, Drowsy, Confused  Orientation Level: Unable to verbalize  Cognition: Decreased attention/concentration, No command following        Safety/Judgement: Not assessed  Dysphagia Treatment:  Oral Assessment:  Oral Assessment  Labial: Decreased rate;Decreased seal (did not follow commands today but poor movement noted)  P.O. Trials:  Patient Position: upright in bed; cleared for 90 degrees per NP  Vocal quality prior to P.O.: Low volume  Consistency Presented: Ice chips  How Presented: SLP-fed/presented;Spoon     Bolus Acceptance: Impaired (verbal cues to accept)  Bolus Formation/Control: Impaired  Type of Impairment:  (absent manipulation, removed from mouth )  Propulsion: Absent                            Oral Phase Severity: Severe  Pharyngeal Phase Severity :  (unable to assess )  Exercises:  Laryngeal Exercises:                                                                                                                                   Pain:  Pain Scale 1: Adult Nonverbal Pain Scale  Pain Intensity 1: 0     After treatment:   []              Patient left in no apparent distress sitting up in chair  [x]              Patient left in no apparent distress in bed  [x]              Call bell left within reach  [x]              Nursing notified  [x]              Caregiver present  []              Bed alarm activated    COMMUNICATION/EDUCATION:   Patient was educated regarding His deficit(s) of dysphagia as this relates to His diagnosis of SDH. He demonstrated Poor understanding as evidenced by no response to education. The patients plan of care including recommendations, planned interventions, and recommended diet changes were discussed with: Registered Nurse and NP. Blaise Nation M.CD.  CCC-SLP   Time Calculation: 9 mins

## 2018-01-25 NOTE — PROGRESS NOTES
Shift Summary- Patient has remained neurologically the same overnight. Pupils brisk and reactive, JACK spontaneously and purposefully with decreased command following. Speech incomprehensible/slurred with expressive aphasia. Has been unrelentlessly restless throughout the night with small periods of drowsiness, constantly sliding himself down in the bed and sticking his legs out of the bed. Has been incontinent of urine overnight with incontinent briefs on--does not keep condom cath on. At one point patient had gotten left wrist restraint off and pulled dressing off of head. Re-dressed with 4x4's and special tape. L- head KORY put out approximately 75 of serosanguinous drainage overnight. Taken to CT this AM. Cardene drip off throughout shift. Stat 12 lead ekg this AM--patient converted to controlled afib. Saba paged with results--no new orders received. HOB @ 30 throughout shift. PRN morphine given x1. Bedside and Verbal shift change report given to Shae Kelsey RN (oncoming nurse) by Gillian Lopez RN (offgoing nurse). Report included the following information SBAR, Kardex, Intake/Output, MAR, Recent Results, Med Rec Status and Cardiac Rhythm Controlled AFib.

## 2018-01-25 NOTE — PROGRESS NOTES
Problem: Falls - Risk of  Goal: *Absence of Falls  Document Arleen Fall Risk and appropriate interventions in the flowsheet. Outcome: Progressing Towards Goal  Fall Risk Interventions:       Mentation Interventions: Bed/chair exit alarm, Adequate sleep, hydration, pain control, Door open when patient unattended, More frequent rounding, Reorient patient, Room close to nurse's station, Toileting rounds, Update white board    Medication Interventions: Bed/chair exit alarm, Evaluate medications/consider consulting pharmacy    Elimination Interventions: Bed/chair exit alarm, Call light in reach, Patient to call for help with toileting needs, Toileting schedule/hourly rounds    History of Falls Interventions: Bed/chair exit alarm, Door open when patient unattended, Evaluate medications/consider consulting pharmacy, Investigate reason for fall, Room close to nurse's station        Problem: Craniotomy:Post Op Day 1  Goal: *Progress independence mobility/activities (eg: Mobility precautions)  Outcome: Progressing Towards Goal  Advanced HOB to 30 degrees, PT/OT on hold for today. Goal: *Optimal pain control at patient's stated goal  Outcome: Progressing Towards Goal  PRN Morphine given. Goal: *Hemodynamically stable  Outcome: Progressing Towards Goal  Cardene infusing to keep SBP <160. Goal: *Tolerating diet  Outcome: Not Progressing Towards Goal  Seen by Speech Therapy today, remains NPO.

## 2018-01-25 NOTE — PROGRESS NOTES
Problem: Self Care Deficits Care Plan (Adult)  Goal: *Acute Goals and Plan of Care (Insert Text)  Occupational Therapy Goals  Initiated 1/25/2018  1. Patient will complete grooming from chair level with mod A within 7 days. 2.  Patient will sit EOB unsupported with CG for 10 minutes in preparation for ADL activity within 7 days. 3.  Patient will complete Upper body bathing with mod A from chair within 7 days. 4.  Patient will complete upper body dressing with mod A within 7 days. 5.  Patient will complete UE exercises with supervision within 7 days. 6.  Patient will participate with Adalid Barlow assessment within 7 days. Occupational Therapy EVALUATION  Patient: Jesus Briggs (89 y.o. male)  Date: 1/25/2018  Primary Diagnosis: SDH (subdural hematoma) (HCC)  Chronic Subdural Hematoma  Procedure(s) (LRB):  CRANIOTOMY, LEFT FOR EVACUATION OF SUBDURAL HEMATOMA (Left) 2 Days Post-Op   Precautions:   Fall (SBP <160)    ASSESSMENT :  Based on the objective data described below, the patient presents with decreased independence with self care and functional mobility following admission for SDH with craniotomy and evacuation. Pt alert intermittently but anticipate alertness decreased due to difficulty hearing as well. Attempted to put hearing aids in but noted hearing aids not humming and anticipate batteries were dead. Pt was able to come to sitting EOB with max A x 2 persons. Pt able to maintain sitting balance from CG to max A at times. Pt noted with leaning heavily to the right side. Recommend discharge to rehab once medically stable. Recommend bed in chair position TID to begin to normalize his daily activities. Encourage patient to participate in self care activities during nursing care (i.e. Washing face and hands). Patient will benefit from skilled intervention to address the above impairments.   Patients rehabilitation potential is considered to be Good  Factors which may influence rehabilitation potential include:   []             None noted  []             Mental ability/status  [x]             Medical condition  []             Home/family situation and support systems  []             Safety awareness  []             Pain tolerance/management  []             Other:      PLAN :  Recommendations and Planned Interventions:  [x]               Self Care Training                  [x]        Therapeutic Activities  [x]               Functional Mobility Training    []        Cognitive Retraining  [x]               Therapeutic Exercises           [x]        Endurance Activities  [x]               Balance Training                   []        Neuromuscular Re-Education  []               Visual/Perceptual Training     [x]   Home Safety Training  [x]               Patient Education                 [x]        Family Training/Education  []               Other (comment):    Frequency/Duration: Patient will be followed by occupational therapy 5 times a week to address goals. Discharge Recommendations: Rehab  Further Equipment Recommendations for Discharge: TBD     SUBJECTIVE:   Patient stated yes.     OBJECTIVE DATA SUMMARY:   HISTORY:   Past Medical History:   Diagnosis Date    Arthritis     neck,chronic    Bladder cancer ([facility] [address] 3/13    bladder    Chronic pain     NECK    Colon cancer ([facility] [address] 3/16    COLON    Diabetes ([facility] [address]     GERD (gastroesophageal reflux disease) 2008    at times, NOT continous    Hypercholesterolemia     Hypertension     Kidney stones 1969    SEVERAL     Nephrolithiasis     Prostate cancer ([address] 9/2003    prostate cancer - radiation    PUD (peptic ulcer disease) 2008    Thromboembolus ([facility] [address] 2/8/16    2 RIGHT LEG, 3 IN LUNGS (STARTED ON XARELTO IN ER, 1 WEEK LATER WAS IN ER WITH INTERNAL BLEEDING)    Transient ischemic attack 2005    TIA - no deficits 2005 AND 2015    Unspecified sleep apnea     does not use CPAP/uses nasal strips     Past Surgical History:   Procedure Laterality Date    COLONOSCOPY N/A 3/6/2017    COLONOSCOPY performed by Luiza Mauricio MD at [facility]  2008    HX CERVICAL FUSION  2002    HX CYST REMOVAL      cyst removed from back, shoulder and scalp    HX HEENT  2006    sinus surgery    HX HEENT      STAS CATARACTS    HX LUMBAR LAMINECTOMY  2002    HX OTHER SURGICAL      nose surgery    HX OTHER SURGICAL  2008    LUMPS -BENIGN, REMOVED FROM SHOULDER, HEAD AND BACK    HX OTHER SURGICAL  3/2016    FILTER FOR BLOOD CLOTS    HX TONSILLECTOMY      HX UROLOGICAL  1969    for kidney stones       Prior Level of Function/Environment/Context: pt unable to provide any history regarding PLOF and family not present. Occupations in which the patient is/was successful, what are the barriers preventing that success:   Performance Patterns (routines, roles, habits, and rituals):   Personal Interests and/or values:   Expanded or extensive additional review of patient history:     Home Situation  Home Environment: Private residence (pt unable to provide information and no family present)  [x]  Right hand dominant   []  Left hand dominant    EXAMINATION OF PERFORMANCE DEFICITS:  Cognitive/Behavioral Status:  Neurologic State: Drowsy; Eyes open to stimulus  Orientation Level: Oriented to person;Disoriented to place; Disoriented to situation;Disoriented to time (answers to name)  Cognition: Decreased command following  Perception: Appears intact  Perseveration: No perseveration noted  Safety/Judgement: Decreased insight into deficits; Decreased awareness of need for safety;Decreased awareness of need for assistance;Decreased awareness of environment    Skin: see nursing notes    Edema: none noted    Hearing: Auditory  Auditory Impairment: Hard of hearing, bilateral  Hearing Aids/Status:  At bedside (batteries were dead)    Vision/Perceptual:                           Acuity:  (unable to follow commands for assessment)         Range of Motion:    AROM: Grossly decreased, non-functional  PROM: Generally decreased, functional                      Strength:    Strength: Grossly decreased, non-functional                Coordination:  Coordination: Grossly decreased, non-functional  Fine Motor Skills-Upper: Right Impaired;Left Impaired    Gross Motor Skills-Upper: Right Impaired;Left Impaired    Tone & Sensation:    Tone: Normal  Sensation: Intact (seems intact)                      Balance:  Sitting: Impaired  Sitting - Static: Poor (constant support)  Sitting - Dynamic: Poor (constant support)  Standing:  (unable to progress with standing activities)    Functional Mobility and Transfers for ADLs:  Bed Mobility:  Rolling: Maximum assistance  Supine to Sit: Maximum assistance;Assist x2  Sit to Supine: Maximum assistance;Assist x2    Transfers:  Sit to Stand:  (unable to progress with standing)  Stand to Sit:  (unable to progress with standing)  Bed to Chair:  (unable to progress with standing)  Toilet Transfer :  (unable to progress with standing)    ADL Assessment:  Feeding: Total assistance    Oral Facial Hygiene/Grooming: Total assistance    Bathing: Total assistance    Upper Body Dressing: Total assistance    Lower Body Dressing: Total assistance    Toileting: Total assistance                ADL Intervention and task modifications:   Pt seen in conjunction with PT this date. Pt was able to progress to sitting EOB with additional time and cues. Pt did well with tasks. Cognitive Retraining  Safety/Judgement: Decreased insight into deficits; Decreased awareness of need for safety;Decreased awareness of need for assistance;Decreased awareness of environment    Functional Measure:  Barthel Index:    Bathin  Bladder: 0  Bowels: 0  Groomin  Dressin  Feedin  Mobility: 0  Stairs: 0  Toilet Use: 0  Transfer (Bed to Chair and Back): 0  Total: 0       Barthel and G-code impairment scale:  Percentage of impairment CH  0% CI  1-19% CJ  20-39% CK  40-59% CL  60-79% CM  80-99% CN  100%   Barthel Score 0-100 100 99-80 79-60 59-40 20-39 1-19   0   Barthel Score 0-20 20 17-19 13-16 9-12 5-8 1-4 0      The Barthel ADL Index: Guidelines  1. The index should be used as a record of what a patient does, not as a record of what a patient could do. 2. The main aim is to establish degree of independence from any help, physical or verbal, however minor and for whatever reason. 3. The need for supervision renders the patient not independent. 4. A patient's performance should be established using the best available evidence. Asking the patient, friends/relatives and nurses are the usual sources, but direct observation and common sense are also important. However direct testing is not needed. 5. Usually the patient's performance over the preceding 24-48 hours is important, but occasionally longer periods will be relevant. 6. Middle categories imply that the patient supplies over 50 per cent of the effort. 7. Use of aids to be independent is allowed. Angie Welsh., Barthel, D.W. (3114). Functional evaluation: the Barthel Index. [facility] (14)2. Baker Settles mary [place], J.J.M.F, Goldy Thomason., Hosea Yanez., Opal Blood, [address] [place] [address] (1999). Measuring the change indisability after inpatient rehabilitation; comparison of the responsiveness of the Barthel Index and Functional [place] Measure. Journal of Neurology, Neurosurgery, and Psychiatry, 66(4), 925-671. Tristanian Daniel, N.J.A, MILKA Baxter, & Magnus Petit M.A. (2004.) Assessment of post-stroke quality of life in cost-effectiveness studies: The usefulness of the Barthel Index and the EuroQoL-5D. Quality of Life Research, 13, 909-62     G codes: In compliance with CMSs Claims Based Outcome Reporting, the following G-code set was chosen for this patient based on their primary functional limitation being treated:     The outcome measure chosen to determine the severity of the functional limitation was the Barthel Index with a score of 0/100 which was correlated with the impairment scale. ? Self Care:     - CURRENT STATUS: CN - 100% impaired, limited or restricted    - GOAL STATUS: CL - 60%-79% impaired, limited or restricted    - D/C STATUS:  ---------------To be determined---------------     Occupational Therapy Evaluation Charge Determination   History Examination Decision-Making   MEDIUM Complexity : Expanded review of history including physical, cognitive and psychosocial  history  HIGH Complexity : 5 or more performance deficits relating to physical, cognitive , or psychosocial skils that result in activity limitations and / or participation restrictions HIGH Complexity : Patient presents with comorbidities that affect occupational performance. Signifigant modification of tasks or assistance (eg, physical or verbal) with assessment (s) is necessary to enable patient to complete evaluation       Based on the above components, the patient evaluation is determined to be of the following complexity level: MEDIUM  Pain:  Pain Scale 1: Adult Nonverbal Pain Scale  Pain Intensity 1: 0           Pain Intervention(s) 1: Medication (see MAR)  Activity Tolerance:   VSS throughout session. Please refer to the flowsheet for vital signs taken during this treatment. After treatment:   [] Patient left in no apparent distress sitting up in chair  [x] Patient left in no apparent distress in bed  [x] Call bell left within reach  [x] Nursing notified  [] Caregiver present  [] Bed alarm activated    COMMUNICATION/EDUCATION:   The patients plan of care was discussed with: Physical Therapist and Registered Nurse. [x] Home safety education was provided and the patient/caregiver indicated understanding. [x] Patient/family have participated as able in goal setting and plan of care. [] Patient/family agree to work toward stated goals and plan of care.   [] Patient understands intent and goals of therapy, but is neutral about his/her participation. [] Patient is unable to participate in goal setting and plan of care. This patients plan of care is appropriate for delegation to TIFF.     Thank you for this referral.  Ty Keene OT  Time Calculation: 20 mins

## 2018-01-25 NOTE — PROGRESS NOTES
Problem: Mobility Impaired (Adult and Pediatric)  Goal: *Acute Goals and Plan of Care (Insert Text)  Physical Therapy Goals  Initiated 1/25/2018  1. Patient will move from supine to sit and sit to supine , scoot up and down and roll side to side in bed with minimal assistance/contact guard assist within 7 day(s). 2.  Patient will transfer from bed to chair and chair to bed with minimal assistance/contact guard assist using the least restrictive device within 7 day(s). 3.  Patient will perform sit to stand with moderate assistance  within 7 day(s). 4.  Patient will ambulate with moderate assistance  for 50 feet with the least restrictive device within 7 day(s). 5.  Patient will improve Norris Balance score by 7 points within 7 days. Outcome: Progressing Towards Goal  physical Therapy EVALUATION- neuro population    Patient: Bob Head (89 y.o. male)  Date: 1/25/2018  Primary Diagnosis: SDH (subdural hematoma) (HCC)  Chronic Subdural Hematoma  Procedure(s) (LRB):  CRANIOTOMY, LEFT FOR EVACUATION OF SUBDURAL HEMATOMA (Left) 2 Days Post-Op   Precautions:Fall (SBP <160)    ASSESSMENT :  RN cleared pt for participation in PT. Co-txed with OT. Based on the objective data described below, the patient presents with generalized deconditioning and strength deficits in LUE and LLE during mobility training. Pt was hard of hearing, leading to difficulty following commands. He required max A X 2 to transfer from supine<>sit at EOB. Pt had poor static and dynamic sitting balance, presenting with a lateral lean to the R, and needed constant support to maintain an upright posture. Because of pt difficulty with command following, pt was unable to progress from sitting to standing and was returned supine to bed. Recommend inpatient rehab to maximize his safety and mobility. Plan on transferring pt OOB to chair and assessing his balance further at next session. RN aware.      Patient will benefit from skilled intervention to address the above impairments. Patients rehabilitation potential is considered to be Good  Factors which may influence rehabilitation potential include:   []           None noted  [x]           Mental ability/status  [x]           Medical condition  []           Home/family situation and support systems  [x]           Safety awareness  []           Pain tolerance/management  []           Other:      PLAN :  Recommendations and Planned Interventions:  [x]             Bed Mobility Training             [x]      Neuromuscular Re-Education  [x]             Transfer Training                   []      Orthotic/Prosthetic Training  [x]             Gait Training                         []      Modalities  [x]             Therapeutic Exercises           []      Edema Management/Control  [x]             Therapeutic Activities            [x]      Patient and Family Training/Education  []             Other (comment):  Frequency/Duration: Patient will be followed by physical therapy 5 times a week to address goals. Discharge Recommendations: Inpatient Rehab  Further Equipment Recommendations for Discharge: tbd     SUBJECTIVE:   Patient said \"yes\".     OBJECTIVE DATA SUMMARY:   HISTORY:    Past Medical History:   Diagnosis Date    Arthritis     neck,chronic    Bladder cancer ([facility] [address] 3/13    bladder    Chronic pain     NECK    Colon cancer ([facility] [address] 3/16    COLON    Diabetes ([facility] [address]     GERD (gastroesophageal reflux disease) 2008    at times, NOT continous    Hypercholesterolemia     Hypertension     Kidney stones 1969    SEVERAL     Nephrolithiasis     Prostate cancer ([address] 9/2003    prostate cancer - radiation    PUD (peptic ulcer disease) 2008    Thromboembolus ([address] 2/8/16    2 RIGHT LEG, 3 IN LUNGS (STARTED ON XARELTO IN ER, 1 WEEK LATER WAS IN ER WITH INTERNAL BLEEDING)    Transient ischemic attack 2005    TIA - no deficits 2005 AND 2015    Unspecified sleep apnea     does not use CPAP/uses nasal strips     Past Surgical History:   Procedure Laterality Date    COLONOSCOPY N/A 3/6/2017    COLON[facility]COPY performed by Lilian [facility] MD at [facility]. [address], COLON, DIAGNOSTIC  2008    HX CERVICAL FUSION  2002    HX CYST REMOVAL      cyst removed from back, shoulder and scalp    HX HEENT  2006    sinus surgery    HX HEENT      STAS CATARACTS    HX LUMBAR LAMINECTOMY  2002    HX OTHER SURGICAL      nose surgery    HX OTHER SURGICAL  2008    LUMPS -BENIGN, REMOVED FROM SHOULDER, HEAD AND BACK    HX OTHER SURGICAL  3/2016    FILTER FOR BLOOD CLOTS    HX TONSILLECTOMY      HX UROLOGICAL  1969    for kidney stones     Prior Level of Function/Home Situation: Pt was a poor historian and unable to give prior history. Personal factors and/or comorbidities impacting plan of care:     Home Situation  Home Environment: Private residence (pt unable to provide information and no family present)    EXAMINATION/PRESENTATION/DECISION MAKING:   Critical Behavior:  Neurologic State: Drowsy, Eyes open to stimulus  Orientation Level: Oriented to person, Disoriented to place, Disoriented to situation, Disoriented to time (answers to name)  Cognition: Decreased command following  Safety/Judgement: Decreased insight into deficits, Decreased awareness of need for safety, Decreased awareness of need for assistance, Decreased awareness of environment  Hearing: Auditory  Auditory Impairment: Hard of hearing, bilateral  Hearing Aids/Status:  At bedside (batteries were dead)    Range Of Motion:  AROM: Grossly decreased, non-functional           PROM: Generally decreased, functional           Strength:    Strength: Grossly decreased, non-functional                    Tone & Sensation:   Tone: Normal              Sensation: Intact (seems intact)               Coordination:  Coordination: Grossly decreased, non-functional  Vision:   Acuity:  (unable to follow commands for assessment)  Functional Mobility:  Bed Mobility:  Rolling: Maximum assistance  Supine to Sit: Maximum assistance;Assist x2  Sit to Supine: Maximum assistance;Assist x2     Transfers:  Sit to Stand:  (unable to progress with standing)  Stand to Sit:  (unable to progress with standing)        Bed to Chair:  (unable to progress with standing)              Balance:   Sitting: Impaired  Sitting - Static: Poor (constant support)  Sitting - Dynamic: Poor (constant support)  Standing:  (unable to progress with standing activities)       Functional Measure  Mann Balance Test:    Sitting to Standin  Standing Unsupported: 0  Sitting with Back Unsupported: 0  Standing to Sittin  Transfers: 0  Standing Unsupported with Eyes Closed: 0  Standing Unsupported with Feet Together: 0  Reach Forward with Outstretched Arm: 0   Object: 0  Turn to Look Over Shoulders: 0  Turn 360 Degrees: 0  Alternate Foot on Step/Stool: 0  Standing Unsupported One Foot in Front: 0  Stand on One Le  Total: 0         56=Maximum possible score;   0-20=High fall risk  21-40=Moderate fall risk   41-56=Low fall risk     Mann Balance Test and G-code impairment scale:  Percentage of Impairment CH    0%   CI    1-19% CJ    20-39% CK    40-59% CL    60-79% CM    80-99% CN     100%   Mann   Score 0-56 56 45-55 34-44 23-33 12-22 1-11 0       G codes: In compliance with CMSs Claims Based Outcome Reporting, the following G-code set was chosen for this patient based on their primary functional limitation being treated: The outcome measure chosen to determine the severity of the functional limitation was the MANN with a score of 0/56 which was correlated with the impairment scale.     ? Mobility - Walking and Moving Around:     - CURRENT STATUS: CN - 100% impaired, limited or restricted    - GOAL STATUS: CM - 80%-99% impaired, limited or restricted    - D/C STATUS:  ---------------To be determined---------------     Physical Therapy Evaluation Charge Determination   History Examination Presentation Decision-Making   HIGH Complexity :3+ comorbidities / personal factors will impact the outcome/ POC  MEDIUM Complexity : 3 Standardized tests and measures addressing body structure, function, activity limitation and / or participation in recreation  MEDIUM Complexity : Evolving with changing characteristics  HIGH Complexity : FOTO score of 1- 25       Based on the above components, the patient evaluation is determined to be of the following complexity level: MEDIUM  . Pain:  Pain Scale 1: Adult Nonverbal Pain Scale  Pain Intensity 1: 0           Pain Intervention(s) 1: Medication (see MAR)  Activity Tolerance:   VSS. Please refer to the flowsheet for vital signs taken during this treatment. After treatment:   []     Patient left in no apparent distress sitting up in chair  [x]     Patient left in no apparent distress in bed  [x]     Call bell left within reach  [x]     Nursing notified  []     Caregiver present  []     Bed alarm activated    COMMUNICATION/EDUCATION:   The patients plan of care was discussed with: Physical Therapist, Occupational Therapist and Registered Nurse. Patient was educated regarding His deficit(s) of generalized deconditioning and balance as this relates to His diagnosis of SDH. He demonstrated fair understanding as evidenced by nods. Patient and/or family was verbally educated on the BE FAST acronym for signs/symptoms of CVA and TIA. BE FAST was written on patient's communication board  for visual education and reinforcement. All questions answered with patient indicating fair understanding. [x]  Fall prevention education was provided and the patient/caregiver indicated understanding. [x]  Patient/family have participated as able in goal setting and plan of care. [x]  Patient/family agree to work toward stated goals and plan of care. []  Patient understands intent and goals of therapy, but is neutral about his/her participation.   []  Patient is unable to participate in goal setting and plan of care. Thank you for this referral.  Nanette David, SPT   Time Calculation: 20 mins      Regarding student involvement in patient care:  A student participated in this treatment session. Per CMS Medicare statements and APTA guidelines I certify that the following was true:  1. I was present and directly observed the entire session. 2. I made all skilled judgments and clinical decisions regarding care. 3. I am the practitioner responsible for assessment, treatment, and documentation.

## 2018-01-26 NOTE — PROGRESS NOTES
Hospitalist Progress Note                               Aris Perdomo MD                                     Answering service: [phone]                               OR 36 from in house phone                                             Date of Service:  2018  NAME:  Matheus Alejandre  :  1935  MRN:  [id number]      Admission Summary:   Matheus Alejandre is a 81 y.o. male with past medical history of arthritis, bladder cancer, prostate cancer, chronic neck pain, colon cancer, type 2 diabetes mellitus, GERD, hypertension, hyperlipidemia, kidney stones, PUD, DVT, PE, TIA, sleep apnea presented to the ED from home with reported inability to talk. Patient is a non-historian. As such, history was obtained per my review of ED and medical records. Per these collective reports, patient had onset of dysarthria and expressive aphasia starting approximately two days ago. Interval history / Subjective: Intubated this AM s/p bronch for AHRF      Assessment & Plan:     AHRF due to mucus plugging   Sedated on vent MGMT per PCCM on propofol    Left moderate to large SDH with mass effect and midline shift. -S/p crani and evacuation   -Follow up CT head with residue al hematoma, and new hemorrhages  -Neurosurgery managing   -Being sent to CT for rhyth changes to make sure no acute intra cranial are evolving   - Acquired coagulopathy due to Eliquis. Eliquis stopped due to above. Hypertension. On Cardene drip. no hypotension     Type 2 diabetes mellitus: accucheks,humalog sliding scale.  -Target blood sugar 140-180   -Stop metformin,creatinine is elevated and start Lantus     Hyperlipidemia. CKD III with worsening of creatinine: Iv fluids. Monitor. Renal dose adjust medications,avoid nephrotoxins. Seizure : On keppra    Elevated wbc from?   On cefepime and Flagyl        Diet:NPO  Code status: Full  DVT prophylaxis: SCD  Care Plan discussed with: RN    Discharge planning/disposition:Needs rehab on discharge. Hospital Problems  Date Reviewed: 1/23/2018          Codes Class Noted POA    * (Principal)SDH (subdural hematoma) ([facility] [address] ICD-10-CM: I62.00  ICD-9-CM: 432.1  1/23/2018 Unknown                Review of Systems:   Pertinent items are noted in HPI. Physical Examination:      Last 24hrs VS reviewed since prior progress note. Most recent are:  Visit Vitals    /62 (BP 1 Location: Left arm, BP Patient Position: At rest)    Pulse 76    Temp 98.4 °F (36.9 °C)    Resp 12    Ht 6' (1.829 m)    Wt 94.5 kg (208 lb 5.4 oz)    SpO2 100%    BMI 28.26 kg/m2           Constitutional:  sedated on vent    Eyes: Non icteric,non pallor pupil s pin point    HENT:  Head bandaged,drain in place. Oral mucous moist,    Resp:  CTA bilaterally. CV:  Regular rhythm, normal rate, no murmurs, gallops, rubs    GI:  Soft, non distended, non tender.  normoactive bowel sounds, no hepatosplenomegaly    :  No CVA or suprapubic tenderness   Skin  :  No erythema,rash,bullae,dipigmentation     Musculoskeletal:  SCDs on both legs ;no edema, warm, 2+ pulses throughout    Neurologic:  on vent              Intake/Output Summary (Last 24 hours) at 01/26/18 1657  Last data filed at 01/26/18 1600   Gross per 24 hour   Intake          4341.62 ml   Output              460 ml   Net          3881.62 ml          Data Review:    Review and/or order of clinical lab test  Review and/or order of tests in the radiology section of CPT  Review and/or order of tests in the medicine section of CPT      Labs:     Recent Labs      01/26/18   0603  01/25/18   0427   WBC  12.5*  9.3   HGB  11.7*  10.7*   HCT  36.5*  35.1*   PLT  157  144*     Recent Labs      01/26/18   0603  01/25/18   1701  01/25/18   0427   NA  148*  148*  148*   K  3.7  3.9  4.5   CL  114*  113*  113*   CO2  30  28  25   BUN  31*  26*  28*   CREA  1.66*  1.55*  1.61*   GLU  240*  210*  195*   CA  9.3  8.7  8.4*   MG  2.1  1.8 1.4*   PHOS  1.7*  2.1*  2.5*     Recent Labs      01/26/18   0603  01/25/18   0427   SGOT  27  21   ALT  21  14   AP  58  50   TBILI  0.9  0.8   TP  6.7  6.3*   ALB  3.1*  3.1*   GLOB  3.6  3.2     No results for input(s): INR, PTP, APTT in the last 72 hours. No lab exists for component: INREXT, INREXT   No results for input(s): FE, TIBC, PSAT, FERR in the last 72 hours. No results found for: FOL, RBCF   No results for input(s): PH, PCO2, PO2 in the last 72 hours.   Recent Labs      01/25/18   1701   CPK  215   CKNDX  1.1   TROIQ  0.05*     Lab Results   Component Value Date/Time    Cholesterol, total 118 01/22/2018 11:28 PM    HDL Cholesterol 53 01/22/2018 11:28 PM    LDL, calculated 20 01/22/2018 11:28 PM    Triglyceride 225 01/22/2018 11:28 PM    CHOL/HDL Ratio 2.2 01/22/2018 11:28 PM     Lab Results   Component Value Date/Time    Glucose (POC) 244 01/26/2018 11:04 AM    Glucose (POC) 221 01/26/2018 03:10 AM    Glucose (POC) 207 01/25/2018 10:22 PM    Glucose (POC) 210 01/25/2018 05:33 PM    Glucose (POC) 175 01/25/2018 10:15 AM     Lab Results   Component Value Date/Time    Color Yellow 03/04/2013 09:14 AM    Appearance Cloudy 03/04/2013 09:14 AM    Specific gravity 1.015 03/10/2010 09:45 AM    pH (UA) 7.0 03/04/2013 09:14 AM    Protein NEGATIVE  03/10/2010 09:45 AM    Glucose NEGATIVE  03/10/2010 09:45 AM    Ketone Negative 03/04/2013 09:14 AM    Bilirubin Negative 03/04/2013 09:14 AM    Urobilinogen 0.2 03/10/2010 09:45 AM    Nitrites Negative 03/04/2013 09:14 AM    Leukocyte Esterase Negative 03/04/2013 09:14 AM    Bacteria Few 03/04/2013 09:14 AM    WBC 0-5 03/04/2013 09:14 AM    RBC >30 03/04/2013 09:14 AM         Medications Reviewed:     Current Facility-Administered Medications   Medication Dose Route Frequency    levETIRAcetam (KEPPRA) 1,000 mg in 0.9% sodium chloride 100 mL IVPB  1,000 mg IntraVENous Q12H    succinylcholine (ANECTINE) injection 60 mg  60 mg IntraVENous NOW    chlorhexidine (PERIDEX) 0.12 % mouthwash 15 mL  15 mL Oral BID    polyvinyl alcohol (LIQUIFILM TEARS) 1.4 % ophthalmic solution 2 Drop  2 Drop Both Eyes Q8H    fentaNYL citrate (PF) injection  mcg   mcg IntraVENous Q1H PRN    propofol (DIPRIVAN) infusion  0-50 mcg/kg/min IntraVENous TITRATE    etomidate (AMIDATE) 2 mg/mL injection        propofol (DIPRIVAN) 10 mg/mL injection        propofol (DIPRIVAN) 10 mg/mL injection        succinylcholine (ANECTINE) 20 mg/mL injection        [START ON 1/27/2018] famotidine (PEPCID) 40 mg/5 mL (8 mg/mL) oral suspension 20 mg  20 mg Per NG tube DAILY    insulin glargine (LANTUS) injection 20 Units  20 Units SubCUTAneous QHS    insulin lispro (HUMALOG) injection   SubCUTAneous Q6H    cefepime (MAXIPIME) 2 g in 0.9% sodium chloride (MBP/ADV) 100 mL  2 g IntraVENous Q12H    metroNIDAZOLE (FLAGYL) IVPB premix 500 mg  500 mg IntraVENous Q8H    sodium chloride (NS) flush 10-30 mL  10-30 mL InterCATHeter PRN    sodium chloride (NS) flush 10 mL  10 mL InterCATHeter Q24H    sodium chloride (NS) flush 10 mL  10 mL InterCATHeter PRN    sodium chloride (NS) flush 10-40 mL  10-40 mL InterCATHeter Q8H    sodium chloride (NS) flush 20 mL  20 mL InterCATHeter Q24H    alteplase (CATHFLO) 1 mg in sterile water (preservative free) 1 mL injection  1 mg InterCATHeter PRN    0.45% sodium chloride infusion  75 mL/hr IntraVENous CONTINUOUS    atorvastatin (LIPITOR) tablet 40 mg  40 mg Per NG tube DAILY    losartan (COZAAR) tablet 50 mg  50 mg Per NG tube DAILY    naloxone (NARCAN) injection 0.4 mg  0.4 mg IntraVENous PRN    acetaminophen (TYLENOL) tablet 650 mg  650 mg Oral Q4H PRN    Or    acetaminophen (TYLENOL) solution 650 mg  650 mg Per NG tube Q4H PRN    Or    acetaminophen (TYLENOL) suppository 650 mg  650 mg Rectal Q4H PRN    glucose chewable tablet 16 g  4 Tab Oral PRN    dextrose (D50W) injection syrg 12.5-25 g  12.5-25 g IntraVENous PRN    glucagon (GLUCAGEN) injection 1 mg  1 mg IntraMUSCular PRN    hydrALAZINE (APRESOLINE) 20 mg/mL injection 10 mg  10 mg IntraVENous Q4H PRN    sodium chloride (NS) flush 5-10 mL  5-10 mL IntraVENous Q8H    sodium chloride (NS) flush 5-10 mL  5-10 mL IntraVENous PRN    acetaminophen (TYLENOL) tablet 650 mg  650 mg Oral Q4H PRN    HYDROcodone-acetaminophen (NORCO) 5-325 mg per tablet 1 Tab  1 Tab Oral Q4H PRN    ondansetron (ZOFRAN) injection 4 mg  4 mg IntraVENous Q4H PRN     ______________________________________________________________________  EXPECTED LENGTH OF STAY: 2d 4h  ACTUAL LENGTH OF STAY:          3                 Garrison Amaya MD

## 2018-01-26 NOTE — PROGRESS NOTES
Day #1 of Cefepime  Indication:  Possible pneumonia / aspiration with increasing WBC count  Current regimen:  Rx to Dose  Abx regimen: Cefepime + Flagyl  Recent Labs      18   0603  18   1701  18   0427  18   0419   WBC  12.5*   --   9.3  10.1   CREA  1.66*  1.55*  1.61*  2.10*   BUN  31*  26*  28*  34*     Est CrCl: ~40-45 ml/min  Temp (24hrs), Av.1 °F (37.3 °C), Min:98.7 °F (37.1 °C), Max:99.5 °F (37.5 °C)    Cultures: None    Plan: Will start cefepime 2 gm IV Q 12 hr per [facility] P&T Committee Protocol with respect to renal function. Pharmacy will continue to monitor patient daily and will make dosage adjustments based upon changing renal function.

## 2018-01-26 NOTE — PROGRESS NOTES
0730 Bedside shift change report given to [facility] [facility] (oncoming nurse) by Meera Faith RN (offgoing nurse). Report included the following information SBAR, Kardex, Procedure Summary, Intake/Output, MAR, Accordion and Recent Results. 0800 Assessment complete, patient alert, able to state name and [place]'s. Speech is garbled, difficult to understand. PERRL 2cm. Moves all extremities spontaneously. KORY drain serosanguinous drainage 25cc. Attempting to grab at dobhoff tube when soft restraints released - placed back on patient. /86 - d ue for AM BP meds, will give. 4705 Patient restless, constant moving about in bed. Appears in pain. Morphine 2mg IV given. 4388 Rhythmic twitching of right arm, appears to be seizure, lasting 20 seconds then stopped without intervention. Peripheral IVs in right arm appear infiltrated and leaking- both d/c. New IV access established in left arm.     0930 Patient desatting down to 60% - attempted various pulse ox on forehead, ear and finger all with good pleth. On 4L NC, placed on 02 face mask by RT @ 7L. Dr. Patricio Dear at bedside to evaluate. Tube feeds turned off.     0940 Luca NP with NSGY called - updated on seizure and patient not maintaining 02 sats. 1294 Plan to intubate patient for airway protection by Dr. Patricio Dear. 1000 Intubated by Dr. Patricio Dear #8 ETT     1020 Telephone consent obtained from daughter, Eliza Johnson, for central venous line insertion and bronchoscopy. Aware of emergent intubation and current condition. 1025 KORY drain removed by Xavier Redding NP - new dry dressing placed over crani site. 26 Dr. Patricio Dear at bedside for line insertion. 1054 X-ray at bedside for confirmation of CVL and ETT - in correct position per Dr. Patricio Dear. OK to use CVL. 1100 Tube feeding resumed via dobhoff     1200 Lindquist cath placed per order     1230 Daughter and girlfriend arrived to bedside - updated on plan of care.      1600 Re-assessment, continued twitching noted of fingers, stopped after about 10 seconds without intervention. 1635 Message out to Dennis Melton NP with NSGY regarding continued twitching of right fingers     1645 Dr. Marcy Stevenson in to round on patient- made aware of twitching. No new orders. Continue to monitor.      1700 Orders from Dennis Melton NP to start vimpat 100mg IV BID

## 2018-01-26 NOTE — PROGRESS NOTES
[facility]    INTERVAL HISTORY / SUBJECTIVE:  Hospital Day: 5     2018    Patient with GCS that necessitates airway security. Minimally responsive. Discuss with neurosurgery. Also will need central line due to poor IV access. RN notes seizure earlier today. Blood sugars elevated. Insulin adjusted. Will need adjustment of IV fluids for hypernatremia.       ASSESSMENT and PLAN:    · Subdural hematoma-postop care per surgeon  · Hypertension-adjust medications  · Clinical sleep disordered breathing-monitor closely  · CKD & Hypernatremia-adjust fluids  · Elevated blood sugars-adjust medications and sliding scale insulin  · Post op encephalopathy/delirium-Monitoring  · Hyperchloremic hypernatremia- saline Adjusted  · Continue ICU care  · Aspiration precautions  · LEUNG prophylaxis and SCDs  · Acutely ill, at risk for decline with ongoing encephalopathy  · D/W RN    Hospital Problems  Date Reviewed: 2018          Codes Class Noted POA    * (Principal)[facility] ICD-10-CM: I62.00  ICD-9-CM: 432.1  2018 Unknown              Medical Decision Making Today  · I have reviewed the flowsheet and previous days notes  · Abrupt change in neurologic status  · Review and Order of Radiology tests  · Review and Order of Medicine tests  · Discuss case with Specialist MD  · Independent visualization of Image  · I have personally reviewed the patients ECG / Tele       VITALS    Visit Vitals    /73    Pulse (!) 114    Temp 99.2 °F (37.3 °C)    Resp 17    Ht 6' (1.829 m)    Wt 94.5 kg (208 lb 5.4 oz)    SpO2 98%    BMI 28.26 kg/m2        Temp (24hrs), Av.1 °F (37.3 °C), Min:98.7 °F (37.1 °C), Max:99.5 °F (37.5 °C)                   Intake/Output Summary (Last 24 hours) at 18 1012  Last data filed at 18 0900   Gross per 24 hour   Intake             3220 ml   Output              195 ml   Net             3025 ml         Other: Minimally responsive    GEN: Nontoxic Mildly distressed   EYE: Anicteric Noninjected   ENT: Thick secretions No Thrush   CARD: Regular No murmurs   RESP: Clear Coarse BS   GI: NABS Nontender   : Clear Urine Normal Genitalia   SKEL: obese No Clubbing   SKIN: Perfused No drug rash   NEURO: GCS less than 8 Minimal response   PSYCH:  Poor insight   LYMPH: No JOLANTA No edema       Recent Labs      01/26/18   0603   NA  148*   K  3.7   CL  114*   CO2  30   BUN  31*   CREA  1.66*   GLU  240*   CA  9.3   MG  2.1   PHOS  1.7*   TBILI  0.9   ALT  21   SGOT  27   AP  58   TP  6.7   ALB  3.1*   GLOB  3.6       Recent Labs      01/26/18   0603   WBC  12.5*   HGB  11.7*   PLT  157       Recent Labs      01/25/18   1712   PHI  7.394   PCO2I  43.3   PO2I  80   HCO3I  26.5*   FIO2I  0.24       XRAY Result:    Results from [place] encounter on 01/22/18   XR CHEST PORT   Narrative EXAM:  XR CHEST PORT    INDICATION:  Enteric tube placement. COMPARISON: CT 5/18/2016. Chest radiograph 1/25/2018. TECHNIQUE: Portable AP supine chest view at 1352 hours    FINDINGS: The enteric tube terminates in the distal stomach. Impression IMPRESSION:    The enteric tube terminates in the distal stomach. INTERPRETATION PROVIDED FOR COMPLIANCE ONLY AT NO CHARGE           CT Result:    Results from Hospital Encounter encounter on 01/22/18   CT HEAD WO CONT   Narrative EXAM:  CT HEAD WO CONT    INDICATION:   Glroy Raspberry cardia ---> ? Fluid change    COMPARISON: Earlier same day. CONTRAST:  None. TECHNIQUE: Unenhanced CT of the head was performed using 5 mm images. Brain and  bone windows were generated. CT dose reduction was achieved through use of a  standardized protocol tailored for this examination and automatic exposure  control for dose modulation. FINDINGS:  Bilateral subarachnoid hemorrhage over the parietal convexities is again noted,  accompanied by left parietal intraparenchymal hemorrhage which is stable.  High  density collections in the subdural spaces are mixed with low-density fluid on  the left, stable. Extra-axial fluid over the right cerebral convexity is stable. Intraventricular hemorrhage is stable, as are lacunar infarctions in the basal  ganglia regions and thalami with microvascular ischemic change. Pneumocephalus  postoperative leak status post left craniotomy stable. Ventricular size is  stable. There is no shift of midline structures or new mass effect. Impression IMPRESSION: Stable postoperative changes since earlier same day. No new acute  change. PMH:  has a past medical history of Arthritis; Bladder cancer ([address] (3/13); Chronic pain; Colon cancer ([address] (3/16); Diabetes ([address] GERD (gastroesophageal reflux disease) (2008); Hypercholesterolemia; Hypertension; Kidney stones (1969); Nephrolithiasis; Prostate cancer ([address] (9/2003); PUD (peptic ulcer disease) (2008); Thromboembolus ([address] (2/8/16); Transient ischemic attack (2005); and Unspecified sleep apnea. He also has no past medical history of Unspecified adverse effect of anesthesia. PSH:   has a past surgical history that includes hx lumbar laminectomy (2002); hx cervical fusion (2002); endoscopy, colon, diagnostic (2008); hx cyst removal; hx urological (1969); hx other surgical; hx other surgical (2008); hx other surgical (3/2016); hx heent (2006); hx tonsillectomy; hx heent; and colonoscopy (N/A, 3/6/2017). FHX: family history includes Cancer in his brother; Heart Attack in his brother; Heart Disease in his brother and father; Lung Disease in his father. There is no history of Anesth Problems. SHX: reports that he quit smoking about 19 years ago. He has a 36.00 pack-year smoking history. He has never used smokeless tobacco. He reports that he drinks about 3.5 oz of alcohol per week  He reports that he does not use illicit drugs.     ALL:   Allergies   Allergen Reactions    Penicillins Rash    Metoprolol Rash    Tramadol Other (comments)     Unsure of reaction.     Lodine [Etodolac] Vertigo        MEDS:   [x] Reviewed  [] Not reviewed    Current Facility-Administered Medications   Medication Dose Route Frequency    potassium phosphate 30 mmol in 0.9% sodium chloride 250 mL infusion   IntraVENous ONCE    levETIRAcetam (KEPPRA) 1,000 mg in 0.9% sodium chloride 100 mL IVPB  1,000 mg IntraVENous Q12H    levETIRAcetam (KEPPRA) 500 mg in 0.9% sodium chloride 100 mL IVPB  500 mg IntraVENous ONCE    succinylcholine (ANECTINE) injection 60 mg  60 mg IntraVENous NOW    chlorhexidine (PERIDEX) 0.12 % mouthwash 15 mL  15 mL Oral BID    polyvinyl alcohol (LIQUIFILM TEARS) 1.4 % ophthalmic solution 2 Drop  2 Drop Both Eyes Q8H    fentaNYL citrate (PF) injection  mcg   mcg IntraVENous Q1H PRN    propofol (DIPRIVAN) infusion  0-50 mcg/kg/min IntraVENous TITRATE    etomidate (AMIDATE) 2 mg/mL injection        propofol (DIPRIVAN) 10 mg/mL injection        propofol (DIPRIVAN) 10 mg/mL injection        succinylcholine (ANECTINE) 20 mg/mL injection        0.45% sodium chloride infusion  100 mL/hr IntraVENous CONTINUOUS    famotidine (PF) (PEPCID) 20 mg in sodium chloride 0.9 % 10 mL injection  20 mg IntraVENous DAILY    triamterene-hydroCHLOROthiazide (MAXZIDE) 37.5-25 mg per tablet 1 Tab  1 Tab Per NG tube DAILY    atorvastatin (LIPITOR) tablet 40 mg  40 mg Per NG tube DAILY    losartan (COZAAR) tablet 50 mg  50 mg Per NG tube DAILY    insulin glargine (LANTUS) injection 10 Units  10 Units SubCUTAneous QHS    naloxone (NARCAN) injection 0.4 mg  0.4 mg IntraVENous PRN    acetaminophen (TYLENOL) tablet 650 mg  650 mg Oral Q4H PRN    Or    acetaminophen (TYLENOL) solution 650 mg  650 mg Per NG tube Q4H PRN    Or    acetaminophen (TYLENOL) suppository 650 mg  650 mg Rectal Q4H PRN    glucose chewable tablet 16 g  4 Tab Oral PRN    dextrose (D50W) injection syrg 12.5-25 g  12.5-25 g IntraVENous PRN    glucagon (GLUCAGEN) injection 1 mg  1 mg IntraMUSCular PRN    insulin lispro (HUMALOG) injection   SubCUTAneous Q6H    hydrALAZINE (APRESOLINE) 20 mg/mL injection 10 mg  10 mg IntraVENous Q4H PRN    sodium chloride (NS) flush 5-10 mL  5-10 mL IntraVENous Q8H    sodium chloride (NS) flush 5-10 mL  5-10 mL IntraVENous PRN    acetaminophen (TYLENOL) tablet 650 mg  650 mg Oral Q4H PRN    HYDROcodone-acetaminophen (NORCO) 5-325 mg per tablet 1 Tab  1 Tab Oral Q4H PRN    ondansetron (ZOFRAN) injection 4 mg  4 mg IntraVENous Q4H PRN       Sera Barrera MD [facility]

## 2018-01-26 NOTE — CONSULTS
1/25/2018    Cardiology Consult  Note   [facility] M.D. , .A.C.C.   --------PCP:-ROBERTA Ramirez MD   -----Subjective:   Gabriela Blood is a 81 y.o. male   Consult requested from ICU  for possible AF and bradycardia    subdural hematoma and OR 2-3 days ago  Now with intermittent arrhythmia   , given beta blocker and now some bradycardia  Repeat head CT done and stable postop with no new CNS finding to explain bradycardia  CT head on admit 22nd- Moderate to large acute on chronic subdural hemorrhage on the left. Left to  right midline shift approximately 8 mm with effacement of the left lateral  ventricle. EKG on admit 1-22-18 NSR  Has Cr 1.55 , trop 0.05  Pt is obtunded, wriggling in bed with restraints, comfortable, opens eyes and garbled unintelligible speech so no ROS  Review of tele, shows mainly SR with frequent PACs, sinus arrhythmia and then a period of SB at 45 aroudn 530 pm  Likely some of this may be short periods of afib  bp has been variable on past several days since SDH and not particularly changed today  Followed by Dr Naif Okeefe  Was on Eliquis for prior DVT, PE-has had at least 5 clots      Cardiac history   4/16 abnormal lexiscan cardiolyte with fixed inf defect, lvef 37%  4/16 echo normal lvef, lae, mild mr, mild tr with pa pressure 37mm. Lipomatous hypertrophy intraatrial septum without defect  8/16 positive echo bubble study with evidence of probe patent pfo    Medical history notable for Gi bleed, cerebral thrombosis ,HTN,DM2    Discussion/Plan/Impression:    1. arrhythmia all over the place with SA, PACs, maybe some PAF, not causing a hemodynamic compromise and no obvious source such as progressive CNS change other than overall poor condition, given low HR, no specific med at this time is needed  2. SDH - with neuro effect as above  3.  Hx of multiple PE, D[place] -[place] [facility] this will be problematic going forward with variety of clotting  4. Low EF in 2016, repeat EF normal?  Will repeat echo in am  5. CKD  6. HTN variable, ICU managing       Cardiac Studies/Hx:  No specialty comments available. Past Medical History:   Diagnosis Date    Arthritis     neck,chronic    Bladder cancer ([facility] [address] 3/13    bladder    Chronic pain     NECK    Colon cancer ([facility] [address] 3/16    COLON    Diabetes ([facility] [address]     GERD (gastroesophageal reflux disease) 2008    at times, NOT continous    Hypercholesterolemia     Hypertension     Kidney stones 1969    SEVERAL     Nephrolithiasis     Prostate cancer ([facility] [address] 9/2003    prostate cancer - radiation    PUD (peptic ulcer disease) 2008    Thromboembolus ([facility] [address] 2/8/16    2 RIGHT LEG, 3 IN LUNGS (STARTED ON XARELTO IN ER, 1 WEEK LATER WAS IN ER WITH INTERNAL BLEEDING)    Transient ischemic attack 2005    TIA - no deficits 2005 AND 2015    Unspecified sleep apnea     does not use CPAP/uses nasal strips        ROS-pertinents  negative except as above  The pertinent portions of the medical history,physician and nursing notes, meds,vitals , labs and Ins/Outs,are reviewed in the electronic record. Social History   Substance Use Topics    Smoking status: Former Smoker     Packs/day: 1.00     Years: 36.00     Quit date: 11/2/1998    Smokeless tobacco: Never Used      Comment: former cigarette/cigar smoker  SMOKED 10 YRS FIRST TIME; QUIT FOR 10 YRS; THEN SMOKED FOR ABOUT 26 YRS    Alcohol use 3.5 oz/week     7 Glasses of wine per week      Comment: DAILY 2 DRINKS      Family History   Problem Relation Age of Onset    Heart Disease Father     Lung Disease Father      EMPHASEMA    Cancer Brother      SKIN    Heart Attack Brother     Heart Disease Brother     Anesth Problems Neg Hx       Prior to Admission Medications   Prescriptions Last Dose Informant Patient Reported? Taking?   acetaminophen (TYLENOL EXTRA STRENGTH) 500 mg tablet 1/22/2018  Yes Yes   Sig: Take 1,000 mg by mouth daily.    amLODIPine (NORVASC) 2.5 mg tablet 1/22/2018 Self Yes Yes   Sig: Take 2.5 mg by mouth nightly. apixaban (ELIQUIS) 2.5 mg tablet 1/22/2018  Yes Yes   Sig: Take 2.5 mg by mouth two (2) times a day. aspirin delayed-release 81 mg tablet 1/22/2018  Yes Yes   Sig: Take 1 Tab by mouth daily. atorvastatin (LIPITOR) 40 mg tablet 1/22/2018  Yes Yes   Sig: Take 40 mg by mouth daily. losartan (COZAAR) 50 mg tablet 1/22/2018  Yes Yes   Sig: Take 50 mg by mouth daily. metFORMIN (GLUCOPHAGE) 1,000 mg tablet 1/22/2018  Yes Yes   Sig: Take 1,000 mg by mouth daily. triamterene-hydrochlorothiazide (MAXZIDE) 37.5-25 mg per tablet 1/22/2018  Yes Yes   Sig: Take 1 Tab by mouth daily. Facility-Administered Medications: None     Allergies   Allergen Reactions    Penicillins Rash    Metoprolol Rash    Tramadol Other (comments)     Unsure of reaction.     Lodine [Etodolac] Vertigo      Objective:    Physical Exam:   Patient Vitals for the past 12 hrs:   Temp Pulse Resp BP SpO2   01/25/18 1818 - 75 18 153/64 100 %   01/25/18 1700 - 78 22 157/64 -   01/25/18 1600 99.1 °F (37.3 °C) 76 17 146/65 100 %   01/25/18 1500 - 86 18 150/76 100 %   01/25/18 1400 - 93 23 147/77 97 %   01/25/18 1300 - 74 18 148/68 100 %   01/25/18 1200 98.8 °F (37.1 °C) 92 23 165/73 100 %   01/25/18 1100 - (!) 107 28 150/61 99 %   01/25/18 1000 - 89 20 155/66 99 %   01/25/18 0900 - 86 25 161/63 91 %   01/25/18 0800 98.8 °F (37.1 °C) 85 30 (!) 145/103 100 %      General:  delirious, no distress   HEENT, Neck:  NC,AT- no JVD   Chest Wall: inspection normal - no chest wall deformities or tenderness, tachypnea   Lung:   wheezes R apex, R anterior, L apex, L anterior   Heart:    no murmurs noted, no gallops noted, irregularly irregular rhythm with rate 75, no JVD   Abdomen: nondistended   Extremities: no ulcers, gangrene or trophic changes, edema 1+     Last 24hr Input/Output:    Intake/Output Summary (Last 24 hours) at 01/25/18 1947  Last data filed at 01/25/18 1600   Gross per 24 hour   Intake          2383.33 ml   Output              415 ml   Net          1968.33 ml        Data Review:   Recent Results (from the past 24 hour(s))   GLUCOSE, POC    Collection Time: 01/24/18  9:07 PM   Result Value Ref Range    Glucose (POC) 191 (H) 65 - 100 mg/dL    Performed by Jerson Brando    GLUCOSE, POC    Collection Time: 01/25/18  2:51 AM   Result Value Ref Range    Glucose (POC) 188 (H) 65 - 100 mg/dL    Performed by Jerson Brando    CBC WITH AUTOMATED DIFF    Collection Time: 01/25/18  4:27 AM   Result Value Ref Range    WBC 9.3 4.1 - 11.1 K/uL    RBC 3.67 (L) 4.10 - 5.70 M/uL    HGB 10.7 (L) 12.1 - 17.0 g/dL    HCT 35.1 (L) 36.6 - 50.3 %    MCV 95.6 80.0 - 99.0 FL    MCH 29.2 26.0 - 34.0 PG    MCHC 30.5 30.0 - 36.5 g/dL    RDW 14.2 11.5 - 14.5 %    PLATELET 638 (L) 197 - 400 K/uL    NEUTROPHILS 83 (H) 32 - 75 %    LYMPHOCYTES 6 (L) 12 - 49 %    MONOCYTES 11 5 - 13 %    EOSINOPHILS 0 0 - 7 %    BASOPHILS 0 0 - 1 %    ABS. NEUTROPHILS 7.7 1.8 - 8.0 K/UL    ABS. LYMPHOCYTES 0.5 (L) 0.8 - 3.5 K/UL    ABS. MONOCYTES 1.0 0.0 - 1.0 K/UL    ABS. EOSINOPHILS 0.0 0.0 - 0.4 K/UL    ABS. BASOPHILS 0.0 0.0 - 0.1 K/UL   METABOLIC PANEL, COMPREHENSIVE    Collection Time: 01/25/18  4:27 AM   Result Value Ref Range    Sodium 148 (H) 136 - 145 mmol/L    Potassium 4.5 3.5 - 5.1 mmol/L    Chloride 113 (H) 97 - 108 mmol/L    CO2 25 21 - 32 mmol/L    Anion gap 10 5 - 15 mmol/L    Glucose 195 (H) 65 - 100 mg/dL    BUN 28 (H) 6 - 20 MG/DL    Creatinine 1.61 (H) 0.70 - 1.30 MG/DL    BUN/Creatinine ratio 17 12 - 20      GFR est AA 50 (L) >60 ml/min/1.73m2    GFR est non-AA 41 (L) >60 ml/min/1.73m2    Calcium 8.4 (L) 8.5 - 10.1 MG/DL    Bilirubin, total 0.8 0.2 - 1.0 MG/DL    ALT (SGPT) 14 12 - 78 U/L    AST (SGOT) 21 15 - 37 U/L    Alk.  phosphatase 50 45 - 117 U/L    Protein, total 6.3 (L) 6.4 - 8.2 g/dL    Albumin 3.1 (L) 3.5 - 5.0 g/dL    Globulin 3.2 2.0 - 4.0 g/dL    A-G Ratio 1.0 (L) 1.1 - 2.2     MAGNESIUM    Collection Time: 01/25/18  4:27 AM   Result Value Ref Range    Magnesium 1.4 (L) 1.6 - 2.4 mg/dL   PHOSPHORUS    Collection Time: 01/25/18  4:27 AM   Result Value Ref Range    Phosphorus 2.5 (L) 2.6 - 4.7 MG/DL   ECG RHYTHM ANALYSIS ADULT    Collection Time: 01/25/18  6:36 AM   Result Value Ref Range    Ventricular Rate 88 BPM    Atrial Rate 468 BPM    QRS Duration 126 ms    Q-T Interval 364 ms    QTC Calculation (Bezet) 440 ms    Calculated R Axis -41 degrees    Calculated T Axis 105 degrees    Diagnosis       Atrial fibrillation  Left axis deviation  Nonspecific intraventricular block  T wave abnormality, consider lateral ischemia or digitalis effect  When compared with ECG of 22-JAN-2018 22:32,  Atrial fibrillation has replaced Sinus rhythm  QRS duration has increased  ST now depressed in Lateral leads  T wave inversion now evident in Lateral leads  Confirmed by Nubia Bryant MD, Luzmaria Martins ([id number]) on 1/25/2018 9:28:24 AM     GLUCOSE, POC    Collection Time: 01/25/18 10:15 AM   Result Value Ref Range    Glucose (POC) 175 (H) 65 - 100 mg/dL    Performed by Rose Angeles    CK W/ CKMB & INDEX    Collection Time: 01/25/18  5:01 PM   Result Value Ref Range     39 - 308 U/L    CK - MB 2.3 <3.6 NG/ML    CK-MB Index 1.1 0 - 2.5     TROPONIN I    Collection Time: 01/25/18  5:01 PM   Result Value Ref Range    Troponin-I, Qt. 0.05 (H) <0.05 ng/mL   TSH 3RD GENERATION    Collection Time: 01/25/18  5:01 PM   Result Value Ref Range    TSH 0.16 (L) 0.36 - 3.08 uIU/mL   METABOLIC PANEL, BASIC    Collection Time: 01/25/18  5:01 PM   Result Value Ref Range    Sodium 148 (H) 136 - 145 mmol/L    Potassium 3.9 3.5 - 5.1 mmol/L    Chloride 113 (H) 97 - 108 mmol/L    CO2 28 21 - 32 mmol/L    Anion gap 7 5 - 15 mmol/L    Glucose 210 (H) 65 - 100 mg/dL    BUN 26 (H) 6 - 20 MG/DL    Creatinine 1.55 (H) 0.70 - 1.30 MG/DL    BUN/Creatinine ratio 17 12 - 20      GFR est AA 52 (L) >60 ml/min/1.73m2    GFR est non-AA 43 (L) >60 ml/min/1.73m2    Calcium 8.7 8.5 - 10.1 MG/DL   MAGNESIUM    Collection Time: 01/25/18  5:01 PM   Result Value Ref Range    Magnesium 1.8 1.6 - 2.4 mg/dL   PHOSPHORUS    Collection Time: 01/25/18  5:01 PM   Result Value Ref Range    Phosphorus 2.1 (L) 2.6 - 4.7 MG/DL   POC G3 - PUL    Collection Time: 01/25/18  5:12 PM   Result Value Ref Range    FIO2 (POC) 0.24 %    pH (POC) 7.394 7.35 - 7.45      pCO2 (POC) 43.3 35.0 - 45.0 MMHG    pO2 (POC) 80 80 - 100 MMHG    HCO3 (POC) 26.5 (H) 22 - 26 MMOL/L    sO2 (POC) 96 92 - 97 %    Base excess (POC) 2 mmol/L    Site RIGHT RADIAL      Device: NASAL CANNULA      Flow rate (POC) 2 L/M    Allens test (POC) YES      Specimen type (POC) ARTERIAL      Total resp.  rate 18     GLUCOSE, POC    Collection Time: 01/25/18  5:33 PM   Result Value Ref Range    Glucose (POC) 210 (H) 65 - 100 mg/dL    Performed by Dalia South       Lab Results   Component Value Date/Time    Cholesterol, total 118 01/22/2018 11:28 PM    Cholesterol, total 110 06/17/2016 02:24 AM    Cholesterol, total 149 01/23/2015 02:47 AM    Cholesterol, total 168 03/04/2013 09:14 AM    Cholesterol, total 164 07/09/2012 12:00 AM    HDL Cholesterol 53 01/22/2018 11:28 PM    HDL Cholesterol 35 06/17/2016 02:24 AM    HDL Cholesterol 50 01/23/2015 02:47 AM    HDL Cholesterol 57 03/04/2013 09:14 AM    HDL Cholesterol 53 07/09/2012 12:00 AM    LDL, calculated 20 01/22/2018 11:28 PM    LDL, calculated 50 06/17/2016 02:24 AM    LDL, calculated 66.2 01/23/2015 02:47 AM    LDL, calculated 81 03/04/2013 09:14 AM    LDL, calculated 78 07/09/2012 12:00 AM    Triglyceride 225 01/22/2018 11:28 PM    Triglyceride 125 06/17/2016 02:24 AM    Triglyceride 164 01/23/2015 02:47 AM    Triglyceride 150 03/04/2013 09:14 AM    Triglyceride 165 07/09/2012 12:00 AM    CHOL/HDL Ratio 2.2 01/22/2018 11:28 PM    CHOL/HDL Ratio 3.1 06/17/2016 02:24 AM    CHOL/HDL Ratio 3.0 01/23/2015 02:47 AM    CHOL/HDL Ratio 2.7 03/10/2010 09:45 AM    CHOL/HDL Ratio 2.8 04/13/2009 09:22 AM      Erika Alonso MD 1/25/2018

## 2018-01-26 NOTE — PROGRESS NOTES
Neurosurgery Progress Note  Ignacia Maguire, ACNP-BC  [phone]        Admit Date: 2018   LOS: 3 days        Daily Progress Note: 2018    POD: 3 Day Post-Op    S/P: Procedure(s):  CRANIOTOMY, LEFT FOR EVACUATION OF SUBDURAL HEMATOMA    HPI: The patient was on Eliquis prior to admission for a 1.5 year history of DVT/PE. He apparently had a fall about 2 months ago and was seen at [facility] with a reportedly negative head CT. He developed a 2 day history of word finding difficulties and presented to the ER at [facility]. Found to have a large left chronic SDH with membranes and midline shift. He was taken to the OR by Dr. Jillian Candelario yesterday evening for evacuation of the hemorrhage. Approximately 300 cc of blood was removed from the subdural space. Subjective: This morning the patient's oxygen saturations dropped into the 60's and had a seizure with the right arm shaking. He continued to be lethargic afterwards with decreased oxygen saturations despite a NRB. He was re-intubated by pulmonary. A bronchoscopy revealed mucus plugging on the left. A central line was also placed for better IV access and an extra dose of keppra was given. Objective:     Vital signs  Temp (24hrs), Av.1 °F (37.3 °C), Min:98.7 °F (37.1 °C), Max:99.5 °F (37.5 °C)   701 -  1900  In: 200 [I.V.:200]  Out: -   1901 -  0700  In: 4808.3 [I.V.:4083.3]  Out: 520 [Urine:350; Drains:170]    Visit Vitals    /64    Pulse 72    Temp 99.2 °F (37.3 °C)    Resp 12    Ht 6' (1.829 m)    Wt 94.5 kg (208 lb 5.4 oz)    SpO2 95%    BMI 28.26 kg/m2    O2 Flow Rate (L/min): 4 l/min O2 Device: Nasal cannula     Pain control  Pain Assessment  Pain Scale 1: Adult Nonverbal Pain Scale  Pain Intensity 1: 0  Pain Intervention(s) 1: Medication (see MAR)    PT/OT  Gait                 Physical Exam:  Gen:NAD. Neuro: Lethargic. Eyes open and staring off into space. Not talking to me. Not following commands.  Withdraws to pain.  Skin: Left frontoparietal incision C/D/I with staples in place. No erythema, edema, or drainage. KORY drain 35 cc of serosanguineous drainage in 8 hours. CT head without contrast on 01/25/18 shows post surgical changes of left craniotomy with no series change in residual subdural hematoma, left posterior parietal parafalcine hematoma, intraventricular hemorrhage, left parietal parenchymal hemorrhage, and bilateral parietal subarachnoid hemorrhage.     24 hour results:    Recent Results (from the past 24 hour(s))   CK W/ CKMB & INDEX    Collection Time: 01/25/18  5:01 PM   Result Value Ref Range     39 - 308 U/L    CK - MB 2.3 <3.6 NG/ML    CK-MB Index 1.1 0 - 2.5     TROPONIN I    Collection Time: 01/25/18  5:01 PM   Result Value Ref Range    Troponin-I, Qt. 0.05 (H) <0.05 ng/mL   TSH 3RD GENERATION    Collection Time: 01/25/18  5:01 PM   Result Value Ref Range    TSH 0.16 (L) 0.36 - 9.18 uIU/mL   METABOLIC PANEL, BASIC    Collection Time: 01/25/18  5:01 PM   Result Value Ref Range    Sodium 148 (H) 136 - 145 mmol/L    Potassium 3.9 3.5 - 5.1 mmol/L    Chloride 113 (H) 97 - 108 mmol/L    CO2 28 21 - 32 mmol/L    Anion gap 7 5 - 15 mmol/L    Glucose 210 (H) 65 - 100 mg/dL    BUN 26 (H) 6 - 20 MG/DL    Creatinine 1.55 (H) 0.70 - 1.30 MG/DL    BUN/Creatinine ratio 17 12 - 20      GFR est AA 52 (L) >60 ml/min/1.73m2    GFR est non-AA 43 (L) >60 ml/min/1.73m2    Calcium 8.7 8.5 - 10.1 MG/DL   MAGNESIUM    Collection Time: 01/25/18  5:01 PM   Result Value Ref Range    Magnesium 1.8 1.6 - 2.4 mg/dL   PHOSPHORUS    Collection Time: 01/25/18  5:01 PM   Result Value Ref Range    Phosphorus 2.1 (L) 2.6 - 4.7 MG/DL   POC G3 - PUL    Collection Time: 01/25/18  5:12 PM   Result Value Ref Range    FIO2 (POC) 0.24 %    pH (POC) 7.394 7.35 - 7.45      pCO2 (POC) 43.3 35.0 - 45.0 MMHG    pO2 (POC) 80 80 - 100 MMHG    HCO3 (POC) 26.5 (H) 22 - 26 MMOL/L    sO2 (POC) 96 92 - 97 %    Base excess (POC) 2 mmol/L    Site RIGHT RADIAL      Device: NASAL CANNULA      Flow rate (POC) 2 L/M    Allens test (POC) YES      Specimen type (POC) ARTERIAL      Total resp. rate 18     GLUCOSE, POC    Collection Time: 01/25/18  5:33 PM   Result Value Ref Range    Glucose (POC) 210 (H) 65 - 100 mg/dL    Performed by [address] [place] [id number], POC    Collection Time: 01/25/18 10:22 PM   Result Value Ref Range    Glucose (POC) 207 (H) 65 - 100 mg/dL    Performed by Rex Mccracken    GLUCOSE, POC    Collection Time: 01/26/18  3:10 AM   Result Value Ref Range    Glucose (POC) 221 (H) 65 - 100 mg/dL    Performed by Rex Mccracken    CBC WITH AUTOMATED DIFF    Collection Time: 01/26/18  6:03 AM   Result Value Ref Range    WBC 12.5 (H) 4.1 - 11.1 K/uL    RBC 3.80 (L) 4.10 - 5.70 M/uL    HGB 11.7 (L) 12.1 - 17.0 g/dL    HCT 36.5 (L) 36.6 - 50.3 %    MCV 96.1 80.0 - 99.0 FL    MCH 30.8 26.0 - 34.0 PG    MCHC 32.1 30.0 - 36.5 g/dL    RDW 14.0 11.5 - 14.5 %    PLATELET 119 979 - 261 K/uL    MPV 12.2 8.9 - 12.9 FL    NRBC 0.0 0  WBC    ABSOLUTE NRBC 0.00 0.00 - 0.01 K/uL    NEUTROPHILS 84 (H) 32 - 75 %    LYMPHOCYTES 5 (L) 12 - 49 %    MONOCYTES 10 5 - 13 %    EOSINOPHILS 0 0 - 7 %    BASOPHILS 0 0 - 1 %    IMMATURE GRANULOCYTES 1 (H) 0.0 - 0.5 %    ABS. NEUTROPHILS 10.5 (H) 1.8 - 8.0 K/UL    ABS. LYMPHOCYTES 0.7 (L) 0.8 - 3.5 K/UL    ABS. MONOCYTES 1.2 (H) 0.0 - 1.0 K/UL    ABS. EOSINOPHILS 0.0 0.0 - 0.4 K/UL    ABS. BASOPHILS 0.0 0.0 - 0.1 K/UL    ABS. IMM.  GRANS. 0.1 (H) 0.00 - 0.04 K/UL    DF AUTOMATED     METABOLIC PANEL, COMPREHENSIVE    Collection Time: 01/26/18  6:03 AM   Result Value Ref Range    Sodium 148 (H) 136 - 145 mmol/L    Potassium 3.7 3.5 - 5.1 mmol/L    Chloride 114 (H) 97 - 108 mmol/L    CO2 30 21 - 32 mmol/L    Anion gap 4 (L) 5 - 15 mmol/L    Glucose 240 (H) 65 - 100 mg/dL    BUN 31 (H) 6 - 20 MG/DL    Creatinine 1.66 (H) 0.70 - 1.30 MG/DL    BUN/Creatinine ratio 19 12 - 20      GFR est AA 48 (L) >60 ml/min/1.73m2    GFR est non-AA 40 (L) >60 ml/min/1.73m2    Calcium 9.3 8.5 - 10.1 MG/DL    Bilirubin, total 0.9 0.2 - 1.0 MG/DL    ALT (SGPT) 21 12 - 78 U/L    AST (SGOT) 27 15 - 37 U/L    Alk. phosphatase 58 45 - 117 U/L    Protein, total 6.7 6.4 - 8.2 g/dL    Albumin 3.1 (L) 3.5 - 5.0 g/dL    Globulin 3.6 2.0 - 4.0 g/dL    A-G Ratio 0.9 (L) 1.1 - 2.2     MAGNESIUM    Collection Time: 01/26/18  6:03 AM   Result Value Ref Range    Magnesium 2.1 1.6 - 2.4 mg/dL   PHOSPHORUS    Collection Time: 01/26/18  6:03 AM   Result Value Ref Range    Phosphorus 1.7 (L) 2.6 - 4.7 MG/DL   GLUCOSE, POC    Collection Time: 01/26/18 11:04 AM   Result Value Ref Range    Glucose (POC) 244 (H) 65 - 100 mg/dL    Performed by Kaminario    POC G3 - PUL    Collection Time: 01/26/18 11:04 AM   Result Value Ref Range    FIO2 (POC) 100 %    pH (POC) 7.342 (L) 7.35 - 7.45      pCO2 (POC) 51.9 (H) 35.0 - 45.0 MMHG    pO2 (POC) 446 (H) 80 - 100 MMHG    HCO3 (POC) 28.1 (H) 22 - 26 MMOL/L    sO2 (POC) 100 (H) 92 - 97 %    Base excess (POC) 2 mmol/L    Site RIGHT RADIAL      Device: VENT      Mode ASSIST CONTROL      Tidal volume 550 ml    Set Rate 12 bpm    PEEP/CPAP (POC) 5 cmH2O    PIP (POC) 20      Allens test (POC) YES      Specimen type (POC) ARTERIAL      Total resp. rate 12            Assessment:     Principal Problem:    SDH (subdural hematoma) ([address] (1/23/2018)        Plan:   1. Left traumatic chronic subdural hematoma   - s/p crani 01/24   - Ok to get OOB. [address] [address] for PT/OT to work with patient   - Cont Keppra   - D/C KORY drain   - Repeat head CT stable  2. Brain compression   - due to #1   - plans as above  3. HTN   - SBP<160   - Cardene PRN   - Hydralazine PRN   - Restart home BP meds down [place]hoff   - Intensivist following  4. Diabetes mellitus, type 2   - Cont Metofrmin   - SSI and accu-checks  5. ABIMBOLA on CKD, stage 3   - Cont IVF   - Avoid nephrotoxic agents   - Hospitalist/intensivist following  6.  Acute encephalopathy   - multi-factorial     - limit sedating agents   - supportive care  7. Hypernatremia   - Cont IVF - decrease rate   - Cont free water flushes with tube feeds  8. Atrial fibrillation   - Metoprolol added   - Electrolytes corrected   - Hospitalist/intensivist following  9. Hypomagenesemia   - Magnesium replaced IV   - Check labs in am   - Intensivist following  10. Hypophosphatemia   - IV replacement   - Electrolyte replacement protocol   - Intensivist following  11. Acute respiratory insufficiency   - Pt re-intubated 01/26   - Pulmonary following  12.  Focal seizure   - Give additional 500 mg of IV keppra   - Increase keppra to 1000 mg IV bid    Activity: OOB with assist  DVT ppx: SCDs  Dispo: tbd    Plan d/w Dr. Holly Casper, ICU nurse, intensivist      Viridiana Jha NP

## 2018-01-26 NOTE — PROGRESS NOTES
Problem: Falls - Risk of  Goal: *Absence of Falls  Document Arleen Fall Risk and appropriate interventions in the flowsheet. Outcome: Progressing Towards Goal  Fall Risk Interventions:       Mentation Interventions: Adequate sleep, hydration, pain control, Door open when patient unattended, Evaluate medications/consider consulting pharmacy, Eyeglasses and hearing aids, More frequent rounding, Reorient patient, Room close to nurse's station, Toileting rounds    Medication Interventions: Evaluate medications/consider consulting pharmacy    Elimination Interventions: Toileting schedule/hourly rounds    History of Falls Interventions: Door open when patient unattended, Evaluate medications/consider consulting pharmacy, Consult care management for discharge planning, Investigate reason for fall, Room close to nurse's station        Problem: Craniotomy:Post Op Day 2  Goal: *Progress independence mobility/activities (eg: Mobility precautions)  Outcome: Not Progressing Towards Goal  Poor command following, not safe to ambulate.    Goal: *Tolerating diet  Outcome: Progressing Towards Goal  Start TF via [place],  Goal: *Neurologically stable  Outcome: Progressing Towards Goal  Variance: Patient Condition  Off baseline but stable and unchanged from post-op variance

## 2018-01-26 NOTE — PROGRESS NOTES
Physical Therapy  1/26/18    Patient chart reviewed. Discussed with RN. Requesting hold on therapy this date due to AMS and seizure-like activity. Will follow up on Monday as appropriate for continued mobility training. Petrona Moreno, PT, DPT

## 2018-01-26 NOTE — PROGRESS NOTES
[address] placed to resume home meds & start tube feeds. [address] with Alex Kemp NP regarding continued arrhythmia & periods of bradycardia (as low as 39). Neuro assessment unchanged other than just drowsiness. No imaging ordered at this time. Consulting w/ Dr. Delonte Zazueta. [address] w/ Dr. Delonte Zazueta. Orders received for ABG, CT head, labs, EKG. Consult Cardiology. [facility] Cardiology paged. [address] Dr. Derrek Gilford updated on events & patient's condition. Will come to see patient    1855 45 second run of Vtach noted on monitor, patient asymptomatic, BP stable. Copy of strip placed on chart. [address] [facility] 149 Neurosurgery paged. 1928 Dr. Angelia Burr returned call. Updated on patient & events. Will review CT scan, no orders received. 2000 Dr. Derrek Gilford at bedside. Labs, EKG & Vtach strip reviewed. No orders received, will continue to monitor.

## 2018-01-26 NOTE — PROCEDURES
Name: Yamileth Castro: Rojas Adan 56   : 1935 Admit Date: 2018   Phone: [phone]  Room: [address] [place] [address]   PCP: Bobbi Bryant MD  MRN: [id number]   Date: 2018  Code: Full Code        Procedure:  Intubation      Airway: IV (only hard palate visible)    Indication:  respiratory failure    Sedation & Anesthesia:  etomidate and propofol    Summary:   After assessing the airway (IV (only hard palate visible)) the patient underwent preoxygenation with 100% FiO2 before sedation with above medications. Emergent intubation was performed. Using a Neftaly 3 blade the lMcGrath aryngoscope was used to visualize the epiglottis and vocal chords. After positive identification of the vocal chords, 8.0 cuffed was placed into the trachea with direct visualization. The tube was seen passing through the vocal chords without  difficulty. Tube position confirmed by by auscultation and ETCO2 monitor and chest X-ray has been ordered and is pending. The patient has been placed on a mechanical ventilator.          Confirmation:  bilateral, symmetric breath sounds, good end-tidal CO2 detector color change  and no breath sounds over stomach    Complications:  none    EBL:  None     Condition: critical    Implants: Airway as noted above    Signed: Remi Pool MD, [facility]

## 2018-01-26 NOTE — PROGRESS NOTES
Cardiology Progress Note            Admit Date: 1/22/2018  Admit Diagnosis: SDH (subdural hematoma) (HCC)  Chronic Subdural Hematoma  Date: 1/26/2018     Time: 7:59 AM    HPI: Cardiology consulted for possible AF and bradycardia. Has PMH of DVT/PE on Eliquis, ischemic CVA in 2016, TIA, bladder, prostate and colon cancer. Had a fall 2 months ago off his friend's porch- had normal CT head at that time. Now presented with acute on chronic SDH with midline shift. Had craniotomy 1/23/18.   Now with intermittent arrhythmia, was given beta blocker and had some bradycardia. Repeat head CT done and stable postop with no new CNS finding to explain bradycardia. EKG on admit 1-22-18 NSR. Has Cr 1.55 , trop 0.05. Bp has been variable on past several days since SDH and not particularly changed today. Pt  Was on Eliquis for prior DVT, PE-has had at least 5 clots. Not now due to SDH. He has been Followed by Dr Jl Romo in past. Has a probable sm. PFO noted on last TTE in 2016- although Dr. Jl Romo did not think this was the cause of his previous CVA and recommended a hypercoaguable workup at that time.       Assessment and Plan     1. Arrhythmia:  Reviewed telemetry and 12 lead EKGs w/ Dr. Jl Romo-  Appears NSR with frequent PACs and runs of PAT, nonspecific IVCD. -No TTE needed.  -No new meds needed at this time. 2. Acute on chronic SDH s/p craniotomy. -neurosurgery following. 3. Hx of multiple PE, DVT- now off [address] indefinitely due to SDH. 4.  Hx of CVA:  Off ASA and Eliquis due to SDH    5. Hx of HTN:  BP variable. Off oral antihypertensives for now. Prn Hydralazine effective- ICU managing with SBP goal <160 per NSG. 6. Hx of probable PFO     7. Trivial elevation in troponin: <0.05. Nonspecific in setting of elevated creatinine. 8.. CKD:  Creatinine 1.55    Cardiology attending: seen and examined.  Agree with assess and plan  No definate afib thus far from review of available strips and ekgs. bp labile and related to positioning per nursing. Continue to monitor, meds for bp and hr as needed/tolerated. No cardiac testing necessary at this time. Cardiac testing  4/16 abnormal lexiscan cardiolyte with fixed inf defect, lvef 37%  4/16 echo normal lvef, lae, mild mr, mild tr with pa pressure 37mm. Lipomatous hypertrophy intraatrial septum without defect  8/16 positive echo bubble study with evidence of probe patent pfo    Subjective:   Gerhardt Helena is nonresponsive. Objective:      Physical Exam:                Visit Vitals    BP (!) 121/96    Pulse (!) 110    Temp 98.7 °F (37.1 °C)    Resp 14    Ht 6' (1.829 m)    Wt 94.5 kg (208 lb 5.4 oz)    SpO2 93%    BMI 28.26 kg/m2          General Appearance:   Well developed, obtunded   Ears/Nose/Mouth/Throat:    . Scalp dressing c/d/i.  KORY drain in place         Neck:  Supple. Chest:    Lungs clear to auscultation bilaterally anteriorly   Cardiovascular:   Irregular Regular rate and rhythm, S1, S2 normal, no murmur. Abdomen:    Soft, non-distended, bowel sounds are active. Extremities:  No edema bilaterally. Skin:  Warm and dry.      Telemetry: NSR with frequent PACs, runs of PAT>          Data Review:    Labs:    Recent Results (from the past 24 hour(s))   GLUCOSE, POC    Collection Time: 01/25/18 10:15 AM   Result Value Ref Range    Glucose (POC) 175 (H) 65 - 100 mg/dL    Performed by Mohsen Caldwell    CK W/ CKMB & INDEX    Collection Time: 01/25/18  5:01 PM   Result Value Ref Range     39 - 308 U/L    CK - MB 2.3 <3.6 NG/ML    CK-MB Index 1.1 0 - 2.5     TROPONIN I    Collection Time: 01/25/18  5:01 PM   Result Value Ref Range    Troponin-I, Qt. 0.05 (H) <0.05 ng/mL   TSH 3RD GENERATION    Collection Time: 01/25/18  5:01 PM   Result Value Ref Range    TSH 0.16 (L) 0.36 - 5.63 uIU/mL   METABOLIC PANEL, BASIC    Collection Time: 01/25/18  5:01 PM   Result Value Ref Range    Sodium 148 (H) 136 - 145 mmol/L    Potassium 3.9 3.5 - 5.1 mmol/L    Chloride 113 (H) 97 - 108 mmol/L    CO2 28 21 - 32 mmol/L    Anion gap 7 5 - 15 mmol/L    Glucose 210 (H) 65 - 100 mg/dL    BUN 26 (H) 6 - 20 MG/DL    Creatinine 1.55 (H) 0.70 - 1.30 MG/DL    BUN/Creatinine ratio 17 12 - 20      GFR est AA 52 (L) >60 ml/min/1.73m2    GFR est non-AA 43 (L) >60 ml/min/1.73m2    Calcium 8.7 8.5 - 10.1 MG/DL   MAGNESIUM    Collection Time: 01/25/18  5:01 PM   Result Value Ref Range    Magnesium 1.8 1.6 - 2.4 mg/dL   PHOSPHORUS    Collection Time: 01/25/18  5:01 PM   Result Value Ref Range    Phosphorus 2.1 (L) 2.6 - 4.7 MG/DL   POC G3 - PUL    Collection Time: 01/25/18  5:12 PM   Result Value Ref Range    FIO2 (POC) 0.24 %    pH (POC) 7.394 7.35 - 7.45      pCO2 (POC) 43.3 35.0 - 45.0 MMHG    pO2 (POC) 80 80 - 100 MMHG    HCO3 (POC) 26.5 (H) 22 - 26 MMOL/L    sO2 (POC) 96 92 - 97 %    Base excess (POC) 2 mmol/L    Site RIGHT RADIAL      Device: NASAL CANNULA      Flow rate (POC) 2 L/M    Allens test (POC) YES      Specimen type (POC) ARTERIAL      Total resp.  rate 18     GLUCOSE, POC    Collection Time: 01/25/18  5:33 PM   Result Value Ref Range    Glucose (POC) 210 (H) 65 - 100 mg/dL    Performed by [address] [place] [address], POC    Collection Time: 01/25/18 10:22 PM   Result Value Ref Range    Glucose (POC) 207 (H) 65 - 100 mg/dL    Performed by Debora Ruiz    GLUCOSE, POC    Collection Time: 01/26/18  3:10 AM   Result Value Ref Range    Glucose (POC) 221 (H) 65 - 100 mg/dL    Performed by Debora Ruiz    CBC WITH AUTOMATED DIFF    Collection Time: 01/26/18  6:03 AM   Result Value Ref Range    WBC 12.5 (H) 4.1 - 11.1 K/uL    RBC 3.80 (L) 4.10 - 5.70 M/uL    HGB 11.7 (L) 12.1 - 17.0 g/dL    HCT 36.5 (L) 36.6 - 50.3 %    MCV 96.1 80.0 - 99.0 FL    MCH 30.8 26.0 - 34.0 PG    MCHC 32.1 30.0 - 36.5 g/dL    RDW 14.0 11.5 - 14.5 %    PLATELET 540 570 - 706 K/uL    MPV 12.2 8.9 - 12.9 FL    NRBC 0.0 0  WBC    ABSOLUTE NRBC 0.00 0.00 - 0.01 K/uL    NEUTROPHILS 84 (H) 32 - 75 %    LYMPHOCYTES 5 (L) 12 - 49 %    MONOCYTES 10 5 - 13 %    EOSINOPHILS 0 0 - 7 %    BASOPHILS 0 0 - 1 %    IMMATURE GRANULOCYTES 1 (H) 0.0 - 0.5 %    ABS. NEUTROPHILS 10.5 (H) 1.8 - 8.0 K/UL    ABS. LYMPHOCYTES 0.7 (L) 0.8 - 3.5 K/UL    ABS. MONOCYTES 1.2 (H) 0.0 - 1.0 K/UL    ABS. EOSINOPHILS 0.0 0.0 - 0.4 K/UL    ABS. BASOPHILS 0.0 0.0 - 0.1 K/UL    ABS. IMM. GRANS. 0.1 (H) 0.00 - 0.04 K/UL    DF AUTOMATED     METABOLIC PANEL, COMPREHENSIVE    Collection Time: 01/26/18  6:03 AM   Result Value Ref Range    Sodium 148 (H) 136 - 145 mmol/L    Potassium 3.7 3.5 - 5.1 mmol/L    Chloride 114 (H) 97 - 108 mmol/L    CO2 30 21 - 32 mmol/L    Anion gap 4 (L) 5 - 15 mmol/L    Glucose 240 (H) 65 - 100 mg/dL    BUN 31 (H) 6 - 20 MG/DL    Creatinine 1.66 (H) 0.70 - 1.30 MG/DL    BUN/Creatinine ratio 19 12 - 20      GFR est AA 48 (L) >60 ml/min/1.73m2    GFR est non-AA 40 (L) >60 ml/min/1.73m2    Calcium 9.3 8.5 - 10.1 MG/DL    Bilirubin, total 0.9 0.2 - 1.0 MG/DL    ALT (SGPT) 21 12 - 78 U/L    AST (SGOT) 27 15 - 37 U/L    Alk.  phosphatase 58 45 - 117 U/L    Protein, total 6.7 6.4 - 8.2 g/dL    Albumin 3.1 (L) 3.5 - 5.0 g/dL    Globulin 3.6 2.0 - 4.0 g/dL    A-G Ratio 0.9 (L) 1.1 - 2.2     MAGNESIUM    Collection Time: 01/26/18  6:03 AM   Result Value Ref Range    Magnesium 2.1 1.6 - 2.4 mg/dL   PHOSPHORUS    Collection Time: 01/26/18  6:03 AM   Result Value Ref Range    Phosphorus 1.7 (L) 2.6 - 4.7 MG/DL          Radiology:        Current Facility-Administered Medications   Medication Dose Route Frequency    0.45% sodium chloride infusion  100 mL/hr IntraVENous CONTINUOUS    famotidine (PF) (PEPCID) 20 mg in sodium chloride 0.9 % 10 mL injection  20 mg IntraVENous DAILY    triamterene-hydroCHLOROthiazide (MAXZIDE) 37.5-25 mg per tablet 1 Tab  1 Tab Per NG tube DAILY    atorvastatin (LIPITOR) tablet 40 mg  40 mg Per NG tube DAILY    losartan (COZAAR) tablet 50 mg  50 mg Per NG tube DAILY    insulin glargine (LANTUS) injection 10 Units  10 Units SubCUTAneous QHS    naloxone (NARCAN) injection 0.4 mg  0.4 mg IntraVENous PRN    acetaminophen (TYLENOL) tablet 650 mg  650 mg Oral Q4H PRN    Or    acetaminophen (TYLENOL) solution 650 mg  650 mg Per NG tube Q4H PRN    Or    acetaminophen (TYLENOL) suppository 650 mg  650 mg Rectal Q4H PRN    glucose chewable tablet 16 g  4 Tab Oral PRN    dextrose (D50W) injection syrg 12.5-25 g  12.5-25 g IntraVENous PRN    glucagon (GLUCAGEN) injection 1 mg  1 mg IntraMUSCular PRN    insulin lispro (HUMALOG) injection   SubCUTAneous Q6H    hydrALAZINE (APRESOLINE) 20 mg/mL injection 10 mg  10 mg IntraVENous Q4H PRN    sodium chloride (NS) flush 5-10 mL  5-10 mL IntraVENous Q8H    sodium chloride (NS) flush 5-10 mL  5-10 mL IntraVENous PRN    acetaminophen (TYLENOL) tablet 650 mg  650 mg Oral Q4H PRN    HYDROcodone-acetaminophen (NORCO) 5-325 mg per tablet 1 Tab  1 Tab Oral Q4H PRN    morphine injection 2 mg  2 mg IntraVENous Q4H PRN    levETIRAcetam (KEPPRA) 500 mg in 0.9% sodium chloride 100 mL IVPB  500 mg IntraVENous Q12H    ondansetron (ZOFRAN) injection 4 mg  4 mg IntraVENous Q4H PRN          Fernandez Alanis.  DEBBIE Swenson MD       [facility] [address] [place] [address], [address] [place] [address],[address]   [address]   [phone]

## 2018-01-26 NOTE — PROGRESS NOTES
Stable looking CT but may have just had focal right sided sz  Will increase Keppra dose, he opens his eyes, post ictal  Agree with re-intubating to protect airway   Continue ICU care

## 2018-01-26 NOTE — DIABETES MGMT
DTC Progress Note    Recommendations/ Comments: Chart reviewed due to hyperglycemia. Note pt is on TF. Pt's blood sugars >200 and pt has required 12 units over the last 24 hours. Noted Lantus 20 units ordered. Current hospital DM medication: lispro insulin correction scale; metformin 1000 mg bid and Lantus 20 units. Chart reviewed on Daniel Guzman. Patient is a 81 y.o. male with known  Type 2 Diabetes on oral agent (monotherapy): metformin 1000 mg bid (generic) at home. A1c:   Lab Results   Component Value Date/Time    Hemoglobin A1c 6.4 01/23/2018 10:43 AM    Hemoglobin A1c 6.9 06/17/2016 02:24 AM       Recent Glucose Results:   Lab Results   Component Value Date/Time     (H) 01/26/2018 06:03 AM     (H) 01/25/2018 05:01 PM    GLUCPOC 244 (H) 01/26/2018 11:04 AM    GLUCPOC 221 (H) 01/26/2018 03:10 AM    GLUCPOC 207 (H) 01/25/2018 10:22 PM        Lab Results   Component Value Date/Time    Creatinine 1.66 01/26/2018 06:03 AM     Estimated Creatinine Clearance: 41.7 mL/min (based on Cr of 1.66). Active Orders   Diet    DIET NPO With Tube Feedings        PO intake: No data found. Will continue to follow as needed.     Thank you  La Núñez RD, CDE

## 2018-01-26 NOTE — PROCEDURES
Hospital: [facility]   Name: Alison Uriostegui   : 1935   MRN: [id number]   Code: Full Code       Procedure:  Diagnostic bronchoscopy       Preoperative Diagnosis:  Mucus clot in bronchi    Postoperative Diagnosis:   · Mucus clot in bronchi bilaterally L > R    Specimens:  1. Specimen sent for routine culture, AFB, fungal culture and Viral.  CDMP query Nurse THIS WAS NOT A BAL    Procedure:   After answering all questions and full information as to risks, benefits, alternatives and goals, consent was obtained from [facility]. A timeout was taken at bedside in the presence of the operative team to confirm the patient's identity and planned procedure. There was agreement and the bronchscope was introduced through an endotracheal tube. Findings as noted below:    -- Pharynx:  Prior to intubation significant amount of oral secretions dried on the back of the throat were noted    -- Vocal cords: Prior to intubation laryngoscope noted normal vocal cord    -- Trachea: Normal    -- Iveth: Normal    -- RUL:  Normal     -- RML:  Normal     -- RLL:  Normal    -- RAUL -  LLL:  Mucous left tracheobronchial tree removed    Complications: none           Disposition: ICU - intubated and critically ill. Condition: stable    ASA Score: ASA 3 - Severe systemic disease but compensated      Mallampati Score: IV (only hard palate visible)    Estimated Blood Loss: None    Anesthesia / Sedation: Patient on ventilator and receiving  Propofol drip         Monitoring:  Vital signs monitored by attending RN as well as pulse oximetry.        Implants:   none           Surgeon: Marium Latham MD, [facility]

## 2018-01-26 NOTE — PROGRESS NOTES
0730 Bedside and Verbal shift change report given to Gi Knutson RN (oncoming nurse) by Jennifer Aceves RN (offgoing nurse). Report included the following information SBAR, Kardex, ED Summary, OR Summary, Procedure Summary, Intake/Output, MAR and Recent Results. 1930 Bedside and Verbal shift change report given to [facility] ISA TOBIN (oncoming nurse) by Gi Knutson RN (offgoing nurse). Report included the following information SBAR, Kardex, ED Summary, OR Summary, Procedure Summary, Intake/Output, MAR, Recent Results and Cardiac Rhythm Sinus arrhythmia.

## 2018-01-26 NOTE — PROGRESS NOTES
Occupational Therapy  1/26/18     Patient chart reviewed. Discussed with physical therapist/ RN. Requesting hold on therapy this date due to AMS and seizure-like activity.  Will follow up on Monday as able and appropriate.     Melina Kehr, OTR/L

## 2018-01-26 NOTE — PROGRESS NOTES
Note patient now re-intubated. Please re-consult when medically stable for swallowing re-assessment. Thanks. Gaston Velasco M.CD.  CCC-SLP

## 2018-01-26 NOTE — PROCEDURES
Name: Libertad Boone: Rojas Adan 56   : 1935 Admit Date: 2018   Phone: [phone]  Room: [address] [place] [address]   PCP: Brenda Orantes MD  MRN: [id number]   Date: 2018  Code: Full Code        Procedure:  Central Line Using ultrasound guidance, Left subclavian    Airway: IV (only hard palate visible)    Indication:  Inadequate venous access    Anesthesia:  0.1 % Lidocaine    Time Out: * Patient was identified by name and date of birth     Summary:        Full sterile barrier precautions were used. A 7 Step Sterility Protocol followed. (cap, mask sterile gown, sterile gloves, large sterile sheet, hand hygiene, 2% chlorhexidine for cutaneous antisepsis). Vessel cannulated cannulated  After x 2 attempts and line placed with no difficulty. Blood return noted. Catheter secured & Biopatch applied. Sterile Tegaderm placed.   CXR pending    Signed: Flory Alcantara MD

## 2018-01-26 NOTE — PROGRESS NOTES
NUTRITION       Chart reviewed for follow-up; discussed with RN. Mr Maddison Mccoy was intubated this morning and is now receiving Diprivan for sedation. Will need to adjust tube feeding goal to avoid overfeeding. Diprivan @ current rate of 22.7 ml/hr will provide 599 lipid calories. Current tube feeding goal: Osmolite 1.5 @ 55 ml/hr with one packet Prosource bid. New tube feeding goal: Osmolite 1.5 @ 30 ml/hr with one packet Prosource 4 x/day and 90 ml water flush q 4 hr to provide 720 ml, 1320 calories, 105 gm protein and 1450 ml free water (tube feeding/flush) per day. Total calories including Diprivan: ~1920 per day. Once off Diprivan-resume previous goal.    RD to follow.   .  Estimated Nutrition Needs:   Kcals/day: 1915 Kcals/day  Protein: 112 g (1.2g/kg)  Fluid: 1915 ml (1 ml/kcal)  Based On: [facility] (2003b)  Weight Used: Actual wt (94.5 kg)      Day Vega RD [facility]

## 2018-01-26 NOTE — PROGRESS NOTES
1930: Bedside shift change report given to [facility] MAHAD, ISA (oncoming nurse) by Jairon Stringer RN (offgoing nurse). Report included the following information SBAR, Intake/Output, MAR, Recent Results, Cardiac Rhythm Afib/SA/SR PVCs PACs and Alarm Parameters . 0730: Bedside shift change report given to  (oncoming nurse) by [facility] ISA TOBIN (offgoing nurse). Report included the following information SBAR, Intake/Output, MAR, Recent Results, Cardiac Rhythm Afib/SA/SR PVCs PACs and Alarm Parameters . Shift summary: Pt with periods of restlessness and drowsiness, orientation wanes from person, place, and time to person only, JACK, decreased command following. Pt very fidgety in bed, morphine given twice for restlessness. Hydralazine given twice overnight for SBP >160. KORY drain in place to thumbprint suction.

## 2018-01-27 NOTE — PROGRESS NOTES
[facility]    INTERVAL HISTORY / SUBJECTIVE:  Hospital Day: 6     2018    Case discussed with neurosurgeon. GCS precludes extubation. Continue current care. Daily wake ups and reassess pulmonary status/neuro status for removal from ventilator. Blood sugars continue to be high. Escalate medication. Hypernatremia, hyper chloremia noted. Fluids adjusted and water.  Awaiting bronchoscopy culture      ASSESSMENT and PLAN:    · Subdural hematoma-postop care per surgeon  · Hypertension-continue medications  · CKD & Hypernatremia-adjust fluids/water  · Elevated blood sugars-adjust medications and sliding scale insulin  · Post op encephalopathy/delirium-Monitoring  · Respiratory insufficiency-continue ventilator until encephalopathy clears  · Continue ICU care  · Aspiration precautions  · LEUNG prophylaxis and SCDs  · Acutely ill, at risk for decline with ongoing encephalopathy  · D/W RN    Hospital Problems  Date Reviewed: 2018          Codes Class Noted POA    * (Principal)SDH (subdural hematoma) ([facility] ICD-10-CM: I62.00  ICD-9-CM: 432.1  2018 Unknown              Medical Decision Making Today  · I have reviewed the flowsheet and previous days notes  · Abrupt change in neurologic status  · Review and Order of Radiology tests  · Review and Order of Medicine tests  · Discuss case with Specialist MD  · Independent visualization of Image  · I have personally reviewed the patients ECG / Tele       VITALS    Visit Vitals    /53    Pulse 60    Temp 99.8 °F (37.7 °C)    Resp 12    Ht 6' (1.829 m)    Wt 97.8 kg (215 lb 9.8 oz)    SpO2 99%    BMI 29.24 kg/m2        Temp (24hrs), Av.9 °F (37.2 °C), Min:98.3 °F (36.8 °C), Max:99.8 °F (37.7 °C)                   Intake/Output Summary (Last 24 hours) at 18 1039  Last data filed at 18 1000   Gross per 24 hour   Intake          4782.36 ml   Output              980 ml   Net          3802.36 ml         Other: Minimally responsive    GEN: Nontoxic Mildly distressed   EYE: Anicteric Noninjected   ENT: Thick secretions No Thrush   CARD: Regular No murmurs   RESP: Clear Coarse BS   GI: NABS Nontender   : Clear Urine Normal Genitalia   SKEL: obese No Clubbing   SKIN: Perfused No drug rash   NEURO: GCS less than 8 Minimal response   PSYCH:  Poor insight   LYMPH: No JOLANTA No edema       Recent Labs      01/27/18   0857  01/26/18   0603   NA  150*  148*   K  3.5  3.7   CL  116*  114*   CO2  27  30   BUN  39*  31*   CREA  1.78*  1.66*   GLU  194*  240*   CA  8.5  9.3   MG  1.9  2.1   PHOS  2.2*  1.7*   TBILI   --   0.9   ALT   --   21   SGOT   --   27   AP   --   58   TP   --   6.7   ALB   --   3.1*   GLOB   --   3.6       Recent Labs      01/27/18   0857   WBC  6.4   HGB  9.4*   PLT  123*       Recent Labs      01/26/18   1104  01/25/18   1712   PHI  7.342*  7.394   PCO2I  51.9*  43.3   PO2I  446*  80   HCO3I  28.1*  26.5*   FIO2I  100  0.24       XRAY Result:    Results from Hospital Encounter encounter on 01/22/18   XR CHEST PORT   Narrative EXAM:  XR CHEST PORT    INDICATION: 81year-old admitted with neurological symptoms found to have  moderate to large subdural hematoma located by seizures    COMPARISON: 1/25/2018. TECHNIQUE: Single frontal view of the chest.    FINDINGS:  Interval intubation with endotracheal tube just below the level of  clavicles. The location of the tanvi is difficult to visualize but it is  estimated to be approximately 3.5 cm from the tanvi. Interval placement of a  left subclavian central venous line terminating near the confluence of the  brachiocephalic veins. Weighted tip enteric tube terminates in the distal  esophagus. Mild pulmonary venous congestion. No new lobar consolidation, pleural  effusion, or visualized pneumothorax. .            Impression IMPRESSION:   1. Endotracheal tube terminating in the mid to lower trachea. 2.  Enteric tube terminating in the lower esophagus.           CT Result:    Results from Hospital Encounter encounter on 01/22/18   CT HEAD WO CONT   Narrative EXAM:  CT HEAD WO CONT    INDICATION:   Elenita Rodriguez cardia ---> ? Fluid change    COMPARISON: Earlier same day. CONTRAST:  None. TECHNIQUE: Unenhanced CT of the head was performed using 5 mm images. Brain and  bone windows were generated. CT dose reduction was achieved through use of a  standardized protocol tailored for this examination and automatic exposure  control for dose modulation. FINDINGS:  Bilateral subarachnoid hemorrhage over the parietal convexities is again noted,  accompanied by left parietal intraparenchymal hemorrhage which is stable. High  density collections in the subdural spaces are mixed with low-density fluid on  the left, stable. Extra-axial fluid over the right cerebral convexity is stable. Intraventricular hemorrhage is stable, as are lacunar infarctions in the basal  ganglia regions and thalami with microvascular ischemic change. Pneumocephalus  postoperative leak status post left craniotomy stable. Ventricular size is  stable. There is no shift of midline structures or new mass effect. Impression IMPRESSION: Stable postoperative changes since earlier same day. No new acute  change. PMH:  has a past medical history of Arthritis; Bladder cancer ([address] (3/13); Chronic pain; Colon cancer ([address] (3/16); Diabetes ([address] GERD (gastroesophageal reflux disease) (2008); Hypercholesterolemia; Hypertension; Kidney stones (1969); Nephrolithiasis; Prostate cancer ([address] (9/2003); PUD (peptic ulcer disease) (2008); Thromboembolus ([address] (2/8/16); Transient ischemic attack (2005); and Unspecified sleep apnea. He also has no past medical history of Unspecified adverse effect of anesthesia.     PSH:   has a past surgical history that includes hx lumbar laminectomy (2002); hx cervical fusion (2002); endoscopy, colon, diagnostic (2008); hx cyst removal; hx urological (1969); hx other surgical; hx other surgical (2008); hx other surgical (3/2016); hx heent (2006); hx tonsillectomy; hx heent; and colonoscopy (N/A, 3/6/2017). FHX: family history includes Cancer in his brother; Heart Attack in his brother; Heart Disease in his brother and father; Lung Disease in his father. There is no history of Anesth Problems. SHX: reports that he quit smoking about 19 years ago. He has a 36.00 pack-year smoking history. He has never used smokeless tobacco. He reports that he drinks about 3.5 oz of alcohol per week  He reports that he does not use illicit drugs. ALL:   Allergies   Allergen Reactions    Penicillins Rash    Metoprolol Rash    Tramadol Other (comments)     Unsure of reaction.     Lodine [Etodolac] Vertigo        MEDS:   [x] Reviewed  [] Not reviewed    Current Facility-Administered Medications   Medication Dose Route Frequency    bacitracin 500 unit/gram packet 1 Packet  1 Packet Topical PRN    insulin glargine (LANTUS) injection 30 Units  30 Units SubCUTAneous QHS    potassium chloride (KAON 10%) 20 mEq/15 mL oral liquid 40 mEq  40 mEq Per NG tube BID    potassium, sodium phosphates (NEUTRA-PHOS) packet 1 Packet  1 Packet Per G Tube QID    magnesium sulfate 2 g/50 ml IVPB (premix or compounded)  2 g IntraVENous ONCE    levETIRAcetam (KEPPRA) 1,000 mg in 0.9% sodium chloride 100 mL IVPB  1,000 mg IntraVENous Q12H    chlorhexidine (PERIDEX) 0.12 % mouthwash 15 mL  15 mL Oral BID    polyvinyl alcohol (LIQUIFILM TEARS) 1.4 % ophthalmic solution 2 Drop  2 Drop Both Eyes Q8H    fentaNYL citrate (PF) injection  mcg   mcg IntraVENous Q1H PRN    propofol (DIPRIVAN) infusion  0-50 mcg/kg/min IntraVENous TITRATE    famotidine (PEPCID) 40 mg/5 mL (8 mg/mL) oral suspension 20 mg  20 mg Per NG tube DAILY    insulin lispro (HUMALOG) injection   SubCUTAneous Q6H    cefepime (MAXIPIME) 2 g in 0.9% sodium chloride (MBP/ADV) 100 mL  2 g IntraVENous Q12H    metroNIDAZOLE (FLAGYL) IVPB premix 500 mg  500 mg IntraVENous Q8H    sodium chloride (NS) flush 10-30 mL  10-30 mL InterCATHeter PRN    sodium chloride (NS) flush 10 mL  10 mL InterCATHeter Q24H    sodium chloride (NS) flush 10 mL  10 mL InterCATHeter PRN    sodium chloride (NS) flush 10-40 mL  10-40 mL InterCATHeter Q8H    sodium chloride (NS) flush 20 mL  20 mL InterCATHeter Q24H    alteplase (CATHFLO) 1 mg in sterile water (preservative free) 1 mL injection  1 mg InterCATHeter PRN    lacosamide (VIMPAT) 100 mg in 0.9% sodium chloride IVPB  100 mg IntraVENous Q12H    0.45% sodium chloride infusion  75 mL/hr IntraVENous CONTINUOUS    atorvastatin (LIPITOR) tablet 40 mg  40 mg Per NG tube DAILY    losartan (COZAAR) tablet 50 mg  50 mg Per NG tube DAILY    naloxone (NARCAN) injection 0.4 mg  0.4 mg IntraVENous PRN    acetaminophen (TYLENOL) tablet 650 mg  650 mg Oral Q4H PRN    Or    acetaminophen (TYLENOL) solution 650 mg  650 mg Per NG tube Q4H PRN    Or    acetaminophen (TYLENOL) suppository 650 mg  650 mg Rectal Q4H PRN    glucose chewable tablet 16 g  4 Tab Oral PRN    dextrose (D50W) injection syrg 12.5-25 g  12.5-25 g IntraVENous PRN    glucagon (GLUCAGEN) injection 1 mg  1 mg IntraMUSCular PRN    hydrALAZINE (APRESOLINE) 20 mg/mL injection 10 mg  10 mg IntraVENous Q4H PRN    sodium chloride (NS) flush 5-10 mL  5-10 mL IntraVENous Q8H    sodium chloride (NS) flush 5-10 mL  5-10 mL IntraVENous PRN    acetaminophen (TYLENOL) tablet 650 mg  650 mg Oral Q4H PRN    HYDROcodone-acetaminophen (NORCO) 5-325 mg per tablet 1 Tab  1 Tab Oral Q4H PRN    ondansetron (ZOFRAN) injection 4 mg  4 mg IntraVENous Q4H PRN       Lida Godinez MD [facility]

## 2018-01-27 NOTE — PROGRESS NOTES
Bedside and Verbal shift change report given to Skyler Noble RN (oncoming nurse) by Nico Mendes RN (offgoing nurse). Report included the following information SBAR, Kardex, Intake/Output, MAR, Recent Results, Med Rec Status and Cardiac Rhythm NSR.

## 2018-01-27 NOTE — PROGRESS NOTES
POD 4  Seizure yesterday, on keppra and vimpat  reintubated yesterday, bronch found mucus plug  Cefepime and flagyl started empirically yesterday  Propofol gtt  afeb VSS  Pupils 3mm reactive bilateral  No tracking  Withdraws left UE  No movement on right  Incision without erythema, edema or drainage  S/p: crani and evacuation of SDH  Follow neuro exam closely  Continue supportive care

## 2018-01-27 NOTE — PROGRESS NOTES
0730: Bedside and Verbal shift change report given to Cristino Gosselin, RN (oncoming nurse) by Hans Jenkins RN (offgoing nurse). Report included the following information SBAR, Kardex, Intake/Output, MAR, Recent Results and Cardiac Rhythm NSR.     0800:  Assessment complete. No new issues. Patient moving upper extremities non purposefully, PERRLA at 3, withdraws to pain. 1200:  Reassessment complete. No new issues. 1600:  Reassessment complete. No new issues. 1930:  Bedside and Verbal shift change report given to Hans Jenkins RN (oncoming nurse) by Cristino Gosselin, RN (offgoing nurse). Report included the following information SBAR, Kardex, Intake/Output, MAR, Recent Results and Cardiac Rhythm NSR. Shift Summary:  Uneventful shift. Patient's neuro exam remains stable, opens eyes to pain, no command following, pupils equal and reactive 2-3. Moves upper extremities spontaneously, withdraws on lower extremities. No witnessed seizures. Family at bedside intermittently. Thick oral and tracheal secretions. Tolerating tube feeds.

## 2018-01-27 NOTE — PROGRESS NOTES
Hospitalist Progress Note  Ismael Rider MD  Answering service: [phone] from in house phone        Date of Service:  2018  NAME:  Mya Strauss  :  1935  MRN:  [id number]      Admission Summary:   Preeti Arreola a 81 y. o. male with past medical history of arthritis, bladder cancer, prostate cancer, chronic neck pain, colon cancer, type 2 diabetes mellitus, GERD, hypertension, hyperlipidemia, kidney stones, PUD, DVT, PE, TIA, sleep apnea presented to the ED from home with reported inability to talk. Heberyvon Moran is a non-historian.  As such, history was obtained per my review of ED and medical records.  Per these collective reports, patient had onset of dysarthria and expressive aphasia starting approximately two days ago.        Interval history / Subjective:   Remains intubated , sedated , on vent      Assessment & Plan:     AHRF due to mucus plugging   Sedated on vent MGMT per PCCM on propofol  Wean off per PCCM      Left moderate to large SDH with mass effect and midline shift. -S/p crani and evacuation   -Follow up CT head with residue al hematoma, and new hemorrhages  -Neurosurgery managing   -Being sent to CT for rhyth changes to make sure no acute intra cranial are evolving   - Acquired coagulopathy due to Eliquis. Eliquis stopped due to above.     Hypertension.  - cont mes through NG tube'  - Hold ace cr trending up  - PRN IV hydralazine      Type 2 diabetes mellitus: accucheks,humalog sliding scale.  -Target blood sugar 140-180   -Stop metformin,creatinine is elevated and start Lantus     Hyperlipidemia.     CKD III with worsening of creatinine: Iv fluids. Monitor. Renal dose adjust medications, avoid nephrotoxins.  - Hold ACE     Seizure : On keppra     Elevated wbc from?   On cefepime and Flagyl wnl TODAY    Code status: full  DVT prophylaxis: scd    Care Plan discussed with: Patient/Family and Nurse  Disposition: TBD     Hospital Problems  Date Reviewed: 1/23/2018          Codes Class Noted POA    * (Principal)SDH (subdural hematoma) ([facility] ICD-10-CM: I62.00  ICD-9-CM: 432.1  1/23/2018 Unknown                Review of Systems:   Review of systems not obtained due to patient factors. Vital Signs:    Last 24hrs VS reviewed since prior progress note. Most recent are:  Visit Vitals    /63 (BP 1 Location: Left arm, BP Patient Position: At rest)    Pulse 71    Temp (!) 100.7 °F (38.2 °C)    Resp 12    Ht 6' (1.829 m)    Wt 97.8 kg (215 lb 9.8 oz)    SpO2 96%    BMI 29.24 kg/m2         Intake/Output Summary (Last 24 hours) at 01/27/18 1348  Last data filed at 01/27/18 1200   Gross per 24 hour   Intake           4265.2 ml   Output              995 ml   Net           3270.2 ml        Physical Examination:             Constitutional:  sedated on vent    Eyes: Non icteric,non pallor pupil s pin point    HENT:  Head bandaged,drain in place. Oral mucous moist,    Resp:  CTA bilaterally. CV:  Regular rhythm, normal rate, no murmurs, gallops, rubs    GI:  Soft, non distended, non tender.  normoactive bowel sounds, no hepatosplenomegaly    :  No CVA or suprapubic tenderness   Skin  :  No erythema,rash,bullae,dipigmentation     Musculoskeletal:  SCDs on both legs ;no edema, warm, 2+ pulses throughout    Neurologic:  on vent             Data Review:    I personally reviewed  Image and labs      Labs:     Recent Labs      01/27/18   0857  01/26/18   0603   WBC  6.4  12.5*   HGB  9.4*  11.7*   HCT  29.7*  36.5*   PLT  123*  157     Recent Labs      01/27/18   0857  01/26/18   0603  01/25/18   1701   NA  150*  148*  148*   K  3.5  3.7  3.9   CL  116*  114*  113*   CO2  27  30  28   BUN  39*  31*  26*   CREA  1.78*  1.66*  1.55*   GLU  194*  240*  210*   CA  8.5  9.3  8.7   MG  1.9  2.1  1.8   PHOS  2.2*  1.7*  2.1*     Recent Labs      01/26/18   0603  01/25/18   0427   SGOT  27  21   ALT  21  14   AP 58  50   TBILI  0.9  0.8   TP  6.7  6.3*   ALB  3.1*  3.1*   GLOB  3.6  3.2     No results for input(s): INR, PTP, APTT in the last 72 hours. No lab exists for component: INREXT   No results for input(s): FE, TIBC, PSAT, FERR in the last 72 hours. No results found for: FOL, RBCF   No results for input(s): PH, PCO2, PO2 in the last 72 hours.   Recent Labs      01/25/18   1701   CPK  215   CKNDX  1.1   TROIQ  0.05*     Lab Results   Component Value Date/Time    Cholesterol, total 118 01/22/2018 11:28 PM    HDL Cholesterol 53 01/22/2018 11:28 PM    LDL, calculated 20 01/22/2018 11:28 PM    Triglyceride 225 01/22/2018 11:28 PM    CHOL/HDL Ratio 2.2 01/22/2018 11:28 PM     Lab Results   Component Value Date/Time    Glucose (POC) 201 01/27/2018 11:32 AM    Glucose (POC) 251 01/27/2018 05:19 AM    Glucose (POC) 273 01/27/2018 12:15 AM    Glucose (POC) 284 01/26/2018 06:07 PM    Glucose (POC) 244 01/26/2018 11:04 AM     Lab Results   Component Value Date/Time    Color Yellow 03/04/2013 09:14 AM    Appearance Cloudy 03/04/2013 09:14 AM    Specific gravity 1.015 03/10/2010 09:45 AM    pH (UA) 7.0 03/04/2013 09:14 AM    Protein NEGATIVE  03/10/2010 09:45 AM    Glucose NEGATIVE  03/10/2010 09:45 AM    Ketone Negative 03/04/2013 09:14 AM    Bilirubin Negative 03/04/2013 09:14 AM    Urobilinogen 0.2 03/10/2010 09:45 AM    Nitrites Negative 03/04/2013 09:14 AM    Leukocyte Esterase Negative 03/04/2013 09:14 AM    Bacteria Few 03/04/2013 09:14 AM    WBC 0-5 03/04/2013 09:14 AM    RBC >30 03/04/2013 09:14 AM         Medications Reviewed:     Current Facility-Administered Medications   Medication Dose Route Frequency    bacitracin 500 unit/gram packet 1 Packet  1 Packet Topical PRN    insulin glargine (LANTUS) injection 30 Units  30 Units SubCUTAneous QHS    potassium chloride (KAON 10%) 20 mEq/15 mL oral liquid 40 mEq  40 mEq Per NG tube BID    potassium, sodium phosphates (NEUTRA-PHOS) packet 1 Packet  1 Packet Per G Tube QID    levETIRAcetam (KEPPRA) 1,000 mg in 0.9% sodium chloride 100 mL IVPB  1,000 mg IntraVENous Q12H    chlorhexidine (PERIDEX) 0.12 % mouthwash 15 mL  15 mL Oral BID    polyvinyl alcohol (LIQUIFILM TEARS) 1.4 % ophthalmic solution 2 Drop  2 Drop Both Eyes Q8H    fentaNYL citrate (PF) injection  mcg   mcg IntraVENous Q1H PRN    propofol (DIPRIVAN) infusion  0-50 mcg/kg/min IntraVENous TITRATE    famotidine (PEPCID) 40 mg/5 mL (8 mg/mL) oral suspension 20 mg  20 mg Per NG tube DAILY    insulin lispro (HUMALOG) injection   SubCUTAneous Q6H    cefepime (MAXIPIME) 2 g in 0.9% sodium chloride (MBP/ADV) 100 mL  2 g IntraVENous Q12H    metroNIDAZOLE (FLAGYL) IVPB premix 500 mg  500 mg IntraVENous Q8H    sodium chloride (NS) flush 10-30 mL  10-30 mL InterCATHeter PRN    sodium chloride (NS) flush 10 mL  10 mL InterCATHeter Q24H    sodium chloride (NS) flush 10 mL  10 mL InterCATHeter PRN    sodium chloride (NS) flush 10-40 mL  10-40 mL InterCATHeter Q8H    sodium chloride (NS) flush 20 mL  20 mL InterCATHeter Q24H    alteplase (CATHFLO) 1 mg in sterile water (preservative free) 1 mL injection  1 mg InterCATHeter PRN    lacosamide (VIMPAT) 100 mg in 0.9% sodium chloride IVPB  100 mg IntraVENous Q12H    0.45% sodium chloride infusion  75 mL/hr IntraVENous CONTINUOUS    atorvastatin (LIPITOR) tablet 40 mg  40 mg Per NG tube DAILY    naloxone (NARCAN) injection 0.4 mg  0.4 mg IntraVENous PRN    acetaminophen (TYLENOL) tablet 650 mg  650 mg Oral Q4H PRN    Or    acetaminophen (TYLENOL) solution 650 mg  650 mg Per NG tube Q4H PRN    Or    acetaminophen (TYLENOL) suppository 650 mg  650 mg Rectal Q4H PRN    glucose chewable tablet 16 g  4 Tab Oral PRN    dextrose (D50W) injection syrg 12.5-25 g  12.5-25 g IntraVENous PRN    glucagon (GLUCAGEN) injection 1 mg  1 mg IntraMUSCular PRN    hydrALAZINE (APRESOLINE) 20 mg/mL injection 10 mg  10 mg IntraVENous Q4H PRN    sodium chloride (NS) flush 5-10 mL  5-10 mL IntraVENous Q8H    sodium chloride (NS) flush 5-10 mL  5-10 mL IntraVENous PRN    acetaminophen (TYLENOL) tablet 650 mg  650 mg Oral Q4H PRN    HYDROcodone-acetaminophen (NORCO) 5-325 mg per tablet 1 Tab  1 Tab Oral Q4H PRN    ondansetron (ZOFRAN) injection 4 mg  4 mg IntraVENous Q4H PRN     ______________________________________________________________________  EXPECTED LENGTH OF STAY: 2d 4h  ACTUAL LENGTH OF STAY:          4                 Saqib Morelos MD

## 2018-01-28 NOTE — PROGRESS NOTES
POD 5  Seizure 1/26, on keppra and vimpat  reintubated 1/26, bronch found mucus plug  Cefepime and flagyl started empirically 1/26  Bronch culture negative so far  Propofol gtt  Tmax - 100.8, VSS  Pupils 3mm reactive bilateral  No tracking  Withdraws left UE, occasional spontaneous movement left LE  Minimal movement on right  Incision without erythema, edema or drainage  WBC - 5.8, platelets - 503 (yest 123), Na - 148, Cl - 115  S/p: crani and evacuation of SDH  Follow neuro exam closely  Continue supportive care  Monitor platelets  Sodium elevated, but stable  Wean propofol gtt as tolerated

## 2018-01-28 NOTE — PROGRESS NOTES
[facility]    INTERVAL HISTORY / SUBJECTIVE:  Hospital Day: 7     2018    Not following commands  Chest x-ray unchanged personally visualized  Replace hypophosphatemia  Adjust tube feeds  increase insulin  Morning labs  Await return of mental status to extubate    ASSESSMENT and PLAN:    · Subdural hematoma-postop care per surgeon  · Hypertension-continue medications  · CKD & Hypernatremia-adjust fluids/water  · Elevated blood sugars-adjust medications and sliding scale insulin  · Post op encephalopathy/delirium-Monitoring  · Respiratory insufficiency-continue ventilator until encephalopathy clears  · Continue ICU care  · Aspiration precautions  · LEUNG prophylaxis and SCDs  · Acutely ill, at risk for decline with ongoing encephalopathy  · D/W RN    Hospital Problems  Date Reviewed: 2018          Codes Class Noted POA    * (Principal)SDH (subdural hematoma) ([facility] ICD-10-CM: I62.00  ICD-9-CM: 432.1  2018 Unknown              Medical Decision Making Today  · I have reviewed the flowsheet and previous days notes  · Abrupt change in neurologic status  · Review and Order of Radiology tests  · Review and Order of Medicine tests  · Discuss case with Specialist MD  · Independent visualization of Image  · I have personally reviewed the patients ECG / Tele       VITALS    Visit Vitals    /76    Pulse 67    Temp (!) 101.3 °F (38.5 °C)  Comment: tylenol given    Resp 16    Ht 6' (1.829 m)    Wt 98.3 kg (216 lb 11.4 oz)    SpO2 100%    BMI 29.39 kg/m2        Temp (24hrs), Av °F (37.8 °C), Min:98.7 °F (37.1 °C), Max:101.3 °F (38.5 °C)                   Intake/Output Summary (Last 24 hours) at 18 0820  Last data filed at 18 0800   Gross per 24 hour   Intake          4823.53 ml   Output             1060 ml   Net          3763.53 ml         Other: Minimally responsive    GEN: Nontoxic Mildly distressed   EYE: Anicteric Noninjected   ENT: Thick secretions No Thrush CARD: Regular No murmurs   RESP: Clear Coarse BS   GI: NABS Nontender   : Clear Urine Normal Genitalia   SKEL: obese No Clubbing   SKIN: Perfused No drug rash   NEURO: GCS less than 8 Minimal response   PSYCH:  Poor insight   LYMPH: No JOLANTA No edema       Recent Labs      01/28/18   0432   NA  148*   K  4.0   CL  115*   CO2  24   BUN  37*   CREA  1.77*   GLU  258*   CA  8.6   MG  2.1   PHOS  2.5*   TBILI  0.7   ALT  20   SGOT  19   AP  50   TP  5.6*   ALB  2.3*   GLOB  3.3       Recent Labs      01/28/18   0432   WBC  5.8   HGB  9.4*   PLT  112*       Recent Labs      01/26/18   1104  01/25/18   1712   PHI  7.342*  7.394   PCO2I  51.9*  43.3   PO2I  446*  80   HCO3I  28.1*  26.5*   FIO2I  100  0.24       XRAY Result:    Results from Hospital Encounter encounter on 01/22/18   XR CHEST PORT   Narrative EXAM:  XR CHEST PORT    INDICATION:  dyspnea    COMPARISON:  1/26/2018    FINDINGS: A portable AP radiograph of the chest was obtained at 0 350 hours. The  patient is on a cardiac monitor. The endotracheal tube terminates long-term  between the thoracic inlet and tanvi. The Dobbhoff tube extends below the  hemidiaphragm. The patient is rotated to the left. The left base is obscured. The cardiac and mediastinal contours and pulmonary vascularity are normal.  The  bones and soft tissues are grossly within normal limits. Impression IMPRESSION: Minimal left basilar subsegmental atelectasis            CT Result:    Results from Hospital Encounter encounter on 01/22/18   CT HEAD WO CONT   Narrative EXAM:  CT HEAD WO CONT    INDICATION:   Gloria Franklin cardia ---> ? Fluid change    COMPARISON: Earlier same day. CONTRAST:  None. TECHNIQUE: Unenhanced CT of the head was performed using 5 mm images. Brain and  bone windows were generated. CT dose reduction was achieved through use of a  standardized protocol tailored for this examination and automatic exposure  control for dose modulation.       FINDINGS:  Bilateral subarachnoid hemorrhage over the parietal convexities is again noted,  accompanied by left parietal intraparenchymal hemorrhage which is stable. High  density collections in the subdural spaces are mixed with low-density fluid on  the left, stable. Extra-axial fluid over the right cerebral convexity is stable. Intraventricular hemorrhage is stable, as are lacunar infarctions in the basal  ganglia regions and thalami with microvascular ischemic change. Pneumocephalus  postoperative leak status post left craniotomy stable. Ventricular size is  stable. There is no shift of midline structures or new mass effect. Impression IMPRESSION: Stable postoperative changes since earlier same day. No new acute  change. PMH:  has a past medical history of Arthritis; Bladder cancer ([address] (3/13); Chronic pain; Colon cancer ([address] (3/16); Diabetes ([address] GERD (gastroesophageal reflux disease) (2008); Hypercholesterolemia; Hypertension; Kidney stones (1969); Nephrolithiasis; Prostate cancer ([facility] (9/2003); PUD (peptic ulcer disease) (2008); Thromboembolus ([address] (2/8/16); Transient ischemic attack (2005); and Unspecified sleep apnea. He also has no past medical history of Unspecified adverse effect of anesthesia. PSH:   has a past surgical history that includes hx lumbar laminectomy (2002); hx cervical fusion (2002); endoscopy, colon, diagnostic (2008); hx cyst removal; hx urological (1969); hx other surgical; hx other surgical (2008); hx other surgical (3/2016); hx heent (2006); hx tonsillectomy; hx heent; and colonoscopy (N/A, 3/6/2017). FHX: family history includes Cancer in his brother; Heart Attack in his brother; Heart Disease in his brother and father; Lung Disease in his father. There is no history of Anesth Problems. SHX: reports that he quit smoking about 19 years ago. He has a 36.00 pack-year smoking history.  He has never used smokeless tobacco. He reports that he drinks about 3.5 oz of alcohol per week  He reports that he does not use illicit drugs. ALL:   Allergies   Allergen Reactions    Penicillins Rash    Metoprolol Rash    Tramadol Other (comments)     Unsure of reaction.     Lodine [Etodolac] Vertigo        MEDS:   [x] Reviewed  [] Not reviewed    Current Facility-Administered Medications   Medication Dose Route Frequency    potassium, sodium phosphates (NEUTRA-PHOS) packet 2 Packet  2 Packet Oral QID    insulin glargine (LANTUS) injection 40 Units  40 Units SubCUTAneous QHS    bacitracin 500 unit/gram packet 1 Packet  1 Packet Topical PRN    levETIRAcetam (KEPPRA) 1,000 mg in 0.9% sodium chloride 100 mL IVPB  1,000 mg IntraVENous Q12H    chlorhexidine (PERIDEX) 0.12 % mouthwash 15 mL  15 mL Oral BID    polyvinyl alcohol (LIQUIFILM TEARS) 1.4 % ophthalmic solution 2 Drop  2 Drop Both Eyes Q8H    fentaNYL citrate (PF) injection  mcg   mcg IntraVENous Q1H PRN    propofol (DIPRIVAN) infusion  0-50 mcg/kg/min IntraVENous TITRATE    famotidine (PEPCID) 40 mg/5 mL (8 mg/mL) oral suspension 20 mg  20 mg Per NG tube DAILY    insulin lispro (HUMALOG) injection   SubCUTAneous Q6H    cefepime (MAXIPIME) 2 g in 0.9% sodium chloride (MBP/ADV) 100 mL  2 g IntraVENous Q12H    metroNIDAZOLE (FLAGYL) IVPB premix 500 mg  500 mg IntraVENous Q8H    sodium chloride (NS) flush 10-30 mL  10-30 mL InterCATHeter PRN    sodium chloride (NS) flush 10 mL  10 mL InterCATHeter Q24H    sodium chloride (NS) flush 10 mL  10 mL InterCATHeter PRN    sodium chloride (NS) flush 10-40 mL  10-40 mL InterCATHeter Q8H    sodium chloride (NS) flush 20 mL  20 mL InterCATHeter Q24H    alteplase (CATHFLO) 1 mg in sterile water (preservative free) 1 mL injection  1 mg InterCATHeter PRN    lacosamide (VIMPAT) 100 mg in 0.9% sodium chloride IVPB  100 mg IntraVENous Q12H    0.45% sodium chloride infusion  75 mL/hr IntraVENous CONTINUOUS    atorvastatin (LIPITOR) tablet 40 mg  40 mg Per NG tube DAILY    naloxone (NARCAN) injection 0.4 mg  0.4 mg IntraVENous PRN    acetaminophen (TYLENOL) tablet 650 mg  650 mg Oral Q4H PRN    Or    acetaminophen (TYLENOL) solution 650 mg  650 mg Per NG tube Q4H PRN    Or    acetaminophen (TYLENOL) suppository 650 mg  650 mg Rectal Q4H PRN    glucose chewable tablet 16 g  4 Tab Oral PRN    dextrose (D50W) injection syrg 12.5-25 g  12.5-25 g IntraVENous PRN    glucagon (GLUCAGEN) injection 1 mg  1 mg IntraMUSCular PRN    hydrALAZINE (APRESOLINE) 20 mg/mL injection 10 mg  10 mg IntraVENous Q4H PRN    sodium chloride (NS) flush 5-10 mL  5-10 mL IntraVENous Q8H    sodium chloride (NS) flush 5-10 mL  5-10 mL IntraVENous PRN    acetaminophen (TYLENOL) tablet 650 mg  650 mg Oral Q4H PRN    HYDROcodone-acetaminophen (NORCO) 5-325 mg per tablet 1 Tab  1 Tab Oral Q4H PRN    ondansetron (ZOFRAN) injection 4 mg  4 mg IntraVENous Q4H PRN       Morelia Mendieta MD [facility]

## 2018-01-28 NOTE — PROGRESS NOTES
Pharmacist Note - Vancomycin Dosing    Consult provided for this 81 y.o. male for indication of PNA. Antibiotic regimen(s): Cefepime, Metronidazole, Vancomycin     Recent Labs      18   0432  18   0857  18   0603   WBC  5.8  6.4  12.5*   CREA  1.77*  1.78*  1.66*   BUN  37*  39*  31*     Frequency of BMP: Tomorrow AM   Height: 182.9 cm  Weight: 98.3 kg  Est CrCl: 39.8 ml/min; UO: 0.4 ml/kg/hr  Temp (24hrs), Av °F (37.8 °C), Min:98.7 °F (37.1 °C), Max:101.3 °F (38.5 °C)    Cultures:   Sputum-Scant streptococci, Beta hemolytic          Wound-pending          AFB culture-pending           Bronch-in process     Goal trough = 15 - 20 mcg/mL    Therapy will be initiated with a loading dose of 2000 mg IV x 1 to be followed by a random level w/aml tomorrow. Further dosing pending result. Pharmacy to follow patient daily and order levels / make dose adjustments as appropriate.     Matteo Raya, PharmD

## 2018-01-28 NOTE — PROGRESS NOTES
Hospitalist Progress Note  Saqib Morelos MD  Answering service: [phone] OR [phone] from in house phone        Date of Service:  2018  NAME:  Kaci Perez  :  1935  MRN:  [id number]      Admission Summary:   Mark Lazaro a 81 y. o. male with past medical history of arthritis, bladder cancer, prostate cancer, chronic neck pain, colon cancer, type 2 diabetes mellitus, GERD, hypertension, hyperlipidemia, kidney stones, PUD, DVT, PE, TIA, sleep apnea presented to the ED from home with reported inability to talk. Veronica Thacker is a non-historian.  As such, history was obtained per my review of ED and medical records.  Per these collective reports, patient had onset of dysarthria and expressive aphasia starting approximately two days ago.        Interval history / Subjective:   Remains intubated , sedated , on vent   Not extractable due to mental status did not improve     Assessment & Plan:     AHRF due to mucus plugging   Sedated on vent MGMT per PCCM on propofol  Wean off vent per PCCM      Left moderate to large SDH with mass effect and midline shift. -S/p crani and evacuation   -Follow up CT head with residue al hematoma, and new hemorrhages  -Neurosurgery managing   -Being sent to CT for rhyth changes to make sure no acute intra cranial are evolving   - Acquired coagulopathy due to Eliquis. Eliquis stopped due to above.     Hypertension.  - cont meds through NG tube'  - Hold ace cr trending up  - PRN IV hydralazine      Type 2 diabetes mellitus: accucheks,humalog sliding scale.  -Target blood sugar 140-180   -Stop metformin, creatinine is elevated and start Lantus     Hyperlipidemia.     CKD III with worsening of creatinine: Iv fluids. Monitor. Renal dose adjust medications, avoid nephrotoxins.  - Hold ACE     Seizure : On keppra     Elevated wbc from?   On cefepime and Flagyl wnl     Code status: full  DVT prophylaxis: [facility]    Care Plan discussed with: Patient/Family and Nurse  Disposition: [facility]     Hospital Problems  Date Reviewed: 1/23/2018          Codes Class Noted POA    * (Principal)SDH (subdural hematoma) ([facility] ICD-10-CM: I62.00  ICD-9-CM: 432.1  1/23/2018 Unknown                Review of Systems:   Review of systems not obtained due to patient factors. Vital Signs:    Last 24hrs VS reviewed since prior progress note. Most recent are:  Visit Vitals    /58    Pulse (!) 56    Temp (!) 101.1 °F (38.4 °C)    Resp 12    Ht 6' (1.829 m)    Wt 98.3 kg (216 lb 11.4 oz)    SpO2 99%    BMI 29.39 kg/m2         Intake/Output Summary (Last 24 hours) at 01/28/18 1241  Last data filed at 01/28/18 0900   Gross per 24 hour   Intake          4612.92 ml   Output              920 ml   Net          3692.92 ml        Physical Examination:             Constitutional:  sedated on vent    Eyes: Non icteric,non pallor pupil s pin point    HENT:  Head bandaged,drain in place. Oral mucous moist,    Resp:  CTA bilaterally. CV:  Regular rhythm, normal rate, no murmurs, gallops, rubs    GI:  Soft, non distended, non tender.  normoactive bowel sounds, no hepatosplenomegaly    :  No CVA or suprapubic tenderness   Skin  :  No erythema,rash,bullae,dipigmentation     Musculoskeletal:  SCDs on both legs ;no edema, warm, 2+ pulses throughout    Neurologic:  on vent             Data Review:    I personally reviewed  Image and labs      Labs:     Recent Labs      01/28/18   0432  01/27/18   0857   WBC  5.8  6.4   HGB  9.4*  9.4*   HCT  29.9*  29.7*   PLT  112*  123*     Recent Labs      01/28/18   0432  01/27/18   0857  01/26/18   0603   NA  148*  150*  148*   K  4.0  3.5  3.7   CL  115*  116*  114*   CO2  24  27  30   BUN  37*  39*  31*   CREA  1.77*  1.78*  1.66*   GLU  258*  194*  240*   CA  8.6  8.5  9.3   MG  2.1  1.9  2.1   PHOS  2.5*  2.2*  1.7*     Recent Labs      01/28/18   0432  01/26/18   0603   SGOT  19  27   ALT  20  21   AP  50  58   TBILI 0.7  0.9   TP  5.6*  6.7   ALB  2.3*  3.1*   GLOB  3.3  3.6     No results for input(s): INR, PTP, APTT in the last 72 hours. No lab exists for component: INREXT, INREXT   No results for input(s): FE, TIBC, PSAT, FERR in the last 72 hours. No results found for: FOL, RBCF   No results for input(s): PH, PCO2, PO2 in the last 72 hours.   Recent Labs      01/25/18   1701   CPK  215   CKNDX  1.1   TROIQ  0.05*     Lab Results   Component Value Date/Time    Cholesterol, total 118 01/22/2018 11:28 PM    HDL Cholesterol 53 01/22/2018 11:28 PM    LDL, calculated 20 01/22/2018 11:28 PM    Triglyceride 225 01/22/2018 11:28 PM    CHOL/HDL Ratio 2.2 01/22/2018 11:28 PM     Lab Results   Component Value Date/Time    Glucose (POC) 272 01/28/2018 12:10 PM    Glucose (POC) 257 01/28/2018 05:41 AM    Glucose (POC) 276 01/28/2018 12:01 AM    Glucose (POC) 261 01/27/2018 05:24 PM    Glucose (POC) 201 01/27/2018 11:32 AM     Lab Results   Component Value Date/Time    Color Yellow 03/04/2013 09:14 AM    Appearance Cloudy 03/04/2013 09:14 AM    Specific gravity 1.015 03/10/2010 09:45 AM    pH (UA) 7.0 03/04/2013 09:14 AM    Protein NEGATIVE  03/10/2010 09:45 AM    Glucose NEGATIVE  03/10/2010 09:45 AM    Ketone Negative 03/04/2013 09:14 AM    Bilirubin Negative 03/04/2013 09:14 AM    Urobilinogen 0.2 03/10/2010 09:45 AM    Nitrites Negative 03/04/2013 09:14 AM    Leukocyte Esterase Negative 03/04/2013 09:14 AM    Bacteria Few 03/04/2013 09:14 AM    WBC 0-5 03/04/2013 09:14 AM    RBC >30 03/04/2013 09:14 AM         Medications Reviewed:     Current Facility-Administered Medications   Medication Dose Route Frequency    potassium, sodium phosphates (NEUTRA-PHOS) packet 2 Packet  2 Packet Oral QID    insulin glargine (LANTUS) injection 40 Units  40 Units SubCUTAneous QHS    bacitracin 500 unit/gram packet 1 Packet  1 Packet Topical PRN    levETIRAcetam (KEPPRA) 1,000 mg in 0.9% sodium chloride 100 mL IVPB  1,000 mg IntraVENous Q12H  chlorhexidine (PERIDEX) 0.12 % mouthwash 15 mL  15 mL Oral BID    polyvinyl alcohol (LIQUIFILM TEARS) 1.4 % ophthalmic solution 2 Drop  2 Drop Both Eyes Q8H    fentaNYL citrate (PF) injection  mcg   mcg IntraVENous Q1H PRN    propofol (DIPRIVAN) infusion  0-50 mcg/kg/min IntraVENous TITRATE    famotidine (PEPCID) 40 mg/5 mL (8 mg/mL) oral suspension 20 mg  20 mg Per NG tube DAILY    insulin lispro (HUMALOG) injection   SubCUTAneous Q6H    cefepime (MAXIPIME) 2 g in 0.9% sodium chloride (MBP/ADV) 100 mL  2 g IntraVENous Q12H    metroNIDAZOLE (FLAGYL) IVPB premix 500 mg  500 mg IntraVENous Q8H    sodium chloride (NS) flush 10-30 mL  10-30 mL InterCATHeter PRN    sodium chloride (NS) flush 10 mL  10 mL InterCATHeter Q24H    sodium chloride (NS) flush 10 mL  10 mL InterCATHeter PRN    sodium chloride (NS) flush 10-40 mL  10-40 mL InterCATHeter Q8H    sodium chloride (NS) flush 20 mL  20 mL InterCATHeter Q24H    alteplase (CATHFLO) 1 mg in sterile water (preservative free) 1 mL injection  1 mg InterCATHeter PRN    lacosamide (VIMPAT) 100 mg in 0.9% sodium chloride IVPB  100 mg IntraVENous Q12H    0.45% sodium chloride infusion  75 mL/hr IntraVENous CONTINUOUS    atorvastatin (LIPITOR) tablet 40 mg  40 mg Per NG tube DAILY    naloxone (NARCAN) injection 0.4 mg  0.4 mg IntraVENous PRN    acetaminophen (TYLENOL) tablet 650 mg  650 mg Oral Q4H PRN    Or    acetaminophen (TYLENOL) solution 650 mg  650 mg Per NG tube Q4H PRN    Or    acetaminophen (TYLENOL) suppository 650 mg  650 mg Rectal Q4H PRN    glucose chewable tablet 16 g  4 Tab Oral PRN    dextrose (D50W) injection syrg 12.5-25 g  12.5-25 g IntraVENous PRN    glucagon (GLUCAGEN) injection 1 mg  1 mg IntraMUSCular PRN    hydrALAZINE (APRESOLINE) 20 mg/mL injection 10 mg  10 mg IntraVENous Q4H PRN    sodium chloride (NS) flush 5-10 mL  5-10 mL IntraVENous Q8H    sodium chloride (NS) flush 5-10 mL  5-10 mL IntraVENous PRN    acetaminophen (TYLENOL) tablet 650 mg  650 mg Oral Q4H PRN    HYDROcodone-acetaminophen (NORCO) 5-325 mg per tablet 1 Tab  1 Tab Oral Q4H PRN    ondansetron (ZOFRAN) injection 4 mg  4 mg IntraVENous Q4H PRN     ______________________________________________________________________  EXPECTED LENGTH OF STAY: 2d 4h  ACTUAL LENGTH OF STAY:          Brad Harper MD

## 2018-01-28 NOTE — PROGRESS NOTES
0730: Bedside and Verbal shift change report given to Monica Cadena RN (oncoming nurse) by Jaylene James RN (offgoing nurse). Report included the following information SBAR, Kardex, Intake/Output, MAR, Recent Results and Cardiac Rhythm NSR.     0800:  Assessment complete. No new issues. Propofol stopped. No command following. Patient opens eyes to voice, no tracking, no focusing. Withdraws to pain on all 4 extremities, moves left side spontaneously with stimulation. Febrile at 101.3, tylenol given. PRN hydralazine given for SBP of 170. Will continue to monitor. 1200:  Reassessment complete. No new issues. Fever down to 99.9.    1600:  Reassessment complete. No new issues. 1930:  Bedside and Verbal shift change report given to Dee Dee Villatoro RN (oncoming nurse) by Monica Cadena RN (offgoing nurse). Report included the following information SBAR, Kardex, Intake/Output, MAR, Recent Results and Cardiac Rhythm NSR. Shift Summary:  Uneventful shift. Patient's neuro assessment remains stable with no command following, withdraws on all 4 extremities, nonpurposeful movement consistently on the left, sporadically on the right. PERRLA at 3's. Good UOP, one BM, tolerating tube feeds. No witnessed seizure activity. Vancomycin added for continued fevers with normal WBC's.

## 2018-01-28 NOTE — PROGRESS NOTES
Shift Summary- Uneventful shift. Neurologically the same overnight, spontaneous movement in upper extremities and left lower extremity. Withdraws with the right lower extremity. SBP <160. Tube feeds being tolerated. Sedated on propofol. Bedside and Verbal shift change report given to Abhijeet Ma RN (oncoming nurse) by Kurt Evans RN (offgoing nurse). Report included the following information SBAR, Kardex, Intake/Output, MAR, Recent Results, Med Rec Status and Cardiac Rhythm NSR.

## 2018-01-29 NOTE — PROGRESS NOTES
[facility]  Pulmonary, Critical Care, and Sleep Medicine  Name: Duc Fatima MRN: [id number]   : 1935 [facility]   Date: 2018      Subjective:    Alert on SBT RSBI < 70 PSV 5/5. Follows some commands. Review of systems not obtained due to patient factors.     Objective:      Exam  Vital Signs:  Visit Vitals    /78 (BP 1 Location: Left arm, BP Patient Position: At rest)    Pulse 71    Temp (!) 100.9 °F (38.3 °C)    Resp 27    Ht 6' (1.829 m)    Wt 102.1 kg (225 lb 1.4 oz)    SpO2 98%    BMI 30.53 kg/m2     Temp (24hrs), Av °F (37.8 °C), Min:99.4 °F (37.4 °C), Max:100.9 °F (38.3 °C)          Intake/Output Summary (Last 24 hours) at 18 0915  Last data filed at 18 0811   Gross per 24 hour   Intake          5264.29 ml   Output             1650 ml   Net          3614.29 ml     GENERAL: well developed and in mild distress, HEENT:  PERRL, EOMI, no alar flaring or epistaxis, oral mucosa moist without cyanosis, NECK:  no jugular vein distention, no retractions, no thyromegaly or masses, LUNGS: decreased breath sounds billaterally and with rhonchi , HEART:  Regular rate and rhythm with no MGR; no edema is present, ABDOMEN:  soft with no tenderness, bowel sounds present, EXTREMITIES:  warm with no cyanosis and SKIN:  no jaundice or ecchymosis     Labs:  Recent Labs      18   0410  18   0432  18   0857   WBC  5.0  5.8  6.4   HGB  9.6*  9.4*  9.4*   HCT  30.2*  29.9*  29.7*   PLT  98*  112*  123*     Recent Labs      18   0410  18   0432  18   0857   NA  145  148*  150*   K  4.1  4.0  3.5   CL  112*  115*  116*   CO2  25  24  27   GLU  241*  258*  194*   BUN  37*  37*  39*   CREA  1.62*  1.77*  1.78*   CA  8.5  8.6  8.5   MG  1.9  2.1  1.9   PHOS  3.5  2.5*  2.2*   ALB  2.1*  2.3*   --    TBILI  0.6  0.7   --    SGOT     --    ALT     --        Recent Labs      18   1104   PHI  7.342* PO2I  446*   PCO2I  51.9*         Impression     Plan  1. S/p craniotomy and evacuation left SDH 1/23/18 s/p GLF as oupt  2. AHRF- mucous plugging possible PNA. Fever/Leukocytosis. Beta hemolytic strep sputum  3. Sz  4. hypernatremia  5. CKD III  6. HTN  7. DM2  8. HLP  9. D/o DVT/PE- on apixiban as outpt  10. H/o CVA · Passed SBT this AM. However pt still somnolent, will re CT head to make sure no extension of bleed prior to extubation  · Limit sedatives and narcotics  · Pulm toilet/nebs  · SUP  · DVT proph  · Pt remains acutely ill and at risk for decline due to mucous plugging from PNa and resp failure. · Stop vanco  · Thrombocytopenia may be med related, stop vanco and pepcid.         Medical Decision Making Today  · I have reviewed the flowsheet and previous days notes  · One or more chronic illnesses with severe exacerbation, progression or side effects of treatment  · Acute or chronic illness that poses a threat to life or bodily function  · Abrupt change in neurologic status  · Diagnostic endoscopies with identified risk factors  · Pareneteral controlled substances - Adjusted / Weaned / Started  · Drug therapy requiring intensive monitoring for toxicity  · Review and order of Clinical lab tests  · Review and Order of Radiology tests  · Review and Order of Medicine tests  · Independent visualization of Image  · Review and summarize records or history fromprevious days notes  · Reviewed Ventilator / NiPPV  · I have personally reviewed the patients ECG / Bard Pablo MD  1/29/2018

## 2018-01-29 NOTE — PROGRESS NOTES
There is no longer any mass effect from the residual fluid at operative site.  No further surgery required at this time  Continue supportive care, likely will need SNF later

## 2018-01-29 NOTE — PROGRESS NOTES
1930: Bedside and Verbal shift change report given to [facility] ISA TOBIN (oncoming nurse) by Kalpana Askew RN (offgoing nurse). Report included the following information SBAR, Procedure Summary, Intake/Output, MAR, Recent Results, Cardiac Rhythm NSR/SB, PVCs, PACs and Alarm Parameters . 0730: Bedside shift change report given to Santi Ly RN (oncoming nurse) by [facility] ISA TOBIN (offgoing nurse). Report included the following information SBAR, Intake/Output, MAR, Recent Results, Cardiac Rhythm NSR/SB, PVCs, PACs and Alarm Parameters . Shift summary: TF stopped at 0400 and Propofol stopped at 0700 for SBT. Off sedation pt opens eyes spontaneously. Move (L) side and (R) leg non purposefully, (R) arm withdraws from pain. Lindquist in place with good UO. Multiple large loose BM overnight. Updated daughter on pt status.

## 2018-01-29 NOTE — PROGRESS NOTES
Care Management: per hospitalist / Dr. Mala Beckwith:   Remains intubated , sedated , on vent   Not extractable due to mental status did not improve  CT head boarded to re-evaluate extant of the SDH    No family present in room and patient remains vented. To follow transition of care per MD to be determined.     Pietro Tao RN BSN CM

## 2018-01-29 NOTE — PROGRESS NOTES
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 He apparently had a fall about 2 months ago and was seen at [address] [place] [address] with a reportedly negative head CT. He developed a 2 day history of word finding difficulties and presented to the ER at [facility]. Found to have a large left chronic SDH with membranes and midline shift. He was taken to the OR by Dr. Nick Andrew yesterday evening for evacuation of the hemorrhage. Approximately 300 cc of blood was removed from the subdural space. Subjective: The patient was re-intubated on Friday. He remained intubated over the weekend. Slow to wake up. Propofol is being weaned. Started on empiric antibiotics on Friday and culture came back as strep. His platelet count has been dropping since initiation of antibiotics. Vancomycin stopped today. Vimpat was added on Friday for seizure control. Objective:     Vital signs  Temp (24hrs), Av °F (37.8 °C), Min:99.4 °F (37.4 °C), Max:100.9 °F (38.3 °C)   701 - 1900  In: 235 [I.V.:175]  Out: 325 [Urine:325]  1901 - 700  In: 8350.1 [I.V.:5070.1]  Out:  [Urine:]    Visit Vitals    /61    Pulse 65    Temp (!) 100.9 °F (38.3 °C)    Resp 12    Ht 6' (1.829 m)    Wt 102.1 kg (225 lb 1.4 oz)    SpO2 96%    BMI 30.53 kg/m2    O2 Flow Rate (L/min): 4 l/min O2 Device: Endotracheal tube, Ventilator     Pain control  Pain Assessment  Pain Scale 1: Adult Nonverbal Pain Scale  Pain Intensity 1: 0  Pain Intervention(s) 1: Medication (see MAR)    PT/OT  Gait                 Physical Exam:  Gen:NAD. Intubated. Propofol turned off for exam  Neuro: Lethargic. Eyes open. Tracks around room. PERRL.  Moves all extremities spontaneously but does not follow commands. Skin: Left frontoparietal incision C/D/I with staples in place. No erythema, edema, or drainage. CT head without contrast on 01/25/18 shows post surgical changes of left craniotomy with no series change in residual subdural hematoma, left posterior parietal parafalcine hematoma, intraventricular hemorrhage, left parietal parenchymal hemorrhage, and bilateral parietal subarachnoid hemorrhage. 24 hour results:    Recent Results (from the past 24 hour(s))   GLUCOSE, POC    Collection Time: 01/28/18 12:10 PM   Result Value Ref Range    Glucose (POC) 272 (H) 65 - 100 mg/dL    Performed by [facility], POC    Collection Time: 01/28/18  5:39 PM   Result Value Ref Range    Glucose (POC) 237 (H) 65 - 100 mg/dL    Performed by Benedict Cadena    GLUCOSE, POC    Collection Time: 01/29/18 12:16 AM   Result Value Ref Range    Glucose (POC) 219 (H) 65 - 100 mg/dL    Performed by Luiz Sarabia    CBC WITH AUTOMATED DIFF    Collection Time: 01/29/18  4:10 AM   Result Value Ref Range    WBC 5.0 4.1 - 11.1 K/uL    RBC 3.18 (L) 4.10 - 5.70 M/uL    HGB 9.6 (L) 12.1 - 17.0 g/dL    HCT 30.2 (L) 36.6 - 50.3 %    MCV 95.0 80.0 - 99.0 FL    MCH 30.2 26.0 - 34.0 PG    MCHC 31.8 30.0 - 36.5 g/dL    RDW 13.7 11.5 - 14.5 %    PLATELET 98 (L) 851 - 400 K/uL    MPV 12.9 8.9 - 12.9 FL    NRBC 0.0 0  WBC    ABSOLUTE NRBC 0.00 0.00 - 0.01 K/uL    NEUTROPHILS 80 (H) 32 - 75 %    LYMPHOCYTES 7 (L) 12 - 49 %    MONOCYTES 10 5 - 13 %    EOSINOPHILS 2 0 - 7 %    BASOPHILS 0 0 - 1 %    IMMATURE GRANULOCYTES 0 0.0 - 0.5 %    ABS. NEUTROPHILS 4.0 1.8 - 8.0 K/UL    ABS. LYMPHOCYTES 0.4 (L) 0.8 - 3.5 K/UL    ABS. MONOCYTES 0.5 0.0 - 1.0 K/UL    ABS. EOSINOPHILS 0.1 0.0 - 0.4 K/UL    ABS. BASOPHILS 0.0 0.0 - 0.1 K/UL    ABS. IMM.  GRANS. 0.0 0.00 - 0.04 K/UL    DF AUTOMATED     METABOLIC PANEL, COMPREHENSIVE    Collection Time: 01/29/18  4:10 AM   Result Value Ref Range    Sodium 145 136 - 145 mmol/L Potassium 4.1 3.5 - 5.1 mmol/L    Chloride 112 (H) 97 - 108 mmol/L    CO2 25 21 - 32 mmol/L    Anion gap 8 5 - 15 mmol/L    Glucose 241 (H) 65 - 100 mg/dL    BUN 37 (H) 6 - 20 MG/DL    Creatinine 1.62 (H) 0.70 - 1.30 MG/DL    BUN/Creatinine ratio 23 (H) 12 - 20      GFR est AA 50 (L) >60 ml/min/1.73m2    GFR est non-AA 41 (L) >60 ml/min/1.73m2    Calcium 8.5 8.5 - 10.1 MG/DL    Bilirubin, total 0.6 0.2 - 1.0 MG/DL    ALT (SGPT) 21 12 - 78 U/L    AST (SGOT) 20 15 - 37 U/L    Alk. phosphatase 55 45 - 117 U/L    Protein, total 5.4 (L) 6.4 - 8.2 g/dL    Albumin 2.1 (L) 3.5 - 5.0 g/dL    Globulin 3.3 2.0 - 4.0 g/dL    A-G Ratio 0.6 (L) 1.1 - 2.2     MAGNESIUM    Collection Time: 01/29/18  4:10 AM   Result Value Ref Range    Magnesium 1.9 1.6 - 2.4 mg/dL   PHOSPHORUS    Collection Time: 01/29/18  4:10 AM   Result Value Ref Range    Phosphorus 3.5 2.6 - 4.7 MG/DL   VANCOMYCIN, RANDOM    Collection Time: 01/29/18  4:10 AM   Result Value Ref Range    Vancomycin, random 11.8 UG/ML   GLUCOSE, POC    Collection Time: 01/29/18  5:50 AM   Result Value Ref Range    Glucose (POC) 239 (H) 65 - 100 mg/dL    Performed by Mali Méndez           Assessment:     Principal Problem:    SDH (subdural hematoma) ([address] (1/23/2018)        Plan:   1. Left traumatic chronic subdural hematoma   - s/p crani 01/24   - Cont Keppra and Vimpat   - Repeat head CT this am   - Keep intubated today as he is not awake enough to follow commands  2. Brain compression   - due to #1   - plans as above  3. HTN   - SBP<160   - Cardene PRN   - Hydralazine PRN   - Restart home BP meds down dobhoff   - Intensivist following  4. Diabetes mellitus, type 2   - Cont Lantus 40 units   - SSI and accu-checks   - Intensivist following  5. ABIMBOLA on CKD, stage 3   - Cont IVF   - Avoid nephrotoxic agents   - Hospitalist/intensivist following  6. Acute encephalopathy   - multi-factorial     - limit sedating agents   - supportive care  7.  Hypernatremia   - Cont 1/2NS   - Cont free water flushes with tube feeds   - Downtrending  8. Atrial fibrillation   - Metoprolol added   - Electrolytes corrected   - Hospitalist/intensivist following  9. Hypomagenesemia   - Resolved   - Intensivist following  10. Hypophosphatemia   - Resolved   - Electrolyte replacement protocol   - Intensivist following  11. Acute respiratory insufficiency   - Pt re-intubated 01/26   - Pulmonary following   - Still not following commands enough to extubate the patient today  12. Focal seizure   - Cont Keppra 1000 mg bid   - Cont Vimpat 100 mg bid  13. Thrombocytopenia   - Possible medication induced. Vancomycin and Pepcid stopped   - Cont to monitor with new labs in am   - Heparin antibody   - Intensivist following    Activity: OOB with assist  DVT ppx: SCDs  Dispo: tbd    Plan d/w Dr. Ligia Castillo, ICU nurse, intensivist. Will re-eval after head CT is completed.       Luanne Méndez NP

## 2018-01-29 NOTE — PROGRESS NOTES
Pharmacy Consult Note: Vancomycin dosing  Day #2 of Vancomycin  Indication:  PNA  Current regimen:  per level  Abx regimen:  Vancomycin, Cefepime, metronidazole  ID Following ?: NO  Frequency of BMP?: every day through     Recent Labs      18   0410  18   0432  18   0857   WBC  5.0  5.8  6.4   CREA  1.62*  1.77*  1.78*   BUN  37*  37*  39*     Est CrCl: 44 ml/min; UO: 0.7 ml/kg/hr  Temp (24hrs), Av.2 °F (37.9 °C), Min:99.4 °F (37.4 °C), Max:101.3 °F (38.5 °C)    Cultures:    Bronch: gBstrep - pending   AFB: Neg - pending     Goal trough = 15 - 20 mcg/mL    Recent trough history (date/time/level/dose/action taken):  Random  @ 0410 = 11.8 mcg/ml ~18 hrs post dose    Plan: Change to 1500 mg IV q24h.

## 2018-01-29 NOTE — PROGRESS NOTES
Problem: Falls - Risk of  Goal: *Absence of Falls  Document Arleen Fall Risk and appropriate interventions in the flowsheet. Outcome: Progressing Towards Goal  Fall Risk Interventions:       Mentation Interventions: Adequate sleep, hydration, pain control, Door open when patient unattended, Evaluate medications/consider consulting pharmacy, More frequent rounding, Room close to nurse's station    Medication Interventions: Evaluate medications/consider consulting pharmacy    Elimination Interventions:  Toileting schedule/hourly rounds    History of Falls Interventions: Evaluate medications/consider consulting pharmacy, Door open when patient unattended, Room close to nurse's station        Problem: Craniotomy:Post Op Day 4 (Use for subsequent days until discharge)  Goal: Off Pathway (Use only if patient is Off Pathway)  Outcome: Not Progressing Towards Goal  Variance: Patient Condition  Pt intubated and sedated, minimal neuro activity off sedation, SBT in morning, TF changed to glucenera to control BG, now with frequent stools

## 2018-01-29 NOTE — PROGRESS NOTES
Hospitalist Progress Note  Ismael Rider MD  Answering service: [phone] OR 36 from in house phone        Date of Service:  2018  NAME:  Mya Strauss  :  1935  MRN:  [id number]      Admission Summary:   Preeti Arreola a 81 y. o. male with past medical history of arthritis, bladder cancer, prostate cancer, chronic neck pain, colon cancer, type 2 diabetes mellitus, GERD, hypertension, hyperlipidemia, kidney stones, PUD, DVT, PE, TIA, sleep apnea presented to the ED from home with reported inability to talk. Heber Moran is a non-historian.  As such, history was obtained per my review of ED and medical records.  Per these collective reports, patient had onset of dysarthria and expressive aphasia starting approximately two days ago.        Interval history / Subjective:   Remains intubated , sedated , on vent   Not extractable due to mental status did not improve  CT head boarded to re-evaluate extant of the SDH     Assessment & Plan:     AHRF due to mucus plugging   Sedated on vent MGMT per PCCM on propofol  Wean off vent per PCCM   Sedation turned off but mental status not improving preventing extubation     Left moderate to large SDH with mass effect and midline shift. -S/p crani and evacuation   -Follow up CT head with residue al hematoma, and new hemorrhages  -Neurosurgery managing   - Acquired coagulopathy due to Eliquis. Eliquis stopped due to above.     Hypertension.  - cont meds through NG tube'  - Hold ace cr trending up  - PRN IV hydralazine      Type 2 diabetes mellitus: accucheks,humalog sliding scale.  -Target blood sugar 140-180   -Stop metformin, creatinine is elevated and start Lantus 40 units     Hyperlipidemia.     CKD III with worsening of creatinine: Iv fluids. Monitor. Renal dose adjust medications, avoid nephrotoxins.  - Hold ACE     Seizure : On keppra     Elevated wbc from?   On cefepime and Flagyl wnl Code status: full  DVT prophylaxis: scd    Care Plan discussed with: Patient/Family and Nurse  Disposition: TBD     Hospital Problems  Date Reviewed: 1/23/2018          Codes Class Noted POA    * (Principal[facility] ICD-10-CM: I62.00  ICD-9-CM: 432.1  1/23/2018 Unknown                Review of Systems:   Review of systems not obtained due to patient factors. Vital Signs:    Last 24hrs VS reviewed since prior progress note. Most recent are:  Visit Vitals    /61    Pulse 65    Temp (!) 100.9 °F (38.3 °C)    Resp 12    Ht 6' (1.829 m)    Wt 102.1 kg (225 lb 1.4 oz)    SpO2 96%    BMI 30.53 kg/m2         Intake/Output Summary (Last 24 hours) at 01/29/18 1224  Last data filed at 01/29/18 1000   Gross per 24 hour   Intake          4706.69 ml   Output             1625 ml   Net          3081.69 ml        Physical Examination:             Constitutional:  on vent    Eyes: Non icteric,non pallor pupil s pin point    HENT:  Head bandaged,drain in place. Oral mucous moist,    Resp:  CTA bilaterally. CV:  Regular rhythm, normal rate, no murmurs, gallops, rubs    GI:  Soft, non distended, non tender.  normoactive bowel sounds, no hepatosplenomegaly    :  No CVA or suprapubic tenderness   Skin  :  No erythema,rash,bullae,dipigmentation     Musculoskeletal:  SCDs on both legs ;no edema, warm, 2+ pulses throughout    Neurologic:  on vent             Data Review:    I personally reviewed  Image and labs      Labs:     Recent Labs      01/29/18 0410  01/28/18 0432   WBC  5.0  5.8   HGB  9.6*  9.4*   HCT  30.2*  29.9*   PLT  98*  112*     Recent Labs      01/29/18   0410  01/28/18   0432  01/27/18   0857   NA  145  148*  150*   K  4.1  4.0  3.5   CL  112*  115*  116*   CO2  25  24  27   BUN  37*  37*  39*   CREA  1.62*  1.77*  1.78*   GLU  241*  258*  194*   CA  8.5  8.6  8.5   MG  1.9  2.1  1.9   PHOS  3.5  2.5*  2.2*     Recent Labs      01/29/18   0410  01/28/18   0432   SGOT  20 19   ALT  21  20   AP  55  50   TBILI  0.6  0.7   TP  5.4*  5.6*   ALB  2.1*  2.3*   GLOB  3.3  3.3     No results for input(s): INR, PTP, APTT in the last 72 hours. No lab exists for component: INREXT, INREXT   No results for input(s): FE, TIBC, PSAT, FERR in the last 72 hours. No results found for: FOL, RBCF   No results for input(s): PH, PCO2, PO2 in the last 72 hours. No results for input(s): CPK, CKNDX, TROIQ in the last 72 hours.     No lab exists for component: CPKMB  Lab Results   Component Value Date/Time    Cholesterol, total 118 01/22/2018 11:28 PM    HDL Cholesterol 53 01/22/2018 11:28 PM    LDL, calculated 20 01/22/2018 11:28 PM    Triglyceride 225 01/22/2018 11:28 PM    CHOL/HDL Ratio 2.2 01/22/2018 11:28 PM     Lab Results   Component Value Date/Time    Glucose (POC) 239 01/29/2018 05:50 AM    Glucose (POC) 219 01/29/2018 12:16 AM    Glucose (POC) 237 01/28/2018 05:39 PM    Glucose (POC) 272 01/28/2018 12:10 PM    Glucose (POC) 257 01/28/2018 05:41 AM     Lab Results   Component Value Date/Time    Color Yellow 03/04/2013 09:14 AM    Appearance Cloudy 03/04/2013 09:14 AM    Specific gravity 1.015 03/10/2010 09:45 AM    pH (UA) 7.0 03/04/2013 09:14 AM    Protein NEGATIVE  03/10/2010 09:45 AM    Glucose NEGATIVE  03/10/2010 09:45 AM    Ketone Negative 03/04/2013 09:14 AM    Bilirubin Negative 03/04/2013 09:14 AM    Urobilinogen 0.2 03/10/2010 09:45 AM    Nitrites Negative 03/04/2013 09:14 AM    Leukocyte Esterase Negative 03/04/2013 09:14 AM    Bacteria Few 03/04/2013 09:14 AM    WBC 0-5 03/04/2013 09:14 AM    RBC >30 03/04/2013 09:14 AM         Medications Reviewed:     Current Facility-Administered Medications   Medication Dose Route Frequency    pantoprazole (PROTONIX) 40 mg in sodium chloride 0.9 % 10 mL injection  40 mg IntraVENous DAILY    potassium, sodium phosphates (NEUTRA-PHOS) packet 2 Packet  2 Packet Oral QID    insulin glargine (LANTUS) injection 40 Units  40 Units SubCUTAneous QHS  bacitracin 500 unit/gram packet 1 Packet  1 Packet Topical PRN    levETIRAcetam (KEPPRA) 1,000 mg in 0.9% sodium chloride 100 mL IVPB  1,000 mg IntraVENous Q12H    chlorhexidine (PERIDEX) 0.12 % mouthwash 15 mL  15 mL Oral BID    polyvinyl alcohol (LIQUIFILM TEARS) 1.4 % ophthalmic solution 2 Drop  2 Drop Both Eyes Q8H    fentaNYL citrate (PF) injection  mcg   mcg IntraVENous Q1H PRN    propofol (DIPRIVAN) infusion  0-50 mcg/kg/min IntraVENous TITRATE    insulin lispro (HUMALOG) injection   SubCUTAneous Q6H    cefepime (MAXIPIME) 2 g in 0.9% sodium chloride (MBP/ADV) 100 mL  2 g IntraVENous Q12H    metroNIDAZOLE (FLAGYL) IVPB premix 500 mg  500 mg IntraVENous Q8H    sodium chloride (NS) flush 10-30 mL  10-30 mL InterCATHeter PRN    sodium chloride (NS) flush 10 mL  10 mL InterCATHeter Q24H    sodium chloride (NS) flush 10 mL  10 mL InterCATHeter PRN    sodium chloride (NS) flush 10-40 mL  10-40 mL InterCATHeter Q8H    sodium chloride (NS) flush 20 mL  20 mL InterCATHeter Q24H    alteplase (CATHFLO) 1 mg in sterile water (preservative free) 1 mL injection  1 mg InterCATHeter PRN    lacosamide (VIMPAT) 100 mg in 0.9% sodium chloride IVPB  100 mg IntraVENous Q12H    0.45% sodium chloride infusion  75 mL/hr IntraVENous CONTINUOUS    atorvastatin (LIPITOR) tablet 40 mg  40 mg Per NG tube DAILY    naloxone (NARCAN) injection 0.4 mg  0.4 mg IntraVENous PRN    acetaminophen (TYLENOL) tablet 650 mg  650 mg Oral Q4H PRN    Or    acetaminophen (TYLENOL) solution 650 mg  650 mg Per NG tube Q4H PRN    Or    acetaminophen (TYLENOL) suppository 650 mg  650 mg Rectal Q4H PRN    glucose chewable tablet 16 g  4 Tab Oral PRN    dextrose (D50W) injection syrg 12.5-25 g  12.5-25 g IntraVENous PRN    glucagon (GLUCAGEN) injection 1 mg  1 mg IntraMUSCular PRN    hydrALAZINE (APRESOLINE) 20 mg/mL injection 10 mg  10 mg IntraVENous Q4H PRN    sodium chloride (NS) flush 5-10 mL  5-10 mL IntraVENous Q8H    sodium chloride (NS) flush 5-10 mL  5-10 mL IntraVENous PRN    acetaminophen (TYLENOL) tablet 650 mg  650 mg Oral Q4H PRN    HYDROcodone-acetaminophen (NORCO) 5-325 mg per tablet 1 Tab  1 Tab Oral Q4H PRN    ondansetron (ZOFRAN) injection 4 mg  4 mg IntraVENous Q4H PRN     ______________________________________________________________________  EXPECTED LENGTH OF STAY: 2d 4h  ACTUAL LENGTH OF STAY:          6                 Des Vu MD

## 2018-01-29 NOTE — DIABETES MGMT
DTC Progress Note    Recommendations/ Comments: Chart reviewed due to hyperglycemia. Note pt is on TF. Pt's blood sugars continue to trend >200. Noted MD increased Lantus dose to 40 units last night in response.      Current hospital DM medication: lispro insulin correction scale; metformin 1000 mg bid and Lantus 40 units.      Chart reviewed on [facility].     Patient is a 81 y.o. male with known  Type 2 Diabetes on oral agent (monotherapy): metformin 1000 mg bid (generic) at home. A1c:   Lab Results   Component Value Date/Time    Hemoglobin A1c 6.4 01/23/2018 10:43 AM    Hemoglobin A1c 6.9 06/17/2016 02:24 AM       Recent Glucose Results:   Lab Results   Component Value Date/Time     (H) 01/29/2018 04:10 AM    GLUCPOC 239 (H) 01/29/2018 05:50 AM    GLUCPOC 219 (H) 01/29/2018 12:16 AM    GLUCPOC 237 (H) 01/28/2018 05:39 PM        Lab Results   Component Value Date/Time    Creatinine 1.62 01/29/2018 04:10 AM     Estimated Creatinine Clearance: 44.2 mL/min (based on Cr of 1.62). Active Orders   Diet    DIET NPO With Tube Feedings        PO intake: No data found. Will continue to follow as needed. Thank you  Britni Kitchen RD, CDE

## 2018-01-29 NOTE — PROGRESS NOTES
0730. Bedside and Verbal shift change report given to Chio Byrd RN by Kimberly Christensen RN. Report included the following information SBAR, Kardex, ED Summary, OR Summary, Procedure Summary, Intake/Output, MAR, Accordion, Recent Results, Med Rec Status and Cardiac Rhythm NSR.     0800. Assumed care of patient. Patient on SBT tolerating well, still no command following at this time. Will continue to monitor. 0930. Patient tolerating SBT well, but still no command following. MD and Noemi Hwang NP aware. Order obtained for head CT and patient will remain intubated at this time. Propofol gtt restarted.

## 2018-01-30 NOTE — PROGRESS NOTES
0730. Bedside and Verbal shift change report given to Sukhjinder Magallanes RN by Daniel Becker RN. Report included the following information SBAR, Kardex and MAR.     0800. Assumed care of patient. Patient on SBT. Now following some commands- able to wiggle toes bilaterally and give thumbs up on the right. Opens eyes to voice. VSS.

## 2018-01-30 NOTE — PROGRESS NOTES
Hospitalist Progress Note  Cynthia Ormond, MD  Answering service: [phone] from in house phone        Date of Service:  2018  NAME:  Duc Fatima  :  1935  MRN:  [id number]      Admission Summary:   Salty Segura a 81 y. o. male with past medical history of arthritis, bladder cancer, prostate cancer, chronic neck pain, colon cancer, type 2 diabetes mellitus, GERD, hypertension, hyperlipidemia, kidney stones, PUD, DVT, PE, TIA, sleep apnea presented to the ED from home with reported inability to talk. Lakeisha Aguilar is a non-historian.  As such, history was obtained per my review of ED and medical records.  Per these collective reports, patient had onset of dysarthria and expressive aphasia starting approximately two days ago.        Interval history / Subjective:   Remains intubated , sedated , on vent   Not extractable due to mental status did not improve  CT head repeated - similar to last one   EEG - done pending report      Assessment & Plan:     AHRF due to mucus plugging   Sedated on vent MGMT per PCCM on propofol  Wean off vent per PCCM   Sedation turned off but mental status not improving preventing extubation     Left moderate to large SDH with mass effect and midline shift. -S/p crani and evacuation   -Follow up CT head with residue al hematoma, and new hemorrhages  -Neurosurgery managing   -Acquired coagulopathy due to Eliquis. Eliquis stopped due to above.     Hypertension.  - cont meds through NG tube'  - Hold ace cr trending up  - PRN IV hydralazine      Type 2 diabetes mellitus: accucheks,humalog sliding scale.  -Target blood sugar 140-180   -Stop metformin, creatinine is elevated and reduced Lantus to 30  units     Hyperlipidemia.     CKD III with worsening of creatinine: Iv fluids. Monitor. Renal dose adjust medications, avoid nephrotoxins.  - Hold ACE     Seizure : On keppra     Elevated wbc from?   On cefepime and Flagyl wnl     Code status: full  DVT prophylaxis: scd    Care Plan discussed with: Patient/Family and Nurse  Disposition: TBD will need rehab      Hospital Problems  Date Reviewed: 1/23/2018          Codes Class Noted POA    * (Principal)SDH (subdural hematoma) ([facility] [address] ICD-10-CM: I62.00  ICD-9-CM: 432.1  1/23/2018 Unknown                Review of Systems:   Review of systems not obtained due to patient factors. Vital Signs:    Last 24hrs VS reviewed since prior progress note. Most recent are:  Visit Vitals    /72    Pulse 74    Temp (!) 101.7 °F (38.7 °C)    Resp 30    Ht 6' (1.829 m)    Wt 103.7 kg (228 lb 9.9 oz)    SpO2 92%    BMI 31.01 kg/m2         Intake/Output Summary (Last 24 hours) at 01/30/18 1234  Last data filed at 01/30/18 1000   Gross per 24 hour   Intake          4175.98 ml   Output             1795 ml   Net          2380.98 ml        Physical Examination:             Constitutional:  on vent    Eyes: Non icteric,non pallor pupil s pin point    HENT:  Head bandaged,drain in place. Oral mucous moist,    Resp:  CTA bilaterally. CV:  Regular rhythm, normal rate, no murmurs, gallops, rubs    GI:  Soft, non distended, non tender.  normoactive bowel sounds, no hepatosplenomegaly    :  No CVA or suprapubic tenderness   Skin  :  No erythema,rash,bullae,dipigmentation     Musculoskeletal:  SCDs on both legs ;no edema, warm, 2+ pulses throughout    Neurologic:  on vent             Data Review:    I personally reviewed  Image and labs      Labs:     Recent Labs      01/30/18 0227 01/29/18 0410   WBC  5.7  5.0   HGB  9.5*  9.6*   HCT  29.9*  30.2*   PLT  101*  98*     Recent Labs      01/30/18 0227 01/29/18   0410  01/28/18   0432   NA  145  145  148*   K  4.0  4.1  4.0   CL  114*  112*  115*   CO2  24  25  24   BUN  35*  37*  37*   CREA  1.53*  1.62*  1.77*   GLU  191*  241*  258*   CA  8.2*  8.5  8.6   MG   --   1.9  2.1   PHOS   --   3.5  2.5*     Recent Labs 01/29/18   0410  01/28/18   0432   SGOT  20  19   ALT  21  20   AP  55  50   TBILI  0.6  0.7   TP  5.4*  5.6*   ALB  2.1*  2.3*   GLOB  3.3  3.3     No results for input(s): INR, PTP, APTT in the last 72 hours. No lab exists for component: INREXT, INREXT   No results for input(s): FE, TIBC, PSAT, FERR in the last 72 hours. No results found for: FOL, RBCF   No results for input(s): PH, PCO2, PO2 in the last 72 hours. No results for input(s): CPK, CKNDX, TROIQ in the last 72 hours.     No lab exists for component: CPKMB  Lab Results   Component Value Date/Time    Cholesterol, total 118 01/22/2018 11:28 PM    HDL Cholesterol 53 01/22/2018 11:28 PM    LDL, calculated 20 01/22/2018 11:28 PM    Triglyceride 225 01/22/2018 11:28 PM    CHOL/HDL Ratio 2.2 01/22/2018 11:28 PM     Lab Results   Component Value Date/Time    Glucose (POC) 103 01/30/2018 12:11 PM    Glucose (POC) 161 01/30/2018 05:19 AM    Glucose (POC) 182 01/30/2018 12:12 AM    Glucose (POC) 207 01/29/2018 05:29 PM    Glucose (POC) 214 01/29/2018 12:39 PM     Lab Results   Component Value Date/Time    Color Yellow 03/04/2013 09:14 AM    Appearance Cloudy 03/04/2013 09:14 AM    Specific gravity 1.015 03/10/2010 09:45 AM    pH (UA) 7.0 03/04/2013 09:14 AM    Protein NEGATIVE  03/10/2010 09:45 AM    Glucose NEGATIVE  03/10/2010 09:45 AM    Ketone Negative 03/04/2013 09:14 AM    Bilirubin Negative 03/04/2013 09:14 AM    Urobilinogen 0.2 03/10/2010 09:45 AM    Nitrites Negative 03/04/2013 09:14 AM    Leukocyte Esterase Negative 03/04/2013 09:14 AM    Bacteria Few 03/04/2013 09:14 AM    WBC 0-5 03/04/2013 09:14 AM    RBC >30 03/04/2013 09:14 AM         Medications Reviewed:     Current Facility-Administered Medications   Medication Dose Route Frequency    fentaNYL citrate (PF) injection 25 mcg  25 mcg IntraVENous Q1H PRN    pantoprazole (PROTONIX) 40 mg in sodium chloride 0.9 % 10 mL injection  40 mg IntraVENous DAILY    potassium, sodium phosphates (NEUTRA-PHOS) packet 2 Packet  2 Packet Oral QID    insulin glargine (LANTUS) injection 40 Units  40 Units SubCUTAneous QHS    bacitracin 500 unit/gram packet 1 Packet  1 Packet Topical PRN    levETIRAcetam (KEPPRA) 1,000 mg in 0.9% sodium chloride 100 mL IVPB  1,000 mg IntraVENous Q12H    chlorhexidine (PERIDEX) 0.12 % mouthwash 15 mL  15 mL Oral BID    polyvinyl alcohol (LIQUIFILM TEARS) 1.4 % ophthalmic solution 2 Drop  2 Drop Both Eyes Q8H    insulin lispro (HUMALOG) injection   SubCUTAneous Q6H    cefepime (MAXIPIME) 2 g in 0.9% sodium chloride (MBP/ADV) 100 mL  2 g IntraVENous Q12H    metroNIDAZOLE (FLAGYL) IVPB premix 500 mg  500 mg IntraVENous Q8H    sodium chloride (NS) flush 10-30 mL  10-30 mL InterCATHeter PRN    sodium chloride (NS) flush 10 mL  10 mL InterCATHeter Q24H    sodium chloride (NS) flush 10 mL  10 mL InterCATHeter PRN    sodium chloride (NS) flush 10-40 mL  10-40 mL InterCATHeter Q8H    sodium chloride (NS) flush 20 mL  20 mL InterCATHeter Q24H    alteplase (CATHFLO) 1 mg in sterile water (preservative free) 1 mL injection  1 mg InterCATHeter PRN    lacosamide (VIMPAT) 100 mg in 0.9% sodium chloride IVPB  100 mg IntraVENous Q12H    atorvastatin (LIPITOR) tablet 40 mg  40 mg Per NG tube DAILY    naloxone (NARCAN) injection 0.4 mg  0.4 mg IntraVENous PRN    acetaminophen (TYLENOL) tablet 650 mg  650 mg Oral Q4H PRN    Or    acetaminophen (TYLENOL) solution 650 mg  650 mg Per NG tube Q4H PRN    Or    acetaminophen (TYLENOL) suppository 650 mg  650 mg Rectal Q4H PRN    glucose chewable tablet 16 g  4 Tab Oral PRN    dextrose (D50W) injection syrg 12.5-25 g  12.5-25 g IntraVENous PRN    glucagon (GLUCAGEN) injection 1 mg  1 mg IntraMUSCular PRN    hydrALAZINE (APRESOLINE) 20 mg/mL injection 10 mg  10 mg IntraVENous Q4H PRN    sodium chloride (NS) flush 5-10 mL  5-10 mL IntraVENous Q8H    sodium chloride (NS) flush 5-10 mL  5-10 mL IntraVENous PRN    acetaminophen (TYLENOL) tablet 650 mg  650 mg Oral Q4H PRN    HYDROcodone-acetaminophen (NORCO) 5-325 mg per tablet 1 Tab  1 Tab Oral Q4H PRN    ondansetron (ZOFRAN) injection 4 mg  4 mg IntraVENous Q4H PRN     ______________________________________________________________________  EXPECTED LENGTH OF STAY: 2d 4h  ACTUAL LENGTH OF STAY:          Genaro Norris MD

## 2018-01-30 NOTE — PROGRESS NOTES
Better today, more awake, extubated, JACK,   incision ok  afeb VSS, CDiff status pending  Spoke with family on phone to update them of progress  Continue supportive care

## 2018-01-30 NOTE — DIABETES MGMT
DTC Progress Note    Recommendations/ Comments: Chart reviewed due to hyperglycemia. Blood sugars 161-239 mg/dl and pt has required 10 units of correction over the last 24 hours. Note pt is on TF(glucerna). If appropriate, please consider increasing Lantus to 50 units. DTC to continue to follow.       Current hospital DM medication: lispro insulin correction scale and Lantus 40 units.      Chart reviewed on Alison Uriostegui.     Patient is a 81 y.o. male with known  Type 2 Diabetes on oral agent (monotherapy): metformin 1000 mg bid (generic) at home. A1c:   Lab Results   Component Value Date/Time    Hemoglobin A1c 6.4 01/23/2018 10:43 AM    Hemoglobin A1c 6.9 06/17/2016 02:24 AM       Recent Glucose Results:   Lab Results   Component Value Date/Time     (H) 01/30/2018 02:27 AM    GLUCPOC 161 (H) 01/30/2018 05:19 AM    GLUCPOC 182 (H) 01/30/2018 12:12 AM    GLUCPOC 207 (H) 01/29/2018 05:29 PM        Lab Results   Component Value Date/Time    Creatinine 1.53 01/30/2018 02:27 AM     Estimated Creatinine Clearance: 47.1 mL/min (based on Cr of 1.53). Active Orders   Diet    DIET NPO With Tube Feedings        PO intake: No data found. Will continue to follow as needed.     Thank you  Garrick Hastings RD, CDE

## 2018-01-30 NOTE — PROGRESS NOTES
1530- Bedside shift change report given to Alondra Wong RN (oncoming nurse) by ISA Damon (offgoing nurse). Report included the following information SBAR, Kardex, ED Summary, OR Summary, Procedure Summary, Intake/Output, MAR, Accordion, Recent Results, Med Rec Status, Cardiac Rhythm NSR and Alarm Parameters . 1930- Bedside shift change report given to Dee Dee Villatoro RN (oncoming nurse) by Alondra Wong RN (offgoing nurse). Report included the following information SBAR, Kardex, ED Summary, OR Summary, Procedure Summary, Intake/Output, MAR, Accordion, Recent Results, Med Rec Status, Cardiac Rhythm NSR and Alarm Parameters .

## 2018-01-30 NOTE — PROGRESS NOTES
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 He apparently had a fall about 2 months ago and was seen at [address] [place] [address] with a reportedly negative head CT. He developed a 2 day history of word finding difficulties and presented to the ER at [facility]. Found to have a large left chronic SDH with membranes and midline shift. He was taken to the OR by Dr. Maxx Love yesterday evening for evacuation of the hemorrhage. Approximately 300 cc of blood was removed from the subdural space. Subjective: The patient's mental status remains marginal. He opens eyes but only follows commands intermittently on the right. Concerned for his ability to protect airway. Unable to obtain ROS due to intubation and AMS    Objective:     Vital signs  Temp (24hrs), Av.6 °F (37.6 °C), Min:98.5 °F (36.9 °C), Max:100.4 °F (38 °C)       190 -  0700  In: 6868.1 [I.V.:4058.1]  Out: 2790 [Urine:2790]    Visit Vitals    /64    Pulse 71    Temp 100.4 °F (38 °C)    Resp 29    Ht 6' (1.829 m)    Wt 103.7 kg (228 lb 9.9 oz)    SpO2 93%    BMI 31.01 kg/m2    O2 Flow Rate (L/min): 4 l/min O2 Device: Endotracheal tube, Ventilator     Pain control  Pain Assessment  Pain Scale 1: Adult Nonverbal Pain Scale  Pain Intensity 1: 0  Pain Intervention(s) 1: Medication (see MAR)    PT/OT  Gait                 Physical Exam:  Gen:NAD. Intubated. Propofol off. Neuro: Lethargic. Eyes open. Nods head \"yes\" when asking him if he wants breathing tube out. PERRL. Follos commands intermittently on the right by giving me a thumbs up. Wiggles toes on right. Withdraws LLE. Minimal movement LUE. Skin: Left frontoparietal incision C/D/I with staples in place.  No erythema, edema, or drainage. CT head without contrast on 01/29/18 shows no new finding is seen. Intracranial hemorrhage and extra-axial collection on the left unchanged. 24 hour results:    Recent Results (from the past 24 hour(s))   GLUCOSE, POC    Collection Time: 01/29/18 12:39 PM   Result Value Ref Range    Glucose (POC) 214 (H) 65 - 100 mg/dL    Performed by Arian Gonzalez, POC    Collection Time: 01/29/18  5:29 PM   Result Value Ref Range    Glucose (POC) 207 (H) 65 - 100 mg/dL    Performed by Clint [facility]    GLUCOSE, POC    Collection Time: 01/30/18 12:12 AM   Result Value Ref Range    Glucose (POC) 182 (H) 65 - 100 mg/dL    Performed by Raya Enciso, BASIC    Collection Time: 01/30/18  2:27 AM   Result Value Ref Range    Sodium 145 136 - 145 mmol/L    Potassium 4.0 3.5 - 5.1 mmol/L    Chloride 114 (H) 97 - 108 mmol/L    CO2 24 21 - 32 mmol/L    Anion gap 7 5 - 15 mmol/L    Glucose 191 (H) 65 - 100 mg/dL    BUN 35 (H) 6 - 20 MG/DL    Creatinine 1.53 (H) 0.70 - 1.30 MG/DL    BUN/Creatinine ratio 23 (H) 12 - 20      GFR est AA 53 (L) >60 ml/min/1.73m2    GFR est non-AA 44 (L) >60 ml/min/1.73m2    Calcium 8.2 (L) 8.5 - 10.1 MG/DL   CBC WITH AUTOMATED DIFF    Collection Time: 01/30/18  2:27 AM   Result Value Ref Range    WBC 5.7 4.1 - 11.1 K/uL    RBC 3.11 (L) 4.10 - 5.70 M/uL    HGB 9.5 (L) 12.1 - 17.0 g/dL    HCT 29.9 (L) 36.6 - 50.3 %    MCV 96.1 80.0 - 99.0 FL    MCH 30.5 26.0 - 34.0 PG    MCHC 31.8 30.0 - 36.5 g/dL    RDW 13.6 11.5 - 14.5 %    PLATELET 621 (L) 893 - 400 K/uL    MPV 13.0 (H) 8.9 - 12.9 FL    NRBC 0.0 0  WBC    ABSOLUTE NRBC 0.00 0.00 - 0.01 K/uL    NEUTROPHILS 78 (H) 32 - 75 %    LYMPHOCYTES 7 (L) 12 - 49 %    MONOCYTES 12 5 - 13 %    EOSINOPHILS 4 0 - 7 %    BASOPHILS 0 0 - 1 %    IMMATURE GRANULOCYTES 0 0.0 - 0.5 %    ABS. NEUTROPHILS 4.4 1.8 - 8.0 K/UL    ABS. LYMPHOCYTES 0.4 (L) 0.8 - 3.5 K/UL    ABS. MONOCYTES 0.7 0.0 - 1.0 K/UL    ABS. EOSINOPHILS 0.2 0.0 - 0.4 K/UL    ABS. BASOPHILS 0.0 0.0 - 0.1 K/UL    ABS. IMM. GRANS. 0.0 0.00 - 0.04 K/UL    DF AUTOMATED     GLUCOSE, POC    Collection Time: 01/30/18  5:19 AM   Result Value Ref Range    Glucose (POC) 161 (H) 65 - 100 mg/dL    Performed by Pat Byrd    POC G3 - PUL    Collection Time: 01/30/18  9:14 AM   Result Value Ref Range    FIO2 (POC) 30 %    pH (POC) 7.438 7.35 - 7.45      pCO2 (POC) 33.1 (L) 35.0 - 45.0 MMHG    pO2 (POC) 62 (L) 80 - 100 MMHG    HCO3 (POC) 22.4 22 - 26 MMOL/L    sO2 (POC) 92 92 - 97 %    Base deficit (POC) 2 mmol/L    Site RIGHT RADIAL      Device: VENT      Mode CPAP/SPON      PEEP/CPAP (POC) 5 cmH2O    Pressure support 5 cmH2O    Allens test (POC) YES      Specimen type (POC) ARTERIAL      Total resp. rate 29            Assessment:     Principal Problem:    SDH (subdural hematoma) ([facility] [address] (1/23/2018)        Plan:   1. Left traumatic chronic subdural hematoma   - s/p crani 01/24   - Cont Keppra and Vimpat   - Repeat head CT stable   - Check EEG to r/o subclinical seizures   - Cont to monitor mental status prior to extubating patient. Concerned about ability to protect airway  2. Brain compression   - due to #1   - plans as above  3. HTN   - SBP<160   - Hydralazine PRN   - Restart home BP meds down dobhoff   - Intensivist following  4. Diabetes mellitus, type 2   - Cont Lantus 40 units   - SSI and accu-checks   - Intensivist following  5. ABIMBOLA on CKD, stage 3   - Cont IVF   - Avoid nephrotoxic agents   - Hospitalist/intensivist following  6. Acute encephalopathy   - multi-factorial     - limit sedating agents   - supportive care  7. Hypernatremia   - d/c 1/2NS   - Cont free water flushes with tube feeds   - Downtrending  8. Atrial fibrillation   - Metoprolol added   - Electrolytes corrected   - Hospitalist/intensivist following  9. Hypomagenesemia   - Resolved   - Intensivist following  10.  Hypophosphatemia   - Resolved   - Electrolyte replacement protocol   - Intensivist following  11. Acute respiratory insufficiency   - Pt re-intubated 01/26   - Pulmonary following   - ok to SBT. Can extubate if mental status improved  12. Focal seizure   - Cont Keppra 1000 mg bid   - Cont Vimpat 100 mg bid   - Check EEG to r/o subclinical seizures  13. Thrombocytopenia   - Possible medication induced. Vancomycin and Pepcid stopped   - Cont to monitor with new labs in am   - Heparin antibody   - Intensivist following    Activity: OOB with assist  DVT ppx: SCDs  Dispo: tbd    Plan d/w Dr. Melissa Baldwin, ICU nurse, intensivist. No further surgical intervention necessary at this time. Discussed with girlfriend at bedside. Gaviota Peguero, DEBBIE     01/30/18 1530    Pt much more awake this afternoon and able to follow commands more. He had an EEG to r/o subclinical seizures and the read is pending. Able to move his left arm now, which was difficult to do this morning. Ok to attempt extubation from [address] standpoint.     ANUSHA

## 2018-01-30 NOTE — PROGRESS NOTES
1930: Bedside shift change report given to [facility] ISA TOBIN (oncoming nurse) by ISA Stanton (offgoing nurse). Report included the following information SBAR, Intake/Output, MAR, Recent Results and Cardiac Rhythm NSR.   0400: Tube feed stopped for SBT in morning. 0630: Propofol stopped for SBT. 0730: Bedside shift change report given to ISA Kay (oncoming nurse) by [facility] ISA TOBIN (offgoing nurse). Report included the following information SBAR, Intake/Output, MAR, Recent Results, Cardiac Rhythm NSR and Alarm Parameters . Shift summary: Pt vented, sedation turned off for SBT, Dobbhoff in place, TF off, lew in and draining, restraints on. Pt with multiple large loose BM overnight. Pt resting comfortably, follows commands- Squeezes hand and thumbs up on (R) hand, wiggles toes both feet, (L) hand withdraws and with very little spontaneous movement.

## 2018-01-30 NOTE — PROGRESS NOTES
[facility]  Pulmonary, Critical Care, and Sleep Medicine  Name: Gonzalo Whipple MRN: [id number]   : 1935 Hospital: [place] [address]   Date: 2018      Subjective:    Alert , tolerating SBT, somnolent but arouses to strong verbal.   Review of systems not obtained due to patient factors.     Objective:      Exam  Vital Signs:  Visit Vitals    /76    Pulse 73    Temp 100.4 °F (38 °C)    Resp 12    Ht 6' (1.829 m)    Wt 103.7 kg (228 lb 9.9 oz)    SpO2 97%    BMI 31.01 kg/m2     Temp (24hrs), Av.6 °F (37.6 °C), Min:98.5 °F (36.9 °C), Max:100.4 °F (38 °C)          Intake/Output Summary (Last 24 hours) at 18 0936  Last data filed at 18 0700   Gross per 24 hour   Intake          3922.69 ml   Output             1695 ml   Net          2227.69 ml     GENERAL: well developed and in mild distress, HEENT:  PERRL, EOMI, no alar flaring or epistaxis, oral mucosa moist without cyanosis, NECK:  no jugular vein distention, no retractions, no thyromegaly or masses, LUNGS: decreased breath sounds billaterally and with rhonchi , HEART:  Regular rate and rhythm with no MGR; no edema is present, ABDOMEN:  soft with no tenderness, bowel sounds present, EXTREMITIES:  warm with no cyanosis and SKIN:  no jaundice or ecchymosis     Labs:  Recent Labs      180  18   0432   WBC  5.7  5.0  5.8   HGB  9.5*  9.6*  9.4*   HCT  29.9*  30.2*  29.9*   PLT  101*  98*  112*     Recent Labs      18   0410  18   0432   NA  145  145  148*   K  4.0  4.1  4.0   CL  114*  112*  115*   CO2  24  25  24   GLU  191*  241*  258*   BUN  35*  37*  37*   CREA  1.53*  1.62*  1.77*   CA  8.2*  8.5  8.6   MG   --   1.9  2.1   PHOS   --   3.5  2.5*   ALB   --   2.1*  2.3*   TBILI   --   0.6  0.7   SGOT   --   20  19   ALT   --   21  20       Recent Labs      18   0914   PHI  7.438   PO2I  62*   PCO2I  33.1* Impression     Plan  1. S/p craniotomy and evacuation left SDH 1/23/18 s/p GLF as oupt  2. AHRF- mucous plugging possible PNA. Fever/Leukocytosis. Beta hemolytic strep sputum  3. Sz  4. hypernatremia  5. CKD III  6. HTN  7. DM2  8. HLP  9. D/o DVT/PE- on apixiban as outpt  10. H/o CVA · Needs to be a bit more alert for extubation. · Limit sedatives and narcotics  · Pulm toilet/nebs  · SUP  · DVT proph  · Pt remains acutely ill and at risk for decline due to mucous plugging from PNa and resp failure. · Stop vanco  · Thrombocytopenia may be med related, stopped vanco and pepcid. - counts stable.         Medical Decision Making Today  · I have reviewed the flowsheet and previous days notes  · One or more chronic illnesses with severe exacerbation, progression or side effects of treatment  · Acute or chronic illness that poses a threat to life or bodily function  · Abrupt change in neurologic status  · Diagnostic endoscopies with identified risk factors  · Pareneteral controlled substances - Adjusted / Weaned / Started  · Drug therapy requiring intensive monitoring for toxicity  · Review and order of Clinical lab tests  · Review and Order of Radiology tests  · Review and Order of Medicine tests  · Independent visualization of Image  · Review and summarize records or history fromprevious days notes  · Reviewed Ventilator / NiPPV  · I have personally reviewed the patients ECG / Viviana MD Antonio  1/30/2018

## 2018-01-31 NOTE — PROGRESS NOTES
Hospitalist Progress Note  Faustino Sharma MD  Answering service: [phone] OR 36 from in house phone        Date of Service:  2018  NAME:  Maren Winston  :  1935  MRN:  [id number]      Admission Summary:   Jayne Ashley a 81 y. o. male with past medical history of arthritis, bladder cancer, prostate cancer, chronic neck pain, colon cancer, type 2 diabetes mellitus, GERD, hypertension, hyperlipidemia, kidney stones, PUD, DVT, PE, TIA, sleep apnea presented to the ED from home with reported inability to talk. Woody Mai is a non-historian.  As such, history was obtained per my review of ED and medical records.  Per these collective reports, patient had onset of dysarthria and expressive aphasia starting approximately two days ago.        Interval history / Subjective:   Remains intubated , awake , on vent   Re intubated last night for worsening resp status    CT head repeated  - No new finding is seen. Intracranial hemorrhage and extra-axial collection on  the left unchanged  EEG - Non specific slowing     Assessment & Plan:     AHRF due to mucus plugging   Vent MGMT per PCCM   Extubated and re intubated last night   Wean off vent per PCCM   CXR  - Small bilateral pleural effusions are now present. Left lower lobe is for the most part collapsed. Need repeat bronch ? Defer to PCCM  Need diuresis on alsix  On cafepime and flagyl      Left moderate to large SDH with mass effect and midline shift. -S/p crani and evacuation   -Follow up CT head with residue al hematoma, and new hemorrhages  -Neurosurgery managing   -Acquired coagulopathy due to Eliquis.  Eliquis stopped due to above.     Hypertension.  - cont meds through NG tube'  - Hold ace cr trending up  - PRN IV hydralazine      Type 2 diabetes mellitus: accucheks,humalog sliding scale.  -Target blood sugar 140-180   -Stop metformin, creatinine is elevated and reduced Lantus to 30  units     Hyperlipidemia.     CKD III with worsening of creatinine:  - Iv fluids. Monitor. Renal dose adjust medications, avoid nephrotoxins.  - Hold ACE     Seizure : On keppra because of seizure last friday  EEG negative diffuse slowing     Elevated wbc from? On cefepime and Flagyl wnl   Will d/c flagyl    Code status: full  DVT prophylaxis: scd    Care Plan discussed with: Patient/Family and Nurse  Disposition: TBD will need rehab d/w [address] Problems  Date Reviewed: 1/23/2018          Codes Class Noted POA    * (Principal)SDH (subdural hematoma) ([facility] [address] [facility] ICD-10-CM: I62.00  ICD-9-CM: 432.1  1/23/2018 Unknown                Review of Systems:   Review of systems not obtained due to patient factors. Vital Signs:    Last 24hrs VS reviewed since prior progress note. Most recent are:  Visit Vitals    /61    Pulse 66    Temp 98.9 °F (37.2 °C)    Resp 18    Ht 6' (1.829 m)    Wt 101.8 kg (224 lb 6.9 oz)    SpO2 100%    BMI 30.44 kg/m2         Intake/Output Summary (Last 24 hours) at 01/31/18 1028  Last data filed at 01/31/18 0900   Gross per 24 hour   Intake           2978.4 ml   Output             3740 ml   Net           -761.6 ml        Physical Examination:             Constitutional:  on vent intubated    Eyes: Non icteric,non pallor pupil s pin point    HENT:  Oral mucous moist,    Resp:  Coarse   CV:  Regular rhythm, normal rate,    GI:  Soft, non distended, non tender.  bs= no hepatosplenomegaly    :  No CVA or suprapubic tenderness   Skin  :  No erythema,rash,bullae,dipigmentation     Musculoskeletal:  SCDs on both legs ;no edema, warm, 2+ pulses throughout    Neurologic:  on vent , awake, responds to command            Data Review:    I personally reviewed  Image and labs      Labs:     Recent Labs      01/30/18   0227  01/29/18   0410   WBC  5.7  5.0   HGB  9.5*  9.6*   HCT  29.9*  30.2*   PLT  101*  98*     Recent Labs      01/31/18   0354  01/30/18   3514 01/29/18   0410   NA  145  145  145   K  3.8  4.0  4.1   CL  111*  114*  112*   CO2  26  24  25   BUN  40*  35*  37*   CREA  1.69*  1.53*  1.62*   GLU  167*  191*  241*   CA  8.5  8.2*  8.5   MG   --    --   1.9   PHOS   --    --   3.5     Recent Labs      01/29/18   0410   SGOT  20   ALT  21   AP  55   TBILI  0.6   TP  5.4*   ALB  2.1*   GLOB  3.3     No results for input(s): INR, PTP, APTT in the last 72 hours. No lab exists for component: INREXT, INREXT   No results for input(s): FE, TIBC, PSAT, FERR in the last 72 hours. No results found for: FOL, RBCF   No results for input(s): PH, PCO2, PO2 in the last 72 hours. No results for input(s): CPK, CKNDX, TROIQ in the last 72 hours.     No lab exists for component: CPKMB  Lab Results   Component Value Date/Time    Cholesterol, total 118 01/22/2018 11:28 PM    HDL Cholesterol 53 01/22/2018 11:28 PM    LDL, calculated 20 01/22/2018 11:28 PM    Triglyceride 225 01/22/2018 11:28 PM    CHOL/HDL Ratio 2.2 01/22/2018 11:28 PM     Lab Results   Component Value Date/Time    Glucose (POC) 184 01/31/2018 06:13 AM    Glucose (POC) 134 01/31/2018 12:15 AM    Glucose (POC) 95 01/30/2018 06:36 PM    Glucose (POC) 103 01/30/2018 12:11 PM    Glucose (POC) 161 01/30/2018 05:19 AM     Lab Results   Component Value Date/Time    Color Yellow 03/04/2013 09:14 AM    Appearance Cloudy 03/04/2013 09:14 AM    Specific gravity 1.015 03/10/2010 09:45 AM    pH (UA) 7.0 03/04/2013 09:14 AM    Protein NEGATIVE  03/10/2010 09:45 AM    Glucose NEGATIVE  03/10/2010 09:45 AM    Ketone Negative 03/04/2013 09:14 AM    Bilirubin Negative 03/04/2013 09:14 AM    Urobilinogen 0.2 03/10/2010 09:45 AM    Nitrites Negative 03/04/2013 09:14 AM    Leukocyte Esterase Negative 03/04/2013 09:14 AM    Bacteria Few 03/04/2013 09:14 AM    WBC 0-5 03/04/2013 09:14 AM    RBC >30 03/04/2013 09:14 AM         Medications Reviewed:     Current Facility-Administered Medications   Medication Dose Route Frequency    fentaNYL citrate (PF) injection 25 mcg  25 mcg IntraVENous Q1H PRN    insulin glargine (LANTUS) injection 30 Units  30 Units SubCUTAneous QHS    famotidine (PEPCID) 40 mg/5 mL (8 mg/mL) oral suspension 20 mg  20 mg Per NG tube DAILY    propofol (DIPRIVAN) infusion  0-50 mcg/kg/min IntraVENous TITRATE    potassium, sodium phosphates (NEUTRA-PHOS) packet 2 Packet  2 Packet Oral QID    bacitracin 500 unit/gram packet 1 Packet  1 Packet Topical PRN    levETIRAcetam (KEPPRA) 1,000 mg in 0.9% sodium chloride 100 mL IVPB  1,000 mg IntraVENous Q12H    chlorhexidine (PERIDEX) 0.12 % mouthwash 15 mL  15 mL Oral BID    polyvinyl alcohol (LIQUIFILM TEARS) 1.4 % ophthalmic solution 2 Drop  2 Drop Both Eyes Q8H    insulin lispro (HUMALOG) injection   SubCUTAneous Q6H    cefepime (MAXIPIME) 2 g in 0.9% sodium chloride (MBP/ADV) 100 mL  2 g IntraVENous Q12H    metroNIDAZOLE (FLAGYL) IVPB premix 500 mg  500 mg IntraVENous Q8H    sodium chloride (NS) flush 10-30 mL  10-30 mL InterCATHeter PRN    sodium chloride (NS) flush 10 mL  10 mL InterCATHeter Q24H    sodium chloride (NS) flush 10 mL  10 mL InterCATHeter PRN    sodium chloride (NS) flush 10-40 mL  10-40 mL InterCATHeter Q8H    sodium chloride (NS) flush 20 mL  20 mL InterCATHeter Q24H    alteplase (CATHFLO) 1 mg in sterile water (preservative free) 1 mL injection  1 mg InterCATHeter PRN    lacosamide (VIMPAT) 100 mg in 0.9% sodium chloride IVPB  100 mg IntraVENous Q12H    atorvastatin (LIPITOR) tablet 40 mg  40 mg Per NG tube DAILY    naloxone (NARCAN) injection 0.4 mg  0.4 mg IntraVENous PRN    acetaminophen (TYLENOL) tablet 650 mg  650 mg Oral Q4H PRN    Or    acetaminophen (TYLENOL) solution 650 mg  650 mg Per NG tube Q4H PRN    Or    acetaminophen (TYLENOL) suppository 650 mg  650 mg Rectal Q4H PRN    glucose chewable tablet 16 g  4 Tab Oral PRN    dextrose (D50W) injection syrg 12.5-25 g  12.5-25 g IntraVENous PRN    glucagon (GLUCAGEN) injection 1 mg  1 mg IntraMUSCular PRN    hydrALAZINE (APRESOLINE) 20 mg/mL injection 10 mg  10 mg IntraVENous Q4H PRN    sodium chloride (NS) flush 5-10 mL  5-10 mL IntraVENous Q8H    sodium chloride (NS) flush 5-10 mL  5-10 mL IntraVENous PRN    acetaminophen (TYLENOL) tablet 650 mg  650 mg Oral Q4H PRN    HYDROcodone-acetaminophen (NORCO) 5-325 mg per tablet 1 Tab  1 Tab Oral Q4H PRN    ondansetron (ZOFRAN) injection 4 mg  4 mg IntraVENous Q4H PRN     ______________________________________________________________________  EXPECTED LENGTH OF STAY: 2d 4h  ACTUAL LENGTH OF STAY:          8                 Fiona Carrion MD

## 2018-01-31 NOTE — PROGRESS NOTES
2130 Unable to finish note. Telephone orders with readback from Dr. Norma Enrique to intubate patient, CXR, propofol, restraints, ABG. Anesthesia and respiratory paged. Explained to patient that we would need to put the breathing tube back in to help his breathing, he stated \"OK. \"    7491 Patient intubated by Dr. Seth Calhoun. Intubation tolerated well. Patient started on propofol and restrained. CXR paged. 18 Patient's family updated on intubation and current condition. Shift summary: Patient's vital signs improved after intubation. Patient withdrawals on all extremities. Lung sounds cleared up. Urine output increased with Lasix. Still having watery stool with flexiseal in place.

## 2018-01-31 NOTE — PROGRESS NOTES
2029 Patient crackles throughout lungs, Use of accessory muscles, tachycardic, tachypnic, Generalized 2+ edema, Attempted NT suction, Small amount of thick yellow, pink tinged secretions out. Telephone order with read back for one time order 40 mg IV Lasix. 2100 Patient began to desat into the low 90s, guppy breathing, use of accessory muscles is worse, OG suctioned suction x2, Large amount of thick yellow secretions out, Patient continues to be tachypnic after recovery, on 100 % face tent, Tachycardic, and hypertensive. 2130 No response from Pulm, repaged. Telephone order with read back         Did not receive page till 9:47. Spoke with nurse and given orders to intubate  CXR without significant disease  ?  Developing pneumonia ?continued difficulty with mucus plugging-see dr Mariano Calzada note  Has scd for DVT ppx  Follow up ABG post intubation  Karlos HUSTON

## 2018-01-31 NOTE — PROGRESS NOTES
Intubation Note    Called to bedside secondary to  respiratory failure. Patient pre-oxygenated with 100% oxygen. Smooth RSI with Propofol 100 mg + Succinylcholine 140 mg IV. DVL x 1 with a CMAC 3.    7.5 ETT taped and secured at 23 cm at the teeth.    + Bilateral BS, + Chest rise, + ETCO2    VSS. CXR pending.     Care turned over to covering Attending MD.

## 2018-01-31 NOTE — PROGRESS NOTES
Pharmacist Note - Vancomycin Dosing    Consult provided for this 81 y.o. male for indication of new fevers in the setting of HAP (previously grew Group F Strep). Antibiotic regimen(s): Cefepime + Metronidazole + Vancomycin    Recent Labs      18   0354  18   0227  18   0410   WBC   --   5.7  5.0   CREA  1.69*  1.53*  1.62*   BUN  40*  35*  37*     Frequency of BMP: Daily  Height: 182.9 cm  Weight: 101.8 kg  Est CrCl: ~40-45 ml/min; UO: >1 ml/kg/hr  Temp (24hrs), Av.9 °F (37.7 °C), Min:98.9 °F (37.2 °C), Max:101 °F (38.3 °C)    Cultures:   BAL: scant Group F Strep, final    Goal trough = 15 - 20 mcg/mL    Therapy will be initiated with a loading dose of 2000 mg IV x 1. Scr is trending up so I will wait to evaluate labs tomorrow before ordering a maintenance dose. Pharmacy to follow patient daily and order levels / make dose adjustments as appropriate.

## 2018-01-31 NOTE — PROGRESS NOTES
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 He apparently had a fall about 2 months ago and was seen at [address] [place] [address] with a reportedly negative head CT. He developed a 2 day history of word finding difficulties and presented to the ER at [facility]. Found to have a large left chronic SDH with membranes and midline shift. He was taken to the OR by Dr. Cara Ochoa yesterday evening for evacuation of the hemorrhage. Approximately 300 cc of blood was removed from the subdural space. Subjective: The patient was extubated last night and doing ok. Later in the evening, he began to drop his oxygen saturations and develop \"guppy breathing. \" Hence, he was reintubated and given Lasix. His breathing improved after the intubation. His EEG only showed diffuse slowing. No foci of electrical discharges. Unable to obtain ROS due to intubation and AMS    Objective:     Vital signs  Temp (24hrs), Av.2 °F (37.9 °C), Min:98.9 °F (37.2 °C), Max:101.7 °F (38.7 °C)   701 -  1900  In: 415.3 [I.V.:125.3]  Out: 190 [Urine:150; Drains:40]  1901 -  0700  In: 5350 [I.V.:2330]  Out: 4970 [Urine:4800; Drains:170]    Visit Vitals    /61    Pulse 66    Temp 98.9 °F (37.2 °C)    Resp 18    Ht 6' (1.829 m)    Wt 101.8 kg (224 lb 6.9 oz)    SpO2 100%    BMI 30.44 kg/m2    O2 Flow Rate (L/min): 4 l/min O2 Device: Endotracheal tube     Pain control  Pain Assessment  Pain Scale 1: Adult Nonverbal Pain Scale  Pain Intensity 1: 0  Pain Intervention(s) 1: Medication (see MAR)    PT/OT  Gait                 Physical Exam:  Gen:NAD. Intubated. Propofol 10 mcg/kg/min turned off for ecam.  Neuro: Awake. Eyes open. Tracks around room. PERRL. Follows commands intermittently on the left by giving me a thumbs up. Wiggles toes bilaterally. Withdraws RUE. Skin: Left frontoparietal incision C/D/I with staples in place. No erythema, edema, or drainage. CT head without contrast on 01/29/18 shows no new finding is seen. Intracranial hemorrhage and extra-axial collection on the left unchanged. 24 hour results:    Recent Results (from the past 24 hour(s))   AMMONIA    Collection Time: 01/30/18 12:10 PM   Result Value Ref Range    Ammonia 17 <32 UMOL/L   GLUCOSE, POC    Collection Time: 01/30/18 12:11 PM   Result Value Ref Range    Glucose (POC) 103 (H) 65 - 100 mg/dL    Performed by [employer]    C. DIFFICILE (DNA)    Collection Time: 01/30/18  3:05 PM   Result Value Ref Range    C. difficile (DNA) NEGATIVE  NEG     GLUCOSE, POC    Collection Time: 01/30/18  6:36 PM   Result Value Ref Range    Glucose (POC) 95 65 - 100 mg/dL    Performed by [employer]    GLUCOSE, POC    Collection Time: 01/31/18 12:15 AM   Result Value Ref Range    Glucose (POC) 134 (H) 65 - 100 mg/dL    Performed by [employer]    METABOLIC PANEL, BASIC    Collection Time: 01/31/18  3:54 AM   Result Value Ref Range    Sodium 145 136 - 145 mmol/L    Potassium 3.8 3.5 - 5.1 mmol/L    Chloride 111 (H) 97 - 108 mmol/L    CO2 26 21 - 32 mmol/L    Anion gap 8 5 - 15 mmol/L    Glucose 167 (H) 65 - 100 mg/dL    BUN 40 (H) 6 - 20 MG/DL    Creatinine 1.69 (H) 0.70 - 1.30 MG/DL    BUN/Creatinine ratio 24 (H) 12 - 20      GFR est AA 47 (L) >60 ml/min/1.73m2    GFR est non-AA 39 (L) >60 ml/min/1.73m2    Calcium 8.5 8.5 - 10.1 MG/DL   GLUCOSE, POC    Collection Time: 01/31/18  6:13 AM   Result Value Ref Range    Glucose (POC) 184 (H) 65 - 100 mg/dL    Performed by [employer]           Assessment:     Principal Problem:    [facility] (1/23/2018)        Plan:   1.   Left traumatic chronic subdural hematoma   - s/p crani 01/24   - Cont Keppra and Vimpat   - Repeat head CT stable   - SBT and extubate pending his pulmonary status   - Maybe transition to Precedex instead of Propofol because took him a little while to clear the propofol from his system last time. 2. Brain compression   - due to #1   - plans as above  3. HTN   - SBP<160   - Hydralazine PRN   - Restart home BP meds down dobhoff   - Intensivist following  4. Diabetes mellitus, type 2   - Cont Lantus 30 units   - SSI and accu-checks   - Intensivist following  5. ABIMBOLA on CKD, stage 3   - Avoid nephrotoxic agents   - Hospitalist/intensivist following  6. Acute encephalopathy   - multi-factorial     - limit sedating agents   - supportive care  7. Hypernatremia   - d/c 1/2NS   - Cont free water flushes with tube feeds   - Downtrending  8. Atrial fibrillation   - Metoprolol added   - Electrolytes corrected   - Hospitalist/intensivist following  9. Hypomagenesemia   - Resolved   - Intensivist following  10. Hypophosphatemia   - Resolved   - Electrolyte replacement protocol   - Intensivist following  11. Acute respiratory insufficiency   - Pt re-intubated 01/26   - Pulmonary following   - Extubated 01/30   - Pt re-intubated 01/30 for respiratory distress  12. Focal seizure   - Cont Keppra 1000 mg bid   - Cont Vimpat 100 mg bid   - EEG does not show seizures but patient had witnessed seizure last Friday so will cont AEDs at this time  13. Thrombocytopenia   - Possible medication induced. Vancomycin and Pepcid stopped   - Cont to monitor   - Intensivist following    Activity: OOB with assist  DVT ppx: SCDs  Dispo: tbd    Plan d/w Dr. Cara Ochoa, ICU nurse, intensivist. No further surgical intervention necessary at this time. Chest PT for respiratory status. Cont current care.       Lois Lea NP

## 2018-01-31 NOTE — PROCEDURES
ELECTROENCEPHALOGRAM REPORT     Patient Name: Jesus Briggs  : 1935  Age: 81 y.o. Ordering physician: [facility]  Date of EE18  Interpreting physician: Saman Vega DO      PROCEDURE: EEG. CLINICAL INDICATION: The patient is a 81 y.o. male who is being evaluated for baseline electro cerebral activities and to rule out seizure focus. SDH s/p Crani      DESCRIPTION OF THE RECORD:     Throughout the recording,  There was diffuse global slowing. There were no clear areas of focal spike or spike-and-wave discharges seen. Hyperventilation and Photic stimulation  Were not performed. INTERPRETATION:     This is an abnormal electroencephalogram showing diffuse slowing suggestive for global cortical dysfunction which is nonspecific and may be seen in underlying metabolic/infectious/inflammatory/structural processes. No clear focal abnormalities or epileptiform activity was seen. Clinical correlation recommended.       [address] [place] [address],   Diplomate, [employer] Board of Psychiatry & Neurology (Neurology)

## 2018-01-31 NOTE — PROGRESS NOTES
Pharmacy Renal Dosing Protocol  Day #1 of Levofloxacin  Indication:  HCAP  Current regimen:  750 mg IV q24h  Abx regimen: cefepime  Recent Labs      18   0354  18   0227  18   0410   WBC   --   5.7  5.0   CREA  1.69*  1.53*  1.62*   BUN  40*  35*  37*     Est CrCl: 42 ml/min; UO: >1 ml/kg/hr  Temp (24hrs), Av.2 °F (37.9 °C), Min:98.9 °F (37.2 °C), Max:101.7 °F (38.7 °C)      Plan: Change to 750 mg IV q48h for CrCl 20-49 ml/min

## 2018-01-31 NOTE — ROUTINE PROCESS
Bedside and Verbal shift change report given to ISA Ellis (oncoming nurse) by Franck Guerra RN (offgoing nurse). Report included the following information SBAR, Kardex, Intake/Output, MAR, Accordion, Recent Results, Med Rec Status, Cardiac Rhythm NSR and Alarm Parameters .

## 2018-01-31 NOTE — PROCEDURES
[facility]  Bronchoscopy Report    Procedure: Diagnostic bronchoscopy. Indication: Pneumonia    Consent/Treatment: Informed consent was obtained from the  family after risks, benefits and alternatives were explained. Timeout verified the correct patient and correct procedure. Anesthesia:   Patient on ventilator and receiving  Propofol drip 10 also recieved versed and fent        Procedure Details:   -- The bronchoscope was introduced through an endotracheal tube. -- The vocal cords not seen. -- The trachea and tanvi were completely inspected and EDAC seen and trache with diffuse erythema , friable no vesicles but some shallow ulcerations. -- The right-sided endobronchial anatomy was completely inspected and dynamic malacia with > 50% occlusion right mainstem with cough no endobronchial lesions. SEKOU right sided airway segments diffusely erythematous and no vesicles. Cory Donohue -- The left-sided endobronchial anatomy was completely inspected and very severe bronchomalacia with 100% collapse of LMS with cough- very difficult to pass scope past dynamically collpased area. With difficulty scope advanced to visualized RAUL and LLL ostia and segments- bleeding noted from mucosa ( oozing) RAUL ostium and LLL ostium. Severe malacia and collapse RAUL and LL ostia. Cory Donohue t    This is with cough  The left mid  mainstem- completely closed 100% and occluded the airway, very difficult to pass scope . 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 2) Diffuse tracheobronchitis- suspect bacterial but can't r/o viral like HSV  3) Bleeding LLL/RAUL ostia suspect mucosal from above process.      Complications: none    Estimated Blood Loss: less than 50 Arleth Patient, MD

## 2018-01-31 NOTE — PROGRESS NOTES
[facility] [place]  Pulmonary, Critical Care, and Sleep Medicine  Name: Cher Cosme MRN: [id number]   : 1935 [facility]   Date: 2018      Subjective:    Re-intubated overnight for WOB/hypoxia. On sedation on vent. Review of systems not obtained due to patient factors. Objective:      Exam  Vital Signs:  Visit Vitals    /61    Pulse 66    Temp 98.9 °F (37.2 °C)    Resp 18    Ht 6' (1.829 m)    Wt 101.8 kg (224 lb 6.9 oz)    SpO2 100%    BMI 30.44 kg/m2     Temp (24hrs), Av.2 °F (37.9 °C), Min:98.9 °F (37.2 °C), Max:101.7 °F (38.7 °C)          Intake/Output Summary (Last 24 hours) at 18 1029  Last data filed at 18 0900   Gross per 24 hour   Intake           2978.4 ml   Output             3740 ml   Net           -761.6 ml     GENERAL: well developed and in mild distress, HEENT:  PERRL, EOMI, no alar flaring or epistaxis, oral mucosa moist without cyanosis, NECK:  no jugular vein distention, no retractions, no thyromegaly or masses, LUNGS: decreased breath sounds billaterally and with rhonchi , HEART:  Regular rate and rhythm with no MGR; no edema is present, ABDOMEN:  soft with no tenderness, bowel sounds present, EXTREMITIES:  warm with no cyanosis and SKIN:  no jaundice or ecchymosis     Labs:  Recent Labs      18   0410   WBC  5.7  5.0   HGB  9.5*  9.6*   HCT  29.9*  30.2*   PLT  101*  98*     Recent Labs      18   0354  187  18   0410   NA  145  145  145   K  3.8  4.0  4.1   CL  111*  114*  112*   CO2  26  24  25   GLU  167*  191*  241*   BUN  40*  35*  37*   CREA  1.69*  1.53*  1.62*   CA  8.5  8.2*  8.5   MG   --    --   1.9   PHOS   --    --   3.5   ALB   --    --   2.1*   TBILI   --    --   0.6   SGOT   --    --   20   ALT   --    --   21       Recent Labs      18   0914   PHI  7.438   PO2I  62*   PCO2I  33.1*         Impression     Plan  1.  S/p craniotomy and evacuation left SDH 1/23/18 s/p GLF as oupt  2. AHRF-suspect new LLL HCAP- reintubated 1/30/18 for WOB/hypoxia  3. Sz  4. hypernatremia  5. CKD III  6. HTN  7. DM2  8. HLP  9. D/o DVT/PE- on apixiban as outpt  10.  H/o CVA · Plan on bronch today with BAL LLL  · analgo sedation RASS goal 0 to -1- use PRN fent and wean down and off propofol  · pulm toilet  ·  lung protective ventilation  · Broaden from cefepime to add levaquin and vanco  · Blood cx  · EEG no sz  · Suspect septic encephalopathy  · Pt is critically ill CCT EOp 30 min  · At risk for decline due to PNA sepsis /MODS        Medical Decision Making Today  · I have reviewed the flowsheet and previous days notes  · One or more chronic illnesses with severe exacerbation, progression or side effects of treatment  · Acute or chronic illness that poses a threat to life or bodily function  · Abrupt change in neurologic status  · Diagnostic endoscopies with identified risk factors  · Pareneteral controlled substances - Adjusted / Matt Glatter / Started  · Drug therapy requiring intensive monitoring for toxicity  · Review and order of Clinical lab tests  · Review and Order of Radiology tests  · Review and Order of Medicine tests  · Independent visualization of Image  · Review and summarize records or history fromprevious days notes  · Reviewed Ventilator / NiPPV  · I have personally reviewed the patients ECG / Rossi Cid MD  1/31/2018

## 2018-02-01 NOTE — DIABETES MGMT
Progress Note     Chart reviewed for elevated blood glucose ( > 180 mg/dL x 2 in the past 24 hours) . Recommendations/ Comments: If appropriate, please consider increasing Lantus from 30 units to 36 units. This is a 20% increase, and may aid in keeping glucose consistently < 180 mg/dL. Fasting glucose today: 198 mg/dL (per POCT Glucose). Required 7 units of correction in the last 24 hours. Inpatient medications for glucose management:  1. Correction Scale: Lispro (Humalog) Normal Sensitivity scale to cover for glucose > 139 mg/dL Every 6 hours     2. Lantus 30 units at bedtime     POC Glucose last 24hrs:   Lab Results   Component Value Date/Time     (H) 02/01/2018 11:35 AM    GLUCPOC 204 (H) 02/01/2018 11:50 AM    GLUCPOC 198 (H) 02/01/2018 06:02 AM    GLUCPOC 138 (H) 02/01/2018 12:46 AM        Estimated Creatinine Clearance: 45.9 mL/min (based on Cr of 1.6). Diet order:   Active Orders   Diet    DIET NPO With Tube Feedings        PO intake: No data found. History of Diabetes:   Maren Winston is a 81 y.o. male with a past medical history significant for DM per  Zeny Crain MD's H&P dated 1/23/2018. Prior to admission medications for glucose management: per past medical records  -Metformin 1000mg daily     A1C:   Lab Results   Component Value Date/Time    Hemoglobin A1c 6.4 01/23/2018 10:43 AM    Hemoglobin A1c 6.9 06/17/2016 02:24 AM       Reference range*:  Increased risk for diabetes: 5.7 - 6.4%  Diabetes: >6.4%  Glycemic control for adults with diabetes: <7.0 %    *GURVINDER HOLT (2014). Diagnosis and classification of diabetes mellitus. Diabetes care, 37, S81. Thank you. Kenneth Hernandez. Kim MPH, RN, BSN, [address]   [phone]    -For most hospitalized persons with hyperglycemia in the intensive care unit (ICU), a glucose range of 140 to 180 mg/dL is recommended, provided this target can be safely achieved. *  - For general medicine and surgery patients in non-ICU settings, a premeal glucose target <140 mg/dL and a random blood glucose <180 mg/dL are recommended. *    JORDYN Carrillo., Hortencia Rosales., Zamzam Barr., ... & Noelle Johns ([id number]). AMERICAN ASSOCIATION OF CLINICAL ENDOCRINOLOGISTS AND AMERICAN COLLEGE OF ENDOCRINOLOGY-CLINICAL PRACTICE GUIDELINES FOR DEVELOPING A DIABETES MELLITUS COMPREHENSIVE CARE PLAN-2015-EXECUTIVE SUMMARY: Complete guidelines are available at https://www. aace. com/publications/guidelines. Endocrine Practice, 21(4), [id number].

## 2018-02-01 NOTE — PROGRESS NOTES
attempted to complete cm 1 stop, however I was only able to have patient follow simple commands at this time. I note that there is  Nicole Mcqueen listed with a cell [phone].

## 2018-02-01 NOTE — PROGRESS NOTES
46- received report from [facility] using SBAR- checked pt alarms; cardiac, respiratory, pulse ox    0900- Respiratory started SBT     0930- Neuro stopped by to see pt    1000- Pt placed back on ventilator and tube feedings, not going to extubate today per Dr. Oralia Alexandra- shift summary- Dr. James Sutton wanted to wait to try and extubate pt until tomorrow    1930- shift report given to [facility]

## 2018-02-01 NOTE — PROGRESS NOTES
Hospitalist Progress Note  Ron He MD  Answering service: [phone] OR [phone] from in house phone        Date of Service:  2018  NAME:  Daniel Guzman  :  1935  MRN:  [id number]      Admission Summary:   Duran Aguiar a 81 y. o. male with past medical history of arthritis, bladder cancer, prostate cancer, chronic neck pain, colon cancer, type 2 diabetes mellitus, GERD, hypertension, hyperlipidemia, kidney stones, PUD, DVT, PE, TIA, sleep apnea presented to the ED from home with reported inability to talk. Jeannie Hopkins is a non-historian.  As such, history was obtained per my review of ED and medical records.  Per these collective reports, patient had onset of dysarthria and expressive aphasia starting approximately two days ago.        Interval history / Subjective:     F/u for respiratory failure and intracranial hemorrhage. Assessment & Plan:     Acute Hypoxic Respiratory Failure due to mucus plugging   S/p mechanical ventilation    CXR  - Small bilateral pleural effusions are now present. Left lower lobe is for the most part collapsed. Need repeat bronch   Continue Daily SBT. Management as per intensivist.  On cafepime  Flagyl discontinued.     Left moderate to large SDH with mass effect and midline shift. -S/p craniotomy and evacuation   -Follow up CT head with residual hematoma, and new hemorrhages  -Neurosurgery managing   -Acquired coagulopathy due to Eliquis. Eliquis stopped due to above.     Hypertension.  - BP is stable  - PRN IV hydralazine      Type 2 diabetes mellitus: accucheks,humalog sliding scale.  -Target blood sugar 140-180   -Continue ISS and Lantus. accu checks     Hyperlipidemia.     CKD III with worsening of creatinine:  - Iv fluids. Monitor. Renal dose adjust medications, avoid nephrotoxins.  - Hold ACE. - renal indices stable     Seizure :   On keppra  EEG negative diffuse slowing     Elevated wbc from? On cefepime and Flagyl wnl   Will d/c flagyl    Hypernatremia: Increase free water flush to 125 cc every 4 hrs  Repeat BMP in AM    Code status: full  DVT prophylaxis: [address] discussed with: Patient/Family and Nurse  Disposition: TBD, still requires ICU care     Hospital Problems  Date Reviewed: 1/23/2018          Codes Class Noted POA    * (Principal)SDH (subdural hematoma) ([facility] [address] ICD-10-CM: I62.00  ICD-9-CM: 432.1  1/23/2018 Unknown                Review of Systems:   Review of systems not obtained due to patient factors. Vital Signs:    Last 24hrs VS reviewed since prior progress note. Most recent are:  Visit Vitals    /68    Pulse (!) 59    Temp 97.8 °F (36.6 °C)    Resp 20    Ht 6' (1.829 m)    Wt 107.5 kg (237 lb)    SpO2 100%    BMI 32.14 kg/m2         Intake/Output Summary (Last 24 hours) at 02/01/18 1556  Last data filed at 02/01/18 1500   Gross per 24 hour   Intake          2978.55 ml   Output             1405 ml   Net          1573.55 ml        Physical Examination:             Constitutional:  on vent intubated    Eyes: Non icteric,non pallor pupil s pin point    HENT:  Oral mucous moist,    Resp:  Coarse   CV:  Regular rhythm, normal rate,    GI:  Soft, non distended, non tender.  bs= no hepatosplenomegaly    :  No CVA or suprapubic tenderness   Skin  :  No erythema,rash,bullae,dipigmentation     Musculoskeletal:  SCDs on both legs ;no edema, warm, 2+ pulses throughout    Neurologic:  on vent , awake, responds to command            Data Review:    I personally reviewed  Image and labs      Labs:     Recent Labs      02/01/18   1135  01/30/18 0227   WBC  4.6  5.7   HGB  9.4*  9.5*   HCT  29.6*  29.9*   PLT  102*  101*     Recent Labs      02/01/18   1135  01/31/18   0354  01/30/18 0227   NA  147*  145  145   K  3.9  3.8  4.0   CL  112*  111*  114*   CO2  25  26  24   BUN  46*  40*  35*   CREA  1.60*  1.69*  1.53*   GLU  203*  167*  191*   CA  8.6  8.5  8.2* No results for input(s): SGOT, GPT, ALT, AP, TBIL, TBILI, TP, ALB, GLOB, GGT, AML, LPSE in the last 72 hours. No lab exists for component: AMYP, HLPSE  No results for input(s): INR, PTP, APTT in the last 72 hours. No lab exists for component: INREXT, INREXT   No results for input(s): FE, TIBC, PSAT, FERR in the last 72 hours. No results found for: FOL, RBCF   No results for input(s): PH, PCO2, PO2 in the last 72 hours. No results for input(s): CPK, CKNDX, TROIQ in the last 72 hours.     No lab exists for component: CPKMB  Lab Results   Component Value Date/Time    Cholesterol, total 118 01/22/2018 11:28 PM    HDL Cholesterol 53 01/22/2018 11:28 PM    LDL, calculated 20 01/22/2018 11:28 PM    Triglyceride 225 01/22/2018 11:28 PM    CHOL/HDL Ratio 2.2 01/22/2018 11:28 PM     Lab Results   Component Value Date/Time    Glucose (POC) 204 02/01/2018 11:50 AM    Glucose (POC) 198 02/01/2018 06:02 AM    Glucose (POC) 138 02/01/2018 12:46 AM    Glucose (POC) 119 01/31/2018 06:25 PM    Glucose (POC) 188 01/31/2018 12:40 PM     Lab Results   Component Value Date/Time    Color Yellow 03/04/2013 09:14 AM    Appearance Cloudy 03/04/2013 09:14 AM    Specific gravity 1.015 03/10/2010 09:45 AM    pH (UA) 7.0 03/04/2013 09:14 AM    Protein NEGATIVE  03/10/2010 09:45 AM    Glucose NEGATIVE  03/10/2010 09:45 AM    Ketone Negative 03/04/2013 09:14 AM    Bilirubin Negative 03/04/2013 09:14 AM    Urobilinogen 0.2 03/10/2010 09:45 AM    Nitrites Negative 03/04/2013 09:14 AM    Leukocyte Esterase Negative 03/04/2013 09:14 AM    Bacteria Few 03/04/2013 09:14 AM    WBC 0-5 03/04/2013 09:14 AM    RBC >30 03/04/2013 09:14 AM         Medications Reviewed:     Current Facility-Administered Medications   Medication Dose Route Frequency    dexmedeTOMidine (PRECEDEX) 400 mcg in 0.9% sodium chloride 100 mL infusion  0.2-1.2 mcg/kg/hr IntraVENous TITRATE    vancomycin (VANCOCIN) 1500 mg in  ml infusion  1,500 mg IntraVENous ONCE  Vancomycin - Pharmacy to Dose   Other Rx Dosing/Monitoring    sodium chloride (NS) flush 5-10 mL  5-10 mL IntraVENous Q8H    sodium chloride (NS) flush 5-10 mL  5-10 mL IntraVENous PRN    midazolam (VERSED) injection 5 mg  5 mg IntraVENous Multiple    fentaNYL citrate (PF) injection 25 mcg  25 mcg IntraVENous Q1H PRN    insulin glargine (LANTUS) injection 30 Units  30 Units SubCUTAneous QHS    famotidine (PEPCID) 40 mg/5 mL (8 mg/mL) oral suspension 20 mg  20 mg Per NG tube DAILY    potassium, sodium phosphates (NEUTRA-PHOS) packet 2 Packet  2 Packet Oral QID    bacitracin 500 unit/gram packet 1 Packet  1 Packet Topical PRN    levETIRAcetam (KEPPRA) 1,000 mg in 0.9% sodium chloride 100 mL IVPB  1,000 mg IntraVENous Q12H    chlorhexidine (PERIDEX) 0.12 % mouthwash 15 mL  15 mL Oral BID    polyvinyl alcohol (LIQUIFILM TEARS) 1.4 % ophthalmic solution 2 Drop  2 Drop Both Eyes Q8H    insulin lispro (HUMALOG) injection   SubCUTAneous Q6H    cefepime (MAXIPIME) 2 g in 0.9% sodium chloride (MBP/ADV) 100 mL  2 g IntraVENous Q12H    sodium chloride (NS) flush 10-30 mL  10-30 mL InterCATHeter PRN    sodium chloride (NS) flush 10 mL  10 mL InterCATHeter Q24H    sodium chloride (NS) flush 10 mL  10 mL InterCATHeter PRN    sodium chloride (NS) flush 10-40 mL  10-40 mL InterCATHeter Q8H    sodium chloride (NS) flush 20 mL  20 mL InterCATHeter Q24H    alteplase (CATHFLO) 1 mg in sterile water (preservative free) 1 mL injection  1 mg InterCATHeter PRN    lacosamide (VIMPAT) 100 mg in 0.9% sodium chloride IVPB  100 mg IntraVENous Q12H    atorvastatin (LIPITOR) tablet 40 mg  40 mg Per NG tube DAILY    naloxone (NARCAN) injection 0.4 mg  0.4 mg IntraVENous PRN    acetaminophen (TYLENOL) tablet 650 mg  650 mg Oral Q4H PRN    Or    acetaminophen (TYLENOL) solution 650 mg  650 mg Per NG tube Q4H PRN    Or    acetaminophen (TYLENOL) suppository 650 mg  650 mg Rectal Q4H PRN    glucose chewable tablet 16 g  4 Tab Oral PRN    dextrose (D50W) injection syrg 12.5-25 g  12.5-25 g IntraVENous PRN    glucagon (GLUCAGEN) injection 1 mg  1 mg IntraMUSCular PRN    hydrALAZINE (APRESOLINE) 20 mg/mL injection 10 mg  10 mg IntraVENous Q4H PRN    sodium chloride (NS) flush 5-10 mL  5-10 mL IntraVENous Q8H    sodium chloride (NS) flush 5-10 mL  5-10 mL IntraVENous PRN    acetaminophen (TYLENOL) tablet 650 mg  650 mg Oral Q4H PRN    HYDROcodone-acetaminophen (NORCO) 5-325 mg per tablet 1 Tab  1 Tab Oral Q4H PRN    ondansetron (ZOFRAN) injection 4 mg  4 mg IntraVENous Q4H PRN     ______________________________________________________________________  EXPECTED LENGTH OF STAY: 2d 4h  ACTUAL LENGTH OF STAY:          9                 Magy Luciano MD

## 2018-02-01 NOTE — PROGRESS NOTES
NUTRITION COMPLETE ASSESSMENT    RECOMMENDATIONS:   Check phosphorus-if lab WNL/stable consider decreasing supplementation to BID     Interventions/Plan:   Food/Nutrient Delivery: Modify rate, concentration, composition, and schedule-see below     Assessment:   Reason for Assessment:    [x]Reassessment     Tube Feeding: Glucerna 1.2 @ 30 ml/hr with one packet of Prosource 4 x/day and 200 ml water flush q 4 hr  Diet: NPO  Nutritionally Significant Medications: [x] Reviewed & Includes: Lantus, correction scale insulin, Neutra Phos   Meal Intake: No data found. Subjective:  Pt intubated-nursing working with patient. Objective:  Chart reviewed for follow-up; discussed with RN. Noted: pt admitted for SDH, s/p craniotomy with evacuation of SDH 1/24; 1/26-reintubated for airway protection d/t seizure and decreased responsiveness; failed extubation 1/30; HTN. Mr [facility] remains on enteral nutrition support. Noted adjustment in tube feeding formula and increased water flushes 2/2 hyperglycemia and hypernatremia, respectively. Off Diprivan-will need to adjust tube feeding to meet estimated energy needs (tube feeding currently meets 59-91% estimated energy and protein needs, respectively). Suggested goal: Glucerna 1.2 @ 65 ml/hr with one packet of Prosource daily and 100 ml water q 4 hr. This will provide 1560 ml, 1930 calories, 109 gm protein and 1950 ml free water (tube feeding/flush) per day (will provide an additional 170 ml free water per day than current feeding). Daily phosphorus supplementation ordered-last value check was WNL. May wish to check phosphorus lab again-reduce supplementation to BID if remains stable/WNL. Flexiseal placed 1/30 for loose stools; Neutra Phos can cause this. BG control improved-less than 200 mg/dl. Weight trending up-noted positive fluid balance but no edema noted. ? accuracy. Continue to monitor trends.  Sodium improved with increased water flushes; now high-normal.    Estimated Nutrition Needs:   Kcals/day: 1887 Kcals/day  Protein: 112 g (1.2g/kg)  Fluid: 1900 ml (1 ml/jaret)  Based On: [place] (2003b)  Weight Used: Actual wt (94.5 kg)    Pt expected to meet estimated nutrient needs:  [x]   Yes via enteral feeding    []  No  [] Unable to predict at this time    Nutrition Diagnosis:   1. Less than optimal enteral nutrition related to change in formula and off Diprivan as evidenced by tube feeding meeting 50% estimated energy needs. Goals:     Tube feeding to meet 90% estimated needs for next 5-7 days. Monitoring & Evaluation:    - Enteral/parenteral nutrition intake   - Electrolyte and renal profile, Weight/weight change     Previous Nutrition Goals Met:  Progressing  Previous Recommendations:      Yes    Education & Discharge Needs:   [x] None Identified   [] Identified and addressed    [x] Participated in care plan, discharge planning, and/or interdisciplinary rounds        Cultural, Presybeterian and ethnic food preferences identified:   None    Skin Integrity: []Intact  [x] surgical incision  Edema: [x]None []Other  Last BM: 2/1  Food Allergies: [x]None []Other    Anthropometrics:    Weight Loss Metrics 2/1/2018 12/29/2017 3/6/2017 9/27/2016 8/1/2016 7/28/2016 6/18/2016   Today's Wt 237 lb 216 lb 6.4 oz 202 lb 206 lb 208 lb 200 lb 203 lb 14.8 oz   BMI 32.14 kg/m2 29.35 kg/m2 27.4 kg/m2 27.94 kg/m2 28.2 kg/m2 27.12 kg/m2 27.65 kg/m2      Last 3 Recorded Weights in this Encounter    01/31/18 0606 02/01/18 0400 02/01/18 1025   Weight: 101.8 kg (224 lb 6.9 oz) 107.5 kg (237 lb) 107.5 kg (237 lb)      Weight Source: Bed  Height: 6' (182.9 cm),    Body mass index is 32.14 kg/(m^2).   IBW : 80.7 kg (178 lb), % IBW (Calculated): 133.15 %  Usual Body Weight: 98 kg (216 lb),      Labs:    Lab Results   Component Value Date/Time    Sodium 145 01/31/2018 03:54 AM    Potassium 3.8 01/31/2018 03:54 AM    Chloride 111 01/31/2018 03:54 AM    CO2 26 01/31/2018 03:54 AM Glucose 167 01/31/2018 03:54 AM    BUN 40 01/31/2018 03:54 AM    Creatinine 1.69 01/31/2018 03:54 AM    Calcium 8.5 01/31/2018 03:54 AM    Magnesium 1.9 01/29/2018 04:10 AM    Phosphorus 3.5 01/29/2018 04:10 AM    Albumin 2.1 01/29/2018 04:10 AM     Lab Results   Component Value Date/Time    Hemoglobin A1c 6.4 01/23/2018 10:43 AM     Lab Results   Component Value Date/Time    Glucose (POC) 198 02/01/2018 06:02 AM      Lab Results   Component Value Date/Time    ALT (SGPT) 21 01/29/2018 04:10 AM    AST (SGOT) 20 01/29/2018 04:10 AM    Alk.  phosphatase 55 01/29/2018 04:10 AM    Bilirubin, total 0.6 01/29/2018 04:10 AM        Trenton Baumann RD [facility]

## 2018-02-01 NOTE — PROGRESS NOTES
[facility] [place]  Pulmonary, Critical Care, and Sleep Medicine  Name: Chintan Alvarez MRN: [id number]   : 1935 [facility]   Date: 2018      Subjective:    More alert today follows some simple commands. Review of systems not obtained due to patient factors. Objective:      Exam  Vital Signs:  Visit Vitals    /64 (BP 1 Location: Right arm, BP Patient Position: At rest)    Pulse (!) 52    Temp 97.4 °F (36.3 °C)    Resp 12    Ht 6' (1.829 m)    Wt 107.5 kg (237 lb)    SpO2 97%    BMI 32.14 kg/m2     Temp (24hrs), Av.3 °F (36.8 °C), Min:97.4 °F (36.3 °C), Max:98.8 °F (37.1 °C)          Intake/Output Summary (Last 24 hours) at 18 1155  Last data filed at 18 1105   Gross per 24 hour   Intake          3284.23 ml   Output             1827 ml   Net          1457.23 ml     GENERAL: well developed and in mild distress, HEENT:  PERRL, EOMI, no alar flaring or epistaxis, oral mucosa moist without cyanosis, NECK:  no jugular vein distention, no retractions, no thyromegaly or masses, LUNGS: decreased breath sounds billaterally and with rhonchi , HEART:  Regular rate and rhythm with no MGR; no edema is present, ABDOMEN:  soft with no tenderness, bowel sounds present, EXTREMITIES:  warm with no cyanosis and SKIN:  no jaundice or ecchymosis     Labs:  Recent Labs      18   0227   WBC  5.7   HGB  9.5*   HCT  29.9*   PLT  101*     Recent Labs      18   0354  18   0227   NA  145  145   K  3.8  4.0   CL  111*  114*   CO2  26  24   GLU  167*  191*   BUN  40*  35*   CREA  1.69*  1.53*   CA  8.5  8.2*       Recent Labs      18   0914   PHI  7.438   PO2I  62*   PCO2I  33.1*         Impression     Plan  1. S/p craniotomy and evacuation left SDH 18 s/p GLF as oupt  2. AHRF-suspect new LLL HCAP- reintubated 18 for WOB/hypoxia  3. Severe distal tracheobronchomalacia- with 100% Left MS collapse with cough.  - LLL PNA with retained secretions. 4. Sz  5. hypernatremia  6. CKD III  7. HTN  8. DM2  9. HLP  10. D/o DVT/PE- on apixiban as outpt  11. H/o CVA · I had a long d/w pts family and explained that the tracheobronchomalacia will be an impediment to recovery from the PNA but may not be a major long term issue after recovery.    · He will need pneumatic splinting with CPAP QHS after extubation  · Keep intubated another day, plan on bronch in AM and then SBT with possible extubation  · pulm toilet  ·  lung protective ventilation  · Awaiting bronchial wash cx- continue present abx  · Blood cx  · EEG no sz  · Suspect septic encephalopathy  · Pt is critically ill CCT EOp 30 min  · At risk for decline due to PNA sepsis /MODS        Medical Decision Making Today  · I have reviewed the flowsheet and previous days notes  · One or more chronic illnesses with severe exacerbation, progression or side effects of treatment  · Acute or chronic illness that poses a threat to life or bodily function  · Abrupt change in neurologic status  · Diagnostic endoscopies with identified risk factors  · Pareneteral controlled substances - Adjusted / Weaned / Started  · Drug therapy requiring intensive monitoring for toxicity  · Review and order of Clinical lab tests  · Review and Order of Radiology tests  · Review and Order of Medicine tests  · Independent visualization of Image  · Review and summarize records or history fromprevious days notes  · Reviewed Ventilator / NiPPV  · I have personally reviewed the patients ECG / Davin Morales MD  2/1/2018

## 2018-02-01 NOTE — PROGRESS NOTES
Problem: Falls - Risk of  Goal: *Absence of Falls  Document Arleen Fall Risk and appropriate interventions in the flowsheet. Outcome: Progressing Towards Goal  Fall Risk Interventions:       Mentation Interventions: Evaluate medications/consider consulting pharmacy    Medication Interventions: Evaluate medications/consider consulting pharmacy    Elimination Interventions:  Toileting schedule/hourly rounds    History of Falls Interventions: Evaluate medications/consider consulting pharmacy

## 2018-02-01 NOTE — PROGRESS NOTES
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 He apparently had a fall about 2 months ago and was seen at [facility] with a reportedly negative head CT. He developed a 2 day history of word finding difficulties and presented to the ER at [facility]. Found to have a large left chronic SDH with membranes and midline shift. He was taken to the OR by Dr. Romi Coleman yesterday evening for evacuation of the hemorrhage. Approximately 300 cc of blood was removed from the subdural space. Subjective: The patient's bronchoscopy yesterday revealed left tracheobronchomalacia. He was started on Levaquin and Vancomycin in addition to his cefepime. He is more awake and alert today. He is currently undergoing a spontaneous breathing trial. His sedation medication was switched from Propofol to Precedex. He is currently on 0.4 mcg/kg/hr. Unable to obtain ROS due to intubation and AMS    Objective:     Vital signs  Temp (24hrs), Av.3 °F (36.8 °C), Min:97.4 °F (36.3 °C), Max:98.8 °F (37.1 °C)   701 -  1900  In: 100 [I.V.:100]  Out: 47 [Urine:47]  1901 -  0700  In: 6242.6 [I.V.:2642.6]  Out: 5244 [Urine:4290; Drains:185]    Visit Vitals    /64 (BP 1 Location: Right arm, BP Patient Position: At rest)    Pulse (!) 52    Temp 97.4 °F (36.3 °C)    Resp 12    Ht 6' (1.829 m)    Wt 107.5 kg (237 lb)    SpO2 97%    BMI 32.14 kg/m2    O2 Flow Rate (L/min): 4 l/min O2 Device: Endotracheal tube     Pain control  Pain Assessment  Pain Scale 1: Numeric (0 - 10)  Pain Intensity 1: 0  Pain Intervention(s) 1: Medication (see MAR)    PT/OT  Gait                 Physical Exam:  Gen:NAD. Intubated. Precedex 0.4 mcg/kg/hr.  Not turned off for exam.  Neuro: Awake. Eyes open. Tracks around room. Nods head yes or no. PERRL. Follows commands to give me thumbs up bilaterally and wiggle toes. JACK spontaneously L>R. Skin: Left frontoparietal incision C/D/I with staples in place. No erythema, edema, or drainage. CT head without contrast on 01/29/18 shows no new finding is seen. Intracranial hemorrhage and extra-axial collection on the left unchanged. 24 hour results:    Recent Results (from the past 24 hour(s))   CULTURE, RESPIRATORY/SPUTUM/BRONCH W GRAM STAIN    Collection Time: 01/31/18 11:30 AM   Result Value Ref Range    Special Requests: NO SPECIAL REQUESTS      GRAM STAIN 2+ WBCS SEEN      GRAM STAIN NO EPITHELIAL CELLS SEEN      GRAM STAIN NO DEFINITE ORGANISM SEEN      Culture result: PENDING    KOH, OTHER SOURCES    Collection Time: 01/31/18 11:30 AM   Result Value Ref Range    Special Requests: NO SPECIAL REQUESTS      KOH NO YEAST SEEN     GLUCOSE, POC    Collection Time: 01/31/18 12:40 PM   Result Value Ref Range    Glucose (POC) 188 (H) 65 - 100 mg/dL    Performed by Arian Communicadoisha, POC    Collection Time: 01/31/18  6:25 PM   Result Value Ref Range    Glucose (POC) 119 (H) 65 - 100 mg/dL    Performed by Arian Communicadoisha, POC    Collection Time: 02/01/18 12:46 AM   Result Value Ref Range    Glucose (POC) 138 (H) 65 - 100 mg/dL    Performed by [address] [place] [address], POC    Collection Time: 02/01/18  6:02 AM   Result Value Ref Range    Glucose (POC) 198 (H) 65 - 100 mg/dL    Performed by Peggy Dorantes           Assessment:     Principal Problem:    SDH (subdural hematoma) ([facility] [address] (1/23/2018)        Plan:   1. Left traumatic chronic subdural hematoma   - s/p crani 01/24   - Cont Keppra and Vimpat   - Repeat head CT stable   - SBT and extubate pending his pulmonary status   - Therapy evals as able   - Cont Precedex PRN  2. Brain compression   - due to #1   - plans as above  3.  HTN   - SBP<160   - Hydralazine PRN   - Restart home BP meds down dobhoff   - Intensivist following  4. Diabetes mellitus, type 2   - Cont Lantus 30 units   - SSI and accu-checks   - Intensivist following  5. ABIMBOLA on CKD, stage 3   - Avoid nephrotoxic agents   - Hospitalist/intensivist following  6. Acute encephalopathy   - multi-factorial     - limit sedating agents   - supportive care  7. Hypernatremia   - d/c 1/2NS   - Cont free water flushes with tube feeds   - Downtrending  8. Atrial fibrillation   - Metoprolol added   - Electrolytes corrected   - Hospitalist/intensivist following  9. Hypomagenesemia   - Resolved   - Intensivist following  10. Hypophosphatemia   - Resolved   - Electrolyte replacement protocol   - Intensivist following  11. Acute respiratory insufficiency   - Pt re-intubated 01/26   - Pulmonary following   - Extubated 01/30   - Pt re-intubated 01/30 for respiratory distress  12. Focal seizure   - Cont Keppra 1000 mg bid   - Cont Vimpat 100 mg bid   - EEG does not show seizures but patient had witnessed seizure last Friday so will cont AEDs at this time  13. Thrombocytopenia   - Possible medication induced. Recheck CBC today since Vanc restarted yesterday   - Cont to monitor   - Intensivist following    Activity: OOB with assist  DVT ppx: SCDs  Dispo: tbd    Will discuss plan with Dr. Gloria Cisneros. Discussed with significant other at bedside and ICU nurse.       Chuy Gifford NP

## 2018-02-01 NOTE — PROGRESS NOTES
Day #2 of Vancomycin  Indication:  New fevers in the setting of HAP (previously grew Group F Strep)  Current regimen:  2000 mg IV x 1 (given on  @ 1231)  Abx regimen:  Cefepime + Vancomycin  ID Following ?: NO  Concomitant nephrotoxic drugs (requires more frequent monitoring): None  Frequency of BMP?: x 1 tomorrow    Recent Labs      18   1135  18   0354  18   0227   WBC  4.6   --   5.7   CREA  1.60*  1.69*  1.53*   BUN  46*  40*  35*     Est CrCl: ~40-50 ml/min; UO: ~0.6 ml/kg/hr  Temp (24hrs), Av.1 ° F (36.7 ° C), Min:97.4 ° F (36.3 ° C), Max:98.7 ° F (37.1 ° C)    Cultures:    BAL: scant Group F Strep, final   BAL: NGTD    Goal trough = 15 - 20 mcg/mL    Recent trough history (date/time/level/dose/action taken):  None    Plan: Scr is about the same today, will give a one-time dose of 1500 mg IV this afternoon. Pharmacy will continue to monitor patient daily and will make dosage adjustments based upon changing renal function.

## 2018-02-01 NOTE — PROGRESS NOTES
1930: Bedside shift change report given to Rufino Vásquez RN (oncoming nurse) by Flory Griffith RN (offgoing nurse). Report included the following information SBAR, Kardex, ED Summary, Procedure Summary, Intake/Output, MAR, Accordion, Recent Results and Med Rec Status. 0730: Bedside shift change report given to Janine Paz (oncoming nurse) by Rufino Vásquez RN (offgoing nurse). Report included the following information SBAR, Kardex, ED Summary, OR Summary, Procedure Summary, Intake/Output, MAR, Accordion, Recent Results and Med Rec Status.

## 2018-02-02 NOTE — PROGRESS NOTES
7:45-Bedside and Verbal shift change report given to Patricio Romo and Mervat Hou (oncoming nurse) by Renan George (offgoing nurse). Report included the following information SBAR, Kardex, ED Summary, Procedure Summary, Intake/Output, MAR, Accordion, Recent Results, Med Rec Status, Cardiac Rhythm SR and Alarm Parameters . 19:30-Bedside and Verbal shift change report given to Trace Reyes (oncoming nurse) by Alejandro Aranda (offgoing nurse). Report included the following information SBAR, Kardex, ED Summary, Procedure Summary, Intake/Output, MAR, Accordion, Recent Results, Med Rec Status, Cardiac Rhythm SB/SR and Alarm Parameters .

## 2018-02-02 NOTE — PROCEDURES
[facility]  Bronchoscopy Report    Procedure: Diagnostic bronchoscopy. Indication: Mucus Plugging    Consent/Treatment: Informed consent was obtained from the  family after risks, benefits and alternatives were explained. Timeout verified the correct patient and correct procedure. Anesthesia:   Topical sedation to nares, mouth, and tracheobronchial tree with lidocaine  Patient on ventilator and receiving  precedex        Procedure Details:   -- The bronchoscope was introduced through an endotracheal tube. -- The vocal cords not seen. -- The trachea and tanvi were completely inspected and erythema better no vesicles both mainstem once again showed excessive dynamic collapse /malacia with cough. However scope could be advanced more easily as mainstem were less edematous. .  No endobronchial lesions. Specimens:   Bronchial washings were sent for  microbiology    Rapid On-Site Evaluation: A preliminary diagnosis of tracheobronchomalacia.  Improving tracheobronchitis    Complications: none    Estimated Blood Loss: Minimal    Citlaly Sanchez MD

## 2018-02-02 NOTE — PROGRESS NOTES
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 Merced Varela will start 7.5 mg/kg based on IBW every 12 hours per [facility] P&T Committee Protocol with respect to renal function. Pharmacy will continue to monitor patient daily and will make dosage adjustments based upon changing renal function.

## 2018-02-02 NOTE — PROGRESS NOTES
Hospitalist Progress Note  Susan Holt MD  Answering service: [phone] OR [phone] from in house phone        Date of Service:  2018  NAME:  Aimee Gutierrez  :  1935  MRN:  [id number]      Admission Summary:   Shantel Antonio a 81 y. o. male with past medical history of arthritis, bladder cancer, prostate cancer, chronic neck pain, colon cancer, type 2 diabetes mellitus, GERD, hypertension, hyperlipidemia, kidney stones, PUD, DVT, PE, TIA, sleep apnea presented to the ED from home with reported inability to talk. Luke Pain is a non-historian.  As such, history was obtained per my review of ED and medical records.  Per these collective reports, patient had onset of dysarthria and expressive aphasia starting approximately two days ago.        Interval history / Subjective:     F/u for respiratory failure and intracranial hemorrhage. Not verbally responsive. Appears anxious. Assessment & Plan:     Acute Hypoxic Respiratory Failure due to mucus plugging   S/p mechanical ventilation  Extubated today but appears tachypnic with increase work of breathing. 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 -S/p craniotomy and evacuation   - Management as per neuro surgery.     Hypertension.  - BP is stable  - PRN IV hydralazine      Type 2 diabetes mellitus with hyperglycemia:   accucheks,humalog sliding scale and lantus  -Target blood sugar 140-180   -Continue ISS and Lantus. accu checks     Hyperlipidemia.     CKD III with worsening of creatinine:  - Iv fluids. Monitor. Renal dose adjust medications, avoid nephrotoxins.  - Hold ACE. - renal indices stable     Seizure :   On keppra  EEG negative diffuse slowing     Elevated wbc from? Resolved. Continue Cefepime. Hypernatremia: Continue to adjust free water flushes  monitor    Afib with RVR requiring cardizem drip  Check 2 d echo      Code status: full  DVT prophylaxis: scd    Care Plan discussed with: Patient/Family and Nurse  Disposition: TBD, still requires ICU care     Hospital Problems  Date Reviewed: 1/23/2018          Codes Class Noted POA    * (Principal)SDH (subdural hematoma) ([facility] [address] ICD-10-CM: I62.00  ICD-9-CM: 432.1  1/23/2018 Unknown                Review of Systems:   Review of systems not obtained due to patient factors. Vital Signs:    Last 24hrs VS reviewed since prior progress note. Most recent are:  Visit Vitals    /73    Pulse (!) 112    Temp 99.7 °F (37.6 °C)    Resp (!) 35    Ht 6' (1.829 m)    Wt 105.5 kg (232 lb 9.4 oz)    SpO2 94%    BMI 31.54 kg/m2         Intake/Output Summary (Last 24 hours) at 02/02/18 1635  Last data filed at 02/02/18 1500   Gross per 24 hour   Intake          2730.93 ml   Output             1737 ml   Net           993.93 ml        Physical Examination:             Constitutional:  on face mask- anxious and non verbal.   Eyes: Non icteric,non pallor pupil s pin point    HENT:  Oral mucous moist, head sutures intact. Resp: Coarse breath sounds   CV:  Regular rhythm, normal rate,    GI:  Soft, non distended, non tender.  bs= no hepatosplenomegaly    :  No CVA or suprapubic tenderness   Skin  :  No erythema,rash,bullae,dipigmentation     Musculoskeletal:  SCDs on both legs ;no edema, warm, 2+ pulses throughout    Neurologic:  on vent , awake, responds to command            Data Review:    I personally reviewed  Image and labs      Labs:     Recent Labs      02/02/18   0459  02/01/18   1135   WBC  5.3  4.6   HGB  9.2*  9.4*   HCT  28.7*  29.6*   PLT  107*  102*     Recent Labs      02/02/18   1505  02/02/18   0459  02/01/18   1135   NA  148*  146*  147*   K  3.3*  3.9  3.9   CL  117*  115*  112* CO2  24  26  25   BUN  45*  46*  46*   CREA  1.83*  1.69*  1.60*   GLU  200*  236*  203*   CA  8.7  8.4*  8.6     Recent Labs      02/02/18   1505   SGOT  15   ALT  20   AP  61   TBILI  0.4   TP  5.8*   ALB  2.2*   GLOB  3.6     No results for input(s): INR, PTP, APTT in the last 72 hours. No lab exists for component: INREXT, INREXT   No results for input(s): FE, TIBC, PSAT, FERR in the last 72 hours. No results found for: FOL, RBCF   No results for input(s): PH, PCO2, PO2 in the last 72 hours. No results for input(s): CPK, CKNDX, TROIQ in the last 72 hours.     No lab exists for component: CPKMB  Lab Results   Component Value Date/Time    Cholesterol, total 118 01/22/2018 11:28 PM    HDL Cholesterol 53 01/22/2018 11:28 PM    LDL, calculated 20 01/22/2018 11:28 PM    Triglyceride 225 01/22/2018 11:28 PM    CHOL/HDL Ratio 2.2 01/22/2018 11:28 PM     Lab Results   Component Value Date/Time    Glucose (POC) 178 02/02/2018 12:38 PM    Glucose (POC) 245 02/02/2018 06:10 AM    Glucose (POC) 222 02/01/2018 11:39 PM    Glucose (POC) 267 02/01/2018 06:07 PM    Glucose (POC) 204 02/01/2018 11:50 AM     Lab Results   Component Value Date/Time    Color Yellow 03/04/2013 09:14 AM    Appearance Cloudy 03/04/2013 09:14 AM    Specific gravity 1.015 03/10/2010 09:45 AM    pH (UA) 7.0 03/04/2013 09:14 AM    Protein NEGATIVE  03/10/2010 09:45 AM    Glucose NEGATIVE  03/10/2010 09:45 AM    Ketone Negative 03/04/2013 09:14 AM    Bilirubin Negative 03/04/2013 09:14 AM    Urobilinogen 0.2 03/10/2010 09:45 AM    Nitrites Negative 03/04/2013 09:14 AM    Leukocyte Esterase Negative 03/04/2013 09:14 AM    Bacteria Few 03/04/2013 09:14 AM    WBC 0-5 03/04/2013 09:14 AM    RBC >30 03/04/2013 09:14 AM         Medications Reviewed:     Current Facility-Administered Medications   Medication Dose Route Frequency    acyclovir (ZOVIRAX) 575 mg in 0.9% sodium chloride 100 mL IVPB  575 mg IntraVENous Q12H    budesonide (PULMICORT) 500 mcg/2 ml nebulizer suspension  500 mcg Nebulization BID RT    albuterol-ipratropium (DUO-NEB) 2.5 MG-0.5 MG/3 ML  3 mL Nebulization Q4H PRN    levETIRAcetam (KEPPRA) oral solution 1,000 mg  1,000 mg Per NG tube Q12H    lacosamide (VIMPAT) tablet 100 mg  100 mg Per NG tube Q12H    albuterol-ipratropium (DUO-NEB) 2.5 MG-0.5 MG/3 ML  3 mL Nebulization 02H RT    dilTIAZem (CARDIZEM) 125 mg in dextrose 5% 125 mL infusion  0-15 mg/hr IntraVENous TITRATE    dexmedeTOMidine (PRECEDEX) 400 mcg in 0.9% sodium chloride 100 mL infusion  0.2-1.2 mcg/kg/hr IntraVENous TITRATE    sodium chloride (NS) flush 5-10 mL  5-10 mL IntraVENous Q8H    sodium chloride (NS) flush 5-10 mL  5-10 mL IntraVENous PRN    midazolam (VERSED) injection 5 mg  5 mg IntraVENous Multiple    fentaNYL citrate (PF) injection 25 mcg  25 mcg IntraVENous Q1H PRN    insulin glargine (LANTUS) injection 30 Units  30 Units SubCUTAneous QHS    famotidine (PEPCID) 40 mg/5 mL (8 mg/mL) oral suspension 20 mg  20 mg Per NG tube DAILY    bacitracin 500 unit/gram packet 1 Packet  1 Packet Topical PRN    chlorhexidine (PERIDEX) 0.12 % mouthwash 15 mL  15 mL Oral BID    polyvinyl alcohol (LIQUIFILM TEARS) 1.4 % ophthalmic solution 2 Drop  2 Drop Both Eyes Q8H    insulin lispro (HUMALOG) injection   SubCUTAneous Q6H    cefepime (MAXIPIME) 2 g in 0.9% sodium chloride (MBP/ADV) 100 mL  2 g IntraVENous Q12H    sodium chloride (NS) flush 10-30 mL  10-30 mL InterCATHeter PRN    sodium chloride (NS) flush 10 mL  10 mL InterCATHeter Q24H    sodium chloride (NS) flush 10 mL  10 mL InterCATHeter PRN    sodium chloride (NS) flush 10-40 mL  10-40 mL InterCATHeter Q8H    sodium chloride (NS) flush 20 mL  20 mL InterCATHeter Q24H    alteplase (CATHFLO) 1 mg in sterile water (preservative free) 1 mL injection  1 mg InterCATHeter PRN    atorvastatin (LIPITOR) tablet 40 mg  40 mg Per NG tube DAILY    naloxone (NARCAN) injection 0.4 mg  0.4 mg IntraVENous PRN    acetaminophen (TYLENOL) tablet 650 mg  650 mg Oral Q4H PRN    Or    acetaminophen (TYLENOL) solution 650 mg  650 mg Per NG tube Q4H PRN    Or    acetaminophen (TYLENOL) suppository 650 mg  650 mg Rectal Q4H PRN    glucose chewable tablet 16 g  4 Tab Oral PRN    dextrose (D50W) injection syrg 12.5-25 g  12.5-25 g IntraVENous PRN    glucagon (GLUCAGEN) injection 1 mg  1 mg IntraMUSCular PRN    hydrALAZINE (APRESOLINE) 20 mg/mL injection 10 mg  10 mg IntraVENous Q4H PRN    sodium chloride (NS) flush 5-10 mL  5-10 mL IntraVENous Q8H    sodium chloride (NS) flush 5-10 mL  5-10 mL IntraVENous PRN    acetaminophen (TYLENOL) tablet 650 mg  650 mg Oral Q4H PRN    HYDROcodone-acetaminophen (NORCO) 5-325 mg per tablet 1 Tab  1 Tab Oral Q4H PRN    ondansetron (ZOFRAN) injection 4 mg  4 mg IntraVENous Q4H PRN     ______________________________________________________________________  EXPECTED LENGTH OF STAY: 2d 4h  ACTUAL LENGTH OF STAY:          10                 Rajendra Castelan MD

## 2018-02-02 NOTE — PROGRESS NOTES
PCCM  Pt extubated after bronch today. WOB later on in the day and placed on bipap. Suspect bronchospasm from HSV tracheobronchitis + component post extubation edema. GAve lasix and have placed on Q2 duonebs. ABG this AM without CO2 retention or sig met acidosis. Will monitor closely and reintubate if worsens. If reintubated will consider trach. D/ w neurosurgery attending who is in agreement. Repeat ABG pending.

## 2018-02-02 NOTE — PROGRESS NOTES
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 He apparently had a fall about 2 months ago and was seen at [address] [place] [address] with a reportedly negative head CT. He developed a 2 day history of word finding difficulties and presented to the ER at [facility]. Found to have a large left chronic SDH with membranes and midline shift. He was taken to the OR by Dr. Nick Andrew yesterday evening for evacuation of the hemorrhage. Approximately 300 cc of blood was removed from the subdural space. 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 He was successfully extubated this morning. He is the most awake I have seen him. He is talking to me and answering questions appropriately. Objective:     Vital signs  Temp (24hrs), Av.3 °F (36.8 °C), Min:97.8 °F (36.6 °C), Max:98.8 °F (37.1 °C)    07 -  1900  In: 150   Out: 350 [Urine:350]  1901 -  0700  In: 5696.4 [I.V.:2256.4]  Out: 6919 [Urine:2216; Drains:75]    Visit Vitals    /73    Pulse 78    Temp 98.3 °F (36.8 °C)    Resp 25    Ht 6' (1.829 m)    Wt 105.5 kg (232 lb 9.4 oz)    SpO2 98%    BMI 31.54 kg/m2    O2 Flow Rate (L/min): 4 l/min O2 Device: Endotracheal tube     Pain control  Pain Assessment  Pain Scale 1: Adult Nonverbal Pain Scale  Pain Intensity 1: 0  Pain Intervention(s) 1: Medication (see MAR)    PT/OT  Gait                 Physical Exam:  Gen:NAD. Off precedex. Neuro: Awake. Eyes open. Oriented to person, place, year. Speech with some dysarthria but more clear. PERRL. Follows commands to show me 2 fingers bilaterally and wiggle toes. JACK spontaneously L>R. Skin: Left frontoparietal incision C/D/I with staples in place. No erythema, edema, or drainage. CT head without contrast on 01/29/18 shows no new finding is seen. Intracranial hemorrhage and extra-axial collection on the left unchanged. 24 hour results:    Recent Results (from the past 24 hour(s))   CBC WITH AUTOMATED DIFF    Collection Time: 02/01/18 11:35 AM   Result Value Ref Range    WBC 4.6 4.1 - 11.1 K/uL    RBC 3.10 (L) 4.10 - 5.70 M/uL    HGB 9.4 (L) 12.1 - 17.0 g/dL    HCT 29.6 (L) 36.6 - 50.3 %    MCV 95.5 80.0 - 99.0 FL    MCH 30.3 26.0 - 34.0 PG    MCHC 31.8 30.0 - 36.5 g/dL    RDW 13.4 11.5 - 14.5 %    PLATELET 395 (L) 258 - 400 K/uL    NRBC 0.0 0  WBC    ABSOLUTE NRBC 0.00 0.00 - 0.01 K/uL    NEUTROPHILS 76 (H) 32 - 75 %    LYMPHOCYTES 11 (L) 12 - 49 %    MONOCYTES 5 5 - 13 %    EOSINOPHILS 7 0 - 7 %    BASOPHILS 1 0 - 1 %    IMMATURE GRANULOCYTES 0 %    ABS. NEUTROPHILS 3.6 1.8 - 8.0 K/UL    ABS. LYMPHOCYTES 0.5 (L) 0.8 - 3.5 K/UL    ABS. MONOCYTES 0.2 0.0 - 1.0 K/UL    ABS. EOSINOPHILS 0.3 0.0 - 0.4 K/UL    ABS. BASOPHILS 0.0 0.0 - 0.1 K/UL    ABS. IMM.  GRANS. 0.0 K/UL    DF MANUAL      RBC COMMENTS ANISOCYTOSIS  1+        RBC COMMENTS MACROCYTOSIS  1+       METABOLIC PANEL, BASIC    Collection Time: 02/01/18 11:35 AM   Result Value Ref Range    Sodium 147 (H) 136 - 145 mmol/L    Potassium 3.9 3.5 - 5.1 mmol/L    Chloride 112 (H) 97 - 108 mmol/L    CO2 25 21 - 32 mmol/L    Anion gap 10 5 - 15 mmol/L    Glucose 203 (H) 65 - 100 mg/dL    BUN 46 (H) 6 - 20 MG/DL    Creatinine 1.60 (H) 0.70 - 1.30 MG/DL    BUN/Creatinine ratio 29 (H) 12 - 20      GFR est AA 51 (L) >60 ml/min/1.73m2    GFR est non-AA 42 (L) >60 ml/min/1.73m2    Calcium 8.6 8.5 - 10.1 MG/DL   GLUCOSE, POC    Collection Time: 02/01/18 11:50 AM   Result Value Ref Range    Glucose (POC) 204 (H) 65 - 100 mg/dL    Performed by [facility] HALEY    GLUCOSE, POC    Collection Time: 02/01/18  6:07 PM   Result Value Ref Range    Glucose (POC) 267 (H) 65 - 100 mg/dL    Performed by Rissa Amaya GLUCOSE, POC    Collection Time: 02/01/18 11:39 PM   Result Value Ref Range    Glucose (POC) 222 (H) 65 - 100 mg/dL    Performed by [facility]    METABOLIC PANEL, BASIC    Collection Time: 02/02/18  4:59 AM   Result Value Ref Range    Sodium 146 (H) 136 - 145 mmol/L    Potassium 3.9 3.5 - 5.1 mmol/L    Chloride 115 (H) 97 - 108 mmol/L    CO2 26 21 - 32 mmol/L    Anion gap 5 5 - 15 mmol/L    Glucose 236 (H) 65 - 100 mg/dL    BUN 46 (H) 6 - 20 MG/DL    Creatinine 1.69 (H) 0.70 - 1.30 MG/DL    BUN/Creatinine ratio 27 (H) 12 - 20      GFR est AA 47 (L) >60 ml/min/1.73m2    GFR est non-AA 39 (L) >60 ml/min/1.73m2    Calcium 8.4 (L) 8.5 - 10.1 MG/DL   CBC WITH AUTOMATED DIFF    Collection Time: 02/02/18  4:59 AM   Result Value Ref Range    WBC 5.3 4.1 - 11.1 K/uL    RBC 3.01 (L) 4.10 - 5.70 M/uL    HGB 9.2 (L) 12.1 - 17.0 g/dL    HCT 28.7 (L) 36.6 - 50.3 %    MCV 95.3 80.0 - 99.0 FL    MCH 30.6 26.0 - 34.0 PG    MCHC 32.1 30.0 - 36.5 g/dL    RDW 13.3 11.5 - 14.5 %    PLATELET 394 (L) 762 - 400 K/uL    NRBC 0.0 0  WBC    ABSOLUTE NRBC 0.00 0.00 - 0.01 K/uL    NEUTROPHILS 75 32 - 75 %    LYMPHOCYTES 10 (L) 12 - 49 %    MONOCYTES 11 5 - 13 %    EOSINOPHILS 4 0 - 7 %    BASOPHILS 0 0 - 1 %    IMMATURE GRANULOCYTES 1 (H) 0.0 - 0.5 %    ABS. NEUTROPHILS 3.9 1.8 - 8.0 K/UL    ABS. LYMPHOCYTES 0.5 (L) 0.8 - 3.5 K/UL    ABS. MONOCYTES 0.6 0.0 - 1.0 K/UL    ABS. EOSINOPHILS 0.2 0.0 - 0.4 K/UL    ABS. BASOPHILS 0.0 0.0 - 0.1 K/UL    ABS. IMM.  GRANS. 0.0 0.00 - 0.04 K/UL    DF AUTOMATED     GLUCOSE, POC    Collection Time: 02/02/18  6:10 AM   Result Value Ref Range    Glucose (POC) 245 (H) 65 - 100 mg/dL    Performed by Ana Barnes    POC G3 - PUL    Collection Time: 02/02/18 10:56 AM   Result Value Ref Range    FIO2 (POC) 40 %    pH (POC) 7.404 7.35 - 7.45      pCO2 (POC) 33.8 (L) 35.0 - 45.0 MMHG    pO2 (POC) 82 80 - 100 MMHG    HCO3 (POC) 21.1 (L) 22 - 26 MMOL/L    sO2 (POC) 96 92 - 97 %    Base deficit (POC) 4 mmol/L    Site RIGHT BRACHIAL      Device: VENT      Mode CPAP/SPON      PEEP/CPAP (POC) 5 cmH2O    Pressure support 5 cmH2O    Allens test (POC) N/A      Specimen type (POC) ARTERIAL      Total resp. rate 30            Assessment:     Principal Problem:    SDH (subdural hematoma) ([facility] [address] (1/23/2018)        Plan:   1. Left traumatic chronic subdural hematoma   - s/p crani 01/24   - Cont Keppra and Vimpat   - Therapy evals as able   - Cont Precedex PRN   - Neuro checks q4h  2. Brain compression   - due to #1   - plans as above  3. HTN   - SBP<160   - Hydralazine PRN   - Restart home BP meds down dobhoff   - Intensivist following  4. Diabetes mellitus, type 2   - Cont Lantus 30 units   - SSI and accu-checks   - Intensivist following  5. ABIMBOLA on CKD, stage 3   - Avoid nephrotoxic agents   - Hospitalist/intensivist following  6. Acute encephalopathy   - multi-factorial     - limit sedating agents   - supportive care  7. Hypernatremia   - d/c 1/2NS   - Cont free water flushes with tube feeds   - Downtrending  8. Atrial fibrillation   - Metoprolol added   - Electrolytes corrected   - Hospitalist/intensivist following  9. Hypomagenesemia   - Resolved   - Intensivist following  10. Hypophosphatemia   - Resolved   - Electrolyte replacement protocol   - Intensivist following  11. Acute respiratory insufficiency   - Pt re-intubated 01/26   - Pulmonary following   - Extubated 01/30   - Pt re-intubated 01/30 for respiratory distress   - Extubated 02/02  12. Focal seizure   - Cont Keppra 1000 mg bid   - Cont Vimpat 100 mg bid   - EEG does not show seizures but patient had witnessed seizure last Friday so will cont AEDs at this time  13. Thrombocytopenia   - Possible medication induced. 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 Hand Neighbours. Keep pt in ICU for tentative pulmonary status.       Bernadette Martinez NP

## 2018-02-02 NOTE — PROGRESS NOTES
participated in [facility] rounds this am and Pulmonologist discussed the possibility of extubation in the near future. Care management will continue to follow for transitions of care needs.

## 2018-02-02 NOTE — PROGRESS NOTES
1930: Bedside shift change report given to Michael Garcia RN (oncoming nurse) by Iman Pride (offgoing nurse). Report included the following information SBAR, Kardex, ED Summary, OR Summary, Procedure Summary, Intake/Output, MAR, Accordion, Recent Results and Med Rec Status. 2000: Assessment complete. Patient resting, awake to voice. No pain, nods approprietly. Lindquist patent, flexi reinserted and patient cleaned up. 2230: Bedside shift change report given to [facility] (oncoming nurse) by Seth Guy RN (offgoing nurse). Report included the following information SBAR, Kardex, ED Summary, OR Summary, Procedure Summary, Intake/Output, MAR, Accordion and Recent Results.

## 2018-02-02 NOTE — PROGRESS NOTES
8:10- AM assessment completed. Pt is alert and follows commands on vent, sedation and Tf off for SBT this AM, lungs are coarse. 9:30- Dr. Yahir Leyva at bedside, would like to bronch pt prior to doing SBT  10:15- Pt on SBT  11:00- Pt extubated to NC 4L  12:00- Pt is alert and oriented and follows commands. 13:50- Pt now in afib, WOB has increased. New orders for cardizem gtt, labs, ABG, and to start bipap.  14:10- Pt refusing bipap and trying to pull mask off face. 16:00- Pt now on 6L NC, has urinated several times, WOB is the same. 16:08- Dr. Yahir Leyva at bedside, new orders to place pt back on precedex gtt and try bipap again.

## 2018-02-02 NOTE — PROGRESS NOTES
2230: Bedside shift change report given to [facility] MAHAD, RN (oncoming nurse) by Myron Jha RN (offgoing nurse). Report included the following information SBAR, Intake/Output, MAR, Recent Results, Cardiac Rhythm SR/SB and Alarm Parameters . Care assumed, pt in bed, intubated and sedated, Precedex @ 0.5mcg, elw and flexiseal in placed. 0400: Tube feeding stopped for SBT in morning.  0620: Precedex off for SBT  0730: Bedside shift change report given to Manny Cadena, ISA and Maddison Aguirre RN  (oncoming nurse) by [facility] MAHAD, ISA (offgoing nurse). Report included the following information SBAR, Intake/Output, MAR, Recent Results, Cardiac Rhythm SB/SR and Alarm Parameters . Shift summary:  Pt vented, sedation and tube feeds off for SBT, lew, flexiseal, and dobhoff in place. JACK purposefully, (L)  weaker than (R), mouthing words around ETT, follows commands slowly. Pt resting at this time.

## 2018-02-02 NOTE — PROGRESS NOTES
Occupational Therapy    Orders received, chart reviewed. Attempted to see patient for occupational therapy re-assessment. Consulted with RN, who conveyed that patient is in A-fib and not medically appropriate for therapy at this time. Will f/u as able and appropriate.     Tay Pichardo, OTR/L

## 2018-02-02 NOTE — PROGRESS NOTES
[facility]  Pulmonary, Critical Care, and Sleep Medicine  Name: Tristin Bray MRN: [id number]   : 1935 [facility]   Date: 2018      Subjective:    Much more alert, follows commands. Review of systems not obtained due to patient factors. Objective:      Exam  Vital Signs:  Visit Vitals    /74    Pulse 75    Temp 98.3 °F (36.8 °C)    Resp 21    Ht 6' (1.829 m)    Wt 105.5 kg (232 lb 9.4 oz)    SpO2 98%    BMI 31.54 kg/m2     Temp (24hrs), Av.3 °F (36.8 °C), Min:97.8 °F (36.6 °C), Max:98.8 °F (37.1 °C)          Intake/Output Summary (Last 24 hours) at 18 0925  Last data filed at 18 0700   Gross per 24 hour   Intake          3293.38 ml   Output             1494 ml   Net          1799.38 ml     GENERAL: well developed and in mild distress, HEENT:  PERRL, EOMI, no alar flaring or epistaxis, oral mucosa moist without cyanosis, NECK:  no jugular vein distention, no retractions, no thyromegaly or masses, LUNGS: decreased breath sounds billaterally and with rhonchi , HEART:  Regular rate and rhythm with no MGR; no edema is present, ABDOMEN:  soft with no tenderness, bowel sounds present, EXTREMITIES:  warm with no cyanosis and SKIN:  no jaundice or ecchymosis     Labs:  Recent Labs      18   1135   WBC  5.3  4.6   HGB  9.2*  9.4*   HCT  28.7*  29.6*   PLT  107*  102*     Recent Labs      18   0459  18   1135  18   0354   NA  146*  147*  145   K  3.9  3.9  3.8   CL  115*  112*  111*   CO2  26  25  26   GLU  236*  203*  167*   BUN  46*  46*  40*   CREA  1.69*  1.60*  1.69*   CA  8.4*  8.6  8.5       No results for input(s): PHI, PO2I, PCO2I in the last 72 hours. Impression     Plan  1. S/p craniotomy and evacuation left SDH 18 s/p GLF as oupt  2. AHRF-suspect new LLL HCAP- reintubated 18 for WOB/hypoxia  3. Severe distal tracheobronchomalacia- with 100% Left MS collapse with cough.  - LLL PNA with retained secretions. 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 5. Sz  6. hypernatremia  7. CKD III  8. HTN  9. DM2  10. HLP  11. 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  H/o CVA · Will plan on SBT and trial of extubation today  · Will start acyclovir  · He will need pneumatic splinting with CPAP QHS after extubation  · Keep intubated another day, plan on bronch in AM and then SBT with possible extubation  · pulm toilet  ·  lung protective ventilation  · EEG no sz  · At risk for decline due to PNA sepsis /MODS        Medical Decision Making Today  · I have reviewed the flowsheet and previous days notes  · One or more chronic illnesses with severe exacerbation, progression or side effects of treatment  · Acute or chronic illness that poses a threat to life or bodily function  · Abrupt change in neurologic status  · Diagnostic endoscopies with identified risk factors  · Pareneteral controlled substances - Adjusted / Jarred Fendt / Started  · Drug therapy requiring intensive monitoring for toxicity  · Review and order of Clinical lab tests  · Review and Order of Radiology tests  · Review and Order of Medicine tests  · Independent visualization of Image  · Review and summarize records or history fromprevious days notes  · Reviewed Ventilator / NiPPV  · I have personally reviewed the patients ECG / Naif Valadez MD  2/2/2018

## 2018-02-02 NOTE — PROGRESS NOTES
Problem: Falls - Risk of  Goal: *Absence of Falls  Document Arleen Fall Risk and appropriate interventions in the flowsheet.    Outcome: Progressing Towards Goal  Fall Risk Interventions:       Mentation Interventions: Adequate sleep, hydration, pain control, Door open when patient unattended, Evaluate medications/consider consulting pharmacy, Eyeglasses and hearing aids, Increase mobility, More frequent rounding, Reorient patient, Room close to nurse's station, Update white board    Medication Interventions: Assess postural VS orthostatic hypotension, Evaluate medications/consider consulting pharmacy    Elimination Interventions: Call light in reach, Toileting schedule/hourly rounds    History of Falls Interventions: Consult care management for discharge planning, Door open when patient unattended, Evaluate medications/consider consulting pharmacy, Investigate reason for fall, Room close to nurse's station

## 2018-02-02 NOTE — PROGRESS NOTES
Problem: Dysphagia (Adult)  Goal: *Acute Goals and Plan of Care (Insert Text)  Speech therapy goals  Re-evaluation 2/2/2018   1. Patient will participate in re-assessment of swallow function within 7 days   2. Patient will participate in formal language/motor speech assessment within 7 days    Initiated 1/24/2018   1. Patient will participate in re-assessment of swallow function within 7 days   2. Patient will participate in formal language/motor speech assessment within 7 days    Speech LAnguage Pathology bedside swallow evaluation  Patient: Kaley Carrillo (65 y.o. male)  Date: 2/2/2018  Primary Diagnosis: SDH (subdural hematoma) (HCC)  Chronic Subdural Hematoma  Procedure(s) (LRB):  CRANIOTOMY, LEFT FOR EVACUATION OF SUBDURAL HEMATOMA (Left) 10 Days Post-Op   Precautions: swallow  Fall (SBP <160)    ASSESSMENT :  Patient underwent left craniotomy for evacuation of subdural hematoma on 1/24. Intubated 1/26-1/30. Re-intubated 1/30-2/2. Patient received in bed with hoarse/breathy vocal quality. Patient's O2 sats 90-91% on 6LNC. RR high 20's. Based on the objective data described below, the patient presents with mild-moderate oral dysphagia characterized by slow oral prep and delayed posterior propulsion. Patient also with moderate-severe pharyngeal dysphagia characterized by delayed swallow initiation and weak hyolaryngeal elevation/excursion. Patient with wet vocal quality, desat to 89% and RR increase to 30's with 2 small ice chips. Trials aborted and RN notified. Patient is at [address] risk for aspiration secondary to multiple recent intubations, breathy/hoarse vocal quality indicative of poor vocal cord closure, and respiratory status. Recommend strict NPO. Patient will benefit from skilled intervention to address the above impairments.   Patients rehabilitation potential is considered to be Fair  Factors which may influence rehabilitation potential include:   []            None noted  [x]            Mental ability/status  [x]            Medical condition  []            Home/family situation and support systems  [x]            Safety awareness  []            Pain tolerance/management  []            Other:      PLAN :  Recommendations and Planned Interventions:  --Strict NPO  --Frequent oral care  --SLP to follow for re-evaluation of swallow function as appropriate and language evaluation  Frequency/Duration: Patient will be followed by speech-language pathology 3 times a week to address goals. Discharge Recommendations: To Be Determined     SUBJECTIVE:   Patient asked his wife for water immediately after evaluation. Wife not present for evaluation.     OBJECTIVE:     Past Medical History:   Diagnosis Date    Arthritis     neck,chronic    Bladder cancer ([address] 3/13    bladder    Chronic pain     NECK    Colon cancer ([address] 3/16    COLON    Diabetes ([address]     GERD (gastroesophageal reflux disease) 2008    at times, NOT continous    Hypercholesterolemia     Hypertension     Kidney stones 1969    SEVERAL     Nephrolithiasis     Prostate cancer ([address] 9/2003    prostate cancer - radiation    PUD (peptic ulcer disease) 2008    Thromboembolus ([address] 2/8/16    2 RIGHT LEG, 3 IN LUNGS (STARTED ON XARELTO IN ER, 1 WEEK LATER WAS IN ER WITH INTERNAL BLEEDING)    Transient ischemic attack 2005    TIA - no deficits 2005 AND 2015    Unspecified sleep apnea     does not use CPAP/uses nasal strips     Past Surgical History:   Procedure Laterality Date    COLONOSCOPY N/A 3/6/2017    COLONOSCOPY performed by Lisette Aguilar MD at [address] [place] [address]  2008    HX CERVICAL FUSION  2002    HX CYST REMOVAL      cyst removed from back, shoulder and scalp    HX HEENT  2006    sinus surgery    HX HEENT      STAS CATARACTS    HX LUMBAR LAMINECTOMY  2002    HX OTHER SURGICAL      nose surgery    HX OTHER SURGICAL  2008    LUMPS -BENIGN, REMOVED FROM SHOULDER, HEAD AND BACK    HX OTHER SURGICAL 3/2016    FILTER FOR BLOOD CLOTS    HX TONSILLECTOMY      HX UROLOGICAL  1969    for kidney stones     Prior Level of Function/Home Situation:   Home Situation  Home Environment: Private residence  One/Two Story Residence: Other (Comment)  Living Alone: No  Support Systems: Child(della), Spouse/Significant Other/Partner  Patient Expects to be Discharged to[de-identified] Private residence  Current DME Used/Available at Home: None  Diet prior to admission: regular/thin  Current Diet:  NPO with TF  Cognitive and Communication Status:  Neurologic State: Alert, Confused  Orientation Level: Oriented to person, Oriented to place, Oriented to situation, Disoriented to time  Cognition: Decreased attention/concentration, Decreased command following  Perception: Appears intact (?hard of hearing)  Perseveration: No perseveration noted  Safety/Judgement: Decreased awareness of environment, Decreased awareness of need for assistance, Decreased awareness of need for safety, Decreased insight into deficits  Oral Assessment:  Oral Assessment  Labial: Decreased rate;Right droop (did not retract full way)  Dentition: Natural  Oral Hygiene: white coating on tongue  Lingual: Decreased rate;Decreased strength (white coating on tongue)  Velum:  (irritation likely due to intubationx2)  Mandible: No impairment  P.O. Trials:  Patient Position: upright in bed  Vocal quality prior to P.O.: Strain; Low volume;Hoarse;Breathy  Consistency Presented: Ice chips  How Presented: Self-fed/presented;Spoon  How Much: 2 (small ice chips)  Bolus Acceptance: No impairment  Bolus Formation/Control: Impaired  Type of Impairment: Delayed  Propulsion: Delayed (# of seconds)  Oral Residue: None  Initiation of Swallow: Delayed (# of seconds)  Laryngeal Elevation: Weak  Aspiration Signs/Symptoms: Increase in RR;Change vocal quality; Decrease in O2 saturations  Pharyngeal Phase Characteristics: Altered vocal quality  Effective Modifications: None          Oral Phase Severity: Mild-moderate  Pharyngeal Phase Severity : Moderate-severe    NOMS:   The NOMS functional outcome measure was used to quantify this patient's level of swallowing impairment. Based on the NOMS, the patient was determined to be at level 1 for swallow function     G Codes: In compliance with CMSs Claims Based Outcome Reporting, the following G-code set was chosen for this patient based the use of the NOMS functional outcome to quantify this patient's level of swallowing impairment. Using the NOMS, the patient was determined to be at level 1 for swallow function which correlates with the CN= 100% level of severity. Based on the objective assessment provided within this note, the current, goal, and discharge g-codes are as follows:    Swallow  Swallowing:   Swallow Current Status CN= 100%   Swallow Goal Status CL= 60-79%      NOMS Swallowing Levels:  Level 1 (CN): NPO  Level 2 (CM): NPO but takes consistency in therapy  Level 3 (CL): Takes less than 50% of nutrition p.o. and continues with nonoral feedings; and/or safe with mod cues; and/or max diet restriction  Level 4 (CK): Safe swallow but needs mod cues; and/or mod diet restriction; and/or still requires some nonoral feeding/supplements  Level 5 (CJ): Safe swallow with min diet restriction; and/or needs min cues  Level 6 (CI): Independent with p.o.; rare cues; usually self cues; may need to avoid some foods or needs extra time  Level 7 ([address] [place] [address]): Independent for all p.o.  DIANA. (2003). National Outcomes Measurement System (NOMS): Adult Speech-Language Pathology User's Guide.        Pain:  Pain Scale 1: Numeric (0 - 10)  Pain Intensity 1: 0     After treatment:   []            Patient left in no apparent distress sitting up in chair  [x]            Patient left in no apparent distress in bed  [x]            Call bell left within reach  [x]            Nursing notified  []            Caregiver present  []            Bed alarm activated    COMMUNICATION/EDUCATION:   The patients plan of care including recommendations, planned interventions, and recommended diet changes were discussed with: Registered Nurse. []            Patient/family have participated as able in goal setting and plan of care. []            Patient/family agree to work toward stated goals and plan of care. []            Patient understands intent and goals of therapy, but is neutral about his/her participation. [x]            Patient is unable to participate in goal setting and plan of care.     Thank you for this referral.  Edmar Meza, SLP  Time Calculation: 15 mins

## 2018-02-02 NOTE — DIABETES MGMT
DTC Progress Note    Recommendations/ Comments: Chart reviewed due to hyperglycemia. Blood sugars > 200and pt has required 14 units of correction over the last 24 hours. Note pt is on TF(glucerna). If appropriate, please consider increasing Lantus to 36 units. DTC to continue to follow.       Current hospital DM medication: lispro insulin correction scale and Lantus 30 units.      Chart reviewed on [facility].     Patient is a 81 y.o. male with known  Type 2 Diabetes on oral agent (monotherapy): metformin 1000 mg bid (generic) at home. A1c:   Lab Results   Component Value Date/Time    Hemoglobin A1c 6.4 01/23/2018 10:43 AM    Hemoglobin A1c 6.9 06/17/2016 02:24 AM       Recent Glucose Results:   Lab Results   Component Value Date/Time     (H) 02/02/2018 04:59 AM    GLUCPOC 245 (H) 02/02/2018 06:10 AM    GLUCPOC 222 (H) 02/01/2018 11:39 PM    GLUCPOC 267 (H) 02/01/2018 06:07 PM        Lab Results   Component Value Date/Time    Creatinine 1.69 02/02/2018 04:59 AM     Estimated Creatinine Clearance: 43.1 mL/min (based on Cr of 1.69). Active Orders   Diet    DIET NPO With Tube Feedings        PO intake: No data found. Will continue to follow as needed.     Thank you  Elenita Delacruz RD, CDE

## 2018-02-03 NOTE — PROGRESS NOTES
[facility]  Pulmonary, Critical Care, and Sleep Medicine  Name: Angelina Lord MRN: [id number]   : 1935 Hospital: [facility]    Date: 2/3/2018      Subjective:    Somnolent on bipap. No acute changes overnight. Review of systems not obtained due to patient factors.     Objective:      Exam  Vital Signs:  Visit Vitals    /47    Pulse 97    Temp 98.2 °F (36.8 °C)    Resp 27    Ht 6' (1.829 m)    Wt 100.7 kg (222 lb)    SpO2 95%    BMI 30.11 kg/m2     Temp (24hrs), Av.8 °F (37.1 °C), Min:97.6 °F (36.4 °C), Max:101 °F (38.3 °C)          Intake/Output Summary (Last 24 hours) at 18 1317  Last data filed at 18 1300   Gross per 24 hour   Intake          2772.46 ml   Output              250 ml   Net          2522.46 ml     GENERAL: well developed and in mild distress, HEENT:  PERRL, EOMI, no alar flaring or epistaxis, oral mucosa moist without cyanosis, NECK:  no jugular vein distention, no retractions, no thyromegaly or masses, LUNGS: decreased breath sounds billaterally and with rhonchi , HEART:  Regular rate and rhythm with no MGR; no edema is present, ABDOMEN:  soft with no tenderness, bowel sounds present, EXTREMITIES:  warm with no cyanosis and SKIN:  no jaundice or ecchymosis     Labs:  Recent Labs      18   0925  18   0459  18   1135   WBC  5.6  5.3  4.6   HGB  8.9*  9.2*  9.4*   HCT  28.3*  28.7*  29.6*   PLT  111*  107*  102*     Recent Labs      18   0925  18   1505  18   0459   NA  152*  148*  146*   K  4.0  3.3*  3.9   CL  117*  117*  115*   CO2  26  24  26   GLU  277*  200*  236*   BUN  47*  45*  46*   CREA  1.94*  1.83*  1.69*   CA  8.7  8.7  8.4*   MG  1.9   --    --    ALB   --   2.2*   --    TBILI   --   0.4   --    SGOT   --   15   --    ALT   --   20   --        Recent Labs      18   1511  18   1056   PHI  7.396  7.404   PO2I  65*  82   PCO2I  32.2*  33.8* Impression     Plan  1. S/p craniotomy and evacuation left SDH 1/23/18 s/p GLF as oupt  2. AHRF-suspect new LLL HCAP- reintubated 1/30/18 for WOB/hypoxia  3. Severe distal tracheobronchomalacia- with 100% Left MS collapse with cough. - LLL PNA with retained secretions. 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 5. Sz  6. hypernatremia  7. CKD III  8. HTN  9. DM2  10. HLP  11. 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 H/o CVA · Continue alb nebs, reduce freuq. · Continue NIPPV sched at night + PRN daytime  · I suspect HSV tracheobronchitis /bronchspasm against backdrop of severe tracheobronchomalacia causing obst airways disease and persistent ventilatory failure after extubation. · Continue nebs and IV acyclovir  · pulm toilet  ·  avoid sedative PRNs  · EEG no sz  · Remains acutely ill.  At risk for decline due to PNA sepsis /MODS        Medical Decision Making Today  · I have reviewed the flowsheet and previous days notes  · One or more chronic illnesses with severe exacerbation, progression or side effects of treatment  · Acute or chronic illness that poses a threat to life or bodily function  · Abrupt change in neurologic status  · Diagnostic endoscopies with identified risk factors  · Pareneteral controlled substances - Adjusted / Weaned / Started  · Drug therapy requiring intensive monitoring for toxicity  · Review and order of Clinical lab tests  · Review and Order of Radiology tests  · Review and Order of Medicine tests  · Independent visualization of Image  · Review and summarize records or history fromprevious days notes  · Reviewed Ventilator / NiPPV  · I have personally reviewed the patients ECG / Rolo Castillo MD  2/3/2018

## 2018-02-03 NOTE — PROGRESS NOTES
0730- Bedside report received from Belle Leon RN, using [employer]. Pt on Bipap FiO2 45% with O2 sats > 90%. Pt opens eyes to voice. Oriented x 2-3. JACK and follows commands. RUE slightly weaker than LUE. Denies pain. Precedex gtt infusing. 2360- KUB performed at bedside. 6136- Labs sent    4560- Pt taken off Bipap and placed on 6 liters NC.    1300- Pt with increased WOB and O2 sats around 90%. Bipap reapplied at previous settings. 1525- PICC team at bedside. 1636- Fentanyl 25 mcg given IV per PRN order. 1743- CXR performed at bedside. 1751-  Pt taken off Bipap and placed on Hi-flow NC 30 liters and 45% FiO2.     1910- Central line dc'd per MD order. Tip intact    1930- Bedside report given to Aureliano Arteaga RN, using SBAR format. Pt resting on Precedex. O2 sats > 90% on Hi-flow.  Neuro exam unchanged

## 2018-02-03 NOTE — PROGRESS NOTES
1930: Bedside shift change report given to Darvin Fierro RN (oncoming nurse) by Yudelka Angeles (offgoing nurse). Report included the following information SBAR, Kardex, ED Summary, OR Summary, Procedure Summary, Intake/Output, MAR, Accordion, Recent Results and Med Rec Status. 2000: Assessment complete. Patient is alert and follows commands, on Bi-pap, lungs course. TF restarted. 0000: Patient very restless. Patient constantly pulling off bipap mask. Precedex gtt being titrated and PRN fentanyl given for safety. 0400: Patient resting comfortably on bipap. 0730: Bedside shift change report given to Brad Szymanski RN (oncoming nurse) by Darvin Fierro RN (offgoing nurse). Report included the following information SBAR, Kardex, ED Summary, OR Summary, Procedure Summary, Intake/Output, MAR, Accordion, Recent Results and Med Rec Status.

## 2018-02-03 NOTE — PROGRESS NOTES
Problem: Falls - Risk of  Goal: *Absence of Falls  Document Arleen Fall Risk and appropriate interventions in the flowsheet.    Outcome: Progressing Towards Goal  Fall Risk Interventions:       Mentation Interventions: Bed/chair exit alarm, Adequate sleep, hydration, pain control, Evaluate medications/consider consulting pharmacy, Door open when patient unattended, Familiar objects from home, Reorient patient, Room close to nurse's station, Toileting rounds, Update white board    Medication Interventions: Evaluate medications/consider consulting pharmacy, Bed/chair exit alarm, Patient to call before getting OOB, Teach patient to arise slowly    Elimination Interventions: Call light in reach, Bed/chair exit alarm, Patient to call for help with toileting needs, Toilet paper/wipes in reach, Toileting schedule/hourly rounds    History of Falls Interventions: Consult care management for discharge planning, Bed/chair exit alarm, Door open when patient unattended, Evaluate medications/consider consulting pharmacy, Investigate reason for fall, Room close to nurse's station

## 2018-02-03 NOTE — PROCEDURES
PICC Placement Note. Order received. Patient's son Douglas Herrera gave verbal informed consent for bedside placement of PICC line. Risks, benefits, complications and procedure explained to him. He offered no questions. PRE-PROCEDURE VERIFICATION  Correct Procedure: yes  Correct Site:  yes  Temperature: Temp: 98.3 °F (36.8 °C), Temperature Source: Temp Source: Axillary  Recent Labs      02/03/18   0925   BUN  47*   CREA  1.94*   PLT  111*   WBC  5.6     Allergies: Penicillins; Metoprolol; Tramadol; and Lodine [etodolac]  Education materials, including PICC Booklet, for PICC Care given to patient: yes. See Patient Education activity for further details. PROCEDURE DETAIL  A double lumen PICC line was started for vascular access and desire for reliable access. The following documentation is in addition to the PICC properties in the lines/airways flowsheet : The vein was accessed with a 20g IV catheter using ultrasound guidance and a guidewire was easily thread. Modified Seldinger Technique was used to thread the PICC and the tip direction was determined with a PICC tip  device  Lot #: [id number]  Was xylocaine 1% used intradermally:  yes  Catheter Length: 43 (cm) circumference 30cm  Vein Selection for PICC:left basilic  Central Line Bundle followed yes  Complication Related to Insertion: other: Had difficulty in getting the PICC tip to drop down into the SVC. Also one PICC line completely occluded while trying top complete the procedure. This PICC had to be removed. Patient formed clots quickly. The placement was verified by X-ray:  The x-ray results state the tip location is on the left side and the tip is in the superior vena cava. Report to Brooklyn Booth is okay to use.

## 2018-02-03 NOTE — PROGRESS NOTES
Problem: Mobility Impaired (Adult and Pediatric)  Goal: *Acute Goals and Plan of Care (Insert Text)  Physical Therapy Goals  Initiated 1/25/2018, Remain appropriate 2/3/2018  1. Patient will move from supine to sit and sit to supine , scoot up and down and roll side to side in bed with minimal assistance/contact guard assist within 7 day(s). 2.  Patient will transfer from bed to chair and chair to bed with minimal assistance/contact guard assist using the least restrictive device within 7 day(s). 3.  Patient will perform sit to stand with moderate assistance  within 7 day(s). 4.  Patient will ambulate with moderate assistance  for 50 feet with the least restrictive device within 7 day(s). 5.  Patient will improve Norris Balance score by 7 points within 7 days. physical Therapy REEVALUATION  Patient: Rosario Roach (4 y.o. male)  Date: 2/3/2018  Primary Diagnosis: SDH (subdural hematoma) (HCC)  Chronic Subdural Hematoma  Procedure(s) (LRB):  CRANIOTOMY, LEFT FOR EVACUATION OF SUBDURAL HEMATOMA (Left) 11 Days Post-Op   Precautions:  Fall (SBP <160)  Chart, physical therapy assessment, plan of care and goals were reviewed. ASSESSMENT :  Based on the objective data described below, the patient presents with drowsiness, BiPAP dependent, generalized weakness and decreased functional independence compared to his baseline. Pt with increased WOB on BiPAP, however RR ranging from 23-30. Pt was able to answer questions when provided in a yes/no format. He declined attempting to come to EOB and in agreement due to his lethargy making it not safe to attempt. Placed in chair position and worked on LE exercises including AP's, quad sets and heel slides. Recommend attempting EOB activity if pt more alert next session. Anticipate he will require rehab placement when medically stable. Patient will benefit from skilled intervention to address the above impairments.   Patients rehabilitation potential is considered to be Fair  Factors which may influence rehabilitation potential include:   []           None noted  [x]           Mental ability/status  [x]           Medical condition  []           Home/family situation and support systems  []           Safety awareness  []           Pain tolerance/management  []           Other:      PLAN :  Recommendations and Planned Interventions:  [x]             Bed Mobility Training             []      Neuromuscular Re-Education  [x]             Transfer Training                   []      Orthotic/Prosthetic Training  [x]             Gait Training                         []      Modalities  [x]             Therapeutic Exercises           []      Edema Management/Control  [x]             Therapeutic Activities            [x]      Patient and Family Training/Education  []             Other (comment):  Frequency/Duration: Patient will be followed by physical therapy 5 times a week to address goals. Discharge Recommendations: Rehab  Further Equipment Recommendations for Discharge: tbd     SUBJECTIVE:   Patient stated Yes.  No.    OBJECTIVE DATA SUMMARY:     Past Medical History:   Diagnosis Date    Arthritis     neck,chronic    Bladder cancer ([facility] [place] [address] 3/13    bladder    Chronic pain     NECK    Colon cancer ([facility] [place] [address] 3/16    COLON    Diabetes ([facility] [place] [address]     GERD (gastroesophageal reflux disease) 2008    at times, NOT continous    Hypercholesterolemia     Hypertension     Kidney stones 1969    SEVERAL     Nephrolithiasis     Prostate cancer ([facility] [place] [address] 9/2003    prostate cancer - radiation    PUD (peptic ulcer disease) 2008    Thromboembolus ([facility] [place] [address] 2/8/16    2 RIGHT LEG, 3 IN LUNGS (STARTED ON XARELTO IN ER, 1 WEEK LATER WAS IN ER WITH INTERNAL BLEEDING)    Transient ischemic attack 2005    TIA - no deficits 2005 AND 2015    Unspecified sleep apnea     does not use CPAP/uses nasal strips     Past Surgical History:   Procedure Laterality Date    COLONOSCOPY N/A 3/6/2017    COLONOSCOPY performed by Amanda Carroll MD at [facility]. [address] 80, COLON, DIAGNOSTIC  2008    HX CERVICAL FUSION  2002    HX CYST REMOVAL      cyst removed from back, shoulder and scalp    HX HEENT  2006    sinus surgery    HX HEENT      STAS CATARACTS    HX LUMBAR LAMINECTOMY  2002    HX OTHER SURGICAL      nose surgery    HX OTHER SURGICAL  2008    LUMPS -BENIGN, REMOVED FROM SHOULDER, HEAD AND BACK    HX OTHER SURGICAL  3/2016    FILTER FOR BLOOD CLOTS    HX TONSILLECTOMY      HX UROLOGICAL  1969    for kidney stones     Hospital course since last seen and reason for reevaluation: Pt was evaluated on 1/25 and then put on hold on 1/26 with follow up on 1/29. No follow up notes in chart and noted new PT orders this AM, therefore re-evaluation performed  Critical Behavior:  Neurologic State: Drowsy, Eyes open to voice  Orientation Level: Oriented to person, Oriented to place, Oriented to time (knows month, not year)  Cognition: Follows commands, Impaired decision making  Safety/Judgement: Decreased awareness of environment, Decreased awareness of need for assistance, Decreased awareness of need for safety, Decreased insight into deficits  Skin:  Appears intact  Strength:    Strength: Grossly decreased, non-functional                      Tone & Sensation:   Tone: Normal              Sensation: Intact               Range Of Motion:  AROM: Grossly decreased, non-functional                       Coordination:  Coordination: Grossly decreased, non-functional    Functional Mobility:  Bed Mobility:     Supine to Sit: Total assistance (bed to chair position)              Balance:   Sitting: Impaired  Sitting - Static: Poor (constant support)  Sitting - Dynamic: Poor (constant support)    Therapeutic Exercises:   AP's, quad sets and heel slides    Pain:  Pain Scale 1: Numeric (0 - 10)  Pain Intensity 1: 0     Activity Tolerance:   Drowsiness  Please refer to the flowsheet for vital signs taken during this treatment.   After treatment:   []  Patient left in no apparent distress sitting up in chair  [x]  Patient left in no apparent distress in bed  [x]  Call bell left within reach  [x]  Nursing notified  []  Caregiver present  []  Bed alarm activated    COMMUNICATION/EDUCATION:   The patients plan of care was discussed with: Registered Nurse. [x]  Fall prevention education was provided and the patient/caregiver indicated understanding. [x]  Patient/family have participated as able in goal setting and plan of care. [x]  Patient/family agree to work toward stated goals and plan of care. []  Patient understands intent and goals of therapy, but is neutral about his/her participation. []  Patient is unable to participate in goal setting and plan of care.     Thank you for this referral.  Caden Henderson, PT   Time Calculation: 13 mins

## 2018-02-04 NOTE — PROGRESS NOTES
02/03/2018 1930: Bedside and Verbal shift change report given to Saul Olivares RN (oncoming nurse) by Sameer Mendez RN (offgoing nurse). Report included the following information SBAR, Kardex, Procedure Summary, Intake/Output, MAR, Accordion, Recent Results, Cardiac Rhythm SR/ST and Alarm Parameters . SHIFT SUMMARY: Patient restless, agitated and confused majority of shift. Placed back on BIPAP for low sats. Problem: Falls - Risk of  Goal: *Absence of Falls  Document Arleen Fall Risk and appropriate interventions in the flowsheet.    Outcome: Progressing Towards Goal  Fall Risk Interventions:       Mentation Interventions: Bed/chair exit alarm, Adequate sleep, hydration, pain control, Door open when patient unattended, Evaluate medications/consider consulting pharmacy, Eyeglasses and hearing aids, More frequent rounding, Reorient patient, Room close to nurse's station, Toileting rounds, Update white board    Medication Interventions: Bed/chair exit alarm, Evaluate medications/consider consulting pharmacy    Elimination Interventions: Call light in reach, Toileting schedule/hourly rounds    History of Falls Interventions: Consult care management for discharge planning, Door open when patient unattended

## 2018-02-04 NOTE — PROGRESS NOTES
Pharmacist Note - Vancomycin Dosing    Consult provided for this 81 y.o. male for indication of HCAP/MRSA. Antibiotic regimen(s):  Vancomycin, Cefepime, and Acyclovir    Recent Labs      18   0354  18   0925  18   1505  18   0459   WBC  6.4  5.6   --   5.3   CREA  1.81*  1.94*  1.83*  1.69*   BUN  47*  47*  45*  46*     Frequency of BMP: Tomorrow AM  Height: 182.9 cm  Weight: 100.7 kg  Est CrCl: 39.3 ml/min; UO: 0.2 ml/kg/hr (one occurrence)  Temp (24hrs), Av.5 °F (36.9 °C), Min:98.1 °F (36.7 °C), Max:98.9 °F (37.2 °C)    Cultures:   Bronch Wash - Scant MRSA (Vanc ALEKSEY = 2) - Final   Bronch Wash, Fungus - in process    Goal trough = 15 - 20 mcg/mL    Therapy will be initiated with a loading dose of 2000 mg IV x 1 to be followed by a maintenance dose of 1250 mg IV every 24 hours. Pharmacy to follow patient daily and order levels / make dose adjustments as appropriate.

## 2018-02-04 NOTE — PROGRESS NOTES
Problem: Falls - Risk of  Goal: *Absence of Falls  Document Arleen Fall Risk and appropriate interventions in the flowsheet.    Outcome: Progressing Towards Goal  Fall Risk Interventions:       Mentation Interventions: Evaluate medications/consider consulting pharmacy    Medication Interventions: Evaluate medications/consider consulting pharmacy    Elimination Interventions: Call light in reach    History of Falls Interventions: Evaluate medications/consider consulting pharmacy

## 2018-02-04 NOTE — PROGRESS NOTES
8:00- AM assessment completed. Pt is drowsy, opens eyes to voice, follow some commands, JACK. No c/o pain or SOB. Lungs are diminished on bipap. 9:30- Dr. Kacie Apple at bedside, new orders to place pt on NC during the day and bipap at night. 12:00- No changes on reassessment. 14:00- Pt placed on bipap. WOB increasing  15:54- Pt pulling off bipap mask. Increased precedex gtt. 16:00- Pt opens eyes to voice, JACK, trying to pull off bipap mask. Pt did not tolerate high ashly O2. SHIFT SUMMARY:    Pt is drowsy, opens eyes to voice, is oriented to person and place, follows commands. Lungs are diminished on bipap. Urine output adequate. Tolerating TF.  Unable to assess ICU CAM.

## 2018-02-04 NOTE — PROGRESS NOTES
Hospitalist Progress Note  Jojo iKm MD  Answering service: [phone] from in house phone        Date of Service:  2018  NAME:  Natividad Armas  :  1935  MRN:  [id number]      Admission Summary:   Dakota Davis a 81 y. o. male with past medical history of arthritis, bladder cancer, prostate cancer, chronic neck pain, colon cancer, type 2 diabetes mellitus, GERD, hypertension, hyperlipidemia, kidney stones, PUD, DVT, PE, TIA, sleep apnea presented to the ED from home with reported inability to talk. Ismael Hollis is a non-historian.  As such, history was obtained per my review of ED and medical records.  Per these collective reports, patient had onset of dysarthria and expressive aphasia starting approximately two days ago.        Interval history / Subjective:     F/u for respiratory failure and intracranial hemorrhage. He is non verbal but awake. He is on oxygen via NC but with increase work of breathing       Assessment & Plan:     Acute Hypoxic Respiratory Failure requiring mechanical ventilation. 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   -S/p craniotomy and evacuation   - Management as per neuro surgery.     Hypertension.  - BP is stable  - PRN IV hydralazine   - Goal SBP < 140 mm hg     Type 2 diabetes mellitus with hyperglycemia:  -Target blood sugar 140-180   Continue ISS- resistant scale  Increase lantus to 40 units  Accu checks    Hyperlipidemia.     CKD III with worsening of creatinine:  - Iv fluids. Monitor. Renal dose adjust medications, avoid nephrotoxins.  - Hold ACE. - renal indices stable     Seizure : On keppra  EEG negative diffuse slowing     Elevated wbc from? Resolved. CXR shows small bilateral pleural effusions and airspace opacities. On Cefepime 1/26 till date, continue for total duration of 10 days    Hypernatremia: Continue to adjust free water flushes  Continue D5W. Monitor    Afib with RVR requiring cardizem drip  Now rate controlled. Wean off cardizem drip  Echo EF 50- 55%, no wall motion abnormalitis  Start cardizem 120 mg PO daily  CHA2D S2 vasc score at least 4- Hypertension, age, diabetes  [place] [facility] [place] Code status: full  DVT prophylaxis: scd    Care Plan discussed with: Patient/Family and Nurse  Disposition: TBD, still requires ICU care     Hospital Problems  Date Reviewed: 1/23/2018          Codes Class Noted POA    * (Principal[facility] ICD-10-CM: I62.00  ICD-9-CM: 432.1  1/23/2018 Unknown                Review of Systems:   Review of systems not obtained due to patient factors. Vital Signs:    Last 24hrs VS reviewed since prior progress note. Most recent are:  Visit Vitals    /54    Pulse 86    Temp 99.1 °F (37.3 °C)    Resp 26    Ht 6' (1.829 m)    Wt 100.7 kg (222 lb)    SpO2 93%    BMI 30.11 kg/m2         Intake/Output Summary (Last 24 hours) at 02/04/18 1349  Last data filed at 02/04/18 1300   Gross per 24 hour   Intake          3204.32 ml   Output               50 ml   Net          3154.32 ml        Physical Examination:             Constitutional:  on BIPAP- somenolent- non verbal.   Eyes: Non icteric,non pallor pupil s pin point    HENT:  Oral mucous moist, head sutures intact. Resp: Chest tightness with increase work of breathing   CV:  Regular rhythm, normal rate,    GI:  Soft, non distended, non tender.  bs= no hepatosplenomegaly    :  No CVA or suprapubic tenderness   Skin  :  No erythema,rash,bullae,dipigmentation     Musculoskeletal:  SCDs on both legs ;no edema, warm, 2+ pulses throughout    Neurologic:  on BIPAP , awake, responds to command            Data Review:    I personally reviewed  Image and labs      Labs:     Recent Labs      02/04/18   0354  02/03/18   0925   WBC  6.4  5.6   HGB  8.7*  8.9*   HCT  28.6*  28.3*   PLT  120*  111*     Recent Labs      02/04/18   0354  02/03/18   0925  02/02/18   1505   NA  152*  152*  148*   K  3.6  4.0  3.3*   CL  119*  117*  117*   CO2  27  26  24   BUN  47*  47*  45*   CREA  1.81*  1.94*  1.83*   GLU  183*  277*  200*   CA  8.8  8.7  8.7   MG   --   1.9   --      Recent Labs      02/02/18   1505   SGOT  15   ALT  20   AP  61   TBILI  0.4   TP  5.8*   ALB  2.2*   GLOB  3.6     No results for input(s): INR, PTP, APTT in the last 72 hours. No lab exists for component: INREXT, INREXT   No results for input(s): FE, TIBC, PSAT, FERR in the last 72 hours. No results found for: FOL, RBCF   No results for input(s): PH, PCO2, PO2 in the last 72 hours. No results for input(s): CPK, CKNDX, TROIQ in the last 72 hours.     No lab exists for component: CPKMB  Lab Results   Component Value Date/Time    Cholesterol, total 118 01/22/2018 11:28 PM    HDL Cholesterol 53 01/22/2018 11:28 PM    LDL, calculated 20 01/22/2018 11:28 PM    Triglyceride 225 01/22/2018 11:28 PM    CHOL/HDL Ratio 2.2 01/22/2018 11:28 PM     Lab Results   Component Value Date/Time    Glucose (POC) 213 02/04/2018 12:29 PM    Glucose (POC) 188 02/04/2018 06:09 AM    Glucose (POC) 240 02/03/2018 11:47 PM    Glucose (POC) 255 02/03/2018 05:23 PM    Glucose (POC) 294 02/03/2018 11:46 AM     Lab Results   Component Value Date/Time    Color Yellow 03/04/2013 09:14 AM    Appearance Cloudy 03/04/2013 09:14 AM    Specific gravity 1.015 03/10/2010 09:45 AM    pH (UA) 7.0 03/04/2013 09:14 AM    Protein NEGATIVE  03/10/2010 09:45 AM    Glucose NEGATIVE  03/10/2010 09:45 AM    Ketone Negative 03/04/2013 09:14 AM    Bilirubin Negative 03/04/2013 09:14 AM    Urobilinogen 0.2 03/10/2010 09:45 AM    Nitrites Negative 03/04/2013 09:14 AM    Leukocyte Esterase Negative 03/04/2013 09:14 AM    Bacteria Few 03/04/2013 09:14 AM    WBC 0-5 03/04/2013 09:14 AM    RBC >30 03/04/2013 09:14 AM         Medications Reviewed:     Current Facility-Administered Medications   Medication Dose Route Frequency    albumin human 25% (BUMINATE) 25 % solution        Vancomycin - Pharmacy to Dose   Other Rx Dosing/Monitoring    vancomycin (VANCOCIN) 2000 mg in  ml infusion  2,000 mg IntraVENous ONCE    Followed by   Fanta Bourgeois ON 2/5/2018] vancomycin (VANCOCIN) 1250 mg in  ml infusion  1,250 mg IntraVENous Q24H    albuterol-ipratropium (DUO-NEB) 2.5 MG-0.5 MG/3 ML  3 mL Nebulization Q4H RT    dextrose 5% infusion  100 mL/hr IntraVENous CONTINUOUS    insulin glargine (LANTUS) injection 35 Units  35 Units SubCUTAneous QHS    insulin lispro (HUMALOG) injection   SubCUTAneous Q6H    dextrose (D50W) injection syrg 12.5-25 g  12.5-25 g IntraVENous PRN    acyclovir (ZOVIRAX) 575 mg in 0.9% sodium chloride 100 mL IVPB  575 mg IntraVENous Q12H    budesonide (PULMICORT) 500 mcg/2 ml nebulizer suspension  500 mcg Nebulization BID RT    albuterol-ipratropium (DUO-NEB) 2.5 MG-0.5 MG/3 ML  3 mL Nebulization Q4H PRN    levETIRAcetam (KEPPRA) oral solution 1,000 mg  1,000 mg Per NG tube Q12H    lacosamide (VIMPAT) tablet 100 mg  100 mg Per NG tube Q12H    dilTIAZem (CARDIZEM) 125 mg in dextrose 5% 125 mL infusion  0-15 mg/hr IntraVENous TITRATE    dexmedeTOMidine (PRECEDEX) 400 mcg in 0.9% sodium chloride 100 mL infusion  0.2-1.2 mcg/kg/hr IntraVENous TITRATE    sodium chloride (NS) flush 5-10 mL  5-10 mL IntraVENous Q8H    sodium chloride (NS) flush 5-10 mL  5-10 mL IntraVENous PRN    midazolam (VERSED) injection 5 mg  5 mg IntraVENous Multiple    fentaNYL citrate (PF) injection 25 mcg  25 mcg IntraVENous Q1H PRN    famotidine (PEPCID) 40 mg/5 mL (8 mg/mL) oral suspension 20 mg  20 mg Per NG tube DAILY    bacitracin 500 unit/gram packet 1 Packet  1 Packet Topical PRN    chlorhexidine (PERIDEX) 0.12 % mouthwash 15 mL  15 mL Oral BID    polyvinyl alcohol (LIQUIFILM TEARS) 1.4 % ophthalmic solution 2 Drop  2 Drop Both Eyes Q8H    cefepime (MAXIPIME) 2 g in 0.9% sodium chloride (MBP/ADV) 100 mL  2 g IntraVENous Q12H    sodium chloride (NS) flush 10-30 mL  10-30 mL InterCATHeter PRN    sodium chloride (NS) flush 10 mL  10 mL InterCATHeter Q24H    sodium chloride (NS) flush 10 mL  10 mL InterCATHeter PRN    sodium chloride (NS) flush 10-40 mL  10-40 mL InterCATHeter Q8H    sodium chloride (NS) flush 20 mL  20 mL InterCATHeter Q24H    alteplase (CATHFLO) 1 mg in sterile water (preservative free) 1 mL injection  1 mg InterCATHeter PRN    atorvastatin (LIPITOR) tablet 40 mg  40 mg Per NG tube DAILY    naloxone (NARCAN) injection 0.4 mg  0.4 mg IntraVENous PRN    acetaminophen (TYLENOL) tablet 650 mg  650 mg Oral Q4H PRN    Or    acetaminophen (TYLENOL) solution 650 mg  650 mg Per NG tube Q4H PRN    Or    acetaminophen (TYLENOL) suppository 650 mg  650 mg Rectal Q4H PRN    glucose chewable tablet 16 g  4 Tab Oral PRN    dextrose (D50W) injection syrg 12.5-25 g  12.5-25 g IntraVENous PRN    glucagon (GLUCAGEN) injection 1 mg  1 mg IntraMUSCular PRN    hydrALAZINE (APRESOLINE) 20 mg/mL injection 10 mg  10 mg IntraVENous Q4H PRN    sodium chloride (NS) flush 5-10 mL  5-10 mL IntraVENous Q8H    sodium chloride (NS) flush 5-10 mL  5-10 mL IntraVENous PRN    acetaminophen (TYLENOL) tablet 650 mg  650 mg Oral Q4H PRN    HYDROcodone-acetaminophen (NORCO) 5-325 mg per tablet 1 Tab  1 Tab Oral Q4H PRN    ondansetron (ZOFRAN) injection 4 mg  4 mg IntraVENous Q4H PRN     ______________________________________________________________________  EXPECTED LENGTH OF STAY: 2d 4h  ACTUAL LENGTH OF STAY:          12 Stew Powell MD

## 2018-02-05 NOTE — PROGRESS NOTES
Bedside and Verbal shift change report given to Osiel Tian RN (oncoming nurse) by Bisi Henson RN (offgoing nurse). Report included the following information SBAR, Kardex, Procedure Summary, Intake/Output, MAR, Accordion, Recent Results, Cardiac Rhythm 1st degree AV block and Alarm Parameters . SHIFT SUMMARY: Tirated precedex gtt for increased agitation/ restlessness. Patients breathing becoming more labored even on BIPAP. ABGs checked and in range. Problem: Falls - Risk of  Goal: *Absence of Falls  Document Arleen Fall Risk and appropriate interventions in the flowsheet.    Outcome: Progressing Towards Goal  Fall Risk Interventions:       Mentation Interventions: Adequate sleep, hydration, pain control, Door open when patient unattended, Evaluate medications/consider consulting pharmacy, Eyeglasses and hearing aids, More frequent rounding, Reorient patient, Toileting rounds, Update white board    Medication Interventions: Evaluate medications/consider consulting pharmacy    Elimination Interventions: Call light in reach, Patient to call for help with toileting needs, Toileting schedule/hourly rounds    History of Falls Interventions: Evaluate medications/consider consulting pharmacy

## 2018-02-05 NOTE — PROGRESS NOTES
Pulmonary / [place] 48 Day: 13     S:    In ICU for resp failure following SDH post evacuation    :  Was extubated at the end of last week  Currently tachypneic despite bipap with increased work of breathing and accessory muscle use  Mental status is poor  Abd distended with worsening resp distress  Has been having bowel movements     O:    Patient Vitals for the past 4 hrs:   BP Temp Pulse Resp SpO2   18 1100 159/68 - (!) 111 30 98 %   18 1000 160/72 - 94 29 97 %   18 0900 155/64 - (!) 102 29 98 %   18 0820 - - - - 100 %   18 0819 - - - - 100 %   18 0800 140/73 98.6 °F (37 °C) (!) 106 (!) 34 99 %     Temp (24hrs), Av.8 °F (37.1 °C), Min:98.1 °F (36.7 °C), Max:99.1 °F (37.3 °C)      Intake/Output Summary (Last 24 hours) at 18 1133  Last data filed at 18 1000   Gross per 24 hour   Intake          5616.91 ml   Output                0 ml   Net          5616.91 ml     Exam:  resp distress  With agonal type resp pattern despite bipap  MM dry  Anicteric  No commands followed  Coarse rhonchi bilaterally  RRR  Protuberant with mild distension, hypoactive bs  Warm and dry  Trace edema        Imaging: Images independently viewed  CXR - persistned L>R effusions and basilar atx    Labs:  Recent Results (from the past 12 hour(s))   GLUCOSE, POC    Collection Time: 18 11:38 PM   Result Value Ref Range    Glucose (POC) 289 (H) 65 - 100 mg/dL    Performed by [facility]    METABOLIC PANEL, BASIC    Collection Time: 18  3:46 AM   Result Value Ref Range    Sodium 150 (H) 136 - 145 mmol/L    Potassium 3.7 3.5 - 5.1 mmol/L    Chloride 117 (H) 97 - 108 mmol/L    CO2 25 21 - 32 mmol/L    Anion gap 8 5 - 15 mmol/L    Glucose 207 (H) 65 - 100 mg/dL    BUN 41 (H) 6 - 20 MG/DL    Creatinine 1.64 (H) 0.70 - 1.30 MG/DL    BUN/Creatinine ratio 25 (H) 12 - 20      GFR est AA 49 (L) >60 ml/min/1.73m2    GFR est non-AA 41 (L) >60 ml/min/1.73m2    Calcium 9.0 8.5 - 10.1 MG/DL   CBC WITH AUTOMATED DIFF    Collection Time: 02/05/18  3:46 AM   Result Value Ref Range    WBC 7.4 4.1 - 11.1 K/uL    RBC 3.14 (L) 4.10 - 5.70 M/uL    HGB 9.4 (L) 12.1 - 17.0 g/dL    HCT 30.4 (L) 36.6 - 50.3 %    MCV 96.8 80.0 - 99.0 FL    MCH 29.9 26.0 - 34.0 PG    MCHC 30.9 30.0 - 36.5 g/dL    RDW 13.6 11.5 - 14.5 %    PLATELET 300 (L) 721 - 400 K/uL    MPV 12.8 8.9 - 12.9 FL    NRBC 0.0 0  WBC    ABSOLUTE NRBC 0.00 0.00 - 0.01 K/uL    NEUTROPHILS 87 (H) 32 - 75 %    LYMPHOCYTES 6 (L) 12 - 49 %    MONOCYTES 3 (L) 5 - 13 %    EOSINOPHILS 4 0 - 7 %    BASOPHILS 0 0 - 1 %    IMMATURE GRANULOCYTES 1 (H) 0.0 - 0.5 %    ABS. NEUTROPHILS 6.4 1.8 - 8.0 K/UL    ABS. LYMPHOCYTES 0.4 (L) 0.8 - 3.5 K/UL    ABS. MONOCYTES 0.2 0.0 - 1.0 K/UL    ABS. EOSINOPHILS 0.3 0.0 - 0.4 K/UL    ABS. BASOPHILS 0.0 0.0 - 0.1 K/UL    ABS. IMM. GRANS. 0.1 (H) 0.00 - 0.04 K/UL    DF AUTOMATED     GLUCOSE, POC    Collection Time: 02/05/18  5:53 AM   Result Value Ref Range    Glucose (POC) 205 (H) 65 - 100 mg/dL    Performed by [facility]    POC G3 - PUL    Collection Time: 02/05/18  6:25 AM   Result Value Ref Range    FIO2 (POC) 50 %    pH (POC) 7.411 7.35 - 7.45      pCO2 (POC) 37.5 35.0 - 45.0 MMHG    pO2 (POC) 83 80 - 100 MMHG    HCO3 (POC) 23.8 22 - 26 MMOL/L    sO2 (POC) 96 92 - 97 %    Base deficit (POC) 1 mmol/L    Site RIGHT RADIAL      Device: BIPAP      PEEP/CPAP (POC) 5 cmH2O    PIP (POC) 12      Allens test (POC) NO      Specimen type (POC) ARTERIAL         A/P:   1. Acute hypoxemic respiratory failure with increased work of breathing despite bipap - secretions and bronchomalacia likely contributing to increased work of breathing - unfortunately appears that he needs to be re-intubated  2. SDH s/p craniotomy with evacuation on 1/23/18 after GLF  3. 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 SZ  6. Hypernatremia  7. CKD  8. DM  9.  H/o DVT /PE    --Re-intubate  --Head CT with ams  --CT abd and pelvis for abd distension  --TF and increase free water with TF already on D5W at 100 cc/h IV  --on cefepime vanc and acyclovir  --Palliative care eval - 3rd time re-intubated    Audra Dorado MD

## 2018-02-05 NOTE — PROGRESS NOTES
Hospitalist Progress Note  Yaa Benson MD  Answering service: [phone] from in house phone        Date of Service:  2018  NAME:  Kalpesh Lewis  :  1935  MRN:  [id number]      Admission Summary:   Ruth Gomez a 81 y. o. male with past medical history of arthritis, bladder cancer, prostate cancer, chronic neck pain, colon cancer, type 2 diabetes mellitus, GERD, hypertension, hyperlipidemia, kidney stones, PUD, DVT, PE, TIA, sleep apnea presented to the ED from home with reported inability to talk. Timothy Whitten is a non-historian.  As such, history was obtained per my review of ED and medical records.  Per these collective reports, patient had onset of dysarthria and expressive aphasia starting approximately two days ago.        Interval history / Subjective:     F/u for respiratory failure and intracranial hemorrhage. Patient was re-intubated due to increase work of breathing. Palliative care consulted       Assessment & Plan:     Acute Hypoxic Respiratory Failure requiring mechanical ventilation. 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   -S/p craniotomy and evacuation   - Management as per neuro surgery.     Hypertension.  - BP is stable  - PRN IV hydralazine   - Goal SBP < 140 mm hg     Type 2 diabetes mellitus with hyperglycemia:  -Target blood sugar 140-180   Continue ISS- resistant scale  Increase lantus to 40 units  Accu checks    Hyperlipidemia.     CKD III with worsening of creatinine:  - Iv fluids. Monitor. Renal dose adjust medications, avoid nephrotoxins.  - Hold ACE. - renal indices stable     Seizure : On keppra  EEG negative diffuse slowing     Elevated wbc from? Resolved. CXR shows small bilateral pleural effusions and airspace opacities. On Cefepime 1/26 till date, continue for total duration of 10 days    Hypernatremia: Continue to adjust free water flushes  Continue D5W. Monitor    Afib with RVR requiring cardizem drip  Now rate controlled. Wean off cardizem drip  Echo EF 50- 55%, no wall motion abnormalitis  Start cardizem 120 mg PO daily  CHA2D S2 vasc score at least 4- Hypertension, age, diabetes  Not a candidate for [employer] due to head bleed. Palliative care consulted    Code status: full  DVT prophylaxis: scd    Care Plan discussed with: Patient/Family and Nurse  Disposition: TBD, still requires ICU care     Hospital Problems  Date Reviewed: 1/23/2018          Codes Class Noted POA    * (Principal)SDH (subdural hematoma) ([facility] ICD-10-CM: I62.00  ICD-9-CM: 432.1  1/23/2018 Unknown                Review of Systems:   Review of systems not obtained due to patient factors. Vital Signs:    Last 24hrs VS reviewed since prior progress note. Most recent are:  Visit Vitals    /64 (BP 1 Location: Right arm, BP Patient Position: At rest)    Pulse 66    Temp 96.7 °F (35.9 °C)    Resp 12    Ht 6' (1.829 m)    Wt 102.5 kg (225 lb 14.4 oz)    SpO2 100%    BMI 30.64 kg/m2         Intake/Output Summary (Last 24 hours) at 02/05/18 1657  Last data filed at 02/05/18 1500   Gross per 24 hour   Intake          5545.74 ml   Output                0 ml   Net          5545.74 ml        Physical Examination:             Constitutional:   Intubated   Eyes: Non icteric,non pallor pupil s pin point    HENT:  Oral mucous moist, head sutures intact. Resp: Chest tightness with increase work of breathing   CV:  Regular rhythm, normal rate,    GI:  Soft, non distended, non tender.  bs= no hepatosplenomegaly    :  No CVA or suprapubic tenderness   Skin  :  No erythema,rash,bullae,dipigmentation     Musculoskeletal:  SCDs on both legs ;no edema, warm, 2+ pulses throughout    Neurologic:  Intubated and sedated            Data Review:    I personally reviewed  Image and labs      Labs:     Recent Labs      02/05/18 0346 02/04/18   0354   WBC  7.4  6.4   HGB  9.4*  8.7*   HCT  30.4*  28.6*   PLT  144*  120*     Recent Labs      02/05/18 0346 02/04/18   0354  02/03/18   0925   NA  150*  152*  152*   K  3.7  3.6  4.0   CL  117*  119*  117*   CO2  25  27  26   BUN  41*  47*  47*   CREA  1.64*  1.81*  1.94*   GLU  207*  183*  277*   CA  9.0  8.8  8.7   MG   --    --   1.9     No results for input(s): SGOT, GPT, ALT, AP, TBIL, TBILI, TP, ALB, GLOB, GGT, AML, LPSE in the last 72 hours. No lab exists for component: AMYP, HLPSE  No results for input(s): INR, PTP, APTT in the last 72 hours. No lab exists for component: INREXT, INREXT   No results for input(s): FE, TIBC, PSAT, FERR in the last 72 hours. No results found for: FOL, RBCF   No results for input(s): PH, PCO2, PO2 in the last 72 hours. No results for input(s): CPK, CKNDX, TROIQ in the last 72 hours.     No lab exists for component: CPKMB  Lab Results   Component Value Date/Time    Cholesterol, total 118 01/22/2018 11:28 PM    HDL Cholesterol 53 01/22/2018 11:28 PM    LDL, calculated 20 01/22/2018 11:28 PM    Triglyceride 225 01/22/2018 11:28 PM    CHOL/HDL Ratio 2.2 01/22/2018 11:28 PM     Lab Results   Component Value Date/Time    Glucose (POC) 226 02/05/2018 12:35 PM    Glucose (POC) 205 02/05/2018 05:53 AM    Glucose (POC) 289 02/04/2018 11:38 PM    Glucose (POC) 199 02/04/2018 06:23 PM    Glucose (POC) 213 02/04/2018 12:29 PM     Lab Results   Component Value Date/Time    Color Yellow 03/04/2013 09:14 AM    Appearance Cloudy 03/04/2013 09:14 AM    Specific gravity 1.015 03/10/2010 09:45 AM    pH (UA) 7.0 03/04/2013 09:14 AM    Protein NEGATIVE  03/10/2010 09:45 AM    Glucose NEGATIVE  03/10/2010 09:45 AM    Ketone Negative 03/04/2013 09:14 AM    Bilirubin Negative 03/04/2013 09:14 AM    Urobilinogen 0.2 03/10/2010 09:45 AM    Nitrites Negative 03/04/2013 09:14 AM    Leukocyte Esterase Negative 03/04/2013 09:14 AM    Bacteria Few 03/04/2013 09:14 AM    WBC 0-5 03/04/2013 09:14 AM    RBC >30 03/04/2013 09:14 AM         Medications Reviewed:     Current Facility-Administered Medications   Medication Dose Route Frequency    balsam peru-castor oil (VENELEX)  mg/gram ointment   Topical BID    niCARdipine (CARDENE) 25 mg in 0.9% sodium chloride 250 mL infusion  0-15 mg/hr IntraVENous TITRATE    vancomycin (VANCOCIN) 1500 mg in  ml infusion  1,500 mg IntraVENous Q24H    etomidate (AMIDATE) 2 mg/mL injection        propofol (DIPRIVAN) 10 mg/mL injection        propofol (DIPRIVAN) 10 mg/mL injection        chlorhexidine (PERIDEX) 0.12 % mouthwash 15 mL  15 mL Oral BID    propofol (DIPRIVAN) infusion  0-50 mcg/kg/min IntraVENous TITRATE    Vancomycin - Pharmacy to Dose   Other Rx Dosing/Monitoring    dilTIAZem CD (CARDIZEM CD) capsule 120 mg  120 mg Oral DAILY    insulin glargine (LANTUS) injection 40 Units  40 Units SubCUTAneous QHS    albuterol-ipratropium (DUO-NEB) 2.5 MG-0.5 MG/3 ML  3 mL Nebulization Q4H RT    dextrose 5% infusion  100 mL/hr IntraVENous CONTINUOUS    insulin lispro (HUMALOG) injection   SubCUTAneous Q6H    dextrose (D50W) injection syrg 12.5-25 g  12.5-25 g IntraVENous PRN    acyclovir (ZOVIRAX) 575 mg in 0.9% sodium chloride 100 mL IVPB  575 mg IntraVENous Q12H    budesonide (PULMICORT) 500 mcg/2 ml nebulizer suspension  500 mcg Nebulization BID RT    albuterol-ipratropium (DUO-NEB) 2.5 MG-0.5 MG/3 ML  3 mL Nebulization Q4H PRN    levETIRAcetam (KEPPRA) oral solution 1,000 mg  1,000 mg Per NG tube Q12H    lacosamide (VIMPAT) tablet 100 mg  100 mg Per NG tube Q12H    dilTIAZem (CARDIZEM) 125 mg in dextrose 5% 125 mL infusion  0-15 mg/hr IntraVENous TITRATE    sodium chloride (NS) flush 5-10 mL  5-10 mL IntraVENous Q8H    sodium chloride (NS) flush 5-10 mL  5-10 mL IntraVENous PRN    midazolam (VERSED) injection 5 mg  5 mg IntraVENous Multiple    fentaNYL citrate (PF) injection 25 mcg  25 mcg IntraVENous Q1H PRN    famotidine (PEPCID) 40 mg/5 mL (8 mg/mL) oral suspension 20 mg  20 mg Per NG tube DAILY    bacitracin 500 unit/gram packet 1 Packet  1 Packet Topical PRN    chlorhexidine (PERIDEX) 0.12 % mouthwash 15 mL  15 mL Oral BID    polyvinyl alcohol (LIQUIFILM TEARS) 1.4 % ophthalmic solution 2 Drop  2 Drop Both Eyes Q8H    cefepime (MAXIPIME) 2 g in 0.9% sodium chloride (MBP/ADV) 100 mL  2 g IntraVENous Q12H    sodium chloride (NS) flush 10-30 mL  10-30 mL InterCATHeter PRN    sodium chloride (NS) flush 10 mL  10 mL InterCATHeter Q24H    sodium chloride (NS) flush 10 mL  10 mL InterCATHeter PRN    sodium chloride (NS) flush 10-40 mL  10-40 mL InterCATHeter Q8H    sodium chloride (NS) flush 20 mL  20 mL InterCATHeter Q24H    alteplase (CATHFLO) 1 mg in sterile water (preservative free) 1 mL injection  1 mg InterCATHeter PRN    atorvastatin (LIPITOR) tablet 40 mg  40 mg Per NG tube DAILY    naloxone (NARCAN) injection 0.4 mg  0.4 mg IntraVENous PRN    acetaminophen (TYLENOL) tablet 650 mg  650 mg Oral Q4H PRN    Or    acetaminophen (TYLENOL) solution 650 mg  650 mg Per NG tube Q4H PRN    Or    acetaminophen (TYLENOL) suppository 650 mg  650 mg Rectal Q4H PRN    glucose chewable tablet 16 g  4 Tab Oral PRN    dextrose (D50W) injection syrg 12.5-25 g  12.5-25 g IntraVENous PRN    glucagon (GLUCAGEN) injection 1 mg  1 mg IntraMUSCular PRN    hydrALAZINE (APRESOLINE) 20 mg/mL injection 10 mg  10 mg IntraVENous Q4H PRN    sodium chloride (NS) flush 5-10 mL  5-10 mL IntraVENous Q8H    sodium chloride (NS) flush 5-10 mL  5-10 mL IntraVENous PRN    acetaminophen (TYLENOL) tablet 650 mg  650 mg Oral Q4H PRN    HYDROcodone-acetaminophen (NORCO) 5-325 mg per tablet 1 Tab  1 Tab Oral Q4H PRN    ondansetron (ZOFRAN) injection 4 mg  4 mg IntraVENous Q4H PRN     ______________________________________________________________________  EXPECTED LENGTH OF STAY: 2d 4h  ACTUAL LENGTH OF STAY:          Imelda Marrero MD

## 2018-02-05 NOTE — PROGRESS NOTES
Physical Therapy  2/5/18    Patient currently being re-intubated. On hold for PT. Will follow peripherally and reassess when appropriate. Petrona Moreno, PT, DPT

## 2018-02-05 NOTE — PROGRESS NOTES
Speech pathology  Chart reviewed. SLP evaluated patient on 2/2 and recommended strict NPO. Note patient got re-intubated earlier today. Will complete orders. Please re-consult once patient extubated and medically appropriate for swallow assessment.    Thanks, Dar Dooley M.S. CCC-SLP

## 2018-02-05 NOTE — PROGRESS NOTES
0800: Assumed care and completed AM assessment. Pt drowsy, JACK and follows minimal commands intermittently. RUE weaker than LUE. Pt on BiPAP, coarse lungs, labored breathing. 0930: Luca,NP at bedside. Updated on pt status. 1030: Interdisplinary rounds held. Dr. Vernell Altamirano aware of pt status. Orders received. 1130: Dr. Vernell Altamirano and respiratory at bedside. Pt intubated. 1200: Restraints placed per MD order. Family at bedside. 1430: Pt to CT with respiratory and RN. 1530: Pt tolerated procedure well and [id number] VSS. Will continue to monitor closely.

## 2018-02-05 NOTE — DIABETES MGMT
DTC Progress Note    Recommendations/ Comments: Chart reviewed due to hyperglycemia. Blood sugars > 200and pt has required 22 units of correction over the last 24 hours. Note pt is on TF(glucerna). Also note pt was re-intubated today. If appropriate, please consider increasing Lantus to 45 units. DTC to continue to follow.       Current hospital DM medication: lispro insulin correction, resistant scale and Lantus 40 units.      Chart reviewed on Rosario Roach.     Patient is a 81 y.o. male with known  Type 2 Diabetes on oral agent (monotherapy): metformin 1000 mg bid (generic) at home. A1c:   Lab Results   Component Value Date/Time    Hemoglobin A1c 6.4 01/23/2018 10:43 AM    Hemoglobin A1c 6.9 06/17/2016 02:24 AM       Recent Glucose Results:   Lab Results   Component Value Date/Time     (H) 02/05/2018 03:46 AM    GLUCPOC 226 (H) 02/05/2018 12:35 PM    GLUCPOC 205 (H) 02/05/2018 05:53 AM    GLUCPOC 289 (H) 02/04/2018 11:38 PM        Lab Results   Component Value Date/Time    Creatinine 1.64 02/05/2018 03:46 AM     Estimated Creatinine Clearance: 43.8 mL/min (based on Cr of 1.64). Active Orders   Diet    DIET NPO With Tube Feedings        PO intake: No data found. Will continue to follow as needed.     Thank you  Roselia Wong RD, CDE

## 2018-02-05 NOTE — PROGRESS NOTES
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 He apparently had a fall about 2 months ago and was seen at [address] [place] [address] with a reportedly negative head CT. He developed a 2 day history of word finding difficulties and presented to the ER at [facility]. Found to have a large left chronic SDH with membranes and midline shift. He was taken to the OR by Dr. Al Martinez yesterday evening for evacuation of the hemorrhage. Approximately 300 cc of blood was removed from the subdural space. Subjective: The patient was extubated on Friday. He was in BIPAP overnight with high flow NC during the day. He is now back on BIPAP. His ABG this morning was ok. He has continued to increase his work of breathing this morning. He is not following commands or as alert as he was on Friday. Precedex was turned off this morning and his mentation hasn't improved. Objective:     Vital signs  Temp (24hrs), Av.8 °F (37.1 °C), Min:98.1 °F (36.7 °C), Max:99.1 °F (37.3 °C)   701 -  190  In: 647.9 [I.V.:497.9]  Out: -   1901 -  0700  In: 6999.9 [I.V.:4619.9]  Out: -     Visit Vitals    /72    Pulse 94    Temp 98.6 °F (37 °C)    Resp 29    Ht 6' (1.829 m)    Wt 102.5 kg (225 lb 14.4 oz)    SpO2 97%    BMI 30.64 kg/m2    O2 Flow Rate (L/min): 40 l/min O2 Device: BIPAP     Pain control  Pain Assessment  Pain Scale 1: Adult Nonverbal Pain Scale  Pain Intensity 1: 0  Pain Intervention(s) 1: Repositioned    PT/OT  Gait                 Physical Exam:  Gen:NAD. Off precedex. On BIPAP. Neuro: Lethargic. Eyes open to voice. PERRL 4 mm BL. Not following commands for me. JACK spontaneously L>R.   Skin: Left frontoparietal incision C/D/I with staples in place. No erythema, edema, or drainage. CT head without contrast on 01/29/18 shows no new finding is seen. Intracranial hemorrhage and extra-axial collection on the left unchanged. 24 hour results:    Recent Results (from the past 24 hour(s))   GLUCOSE, POC    Collection Time: 02/04/18 12:29 PM   Result Value Ref Range    Glucose (POC) 213 (H) 65 - 100 mg/dL    Performed by Mortimer Dickinson    GLUCOSE, POC    Collection Time: 02/04/18  6:23 PM   Result Value Ref Range    Glucose (POC) 199 (H) 65 - 100 mg/dL    Performed by Mortimer Dickinson    GLUCOSE, POC    Collection Time: 02/04/18 11:38 PM   Result Value Ref Range    Glucose (POC) 289 (H) 65 - 100 mg/dL    Performed by Elysia Waller    METABOLIC PANEL, BASIC    Collection Time: 02/05/18  3:46 AM   Result Value Ref Range    Sodium 150 (H) 136 - 145 mmol/L    Potassium 3.7 3.5 - 5.1 mmol/L    Chloride 117 (H) 97 - 108 mmol/L    CO2 25 21 - 32 mmol/L    Anion gap 8 5 - 15 mmol/L    Glucose 207 (H) 65 - 100 mg/dL    BUN 41 (H) 6 - 20 MG/DL    Creatinine 1.64 (H) 0.70 - 1.30 MG/DL    BUN/Creatinine ratio 25 (H) 12 - 20      GFR est AA 49 (L) >60 ml/min/1.73m2    GFR est non-AA 41 (L) >60 ml/min/1.73m2    Calcium 9.0 8.5 - 10.1 MG/DL   CBC WITH AUTOMATED DIFF    Collection Time: 02/05/18  3:46 AM   Result Value Ref Range    WBC 7.4 4.1 - 11.1 K/uL    RBC 3.14 (L) 4.10 - 5.70 M/uL    HGB 9.4 (L) 12.1 - 17.0 g/dL    HCT 30.4 (L) 36.6 - 50.3 %    MCV 96.8 80.0 - 99.0 FL    MCH 29.9 26.0 - 34.0 PG    MCHC 30.9 30.0 - 36.5 g/dL    RDW 13.6 11.5 - 14.5 %    PLATELET 842 (L) 086 - 400 K/uL    MPV 12.8 8.9 - 12.9 FL    NRBC 0.0 0  WBC    ABSOLUTE NRBC 0.00 0.00 - 0.01 K/uL    NEUTROPHILS 87 (H) 32 - 75 %    LYMPHOCYTES 6 (L) 12 - 49 %    MONOCYTES 3 (L) 5 - 13 %    EOSINOPHILS 4 0 - 7 %    BASOPHILS 0 0 - 1 %    IMMATURE GRANULOCYTES 1 (H) 0.0 - 0.5 %    ABS. NEUTROPHILS 6.4 1.8 - 8.0 K/UL    ABS. LYMPHOCYTES 0.4 (L) 0.8 - 3.5 K/UL    ABS.  MONOCYTES 0.2 0.0 - 1.0 K/UL    ABS. EOSINOPHILS 0.3 0.0 - 0.4 K/UL    ABS. BASOPHILS 0.0 0.0 - 0.1 K/UL    ABS. IMM. GRANS. 0.1 (H) 0.00 - 0.04 K/UL    DF AUTOMATED     GLUCOSE, POC    Collection Time: 02/05/18  5:53 AM   Result Value Ref Range    Glucose (POC) 205 (H) 65 - 100 mg/dL    Performed by Fabio Burgos    POC G3 - PUL    Collection Time: 02/05/18  6:25 AM   Result Value Ref Range    FIO2 (POC) 50 %    pH (POC) 7.411 7.35 - 7.45      pCO2 (POC) 37.5 35.0 - 45.0 MMHG    pO2 (POC) 83 80 - 100 MMHG    HCO3 (POC) 23.8 22 - 26 MMOL/L    sO2 (POC) 96 92 - 97 %    Base deficit (POC) 1 mmol/L    Site RIGHT RADIAL      Device: BIPAP      PEEP/CPAP (POC) 5 cmH2O    PIP (POC) 12      Allens test (POC) NO      Specimen type (POC) ARTERIAL            Assessment:     Principal Problem:    SDH (subdural hematoma) (HCC) (1/23/2018)        Plan:   1. Left traumatic chronic subdural hematoma   - s/p crani 01/24   - Cont Keppra and Vimpat   - Therapy evals as able   - Cont Precedex PRN   - Neuro checks q4h  2. Brain compression   - due to #1   - plans as above  3. HTN   - SBP<160   - Hydralazine PRN   - Restart home BP meds down dobhoff   - Intensivist following  4. Diabetes mellitus, type 2   - Cont Lantus 40 units   - SSI and accu-checks   - Intensivist following  5. ABIMBOLA on CKD, stage 3   - Avoid nephrotoxic agents   - Hospitalist/intensivist following  6. Acute encephalopathy   - multi-factorial     - limit sedating agents   - supportive care  7. Hypernatremia   - d/c 1/2NS   - Adjust free water flushes   - Downtrending  8. Atrial fibrillation   - Metoprolol added. Diltiazem drip ordered but patient not currently on it   - Electrolytes corrected   - Hospitalist/intensivist following  9. Hypomagenesemia   - Resolved   - Intensivist following  10. Hypophosphatemia   - Resolved   - Electrolyte replacement protocol   - Intensivist following  11.  Acute respiratory insufficiency   - Pt re-intubated 01/26   - Pulmonary following   - Extubated 01/30   - Pt re-intubated 01/30 for respiratory distress   - Extubated 02/02   - Pt re-intubated 02/05 for increasing respiratory distress  12. Focal seizure   - Cont Keppra 1000 mg bid   - Cont Vimpat 100 mg bid   - EEG does not show seizures but patient had witnessed seizure last Friday so will cont AEDs at this time  13. Thrombocytopenia   - Possible medication induced. Steady at 144   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 Gloria Cisneros. Palliative consult may be warranted to address goals of care. Will get head CT after patient re-intubated to ensure encephalopathy is not due to his SDH.       Chuy Gifford NP

## 2018-02-05 NOTE — PROGRESS NOTES
Procedure Note    Procedure:  Endotracheal intubation  :  Dr. Devan Ochoa  Indication:  Respiratory failure  Anesthesia:  Diprivan 5 ml, Etomidate 20 mg  Estimated blood loss:  0  Airway Class:  IV  Consent:  Emergent    Under fiberoptic visualization using a C-Mac 4 blade a 8.0 ETT was placed through the cords to a depth of 24 cm. Equal breath sounds were auscultated bilaterally. Appropriate color change on ETCO2 meter. There were no complications. The patient tolerated the procedure well. A post procedure CXR has been ordered.     Audra Dorado MD

## 2018-02-05 NOTE — PROGRESS NOTES
Attended IDR this morning. Note patient reintubated today. Patient also put in restraints today. CM to follow.

## 2018-02-05 NOTE — CONSULTS
Palliative Medicine    Consult received, will try to coordinate family meeting w/ son Amy Gill and other family.

## 2018-02-05 NOTE — PROGRESS NOTES
NUTRITION       Chart reviewed for brief follow-up; discussed during interdisciplinary rounds. Noted pt was re-intubated this morning. Diprivan ordered for sedation; @ current rate of 18.5 ml/hr pt will receive 488 lipid calories per day. Will need to adjust tube feeding to avoid overfeeding calorically (until Diprivan is discontinued) and increase free water flush d/t elevated sodium. New tube feeding goal: Glucerna 1.2 @ 45 ml/hr with one packet Prosource tid and 200 ml water flush q 4 hr. This will provide 1080 ml, 1480 calories (1970 calories including Diprivan), 110 gm protein and 2340 ml free water (tube feeding/flush) per day. RD to follow.     Estimated Nutrition Needs:   Kcals/day: 1970 Kcals/day  Protein: 112 g (1.2g/kg)  Fluid: 1900 ml (1 ml/jaret)  Based On: [facility] (2003b)  Weight Used: Actual wt (94.5 kg)      Tori Leija RD [facility]

## 2018-02-06 NOTE — DIABETES MGMT
DTC Progress Note    Recommendations/ Comments: Chart reviewed due to hyperglycemia. Blood sugars > 200 and pt has required 15 units of correction over the last 24 hours. Note pt is on TF(glucerna). Also note pt was re-intubated yesterday. If appropriate, please consider increasing Lantus to 50 units. DTC to continue to follow.       Current hospital DM medication: lispro insulin correction, resistant scale and Lantus 40 units.      Chart reviewed on Margie Hawley.     Patient is a 81 y.o. male with known  Type 2 Diabetes on oral agent (monotherapy): metformin 1000 mg bid (generic) at home. A1c:   Lab Results   Component Value Date/Time    Hemoglobin A1c 6.4 (H) 01/23/2018 10:43 AM    Hemoglobin A1c 6.9 (H) 06/17/2016 02:24 AM       Recent Glucose Results:   Lab Results   Component Value Date/Time     (H) 02/06/2018 03:37 AM    GLUCPOC 252 (H) 02/06/2018 12:01 PM    GLUCPOC 244 (H) 02/06/2018 06:00 AM    GLUCPOC 241 (H) 02/06/2018 12:23 AM        Lab Results   Component Value Date/Time    Creatinine 1.97 (H) 02/06/2018 03:37 AM     Estimated Creatinine Clearance: 37.6 mL/min (based on Cr of 1.97). Active Orders   Diet    DIET NPO With Tube Feedings        PO intake: No data found. Will continue to follow as needed.     Thank you  Warden Wagner MOSS, CDE

## 2018-02-06 NOTE — PALLIATIVE CARE
Palliative Medicine Social Work    Dr. Elijah Kim and I met with patient's two children, Arti Paige ([phone]) and Gaby Hoffman ([phone]). Family had an accurate understanding of his condition and concerns for his ability to recover; especially as it relates to his respiratory status/multiple intubations. Talked more about his condition. Discussed that his compromised state is likely a combination of his infections, delirium in the setting of a compromised baseline health that just makes it difficult sometimes for people to recover. Family had lots of questions about how we evaluated actual \"progress\"; how long it may take before we see improvements; next steps. Discussed how his respiratory status will continue to be evaluated, SBT; talked about mental status and ability to protect airway/clear secretions as equally vital in the evaluation. Prepared that they may be faced with decision for tracheostomy/PEG if showing no signs of improvement or if needing/wanting re-intubationmay after another extubation. Talked some about his past health crises; baseline functioning. Patient has been through multiple cancers, chronic conditions and general decline with each. He lived alone and was able to manage his basic care. Of late, he has been unsteady on his feet due to neuropathy. 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 Lost his wife to pancreatic cancer three years ago. He has completed an AMD which designates Buck Kamilla as primary agent; and another brother, Barb Alexis, as secondary. He outlines his desire for no life-prolonging care in the context of imminent death, terminal illness or inability to recover meaningfully. Family present feels a meaningful recover for him would be the ability to return home and manage his own care.   They do not feel he would appreciate extensive time in long-term setting or level of dependence, but probably willing to endure short-term if true bridge to recovery. Family agreed to no CPR or shock in the setting of cardiac arrest.  Will continue to follow and support, clarify goals as more is known. Son to forward me a copy of AMD for records. Thank you for the opportunity to be involved in the care of Mr. Natalia Musa. Miranda Sanabria, LEIDAW, [facility]-  Palliative Medicine   Respecting Choices ® ACP Facilitator   [phone]

## 2018-02-06 NOTE — PROGRESS NOTES
Bedside and Verbal shift change report given to Faisal Eddy RN (oncoming nurse) by Alex Paredes RN (offgoing nurse). Report included the following information SBAR, Kardex, Procedure Summary, Intake/Output, MAR, Accordion, Recent Results, Cardiac Rhythm NSR/SB and Alarm Parameters . SHIFT SUMMARY: Pt remains on Propofol gtt for vent compliance and comfort. VSS. Overall uneventful shift. Problem: Falls - Risk of  Goal: *Absence of Falls  Document Arleen Fall Risk and appropriate interventions in the flowsheet.    Outcome: Progressing Towards Goal  Fall Risk Interventions:       Mentation Interventions: Adequate sleep, hydration, pain control, Bed/chair exit alarm, Door open when patient unattended, Evaluate medications/consider consulting pharmacy, Familiar objects from home, Increase mobility, More frequent rounding, Reorient patient, Toileting rounds, Self-releasing belt, Update white board    Medication Interventions: Bed/chair exit alarm, Evaluate medications/consider consulting pharmacy, Patient to call before getting OOB, Teach patient to arise slowly    Elimination Interventions: Call light in reach    History of Falls Interventions: Evaluate medications/consider consulting pharmacy, Investigate reason for fall

## 2018-02-06 NOTE — PROGRESS NOTES
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 He apparently had a fall about 2 months ago and was seen at [address] [place] [address] with a reportedly negative head CT. He developed a 2 day history of word finding difficulties and presented to the ER at [facility]. Found to have a large left chronic SDH with membranes and midline shift. He was taken to the OR by Dr. Justo Vázquez yesterday evening for evacuation of the hemorrhage. Approximately 300 cc of blood was removed from the subdural space. Subjective: The patient was re-intubated yesterday for increasing respiratory distress. Off sedation this morning. Plans for family with palliative care noted for today. CT head from yesterday shows improvement in the extra-axial collection. Unable to obtain ROS due to intubation. Objective:     Vital signs  Temp (24hrs), Av.6 °F (37 °C), Min:96.7 °F (35.9 °C), Max:99.3 °F (37.4 °C)    07 -  1900  In: 1095.6 [I.V.:780.6]  Out: -    1901 -  0700  In: 8375.3 [I.V.:5800.3]  Out: -     Visit Vitals    /70    Pulse 69    Temp 98.3 °F (36.8 °C)    Resp 12    Ht 6' (1.829 m)    Wt 109.6 kg (241 lb 9.6 oz)    SpO2 100%    BMI 32.77 kg/m2    O2 Flow Rate (L/min): 40 l/min O2 Device: Endotracheal tube     Pain control  Pain Assessment  Pain Scale 1: Adult Nonverbal Pain Scale  Pain Intensity 1: 0  Pain Intervention(s) 1: Repositioned    PT/OT  Gait                 Physical Exam:  Gen:NAD. Intubated. Off sedation. Neuro: Awake. Eyes open to voice. PERRL 4 mm BL. Not following commands for me. JACK spontaneously L>R. Skin: Left frontoparietal incision C/D/I with staples in place. No erythema, edema, or drainage.     CT head without contrast on 02/05/18 shows overall improvement in the parafalcine subdural hematoma, left frontal intraparenchymal hematomas, and bilateral subarachnoid hemorrhage. Left  convexity subdural fluid collection is stable in size. Intraventricular blood is stable. No midline shift or herniation. 24 hour results:    Recent Results (from the past 24 hour(s))   GLUCOSE, POC    Collection Time: 02/05/18 12:35 PM   Result Value Ref Range    Glucose (POC) 226 (H) 65 - 100 mg/dL    Performed by Sylvania Bence    POC G3 - PUL    Collection Time: 02/05/18 12:43 PM   Result Value Ref Range    FIO2 (POC) 100 %    pH (POC) 7.474 (H) 7.35 - 7.45      pCO2 (POC) 37.2 35.0 - 45.0 MMHG    pO2 (POC) 64 (L) 80 - 100 MMHG    HCO3 (POC) 27.3 (H) 22 - 26 MMOL/L    sO2 (POC) 94 92 - 97 %    Base excess (POC) 4 mmol/L    Site RIGHT RADIAL      Device: VENT      Mode ASSIST CONTROL      Tidal volume 600 ml    Set Rate 12 bpm    PEEP/CPAP (POC) 5 cmH2O    PIP (POC) 25      Allens test (POC) YES      Specimen type (POC) ARTERIAL      Total resp.  rate 14     GLUCOSE, POC    Collection Time: 02/05/18  5:37 PM   Result Value Ref Range    Glucose (POC) 190 (H) 65 - 100 mg/dL    Performed by Sylvania Bence    GLUCOSE, POC    Collection Time: 02/06/18 12:23 AM   Result Value Ref Range    Glucose (POC) 241 (H) 65 - 100 mg/dL    Performed by Chavo Spivey    CBC WITH AUTOMATED DIFF    Collection Time: 02/06/18  3:37 AM   Result Value Ref Range    WBC 5.1 4.1 - 11.1 K/uL    RBC 2.68 (L) 4.10 - 5.70 M/uL    HGB 8.0 (L) 12.1 - 17.0 g/dL    HCT 25.4 (L) 36.6 - 50.3 %    MCV 94.8 80.0 - 99.0 FL    MCH 29.9 26.0 - 34.0 PG    MCHC 31.5 30.0 - 36.5 g/dL    RDW 13.6 11.5 - 14.5 %    PLATELET 563 (L) 730 - 400 K/uL    MPV 13.0 (H) 8.9 - 12.9 FL    NRBC 0.0 0  WBC    ABSOLUTE NRBC 0.00 0.00 - 0.01 K/uL    NEUTROPHILS 72 32 - 75 %    LYMPHOCYTES 12 12 - 49 %    MONOCYTES 9 5 - 13 %    EOSINOPHILS 6 0 - 7 %    BASOPHILS 0 0 - 1 %    IMMATURE GRANULOCYTES 1 (H) 0.0 - 0.5 %    ABS. NEUTROPHILS 3.7 1.8 - 8.0 K/UL    ABS. LYMPHOCYTES 0.6 (L) 0.8 - 3.5 K/UL    ABS. MONOCYTES 0.5 0.0 - 1.0 K/UL    ABS. EOSINOPHILS 0.3 0.0 - 0.4 K/UL    ABS. BASOPHILS 0.0 0.0 - 0.1 K/UL    ABS. IMM. GRANS. 0.0 0.00 - 0.04 K/UL    DF AUTOMATED     MAGNESIUM    Collection Time: 02/06/18  3:37 AM   Result Value Ref Range    Magnesium 2.1 1.6 - 2.4 mg/dL   METABOLIC PANEL, COMPREHENSIVE    Collection Time: 02/06/18  3:37 AM   Result Value Ref Range    Sodium 144 136 - 145 mmol/L    Potassium 3.5 3.5 - 5.1 mmol/L    Chloride 111 (H) 97 - 108 mmol/L    CO2 26 21 - 32 mmol/L    Anion gap 7 5 - 15 mmol/L    Glucose 217 (H) 65 - 100 mg/dL    BUN 43 (H) 6 - 20 MG/DL    Creatinine 1.97 (H) 0.70 - 1.30 MG/DL    BUN/Creatinine ratio 22 (H) 12 - 20      GFR est AA 40 (L) >60 ml/min/1.73m2    GFR est non-AA 33 (L) >60 ml/min/1.73m2    Calcium 8.3 (L) 8.5 - 10.1 MG/DL    Bilirubin, total 0.3 0.2 - 1.0 MG/DL    ALT (SGPT) 29 12 - 78 U/L    AST (SGOT) 27 15 - 37 U/L    Alk.  phosphatase 56 45 - 117 U/L    Protein, total 5.1 (L) 6.4 - 8.2 g/dL    Albumin 1.8 (L) 3.5 - 5.0 g/dL    Globulin 3.3 2.0 - 4.0 g/dL    A-G Ratio 0.5 (L) 1.1 - 2.2     PHOSPHORUS    Collection Time: 02/06/18  3:37 AM   Result Value Ref Range    Phosphorus 2.8 2.6 - 4.7 MG/DL   POC G3 - PUL    Collection Time: 02/06/18  6:00 AM   Result Value Ref Range    FIO2 (POC) 50 %    pH (POC) 7.447 7.35 - 7.45      pCO2 (POC) 37.6 35.0 - 45.0 MMHG    pO2 (POC) 151 (H) 80 - 100 MMHG    HCO3 (POC) 25.9 22 - 26 MMOL/L    sO2 (POC) 99 (H) 92 - 97 %    Base excess (POC) 2 mmol/L    Site RIGHT RADIAL      Device: VENT      Mode ASSIST CONTROL      Tidal volume 600 ml    Set Rate 12 bpm    PEEP/CPAP (POC) 8 cmH2O    Allens test (POC) NO      Specimen type (POC) ARTERIAL     GLUCOSE, POC    Collection Time: 02/06/18  6:00 AM   Result Value Ref Range    Glucose (POC) 244 (H) 65 - 100 mg/dL    Performed by Willow Crouch           Assessment:     Principal Problem:    SDH (subdural hematoma) ([facility] [address] (1/23/2018)        Plan:   1. Left traumatic chronic subdural hematoma   - s/p crani 01/24   - Cont Keppra and Vimpat   - Therapy evals as able   - Cont Precedex PRN   - Neuro checks q4h   - CT head shows improvement  2. Brain compression   - due to #1   - plans as above  3. HTN   - SBP<160   - Hydralazine PRN   - Restart home BP meds down dobhoff   - Intensivist following  4. Diabetes mellitus, type 2   - Cont Lantus 40 units   - SSI and accu-checks   - Intensivist following  5. ABIMBOLA on CKD, stage 3   - Avoid nephrotoxic agents   - Hospitalist/intensivist following  6. Acute encephalopathy   - multi-factorial     - limit sedating agents   - supportive care  7. Hypernatremia   - Resolving   - Free water flushes adjusted yesterday  8. Atrial fibrillation   - Metoprolol added. - Electrolytes corrected   - Hospitalist/intensivist following  9. Hypomagenesemia   - Resolved   - Intensivist following  10. Hypophosphatemia   - Resolved   - Electrolyte replacement protocol   - Intensivist following  11. Acute respiratory insufficiency   - Pt re-intubated 01/26   - Pulmonary following   - Extubated 01/30   - Pt re-intubated 01/30 for respiratory distress   - Extubated 02/02   - Pt re-intubated 02/05 for increasing respiratory distress  12. Focal seizure   - Cont Keppra 1000 mg bid   - Cont Vimpat 100 mg bid   - EEG does not show seizures but patient had witnessed seizure last Friday so will cont AEDs at this time  13. Thrombocytopenia   - Possible medication induced. 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 Oleksandr Nur. Appreciate palliative care setting up a family meeting. At this point, do not think encephalopathy is due to neurologic condition. Head CT has improved.  Main issue at this point is respiratory status and the fact that patient continues to require ventilator support.        Bernetta Duane, NP

## 2018-02-06 NOTE — PROGRESS NOTES
Hospitalist Progress Note  Anna Lynch MD  Answering service: [phone] OR [phone] from in house phone        Date of Service:  2018  NAME:  Angelina Lord YOB: 1935  MRN:  [id number]      Admission Summary:   Anne Laughlin a 81 y. o. male with past medical history of arthritis, bladder cancer, prostate cancer, chronic neck pain, colon cancer, type 2 diabetes mellitus, GERD, hypertension, hyperlipidemia, kidney stones, PUD, DVT, PE, TIA, sleep apnea presented to the ED from home with reported inability to talk. Dai Meza is a non-historian.  As such, history was obtained per my review of ED and medical records.  Per these collective reports, patient had onset of dysarthria and expressive aphasia starting approximately two days ago.        Interval history / Subjective:     F/u for respiratory failure and intracranial hemorrhage. Patient was re-intubated due to increase work of breathing 18    Palliative care consulted       Assessment & Plan:     Acute Hypoxic Respiratory Failure requiring mechanical ventilation. 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     Hypertension.  - BP is stable  - PRN IV hydralazine   - Goal SBP < 140 mm hg     Type 2 diabetes mellitus with hyperglycemia:  -Target blood sugar 140-180   Continue ISS- resistant scale. Increase lantus to 45  units qhs  Accu checks    Hyperlipidemia.     CKD III with worsening of creatinine:  - Stable  Monitor renal indices     Seizure : On keppra and Vimpat  EEG negative diffuse slowing     Elevated wbc from? Resolved. CXR shows small bilateral pleural effusions and airspace opacities. On Cefepime 1/26 till date, continue for total duration of 10 days    Hypernatremia: Continue to adjust free water flushes  Continue D5W. Monitor    Afib with RVR requiring cardizem drip  Now rate controlled. Wean off cardizem drip  Echo EF 50- 55%, no wall motion abnormalitis  Start cardizem 120 mg PO daily  CHA2D S2 vasc score at least 4- Hypertension, age, diabetes  Not a candidate for [address] due to head bleed. Anemia: Secondary to subdural hematoma. Monitor H/h closely     Palliative care consulted; patient is now a partial code. No CPR, But can be intubated    Code status: full  DVT prophylaxis: [address] discussed with: Patient/Family and Nurse  Disposition: TBD, still requires ICU care. Prognosis is poor     Hospital Problems  Date Reviewed: 1/23/2018          Codes Class Noted POA    * (Principal)SDH (subdural hematoma) ([facility] [address] ICD-10-CM: I62.00  ICD-9-CM: 432.1  1/23/2018 Unknown                Review of Systems:   Review of systems not obtained due to patient factors. Vital Signs:    Last 24hrs VS reviewed since prior progress note.  Most recent are:  Visit Vitals    /63 (BP 1 Location: Right arm, BP Patient Position: At rest)    Pulse 68    Temp 99.7 °F (37.6 °C)    Resp 13    Ht 6' (1.829 m)    Wt 109.6 kg (241 lb 9.6 oz)    SpO2 100%    BMI 32.77 kg/m2         Intake/Output Summary (Last 24 hours) at 02/06/18 1709  Last data filed at 02/06/18 1600   Gross per 24 hour   Intake          5161.58 ml   Output                0 ml   Net          5161.58 ml        Physical Examination:             Constitutional:   Intubated   Eyes: Non icteric,non pallor pupil s pin point HENT:  Oral mucous moist, head sutures intact. Resp: CTA   CV:  Regular rhythm, normal rate,    GI:  Soft, non distended, non tender. bs= no hepatosplenomegaly    :  No CVA or suprapubic tenderness   Skin  :  No erythema,rash,bullae,dipigmentation     Musculoskeletal:  SCDs on both legs ;no edema, warm, 2+ pulses throughout    Neurologic:  Intubated and sedated            Data Review:    I personally reviewed  Image and labs      Labs:     Recent Labs      02/06/18 0337 02/05/18 0346   WBC  5.1  7.4   HGB  8.0*  9.4*   HCT  25.4*  30.4*   PLT  126*  144*     Recent Labs      02/06/18 0337 02/05/18 0346 02/04/18   0354   NA  144  150*  152*   K  3.5  3.7  3.6   CL  111*  117*  119*   CO2  26  25  27   BUN  43*  41*  47*   CREA  1.97*  1.64*  1.81*   GLU  217*  207*  183*   CA  8.3*  9.0  8.8   MG  2.1   --    --    PHOS  2.8   --    --      Recent Labs      02/06/18 0337   SGOT  27   ALT  29   AP  56   TBILI  0.3   TP  5.1*   ALB  1.8*   GLOB  3.3     No results for input(s): INR, PTP, APTT in the last 72 hours. No lab exists for component: INREXT, INREXT   No results for input(s): FE, TIBC, PSAT, FERR in the last 72 hours. No results found for: FOL, RBCF   No results for input(s): PH, PCO2, PO2 in the last 72 hours. No results for input(s): CPK, CKNDX, TROIQ in the last 72 hours.     No lab exists for component: CPKMB  Lab Results   Component Value Date/Time    Cholesterol, total 118 01/22/2018 11:28 PM    HDL Cholesterol 53 01/22/2018 11:28 PM    LDL, calculated 20 01/22/2018 11:28 PM    Triglyceride 225 (H) 01/22/2018 11:28 PM    CHOL/HDL Ratio 2.2 01/22/2018 11:28 PM     Lab Results   Component Value Date/Time    Glucose (POC) 252 (H) 02/06/2018 12:01 PM    Glucose (POC) 244 (H) 02/06/2018 06:00 AM    Glucose (POC) 241 (H) 02/06/2018 12:23 AM    Glucose (POC) 190 (H) 02/05/2018 05:37 PM    Glucose (POC) 226 (H) 02/05/2018 12:35 PM     Lab Results   Component Value Date/Time    Color Yellow 03/04/2013 09:14 AM    Appearance Cloudy (A) 03/04/2013 09:14 AM    Specific gravity 1.015 03/10/2010 09:45 AM    pH (UA) 7.0 03/04/2013 09:14 AM    Protein NEGATIVE  03/10/2010 09:45 AM    Glucose NEGATIVE  03/10/2010 09:45 AM    Ketone Negative 03/04/2013 09:14 AM    Bilirubin Negative 03/04/2013 09:14 AM    Urobilinogen 0.2 03/10/2010 09:45 AM    Nitrites Negative 03/04/2013 09:14 AM    Leukocyte Esterase Negative 03/04/2013 09:14 AM    Bacteria Few 03/04/2013 09:14 AM    WBC 0-5 03/04/2013 09:14 AM    RBC >30 (A) 03/04/2013 09:14 AM         Medications Reviewed:     Current Facility-Administered Medications   Medication Dose Route Frequency    balsam peru-castor oil (VENELEX)  mg/gram ointment   Topical BID    niCARdipine (CARDENE) 25 mg in 0.9% sodium chloride 250 mL infusion  0-15 mg/hr IntraVENous TITRATE    vancomycin (VANCOCIN) 1500 mg in  ml infusion  1,500 mg IntraVENous Q24H    chlorhexidine (PERIDEX) 0.12 % mouthwash 15 mL  15 mL Oral BID    propofol (DIPRIVAN) infusion  0-50 mcg/kg/min IntraVENous TITRATE    Vancomycin - Pharmacy to Dose   Other Rx Dosing/Monitoring    dilTIAZem CD (CARDIZEM CD) capsule 120 mg  120 mg Oral DAILY    insulin glargine (LANTUS) injection 40 Units  40 Units SubCUTAneous QHS    albuterol-ipratropium (DUO-NEB) 2.5 MG-0.5 MG/3 ML  3 mL Nebulization Q4H RT    dextrose 5% infusion  100 mL/hr IntraVENous CONTINUOUS    insulin lispro (HUMALOG) injection   SubCUTAneous Q6H    dextrose (D50W) injection syrg 12.5-25 g  12.5-25 g IntraVENous PRN    acyclovir (ZOVIRAX) 575 mg in 0.9% sodium chloride 100 mL IVPB  575 mg IntraVENous Q12H    budesonide (PULMICORT) 500 mcg/2 ml nebulizer suspension  500 mcg Nebulization BID RT    albuterol-ipratropium (DUO-NEB) 2.5 MG-0.5 MG/3 ML  3 mL Nebulization Q4H PRN    levETIRAcetam (KEPPRA) oral solution 1,000 mg  1,000 mg Per NG tube Q12H    lacosamide (VIMPAT) tablet 100 mg  100 mg Per NG tube Q12H    dilTIAZem (CARDIZEM) 125 mg in dextrose 5% 125 mL infusion  0-15 mg/hr IntraVENous TITRATE    sodium chloride (NS) flush 5-10 mL  5-10 mL IntraVENous Q8H    sodium chloride (NS) flush 5-10 mL  5-10 mL IntraVENous PRN    midazolam (VERSED) injection 5 mg  5 mg IntraVENous Multiple    fentaNYL citrate (PF) injection 25 mcg  25 mcg IntraVENous Q1H PRN    famotidine (PEPCID) 40 mg/5 mL (8 mg/mL) oral suspension 20 mg  20 mg Per NG tube DAILY    bacitracin 500 unit/gram packet 1 Packet  1 Packet Topical PRN    polyvinyl alcohol (LIQUIFILM TEARS) 1.4 % ophthalmic solution 2 Drop  2 Drop Both Eyes Q8H    cefepime (MAXIPIME) 2 g in 0.9% sodium chloride (MBP/ADV) 100 mL  2 g IntraVENous Q12H    sodium chloride (NS) flush 10-30 mL  10-30 mL InterCATHeter PRN    sodium chloride (NS) flush 10 mL  10 mL InterCATHeter Q24H    sodium chloride (NS) flush 10 mL  10 mL InterCATHeter PRN    sodium chloride (NS) flush 10-40 mL  10-40 mL InterCATHeter Q8H    sodium chloride (NS) flush 20 mL  20 mL InterCATHeter Q24H    alteplase (CATHFLO) 1 mg in sterile water (preservative free) 1 mL injection  1 mg InterCATHeter PRN    atorvastatin (LIPITOR) tablet 40 mg  40 mg Per NG tube DAILY    naloxone (NARCAN) injection 0.4 mg  0.4 mg IntraVENous PRN    acetaminophen (TYLENOL) tablet 650 mg  650 mg Oral Q4H PRN    Or    acetaminophen (TYLENOL) solution 650 mg  650 mg Per NG tube Q4H PRN    Or    acetaminophen (TYLENOL) suppository 650 mg  650 mg Rectal Q4H PRN    glucose chewable tablet 16 g  4 Tab Oral PRN    dextrose (D50W) injection syrg 12.5-25 g  12.5-25 g IntraVENous PRN    glucagon (GLUCAGEN) injection 1 mg  1 mg IntraMUSCular PRN    hydrALAZINE (APRESOLINE) 20 mg/mL injection 10 mg  10 mg IntraVENous Q4H PRN    sodium chloride (NS) flush 5-10 mL  5-10 mL IntraVENous Q8H    sodium chloride (NS) flush 5-10 mL  5-10 mL IntraVENous PRN    acetaminophen (TYLENOL) tablet 650 mg  650 mg Oral Q4H PRN    HYDROcodone-acetaminophen (NORCO) 5-325 mg per tablet 1 Tab  1 Tab Oral Q4H PRN    ondansetron (ZOFRAN) injection 4 mg  4 mg IntraVENous Q4H PRN     ______________________________________________________________________  EXPECTED LENGTH OF STAY: 2d 4h  ACTUAL LENGTH OF STAY:          5[address] Mike Wolfe, MD

## 2018-02-06 NOTE — PROGRESS NOTES
Pulmonary / [address] Day: 12     S:    In ICU for resp failure following SDH post evacuation    :  Re-intubated on  for AMS / resp distress. Thick secretions in back of throat at time of intubation  Head CT without significant change  Abd CT for abd distension - Small bilateral pleural effusions with underlying consolidation. Gallstone. Sigmoid diverticulosis.  Lesion left hepatic lobe again demonstrated but cannot  be characterized given the lack of intravenous contrast. No acute  intra-abdominal abnormality  Remains sedate on vent  Tolerating TF    :  Was extubated at the end of last week  Currently tachypneic despite bipap with increased work of breathing and accessory muscle use  Mental status is poor  Abd distended with worsening resp distress  Has been having bowel movements     O:    Patient Vitals for the past 4 hrs:   BP Temp Pulse Resp SpO2   18 1013 - - 66 12 100 %   18 1000 156/77 - 65 12 100 %   18 0900 128/60 - 61 12 100 %   18 0800 134/61 98.3 °F (36.8 °C) 63 12 100 %   18 0700 144/69 - 67 12 99 %     Temp (24hrs), Av.6 °F (37 °C), Min:96.7 °F (35.9 °C), Max:99.3 °F (37.4 °C)      Intake/Output Summary (Last 24 hours) at 18 1041  Last data filed at 18 1000   Gross per 24 hour   Intake          5676.89 ml   Output                0 ml   Net          5676.89 ml     Exam:  resp distress  With agonal type resp pattern despite bipap  MM dry  Anicteric  No commands followed  Coarse rhonchi bilaterally  RRR  Protuberant with mild distension, hypoactive bs  Warm and dry  Trace edema        Imaging: Images independently viewed  CXR - persistned L>R effusions and basilar atx    Labs:  Recent Results (from the past 12 hour(s))   GLUCOSE, POC    Collection Time: 18 12:23 AM   Result Value Ref Range    Glucose (POC) 241 (H) 65 - 100 mg/dL    Performed by Novi    CBC WITH AUTOMATED DIFF    Collection Time: 18  3:37 AM   Result Value Ref Range    WBC 5.1 4.1 - 11.1 K/uL    RBC 2.68 (L) 4.10 - 5.70 M/uL    HGB 8.0 (L) 12.1 - 17.0 g/dL    HCT 25.4 (L) 36.6 - 50.3 %    MCV 94.8 80.0 - 99.0 FL    MCH 29.9 26.0 - 34.0 PG    MCHC 31.5 30.0 - 36.5 g/dL    RDW 13.6 11.5 - 14.5 %    PLATELET 865 (L) 822 - 400 K/uL    MPV 13.0 (H) 8.9 - 12.9 FL    NRBC 0.0 0  WBC    ABSOLUTE NRBC 0.00 0.00 - 0.01 K/uL    NEUTROPHILS 72 32 - 75 %    LYMPHOCYTES 12 12 - 49 %    MONOCYTES 9 5 - 13 %    EOSINOPHILS 6 0 - 7 %    BASOPHILS 0 0 - 1 %    IMMATURE GRANULOCYTES 1 (H) 0.0 - 0.5 %    ABS. NEUTROPHILS 3.7 1.8 - 8.0 K/UL    ABS. LYMPHOCYTES 0.6 (L) 0.8 - 3.5 K/UL    ABS. MONOCYTES 0.5 0.0 - 1.0 K/UL    ABS. EOSINOPHILS 0.3 0.0 - 0.4 K/UL    ABS. BASOPHILS 0.0 0.0 - 0.1 K/UL    ABS. IMM. GRANS. 0.0 0.00 - 0.04 K/UL    DF AUTOMATED     MAGNESIUM    Collection Time: 02/06/18  3:37 AM   Result Value Ref Range    Magnesium 2.1 1.6 - 2.4 mg/dL   METABOLIC PANEL, COMPREHENSIVE    Collection Time: 02/06/18  3:37 AM   Result Value Ref Range    Sodium 144 136 - 145 mmol/L    Potassium 3.5 3.5 - 5.1 mmol/L    Chloride 111 (H) 97 - 108 mmol/L    CO2 26 21 - 32 mmol/L    Anion gap 7 5 - 15 mmol/L    Glucose 217 (H) 65 - 100 mg/dL    BUN 43 (H) 6 - 20 MG/DL    Creatinine 1.97 (H) 0.70 - 1.30 MG/DL    BUN/Creatinine ratio 22 (H) 12 - 20      GFR est AA 40 (L) >60 ml/min/1.73m2    GFR est non-AA 33 (L) >60 ml/min/1.73m2    Calcium 8.3 (L) 8.5 - 10.1 MG/DL    Bilirubin, total 0.3 0.2 - 1.0 MG/DL    ALT (SGPT) 29 12 - 78 U/L    AST (SGOT) 27 15 - 37 U/L    Alk.  phosphatase 56 45 - 117 U/L    Protein, total 5.1 (L) 6.4 - 8.2 g/dL    Albumin 1.8 (L) 3.5 - 5.0 g/dL    Globulin 3.3 2.0 - 4.0 g/dL    A-G Ratio 0.5 (L) 1.1 - 2.2     PHOSPHORUS    Collection Time: 02/06/18  3:37 AM   Result Value Ref Range    Phosphorus 2.8 2.6 - 4.7 MG/DL   POC G3 - PUL    Collection Time: 02/06/18  6:00 AM   Result Value Ref Range    FIO2 (POC) 50 %    pH (POC) 7.447 7.35 - 7.45      pCO2 (POC) 37.6 35.0 - 45.0 MMHG    pO2 (POC) 151 (H) 80 - 100 MMHG    HCO3 (POC) 25.9 22 - 26 MMOL/L    sO2 (POC) 99 (H) 92 - 97 %    Base excess (POC) 2 mmol/L    Site RIGHT RADIAL      Device: VENT      Mode ASSIST CONTROL      Tidal volume 600 ml    Set Rate 12 bpm    PEEP/CPAP (POC) 8 cmH2O    Allens test (POC) NO      Specimen type (POC) ARTERIAL     GLUCOSE, POC    Collection Time: 02/06/18  6:00 AM   Result Value Ref Range    Glucose (POC) 244 (H) 65 - 100 mg/dL    Performed by Arpan Taylor        A/P:   1. Acute hypoxemic respiratory failure with increased work of breathing despite bipap - secretions and bronchomalacia likely contributing to increased work of breathing - re-intubated 2/6  2. SDH s/p craniotomy with evacuation on 1/23/18 after GLF  3. 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 SZ  6. Hypernatremia  7. CKD  8. DM  9.  H/o DVT /PE    --Vent support  --TF and  free water with TF already on D5W at 100 cc/h IV  --on cefepime vanc and acyclovir - sputum cx sent  --Palliative care eval - 3rd time re-intubated    Panda Silver MD

## 2018-02-06 NOTE — WOUND CARE
WOCN Note:     New consult placed by RN for skin breakdown on nose and thigh. Chart shows:  Admitted for subdural hematoma with craniotomy; history of DM, colon and bladder cancer, DVT & PE, TIA, CVA 2016    Assessment:   Patient is sedated and intubated; in wrist restraints  RN reports incontinent of large amounts of urine and condom cath has been unsuccessful. Requires full assist from 2 people in repositioning. Bed: [facility]   Diet: tube feeding via NG    Mostly retracted penis; able to pouch using urostomy pouch, skin prep and Deysi's ring to bedside bag    Bilateral heels with light blanching erythema - Prevalon boots applied. Buttocks and sacral skin intact and without erythema, but light blanching pinkness - Sensi Care cream in use. 1. Bridge of nose with partial thickness abrasion = 1 x 1.2 x 0.1 cm  100% moist blanching pink. Scant serous exudate. Periwound intact & without erythema. Merced applied; already cushioned with foam under NG tube secure device. Right inner thigh trauma wound ?form cath secure? Tape? Lindquist port? 2 areas:  Lateral = 3 x 1.2 x 0.1 cm  100% Dark krysten red with some bleeding, irregular shape/margins  Medial = 2.2 x 2 x 0.1 cm  90% central yellow 10% red rim  No periwound erythema to either site. Cleaned with saline and Duoderm applied. Patient repositioned on left side. Wound Recommendations:    Maintain external urostomy pouch and change as needed. Right thigh: Please maintain Duoderm up to one week or change as needed with soiling or rolled edges. Please use adhesive remover wipes when changing. Nose: apply Merced every 2-3 days to keep area sealed. Skin Care & Pressure Relief Recommendations:  Minimize layers of linen/pads under patient to optimize support surface. Turn/reposition approximately every 2 hours and offload heels.   Manage incontinence / promote continence; SensiCare to buttocks with incont care and daily    Discussed above plan with RN.     Transition of Care: Plan to follow as needed while admitted to hospital.    KALPANA PalaciosN, RN, [facility]  Certified Wound, Ostomy, Continence Nurse  office [phone]  pager [phone] or call  to page

## 2018-02-06 NOTE — ROUTINE PROCESS
Bedside, Verbal and Written shift change report given to Thomas Canseco RN (oncoming nurse) by Anthony Dawkins RN (offgoing nurse). Report included the following information SBAR, Kardex, ED Summary, Procedure Summary, Intake/Output, MAR, Accordion and Recent Results.

## 2018-02-06 NOTE — ACP (ADVANCE CARE PLANNING)
Advanced Care Planning    GOALS OF CARE:  Patient/Health Care Proxy Stated Goals:  (recover from acute events)    TREATMENT PREFERENCES:   Code Status: Partial Code- no CPR/shocks but okay w/ intubation/reintubation/pressors     Pt w/ AMD that family going to bring stating that he would not want aggressive measures at end of life if no chance for meaningful recovery. Prior to admission had mult health issues but was functional and able to do ADL/IADLs.     Advance Care Planning:  Advance Care Planning 2/6/2018   Patient's Healthcare Decision Maker is: Named in scanned ACP document   Primary Decision Maker Name Perez Osorio   Primary Decision Maker Phone Number [phone]   Primary Decision Maker Relationship to Patient Adult child   Secondary Decision Maker Name Gill Whitmore    Secondary Decision Maker Phone Number [phone]   Secondary Decision Maker Relationship to Patient Adult child   Confirm Advance Directive Yes, not on file   Patient Would Like to Complete Advance Directive -   Does the patient have other document types -       Medical Interventions: Full interventions

## 2018-02-06 NOTE — CONSULTS
Palliative Medicine Consult  [place]: [phone] ([phone])    Patient Name: Esperanza Freed  YOB: 1935    Date of Initial Consult:2/6/18  Reason for Consult: Care decisions   Requesting Provider: Aleks  Primary Care Physician: Staci Gutiérrez MD     SUMMARY:   Esperanza Freed is a 81 y.o. with a past history of bladder, prostate, and colon cancer (recently dx colon CA); DM; HTN; CKD; GERD; ischemic CVA 2016; neck pain; PE/DVT on Eliquis and ASA who was admitted on 1/22/2018 from home with aphasia, dysarthria x 2 days. Had a fall off a friend's porch about 2 months ago, CT head at OSH w/out acute findings. CT head upon admission w/ large L SDH w/ 8mm midline shift. S/p craniotomy and evacuation of SDH on 1/24/18. 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 After surgery reintubated 1/26, extubated 1/30, reintubated 1/30, extubated on 2/2, reintubated 2/5. Bronchs 1/26, 1/31, 2/2. Has had waxing/waning mental status, notes indicate was awake and answering questions on 2/2. Witnessed seizure, none seen on EEG. Current medical issues leading to Palliative Medicine involvement include: care decisions. Children:Ford (primary mPOA), Angeline Schwab and [place]. [place]. St[place] beltran having the hardest time, the other two have been up to visit on regular basis. PALLIATIVE DIAGNOSES:   1. Resp failure, required mult intubations, mucous plugging, HSV bronchitis   2. Debility due to medical issues  3. AMS/somnolence        PLAN:   1. Cannot interact w/ pt as somnolent, sedation turned off earlier today. 2. Along w/ Gabby [place] LCSW meet w/ primary mPOA/son Vasquez Melgar and dtr Angeline Schwab. At baseline certainly had mult medical issues but still able to do all ADL/IADLs and visited his girlfriend often (and cooked for her). Was always private about his health- would call them to pick him up from previous surgeries without informing them of surgery beforehand.    3. Pt w/ AMD that we will get- stating that he would not want aggressive measures at end of life if no chance for meaningful recovery. He would not consider long term care in NH as meaningful. 4. Discuss medical situation, of which family has good understanding. Have many questions about pt's definitive ability to recover, and they understand that we cannot answer these definitively as still unknowns. Pt able to breathe on own, and last week was alert to the point where he could give his family his bank account numbers- but share that it is a poor sign that he had to be reintubated yet another time, despite appropriate tx of PNA and HSV. Another sputum sample sent today. Imaging w/out acute findings yest. Combination of resp function and mental status waxing/waning. Also talked about delicate balance of nutrition, renal function, blood pressure. 5. Talked about 'what ifs' - for example, what if cannot make resp gains or making them slowly. Would pt ever want trach/PEG if came to that? They feel that if there was a reasonable chance pt could recover and this was a bridge to that recovery, that he would want it. 6. Hopeful that pt makes gains, but they do realize that pt very debilitated and is going to be a long recovery- one that may include ups/downs. 7. Discuss code status in detail in the context of his AMD and current situation. Changed to partial code- no CPR/shocks but okay w/ intubation/reintubation/pressors. 8. Following- next meeting family requesting pulmonary to be present. 9. Initial consult note routed to primary continuity provider  10.  Communicated plan of care with: Palliative IDT; Irais MOSS; Dr Anthony Valentine / TREATMENT PREFERENCES:     GOALS OF CARE:  Patient/Health Care Proxy Stated Goals:  (recover from acute events)      TREATMENT PREFERENCES:   Code Status: Full Code    Advance Care Planning:  Advance Care Planning 2/6/2018   Patient's Healthcare Decision Maker is: Legal Next of Mary Beth Spicer   Primary Decision Maker Name Johnnette Holter Decision Maker Phone Number  [phone]   Primary Decision Maker Relationship to Patient Adult child   Secondary Decision Maker Name [address] [facility] [address] [facility]   Secondary Decision Maker Phone Number [phone] (W) [phone] (H)   Secondary Decision Maker Relationship to Patient Adult child   Confirm Advance Directive None   Patient Would Like to Complete Advance Directive -   Does the patient have other document types -       Medical Interventions: Full interventions           Other:    As far as possible, the palliative care team has discussed with patient / health care proxy about goals of care / treatment preferences for patient. HISTORY:     History obtained from: family, chart, staff    CHIEF COMPLAINT: Cannot obtain due to patient factors    HPI/SUBJECTIVE:    The patient is:   [] Verbal and participatory  [x] Non-participatory due to:     Pt on propofol, on vent. Earlier this morning took it off and was able to open/close eyes for staff. Getting TFs through dobhoff.      Clinical Pain Assessment (nonverbal scale for severity on nonverbal patients):   Clinical Pain Assessment  Severity: 0     Activity (Movement): Lying quietly, normal position    Duration: for how long has pt been experiencing pain (e.g., 2 days, 1 month, years)  Frequency: how often pain is an issue (e.g., several times per day, once every few days, constant)     FUNCTIONAL ASSESSMENT:     Palliative Performance Scale (PPS):     Cannot assess due to sedation      PSYCHOSOCIAL/SPIRITUAL SCREENING:     Palliative IDT has assessed this patient for cultural preferences / practices and a referral made as appropriate to needs (Cultural Services, Patient Advocacy, Ethics, etc.)    Advance Care Planning:  Advance Care Planning 2/6/2018   Patient's Healthcare Decision Maker is: Legal Next of Mary Beth 70   Primary Decision Maker Name Johnnette Holter Decision Maker Phone Number  [phone]   Primary Decision Maker Relationship to Patient Adult child   Secondary Decision Maker Name [facility] [address] Irene Pacheco Said Phone Number [phone] (D) [phone] (H)   Secondary Decision Maker Relationship to Patient Adult child   Confirm Advance Directive None   Patient Would Like to Complete Advance Directive -   Does the patient have other document types -       Any spiritual / Taoist concerns:  [] Yes /  [x] No    Caregiver Burnout:  [] Yes /  [x] No /  [] No Caregiver Present      Anticipatory grief assessment:   [x] Normal  / [] Maladaptive       ESAS Anxiety:   Cannot obtain due to patient factors    ESAS Depression:   Cannot obtain due to patient factors          REVIEW OF SYSTEMS:     Positive and pertinent negative findings in ROS are noted above in HPI. The following systems were [] reviewed / [x] unable to be reviewed as noted in HPI  Other findings are noted below. Systems: constitutional, ears/nose/mouth/throat, respiratory, gastrointestinal, genitourinary, musculoskeletal, integumentary, neurologic, psychiatric, endocrine. Positive findings noted below. Modified ESAS Completed by: provider   Fatigue: 10 Drowsiness: 10     Pain: 0           Dyspnea: 0           Stool Occurrence(s): 1        PHYSICAL EXAM:     From RN flowsheet:  Wt Readings from Last 3 Encounters:   02/06/18 241 lb 9.6 oz (109.6 kg)   12/29/17 216 lb 6.4 oz (98.2 kg)   03/06/17 202 lb (91.6 kg)     Blood pressure 156/77, pulse 66, temperature 98.3 °F (36.8 °C), resp. rate 12, height 6' (1.829 m), weight 241 lb 9.6 oz (109.6 kg), SpO2 100 %.     Pain Scale 1: Adult Nonverbal Pain Scale  Pain Intensity 1: 0              Pain Intervention(s) 1: Repositioned       Constitutional: sedated, eyes closed   Eyes: pupils equal, anicteric  ENMT: no nasal discharge, dobhoff   Respiratory: breathing not labored  Gastrointestinal: soft non-tender, +bowel sounds  Musculoskeletal: no deformity, no tenderness to palpation  Skin: warm, dry  Neurologic: sedated HISTORY:     Principal Problem:    SDH (subdural hematoma) ([address] (1/23/2018)      Past Medical History:   Diagnosis Date    Arthritis     neck,chronic    Bladder cancer ([address] 3/13    bladder    Chronic pain     NECK    Colon cancer ([address] 3/16    COLON    Diabetes ([address]     GERD (gastroesophageal reflux disease) 2008    at times, NOT continous    Hypercholesterolemia     Hypertension     Kidney stones 1969    SEVERAL     Nephrolithiasis     Prostate cancer ([address] 9/2003    prostate cancer - radiation    PUD (peptic ulcer disease) 2008    Thromboembolus ([address] 2/8/16    2 RIGHT LEG, 3 IN LUNGS (STARTED ON XARELTO IN ER, 1 WEEK LATER WAS IN ER WITH INTERNAL BLEEDING)    Transient ischemic attack 2005    TIA - no deficits 2005 AND 2015    Unspecified sleep apnea     does not use CPAP/uses nasal strips      Past Surgical History:   Procedure Laterality Date    COLONOSCOPY N/A 3/6/2017    COLONOSCOPY performed by Kamini Judd MD at [address] [facility]  2008    HX CERVICAL FUSION  2002    HX CYST REMOVAL      cyst removed from back, shoulder and scalp    HX HEENT  2006    sinus surgery    HX HEENT      STAS CATARACTS    HX LUMBAR LAMINECTOMY  2002    HX OTHER SURGICAL      nose surgery    HX OTHER SURGICAL  2008    LUMPS -BENIGN, REMOVED FROM SHOULDER, HEAD AND BACK    HX OTHER SURGICAL  3/2016    FILTER FOR BLOOD CLOTS    HX TONSILLECTOMY      HX UROLOGICAL  1969    for kidney stones      Family History   Problem Relation Age of Onset    Heart Disease Father     Lung Disease Father      [facility]    Cancer Brother      SKIN    Heart Attack Brother     Heart Disease Brother     Anesth Problems Neg Hx       History reviewed, no pertinent family history.   Social History   Substance Use Topics    Smoking status: Former Smoker     Packs/day: 1.00     Years: 36.00     Quit date: 11/2/1998    Smokeless tobacco: Never Used      Comment: former cigarette/cigar smoker SMOKED 10 YRS FIRST TIME; QUIT FOR 10 YRS; THEN SMOKED FOR ABOUT 26 YRS    Alcohol use 3.5 oz/week     7 Glasses of wine per week      Comment: DAILY 2 DRINKS     Allergies   Allergen Reactions    Penicillins Rash    Metoprolol Rash    Tramadol Other (comments)     Unsure of reaction.     Lodine [Etodolac] Vertigo      Current Facility-Administered Medications   Medication Dose Route Frequency    balsam peru-castor oil (VENELEX)  mg/gram ointment   Topical BID    niCARdipine (CARDENE) 25 mg in 0.9% sodium chloride 250 mL infusion  0-15 mg/hr IntraVENous TITRATE    vancomycin (VANCOCIN) 1500 mg in  ml infusion  1,500 mg IntraVENous Q24H    chlorhexidine (PERIDEX) 0.12 % mouthwash 15 mL  15 mL Oral BID    propofol (DIPRIVAN) infusion  0-50 mcg/kg/min IntraVENous TITRATE    Vancomycin - Pharmacy to Dose   Other Rx Dosing/Monitoring    dilTIAZem CD (CARDIZEM CD) capsule 120 mg  120 mg Oral DAILY    insulin glargine (LANTUS) injection 40 Units  40 Units SubCUTAneous QHS    albuterol-ipratropium (DUO-NEB) 2.5 MG-0.5 MG/3 ML  3 mL Nebulization Q4H RT    dextrose 5% infusion  100 mL/hr IntraVENous CONTINUOUS    insulin lispro (HUMALOG) injection   SubCUTAneous Q6H    dextrose (D50W) injection syrg 12.5-25 g  12.5-25 g IntraVENous PRN    acyclovir (ZOVIRAX) 575 mg in 0.9% sodium chloride 100 mL IVPB  575 mg IntraVENous Q12H    budesonide (PULMICORT) 500 mcg/2 ml nebulizer suspension  500 mcg Nebulization BID RT    albuterol-ipratropium (DUO-NEB) 2.5 MG-0.5 MG/3 ML  3 mL Nebulization Q4H PRN    levETIRAcetam (KEPPRA) oral solution 1,000 mg  1,000 mg Per NG tube Q12H    lacosamide (VIMPAT) tablet 100 mg  100 mg Per NG tube Q12H    dilTIAZem (CARDIZEM) 125 mg in dextrose 5% 125 mL infusion  0-15 mg/hr IntraVENous TITRATE    sodium chloride (NS) flush 5-10 mL  5-10 mL IntraVENous Q8H    sodium chloride (NS) flush 5-10 mL  5-10 mL IntraVENous PRN    midazolam (VERSED) injection 5 mg  5 mg IntraVENous Multiple    fentaNYL citrate (PF) injection 25 mcg  25 mcg IntraVENous Q1H PRN    famotidine (PEPCID) 40 mg/5 mL (8 mg/mL) oral suspension 20 mg  20 mg Per NG tube DAILY    bacitracin 500 unit/gram packet 1 Packet  1 Packet Topical PRN    polyvinyl alcohol (LIQUIFILM TEARS) 1.4 % ophthalmic solution 2 Drop  2 Drop Both Eyes Q8H    cefepime (MAXIPIME) 2 g in 0.9% sodium chloride (MBP/ADV) 100 mL  2 g IntraVENous Q12H    sodium chloride (NS) flush 10-30 mL  10-30 mL InterCATHeter PRN    sodium chloride (NS) flush 10 mL  10 mL InterCATHeter Q24H    sodium chloride (NS) flush 10 mL  10 mL InterCATHeter PRN    sodium chloride (NS) flush 10-40 mL  10-40 mL InterCATHeter Q8H    sodium chloride (NS) flush 20 mL  20 mL InterCATHeter Q24H    alteplase (CATHFLO) 1 mg in sterile water (preservative free) 1 mL injection  1 mg InterCATHeter PRN    atorvastatin (LIPITOR) tablet 40 mg  40 mg Per NG tube DAILY    naloxone (NARCAN) injection 0.4 mg  0.4 mg IntraVENous PRN    acetaminophen (TYLENOL) tablet 650 mg  650 mg Oral Q4H PRN    Or    acetaminophen (TYLENOL) solution 650 mg  650 mg Per NG tube Q4H PRN    Or    acetaminophen (TYLENOL) suppository 650 mg  650 mg Rectal Q4H PRN    glucose chewable tablet 16 g  4 Tab Oral PRN    dextrose (D50W) injection syrg 12.5-25 g  12.5-25 g IntraVENous PRN    glucagon (GLUCAGEN) injection 1 mg  1 mg IntraMUSCular PRN    hydrALAZINE (APRESOLINE) 20 mg/mL injection 10 mg  10 mg IntraVENous Q4H PRN    sodium chloride (NS) flush 5-10 mL  5-10 mL IntraVENous Q8H    sodium chloride (NS) flush 5-10 mL  5-10 mL IntraVENous PRN    acetaminophen (TYLENOL) tablet 650 mg  650 mg Oral Q4H PRN    HYDROcodone-acetaminophen (NORCO) 5-325 mg per tablet 1 Tab  1 Tab Oral Q4H PRN    ondansetron (ZOFRAN) injection 4 mg  4 mg IntraVENous Q4H PRN          LAB AND IMAGING FINDINGS:     Lab Results   Component Value Date/Time    WBC 5.1 02/06/2018 03:37 AM    HGB 8.0 02/06/2018 03:37 AM    PLATELET 441 60/37/5752 03:37 AM     Lab Results   Component Value Date/Time    Sodium 144 02/06/2018 03:37 AM    Potassium 3.5 02/06/2018 03:37 AM    Chloride 111 02/06/2018 03:37 AM    CO2 26 02/06/2018 03:37 AM    BUN 43 02/06/2018 03:37 AM    Creatinine 1.97 02/06/2018 03:37 AM    Calcium 8.3 02/06/2018 03:37 AM    Magnesium 2.1 02/06/2018 03:37 AM    Phosphorus 2.8 02/06/2018 03:37 AM      Lab Results   Component Value Date/Time    AST (SGOT) 27 02/06/2018 03:37 AM    Alk. phosphatase 56 02/06/2018 03:37 AM    Protein, total 5.1 02/06/2018 03:37 AM    Albumin 1.8 02/06/2018 03:37 AM    Globulin 3.3 02/06/2018 03:37 AM     Lab Results   Component Value Date/Time    INR 1.0 01/23/2018 12:42 AM    Prothrombin time 10.6 01/23/2018 12:42 AM    aPTT 28.6 01/23/2018 12:42 AM      No results found for: IRON, FE, TIBC, IBCT, PSAT, FERR   No results found for: PH, PCO2, PO2  No components found for: [place]   Lab Results   Component Value Date/Time     01/25/2018 05:01 PM    CK - MB 2.3 01/25/2018 05:01 PM                Total time: 70 min   Counseling / coordination time, spent as noted above: 50 min   > 50% counseling / coordination?: yes    Prolonged service was provided for  []30 min   []75 min in face to face time in the presence of the patient, spent as noted above. Time Start:   Time End:   Note: this can only be billed with 85349 (initial) or 16677 (follow up). If multiple start / stop times, list each separately.

## 2018-02-06 NOTE — ROUTINE PROCESS
Bedside, Verbal and Written shift change report given to SN Lola (oncoming nurse) by Karina Tovar (offgoing nurse). Report included the following information SBAR, Kardex, ED Summary, OR Summary, Procedure Summary, Intake/Output, MAR and Recent Results     . Bedside, Verbal and Written shift change report given to Karina Tovar (oncoming nurse) by Anahi Lewis (offgoing nurse). Report included the following information SBAR, Kardex, ED Summary, OR Summary, Procedure Summary, Intake/Output, MAR and Recent Results.

## 2018-02-06 NOTE — PROGRESS NOTES
Physical Therapy  2/6/18    Patient chart reviewed. Discussed with RN. Patient currently intubated, sedated, no command following. Not appropriate for mobility with PT at this time. Recommend with nursing patient to complete as able in order to promote cardiopulmonary systems, maintain strength, endurance and independence:   -bed in chair position with foot board on 3x/day 30-60 mins max each  -passive ROM B UEs and LEs during bathing to prevent contractures  -positioning to prevent edema and contractures. Petrona Moreno, PT, DPT

## 2018-02-06 NOTE — PROGRESS NOTES
Occupational Therapy  2/6/18    Patient chart reviewed. Discussed with RN. Patient currently intubated, sedated, no command following. Not appropriate for OT at this time.  Will follow up as able and appropriate.     Recommend with nursing patient to complete as able in order to promote cardiopulmonary systems, maintain strength, endurance and independence:   -bed in chair position with foot board on 3x/day 30-60 mins max each  -passive ROM B UEs and LEs during bathing to prevent contractures  -positioning to prevent edema and contractures.      Mora Benitez, OTR/L

## 2018-02-07 NOTE — PROGRESS NOTES
Hospitalist Progress Note  Inga Lee MD  Answering service: [phone] OR [phone] from in house phone        Date of Service:  2018  NAME:  Sophy Rojo  :  1935  MRN:  [id number]      Reason for follow up:   resp failure, sdh    Interval history / Subjective:     Seen by palliative care, code now ok to intubate but no shocks or cpr. Assessment & Plan:   SDH  Neurosurgery consulted on admission and eliquis held. On  underwent left frontotemporoparietal craniotomy. Placed on bp control with cardene drip and placed on keppra. EEG on  revealed no seizure activity. : per neurosurgery. Expressive aphasia and dysarthria  Seen by speech and recommended alternate means of nutrition. : if extubated may need to consider peg. Acute hypoxic respiratory failure  Intubated for surgery then extubated. Re-intubated on  and then bronched and found to have mucous clot. Extubated on  and subsequently re-intubated same day, rebronched on  and  that revealed severe tracheobronchomalacia. Extubated on  and then re-intubated on . Palliative care consulted given inability to remain extubated. Code changed to allow intubation but no cpr/shocks. : defer to [facility] on wean, trach etch. 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 Placed on acyclovir on : defer to pcc on length of treatment with acyclovir. Thrombocytopenia  : recheck in am.     Hx Afib on eliquis  Hx dvt/pe on eliquis  Taken off eliquis on admission. Unclear if he could ever be placed back on any blood thinners given recurrent sdh. Code Status: OK to intubate but no shocks or chest compressions  DVT prophylaxis: scd    Care Plan discussed with : Dr. Misbah De La Fuente, nurse.    Disposition: tbd  Estimated date of d/c: tbd    Hospital Problems  Date Reviewed: 2018          Codes Class Noted POA    * (Principal)SDH (subdural hematoma) ([facility]) ICD-10-CM: I62.00  ICD-9-CM: 432.1  1/23/2018 Unknown                Review of Systems:   Review of systems not obtained due to patient factors. Vital Signs:    Last 24hrs VS reviewed since prior progress note. Most recent are:  Visit Vitals    /74    Pulse 70    Temp 98.6 °F (37 °C)    Resp 30    Ht 6' (1.829 m)    Wt 110.4 kg (243 lb 4.8 oz)    SpO2 100%    BMI 33 kg/m2         Intake/Output Summary (Last 24 hours) at 02/07/18 1019  Last data filed at 02/07/18 0830   Gross per 24 hour   Intake          4360.32 ml   Output             2650 ml   Net          1710.32 ml        Physical Examination:             Constitutional:  No acute distress, intubated, does not interact.      ENT:  Intubated. Neck supple,    Resp:  diminished bases. No wheezing/rhonchi/rales. No accessory muscle use   CV:  Regular rhythm, normal rate, no murmurs, gallops, rubs    GI:  Soft, distended. Pos bowel sounds. Musculoskeletal:  diffuse non pitting edema. Neurologic:  can not assess as not interactive. Per nursing left sided hemeparesis when he was awake. Data Review:    Review and/or order of clinical lab test      Labs:     Recent Labs      02/07/18 0316 02/06/18 0337   WBC  4.9  5.1   HGB  8.1*  8.0*   HCT  25.5*  25.4*   PLT  136*  126*     Recent Labs      02/07/18 0316 02/06/18 0337 02/05/18   0346   NA  141  144  150*   K  3.9  3.5  3.7   CL  109*  111*  117*   CO2  25  26  25   BUN  41*  43*  41*   CREA  2.13*  1.97*  1.64*   GLU  227*  217*  207*   CA  8.2*  8.3*  9.0   MG  2.2  2.1   --    PHOS  3.5  2.8   --      Recent Labs      02/07/18 0316 02/06/18   0337   SGOT  30  27   ALT  39  29   AP  69  56   TBILI  0.3  0.3   TP  5.2*  5.1*   ALB  1.7*  1.8*   GLOB  3.5  3.3     No results for input(s): INR, PTP, APTT in the last 72 hours.     No lab exists for component: INREXT   No results for input(s): FE, TIBC, PSAT, FERR in the last 72 hours. No results found for: FOL, RBCF   No results for input(s): PH, PCO2, PO2 in the last 72 hours. No results for input(s): CPK, CKNDX, TROIQ in the last 72 hours.     No lab exists for component: CPKMB  Lab Results   Component Value Date/Time    Cholesterol, total 118 01/22/2018 11:28 PM    HDL Cholesterol 53 01/22/2018 11:28 PM    LDL, calculated 20 01/22/2018 11:28 PM    Triglyceride 225 (H) 01/22/2018 11:28 PM    CHOL/HDL Ratio 2.2 01/22/2018 11:28 PM     Lab Results   Component Value Date/Time    Glucose (POC) 258 (H) 02/07/2018 06:01 AM    Glucose (POC) 224 (H) 02/06/2018 11:37 PM    Glucose (POC) 218 (H) 02/06/2018 06:15 PM    Glucose (POC) 252 (H) 02/06/2018 12:01 PM    Glucose (POC) 244 (H) 02/06/2018 06:00 AM     Lab Results   Component Value Date/Time    Color Yellow 03/04/2013 09:14 AM    Appearance Cloudy (A) 03/04/2013 09:14 AM    Specific gravity 1.015 03/10/2010 09:45 AM    pH (UA) 7.0 03/04/2013 09:14 AM    Protein NEGATIVE  03/10/2010 09:45 AM    Glucose NEGATIVE  03/10/2010 09:45 AM    Ketone Negative 03/04/2013 09:14 AM    Bilirubin Negative 03/04/2013 09:14 AM    Urobilinogen 0.2 03/10/2010 09:45 AM    Nitrites Negative 03/04/2013 09:14 AM    Leukocyte Esterase Negative 03/04/2013 09:14 AM    Bacteria Few 03/04/2013 09:14 AM    WBC 0-5 03/04/2013 09:14 AM    RBC >30 (A) 03/04/2013 09:14 AM         Medications Reviewed:     Current Facility-Administered Medications   Medication Dose Route Frequency    balsam peru-castor oil (VENELEX)  mg/gram ointment   Topical BID    niCARdipine (CARDENE) 25 mg in 0.9% sodium chloride 250 mL infusion  0-15 mg/hr IntraVENous TITRATE    vancomycin (VANCOCIN) 1500 mg in  ml infusion  1,500 mg IntraVENous Q24H    chlorhexidine (PERIDEX) 0.12 % mouthwash 15 mL  15 mL Oral BID    propofol (DIPRIVAN) infusion  0-50 mcg/kg/min IntraVENous TITRATE    Vancomycin - Pharmacy to Dose   Other Rx Dosing/Monitoring    insulin glargine (LANTUS) injection 40 Units  40 Units SubCUTAneous QHS    albuterol-ipratropium (DUO-NEB) 2.5 MG-0.5 MG/3 ML  3 mL Nebulization Q4H RT    dextrose 5% infusion  100 mL/hr IntraVENous CONTINUOUS    insulin lispro (HUMALOG) injection   SubCUTAneous Q6H    dextrose (D50W) injection syrg 12.5-25 g  12.5-25 g IntraVENous PRN    acyclovir (ZOVIRAX) 575 mg in 0.9% sodium chloride 100 mL IVPB  575 mg IntraVENous Q12H    budesonide (PULMICORT) 500 mcg/2 ml nebulizer suspension  500 mcg Nebulization BID RT    albuterol-ipratropium (DUO-NEB) 2.5 MG-0.5 MG/3 ML  3 mL Nebulization Q4H PRN    levETIRAcetam (KEPPRA) oral solution 1,000 mg  1,000 mg Per NG tube Q12H    lacosamide (VIMPAT) tablet 100 mg  100 mg Per NG tube Q12H    dilTIAZem (CARDIZEM) 125 mg in dextrose 5% 125 mL infusion  0-15 mg/hr IntraVENous TITRATE    sodium chloride (NS) flush 5-10 mL  5-10 mL IntraVENous Q8H    sodium chloride (NS) flush 5-10 mL  5-10 mL IntraVENous PRN    midazolam (VERSED) injection 5 mg  5 mg IntraVENous Multiple    fentaNYL citrate (PF) injection 25 mcg  25 mcg IntraVENous Q1H PRN    famotidine (PEPCID) 40 mg/5 mL (8 mg/mL) oral suspension 20 mg  20 mg Per NG tube DAILY    bacitracin 500 unit/gram packet 1 Packet  1 Packet Topical PRN    polyvinyl alcohol (LIQUIFILM TEARS) 1.4 % ophthalmic solution 2 Drop  2 Drop Both Eyes Q8H    cefepime (MAXIPIME) 2 g in 0.9% sodium chloride (MBP/ADV) 100 mL  2 g IntraVENous Q12H    sodium chloride (NS) flush 10-30 mL  10-30 mL InterCATHeter PRN    sodium chloride (NS) flush 10 mL  10 mL InterCATHeter Q24H    sodium chloride (NS) flush 10 mL  10 mL InterCATHeter PRN    sodium chloride (NS) flush 10-40 mL  10-40 mL InterCATHeter Q8H    sodium chloride (NS) flush 20 mL  20 mL InterCATHeter Q24H    alteplase (CATHFLO) 1 mg in sterile water (preservative free) 1 mL injection  1 mg InterCATHeter PRN    atorvastatin (LIPITOR) tablet 40 mg  40 mg Per NG tube DAILY    naloxone (NARCAN) injection 0.4 mg  0.4 mg IntraVENous PRN    acetaminophen (TYLENOL) tablet 650 mg  650 mg Oral Q4H PRN    Or    acetaminophen (TYLENOL) solution 650 mg  650 mg Per NG tube Q4H PRN    Or    acetaminophen (TYLENOL) suppository 650 mg  650 mg Rectal Q4H PRN    glucose chewable tablet 16 g  4 Tab Oral PRN    glucagon (GLUCAGEN) injection 1 mg  1 mg IntraMUSCular PRN    hydrALAZINE (APRESOLINE) 20 mg/mL injection 10 mg  10 mg IntraVENous Q4H PRN    sodium chloride (NS) flush 5-10 mL  5-10 mL IntraVENous Q8H    sodium chloride (NS) flush 5-10 mL  5-10 mL IntraVENous PRN    acetaminophen (TYLENOL) tablet 650 mg  650 mg Oral Q4H PRN    HYDROcodone-acetaminophen (NORCO) 5-325 mg per tablet 1 Tab  1 Tab Oral Q4H PRN    ondansetron (ZOFRAN) injection 4 mg  4 mg IntraVENous Q4H PRN     ______________________________________________________________________  EXPECTED LENGTH OF STAY: 2d 4h  ACTUAL LENGTH OF STAY:          15                 Brenda Casarez MD

## 2018-02-07 NOTE — PROGRESS NOTES
Pharmacist Note - Vancomycin Dosing  Therapy day #4  Indication: HCAP/MRSA  Current regimen: 1.5 gm IV Q 24 hr    A Trough Level resulted at 26.6 mcg/mL which was obtained ~25 hrs post-dose. Goal trough: 15 - 20 mcg/mL     Plan: The vancomycin dose this afternoon was held in anticipation of a likely high trough. Will continue to hold the vancomycin for now and check a random level tomorrow morning at 0700. Pharmacy will continue to monitor this patient daily for changes in clinical status and renal function.

## 2018-02-07 NOTE — ROUTINE PROCESS
1930 Bedside and Verbal shift change report given to KELLI Arora, RN (oncoming nurse) by Irina Vasquez. Julissa Faria RN (offgoing nurse). Report included the following information SBAR, Kardex, OR Summary, Intake/Output, MAR and Recent Results. 0730 Bedside and Verbal shift change report given to DEVORAH Faria RN (oncoming nurse) by Chris Arora RN (offgoing nurse). Report included the following information SBAR, Kardex, Intake/Output, MAR and Recent Results.

## 2018-02-07 NOTE — DIABETES MGMT
Karina mckeon        DTC Progress Note    Recommendations/ Comments: Chart reviewed due to hyperglycemia. Blood sugars > 200 and pt has required 23 units of correction over the last 24 hours. Note pt is on TF Glucerna 1.2 @ 45  Vent support  Palliative care      If appropriate, please consider  increasing Lantus to 50 units. DTC to continue to follow.       Current hospital DM medication: lispro insulin correction, resistant scale and Lantus 40 units.      Chart reviewed on [facility].     Patient is a 81 y.o. male with known  Type 2 Diabetes on oral agent (monotherapy): metformin 1000 mg bid (generic) at home. A1c:   Lab Results   Component Value Date/Time    Hemoglobin A1c 6.4 (H) 01/23/2018 10:43 AM    Hemoglobin A1c 6.9 (H) 06/17/2016 02:24 AM       Recent Glucose Results:   Lab Results   Component Value Date/Time     (H) 02/07/2018 03:16 AM    GLUCPOC 234 (H) 02/07/2018 12:00 PM    GLUCPOC 258 (H) 02/07/2018 06:01 AM    GLUCPOC 224 (H) 02/06/2018 11:37 PM        Lab Results   Component Value Date/Time    Creatinine 2.13 (H) 02/07/2018 03:16 AM     Estimated Creatinine Clearance: 34.9 mL/min (based on Cr of 2.13). Active Orders   Diet    DIET NPO With Tube Feedings        PO intake: No data found. Will continue to follow as needed.     Thank you  Jesus Manuel Omalley RN, CDE

## 2018-02-07 NOTE — PROGRESS NOTES
Neurosurgery Progress Note  Jessica Mo ACNP-BC  [phone]        Admit Date: 2018   LOS: 15 days        Daily Progress Note: 2018    POD: 15 Day Post-Op    S/P: Procedure(s):  CRANIOTOMY, LEFT FOR EVACUATION OF SUBDURAL HEMATOMA    HPI: The patient was on Eliquis prior to admission for a 1.5 year history of DVT/PE. He apparently had a fall about 2 months ago and was seen at [address] [place] [address] with a reportedly negative head CT. He developed a 2 day history of word finding difficulties and presented to the ER at [facility]. Found to have a large left chronic SDH with membranes and midline shift. He was taken to the OR by Dr. Kristel Diaz yesterday evening for evacuation of the hemorrhage. Approximately 300 cc of blood was removed from the subdural space. Subjective: The patient remains intubated. His propofol was turned off this morning at 0900. Still not following commands. Unable to obtain ROS due to intubation. Objective:     Vital signs  Temp (24hrs), Av.2 °F (37.3 °C), Min:98.1 °F (36.7 °C), Max:99.9 °F (37.7 °C)   701 -  1900  In: 109.2 [I.V.:109.2]  Out: 500 [Urine:500]   190 -  0700  In: 7577.2 [I.V.:5047.2]  Out: 2150 [Urine:2150]    Visit Vitals    /74    Pulse 70    Temp 98.6 °F (37 °C)    Resp 30    Ht 6' (1.829 m)    Wt 110.4 kg (243 lb 4.8 oz)    SpO2 100%    BMI 33 kg/m2    O2 Flow Rate (L/min): 40 l/min O2 Device: Endotracheal tube     Pain control  Pain Assessment  Pain Scale 1: Adult Nonverbal Pain Scale  Pain Intensity 1: 0  Pain Intervention(s) 1: Repositioned    PT/OT  Gait                 Physical Exam:  Gen:NAD. Intubated. Off sedation. Neuro: Awake. Eyes open to voice. PERRL 4 mm BL. Not following commands for me. Withdraws to pain in BLE>BUE  Skin: Left frontoparietal incision C/D/I with staples in place. No erythema, edema, or drainage.     CT head without contrast on 18 shows overall improvement in the parafalcine subdural hematoma, left frontal intraparenchymal hematomas, and bilateral subarachnoid hemorrhage. Left convexity subdural fluid collection is stable in size. Intraventricular blood is stable. No midline shift or herniation. 24 hour results:    Recent Results (from the past 24 hour(s))   GLUCOSE, POC    Collection Time: 02/06/18 12:01 PM   Result Value Ref Range    Glucose (POC) 252 (H) 65 - 100 mg/dL    Performed by Jani Lopez    CULTURE, RESPIRATORY/SPUTUM/BRONCH Laverta Meuse STAIN    Collection Time: 02/06/18  2:24 PM   Result Value Ref Range    Special Requests: NO SPECIAL REQUESTS      GRAM STAIN RARE WBCS SEEN      GRAM STAIN RARE EPITHELIAL CELLS SEEN      GRAM STAIN NO ORGANISMS SEEN      Culture result: PENDING    GLUCOSE, POC    Collection Time: 02/06/18  6:15 PM   Result Value Ref Range    Glucose (POC) 218 (H) 65 - 100 mg/dL    Performed by Jani Lopez    GLUCOSE, POC    Collection Time: 02/06/18 11:37 PM   Result Value Ref Range    Glucose (POC) 224 (H) 65 - 100 mg/dL    Performed by John Salgado    CBC WITH AUTOMATED DIFF    Collection Time: 02/07/18  3:16 AM   Result Value Ref Range    WBC 4.9 4.1 - 11.1 K/uL    RBC 2.74 (L) 4.10 - 5.70 M/uL    HGB 8.1 (L) 12.1 - 17.0 g/dL    HCT 25.5 (L) 36.6 - 50.3 %    MCV 93.1 80.0 - 99.0 FL    MCH 29.6 26.0 - 34.0 PG    MCHC 31.8 30.0 - 36.5 g/dL    RDW 13.6 11.5 - 14.5 %    PLATELET 377 (L) 946 - 400 K/uL    MPV 12.8 8.9 - 12.9 FL    NRBC 0.0 0  WBC    ABSOLUTE NRBC 0.00 0.00 - 0.01 K/uL    NEUTROPHILS 72 32 - 75 %    LYMPHOCYTES 11 (L) 12 - 49 %    MONOCYTES 9 5 - 13 %    EOSINOPHILS 7 0 - 7 %    BASOPHILS 0 0 - 1 %    IMMATURE GRANULOCYTES 1 (H) 0.0 - 0.5 %    ABS. NEUTROPHILS 3.5 1.8 - 8.0 K/UL    ABS. LYMPHOCYTES 0.5 (L) 0.8 - 3.5 K/UL    ABS. MONOCYTES 0.4 0.0 - 1.0 K/UL    ABS. EOSINOPHILS 0.3 0.0 - 0.4 K/UL    ABS. BASOPHILS 0.0 0.0 - 0.1 K/UL    ABS. IMM.  GRANS. 0.1 (H) 0.00 - 0.04 K/UL    DF AUTOMATED     MAGNESIUM    Collection Time: 02/07/18  3:16 AM   Result Value Ref Range    Magnesium 2.2 1.6 - 2.4 mg/dL   METABOLIC PANEL, COMPREHENSIVE    Collection Time: 02/07/18  3:16 AM   Result Value Ref Range    Sodium 141 136 - 145 mmol/L    Potassium 3.9 3.5 - 5.1 mmol/L    Chloride 109 (H) 97 - 108 mmol/L    CO2 25 21 - 32 mmol/L    Anion gap 7 5 - 15 mmol/L    Glucose 227 (H) 65 - 100 mg/dL    BUN 41 (H) 6 - 20 MG/DL    Creatinine 2.13 (H) 0.70 - 1.30 MG/DL    BUN/Creatinine ratio 19 12 - 20      GFR est AA 36 (L) >60 ml/min/1.73m2    GFR est non-AA 30 (L) >60 ml/min/1.73m2    Calcium 8.2 (L) 8.5 - 10.1 MG/DL    Bilirubin, total 0.3 0.2 - 1.0 MG/DL    ALT (SGPT) 39 12 - 78 U/L    AST (SGOT) 30 15 - 37 U/L    Alk. phosphatase 69 45 - 117 U/L    Protein, total 5.2 (L) 6.4 - 8.2 g/dL    Albumin 1.7 (L) 3.5 - 5.0 g/dL    Globulin 3.5 2.0 - 4.0 g/dL    A-G Ratio 0.5 (L) 1.1 - 2.2     PHOSPHORUS    Collection Time: 02/07/18  3:16 AM   Result Value Ref Range    Phosphorus 3.5 2.6 - 4.7 MG/DL   TSH 3RD GENERATION    Collection Time: 02/07/18  3:16 AM   Result Value Ref Range    TSH 1.33 0.36 - 3.74 uIU/mL   T4, FREE    Collection Time: 02/07/18  3:16 AM   Result Value Ref Range    T4, Free 1.0 0.8 - 1.5 NG/DL   POC G3 - PUL    Collection Time: 02/07/18  5:41 AM   Result Value Ref Range    FIO2 (POC) 0.50 %    pH (POC) 7.443 7.35 - 7.45      pCO2 (POC) 36.1 35.0 - 45.0 MMHG    pO2 (POC) 115 (H) 80 - 100 MMHG    HCO3 (POC) 24.7 22 - 26 MMOL/L    sO2 (POC) 99 (H) 92 - 97 %    Base excess (POC) 1 mmol/L    Site RIGHT RADIAL      Device: VENT      Mode ASSIST CONTROL      Tidal volume 600 ml    Set Rate 12 bpm    PEEP/CPAP (POC) 5 cmH2O    Allens test (POC) NO      Specimen type (POC) ARTERIAL      Volume control YES     GLUCOSE, POC    Collection Time: 02/07/18  6:01 AM   Result Value Ref Range    Glucose (POC) 258 (H) 65 - 100 mg/dL    Performed by Mary Quick           Assessment:     Principal Problem:    SDH (subdural hematoma) ([facility] [address] (1/23/2018)        Plan:   1.   Left traumatic chronic subdural hematoma   - s/p crani 01/24   - Cont Keppra and Vimpat   - Therapy evals as able   - Cont Precedex PRN   - Neuro checks q4h   - CT head shows improvement  2. Brain compression   - due to #1   - plans as above  3. HTN   - SBP<160   - Hydralazine PRN   - Intensivist following  4. Diabetes mellitus, type 2   - Cont Lantus 40 units   - SSI and accu-checks   - Intensivist following  5. ABIMBOLA on CKD, stage 3   - Avoid nephrotoxic agents   - Hospitalist/intensivist following  6. Acute encephalopathy   - multi-factorial     - limit sedating agents   - supportive care  7. Hypernatremia   - Resolving   - Free water flushes adjusted yesterday  8. Atrial fibrillation   - Metoprolol added. - Electrolytes corrected   - Hospitalist/intensivist following  9. Hypomagenesemia   - Resolved   - Intensivist following  10. Hypophosphatemia   - Resolved   - Electrolyte replacement protocol   - Intensivist following  11. Acute respiratory insufficiency   - Pt re-intubated 01/26   - Pulmonary following   - Extubated 01/30   - Pt re-intubated 01/30 for respiratory distress   - Extubated 02/02   - Pt re-intubated 02/05 for increasing respiratory distress  12. Focal seizure   - Cont Keppra 1000 mg bid   - Cont Vimpat 100 mg bid   - EEG does not show seizures but patient had witnessed seizure last Friday so will cont AEDs at this time  13. Thrombocytopenia   - Possible medication induced. - Cont to monitor   - Intensivist following  14. HSV bronchitis   - Started on IV acyclovir   - Intensivist following    Activity: OOB with assist  DVT ppx: SCDs  Dispo: tbd    Plan d/w Dr. Elizabeth Sanderson. Appreciate palliative care input. SDH stable. Remove staples and suture from craniotomy site today. Respiratory issues seem to be the main problem. Will sign off. Call again if questions.       Rasta Anna NP

## 2018-02-07 NOTE — PROGRESS NOTES
Problem: Falls - Risk of  Goal: *Absence of Falls  Document Arleen Fall Risk and appropriate interventions in the flowsheet.    Outcome: Progressing Towards Goal  Fall Risk Interventions:       Mentation Interventions: Evaluate medications/consider consulting pharmacy, More frequent rounding    Medication Interventions: Evaluate medications/consider consulting pharmacy    Elimination Interventions: Call light in reach, Patient to call for help with toileting needs    History of Falls Interventions: Evaluate medications/consider consulting pharmacy

## 2018-02-07 NOTE — ACP (ADVANCE CARE PLANNING)
Patient is without capacity at this time. A copy of his AMD which designates son, Lucia Turcios ([phone]) as primary mPOA was placed on chart today for scanning. Document also directs for no life-prolonging care in the context of imminent death, terminal illness or inability to recover meaningfully. Family has agreed to NO CPR or shock. They have identified meaningful recovery as the ability to return home with independent functioning.

## 2018-02-07 NOTE — PROGRESS NOTES
Occupational Therapy: Spoke with Physical Therapist who attended rounds and spoke with nurse on ICU. Therapist reports that patient is intubated, sedated and with no command following at this time. Will defer and see for weekly reassessment at next visit. AMITA Ambrosio/L    Occupational Therapy/Discharge: Patient has been on hold and unable to participate in therapy the last 4 visits. Will discharge from OT. Please re consult if patient improves and is able to benefit by OT services. Thank you for this consult.    AMITA Ambrosio/MARIA G  Consulted OTR

## 2018-02-07 NOTE — PROGRESS NOTES
Pulmonary / [address] Day: 16     S:    In ICU for resp failure following SDH post evacuation    :  Failed SBT with increased RR  TF  Palliative care following - Partial code - no shocks or CPR    :  Re-intubated on  for AMS / resp distress. Thick secretions in back of throat at time of intubation  Head CT without significant change  Abd CT for abd distension - Small bilateral pleural effusions with underlying consolidation. Gallstone. Sigmoid diverticulosis.  Lesion left hepatic lobe again demonstrated but cannot  be characterized given the lack of intravenous contrast. No acute  intra-abdominal abnormality  Remains sedate on vent  Tolerating TF    :  Was extubated at the end of last week  Currently tachypneic despite bipap with increased work of breathing and accessory muscle use  Mental status is poor  Abd distended with worsening resp distress  Has been having bowel movements     O:    Patient Vitals for the past 4 hrs:   BP Temp Pulse Resp SpO2   18 1100 149/70 - 74 15 100 %   18 1015 - - 70 30 100 %   18 1000 - - 70 25 98 %   18 0953 - - 70 16 100 %   18 0900 144/74 - 70 13 100 %   18 0800 133/64 98.6 °F (37 °C) 68 12 100 %     Temp (24hrs), Av.2 °F (37.3 °C), Min:98.1 °F (36.7 °C), Max:99.9 °F (37.7 °C)      Intake/Output Summary (Last 24 hours) at 18 1149  Last data filed at 18 0830   Gross per 24 hour   Intake          4267.66 ml   Output             2650 ml   Net          1617.66 ml     Exam:  resp distress  With agonal type resp pattern despite bipap  MM dry  Anicteric  No commands followed  Coarse rhonchi bilaterally  RRR  Protuberant with mild distension, hypoactive bs  Warm and dry  Trace edema        Imaging: Images independently viewed  CXR - persistned L>R effusions and basilar atx    Labs:  Recent Results (from the past 12 hour(s))   CBC WITH AUTOMATED DIFF    Collection Time: 18  3:16 AM   Result Value Ref Range WBC 4.9 4.1 - 11.1 K/uL    RBC 2.74 (L) 4.10 - 5.70 M/uL    HGB 8.1 (L) 12.1 - 17.0 g/dL    HCT 25.5 (L) 36.6 - 50.3 %    MCV 93.1 80.0 - 99.0 FL    MCH 29.6 26.0 - 34.0 PG    MCHC 31.8 30.0 - 36.5 g/dL    RDW 13.6 11.5 - 14.5 %    PLATELET 173 (L) 367 - 400 K/uL    MPV 12.8 8.9 - 12.9 FL    NRBC 0.0 0  WBC    ABSOLUTE NRBC 0.00 0.00 - 0.01 K/uL    NEUTROPHILS 72 32 - 75 %    LYMPHOCYTES 11 (L) 12 - 49 %    MONOCYTES 9 5 - 13 %    EOSINOPHILS 7 0 - 7 %    BASOPHILS 0 0 - 1 %    IMMATURE GRANULOCYTES 1 (H) 0.0 - 0.5 %    ABS. NEUTROPHILS 3.5 1.8 - 8.0 K/UL    ABS. LYMPHOCYTES 0.5 (L) 0.8 - 3.5 K/UL    ABS. MONOCYTES 0.4 0.0 - 1.0 K/UL    ABS. EOSINOPHILS 0.3 0.0 - 0.4 K/UL    ABS. BASOPHILS 0.0 0.0 - 0.1 K/UL    ABS. IMM. GRANS. 0.1 (H) 0.00 - 0.04 K/UL    DF AUTOMATED     MAGNESIUM    Collection Time: 02/07/18  3:16 AM   Result Value Ref Range    Magnesium 2.2 1.6 - 2.4 mg/dL   METABOLIC PANEL, COMPREHENSIVE    Collection Time: 02/07/18  3:16 AM   Result Value Ref Range    Sodium 141 136 - 145 mmol/L    Potassium 3.9 3.5 - 5.1 mmol/L    Chloride 109 (H) 97 - 108 mmol/L    CO2 25 21 - 32 mmol/L    Anion gap 7 5 - 15 mmol/L    Glucose 227 (H) 65 - 100 mg/dL    BUN 41 (H) 6 - 20 MG/DL    Creatinine 2.13 (H) 0.70 - 1.30 MG/DL    BUN/Creatinine ratio 19 12 - 20      GFR est AA 36 (L) >60 ml/min/1.73m2    GFR est non-AA 30 (L) >60 ml/min/1.73m2    Calcium 8.2 (L) 8.5 - 10.1 MG/DL    Bilirubin, total 0.3 0.2 - 1.0 MG/DL    ALT (SGPT) 39 12 - 78 U/L    AST (SGOT) 30 15 - 37 U/L    Alk.  phosphatase 69 45 - 117 U/L    Protein, total 5.2 (L) 6.4 - 8.2 g/dL    Albumin 1.7 (L) 3.5 - 5.0 g/dL    Globulin 3.5 2.0 - 4.0 g/dL    A-G Ratio 0.5 (L) 1.1 - 2.2     PHOSPHORUS    Collection Time: 02/07/18  3:16 AM   Result Value Ref Range    Phosphorus 3.5 2.6 - 4.7 MG/DL   TSH 3RD GENERATION    Collection Time: 02/07/18  3:16 AM   Result Value Ref Range    TSH 1.33 0.36 - 3.74 uIU/mL   T4, FREE    Collection Time: 02/07/18 3:16 AM   Result Value Ref Range    T4, Free 1.0 0.8 - 1.5 NG/DL   POC G3 - PUL    Collection Time: 02/07/18  5:41 AM   Result Value Ref Range    FIO2 (POC) 0.50 %    pH (POC) 7.443 7.35 - 7.45      pCO2 (POC) 36.1 35.0 - 45.0 MMHG    pO2 (POC) 115 (H) 80 - 100 MMHG    HCO3 (POC) 24.7 22 - 26 MMOL/L    sO2 (POC) 99 (H) 92 - 97 %    Base excess (POC) 1 mmol/L    Site RIGHT RADIAL      Device: VENT      Mode ASSIST CONTROL      Tidal volume 600 ml    Set Rate 12 bpm    PEEP/CPAP (POC) 5 cmH2O    Allens test (POC) NO      Specimen type (POC) ARTERIAL      Volume control YES     GLUCOSE, POC    Collection Time: 02/07/18  6:01 AM   Result Value Ref Range    Glucose (POC) 258 (H) 65 - 100 mg/dL    Performed by Kike Courser        A/P:   1. Acute hypoxemic respiratory failure with increased work of breathing despite bipap - secretions and bronchomalacia likely contributing to increased work of breathing - re-intubated 2/5 - failed SBT 2/7  2. SDH s/p craniotomy with evacuation on 1/23/18 after GLF  3. 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 MRSA in sputum - completing course of vancomycin  6. SZ  7. Hypernatremia  8. CKD  9.  DM  10. H/o DVT /PE    --Vent support  --Hypernatremia improved - stop D5W and continue water with TF  --on cefepime vanc and acyclovir - sputum cx with MRSA - stopping cefepime 2/7 continue vanc to complete course  --Palliative care eval - 3rd time re-intubated - now a partial code    Erwin Keys MD

## 2018-02-07 NOTE — PROGRESS NOTES
Spiritual Care Assessment/Progress Notes    [place] [id number]  [id number]    1935  81 y.o.  male    Patient Telephone Number: [phone] (home)   Taoism Affiliation: Aric Forman   Language: English   Extended Emergency Contact Information  Primary Emergency Contact: [address] [facility]  Work Phone: [phone]  Mobile Phone: [phone]  Relation: Child   Patient Active Problem List    Diagnosis Date Noted    SDH (subdural hematoma) ([facility] 01/23/2018    Hyperlipidemia 08/01/2016    Diabetes mellitus type 2, controlled ([address] 08/01/2016    CVA (cerebral vascular accident) ([facility] [address] [facility] 06/16/2016    Chronic deep vein thrombosis (DVT) of distal vein of right lower extremity ([facility] [address] 06/16/2016    History of GI bleed 06/16/2016    Colon cancer ([facility] [address] 05/16/2016    Pre-op evaluation 04/08/2016    Cerebral thrombosis with cerebral infarction ([facility] [address] 01/22/2015    Uncontrolled hypertension 01/22/2015    History of prostate cancer 03/07/2013    Cervical spondylosis 07/11/2012    Balance disorder 03/07/2012    Diabetes mellitus ([facility] [address] [facility] 11/02/2011    Sinusitis 11/02/2011    Hypertension 11/02/2011        Date: 2/7/2018       Level of Taoism/Spiritual Activity:  []         Involved in liza tradition/spiritual practice    []         Not involved in liza tradition/spiritual practice  []         Spiritually oriented    []         Claims no spiritual orientation    []         seeking spiritual identity  []         Feels alienated from Baptist practice/tradition  []         Feels angry about Baptist practice/tradition  []         Spirituality/Baptist tradition is a resource for coping at this time.   [x]         Not able to assess due to medical condition    Services Provided Today:  []         crisis intervention    []         reading Scriptures  []         spiritual assessment    []         prayer  []         empathic listening/emotional support  []         rites and rituals (cite in comments)  []         life review     []         Gnosticism support  []         theological development   []         advocacy  []         ethical dialog     []         blessing  []         bereavement support    []         support to family  []         anticipatory grief support   []         help with AMD  []         spiritual guidance    []         meditation      Spiritual Care Needs  []         Emotional Support  []         Spiritual/Sikhism Care  []         Loss/Adjustment  []         Advocacy/Referral                /Ethics  []         No needs expressed at               this time  []         Other: (note in               comments)  [address]  []         Follow up visits with               pt/family  []         Provide materials  []         Schedule sacraments  []         Contact Community               Clergy  [x]         Follow up as needed  []         Other: (note in               comments)     Comments: Attempted visit with pt in ICU. No family or visitors present. Pt is intubated and appeared to be resting comfortably. Unable to assess at this time. Left pastoral care note for pt's family.     Caterina Munguia, Palliative

## 2018-02-08 NOTE — PROGRESS NOTES
Problem: Falls - Risk of  Goal: *Absence of Falls  Document Arleen Fall Risk and appropriate interventions in the flowsheet.    Outcome: Progressing Towards Goal  Fall Risk Interventions:       Mentation Interventions: Adequate sleep, hydration, pain control, Bed/chair exit alarm, Door open when patient unattended, Increase mobility, More frequent rounding, Reorient patient, Room close to nurse's station, Toileting rounds, Update white board    Medication Interventions: Assess postural VS orthostatic hypotension, Bed/chair exit alarm, Patient to call before getting OOB, Teach patient to arise slowly, Evaluate medications/consider consulting pharmacy    Elimination Interventions: Bed/chair exit alarm, Call light in reach, Toileting schedule/hourly rounds    History of Falls Interventions: Bed/chair exit alarm, Consult care management for discharge planning, Door open when patient unattended, Investigate reason for fall, Evaluate medications/consider consulting pharmacy, Room close to nurse's station

## 2018-02-08 NOTE — PROGRESS NOTES
0800 Bedside and Verbal shift change report given to Sarahy Mccormack RN (oncoming nurse) by Dandy Phelps RN (offgoing nurse). Report included the following information SBAR, Kardex, ED Summary, OR Summary, Procedure Summary, Intake/Output, MAR and Recent Results. 0800 Assumed care of pt. Pt opens eyes to voice, moves BUE spontaneously, nonpurposefully, withdraws BLE; no command following. Urostomy bag leaking, noted wound to pt's scrotum, RN to notify WOCN RN.   0900 WOCN RN at bedside to evaluate pt. Notified of wound to pt's R testicle/scrotum. Foam dressing placed; urostomy bag for urine collection replaced. 1020 Pt placed on SBT by RT.  1115 Interdisciplinary rounds held. 1137 RT at bedside for ABG. 65 MD provided with ABG results. Pt failed SBT due to AMS. 1330 Dr. Collins Salomon, Palliative, updated on pt status, notified pt tolerated SBT well, but remains intubated due to mental status. MD to discuss plan of care with pt's family. 2000 Bedside and Verbal shift change report given to Dandy Phelps RN (oncoming nurse) by Sarahy Mccormack RN (offgoing nurse). Report included the following information SBAR, Kardex, ED Summary, OR Summary, Procedure Summary, Intake/Output, MAR and Recent Results.

## 2018-02-08 NOTE — WOUND CARE
WOCN Note:     Follow-up visit for nose. Chart shows:  Admitted for subdural hematoma with craniotomy; history of DM, colon and bladder cancer, DVT & PE, TIA, CVA 2016     Assessment:   Patient is sedated and intubated but arouses with turning; in wrist restraints  RN reports incontinent of large amounts of urine- placed a urostomy pouch 2/6 and Rn reports it lasted until today - I placed urostomy pouch today using Deysi's ring and skin prep, to bedside bag. Requires full assist from 2 people in repositioning and RN in room to assist.   Bed: [facility]   Diet: tube feeding via NG    Buttocks and sacral skin intact with light diffuse pink that is fully blanching. Cleaned of small amount of mushy stool and applied Sensi Care cream.     Nose abrasion is unchanged since last visit with Falls still in place. Right thigh has an intact Duoderm so I did not remove it to assess. Posterior scrotal wound with unknown etiology but appears trauma related = 3 x 1.2 c 0.1 cm 100% moist krysten red base with burgundy tissue sliding off. No bleeding. Edges are ragged. No periwound erythema. Patient repositioned on right side. Heels offloaded with Prevalon boots. Wound Recommendations:    Maintain pouch - supplies left in room if it needs to be changed. Posterior scrotum: please clean with saline, apply Venelex and cover with Mepilex border. Change Mepilex border as needed. Skin Care & Pressure Relief Recommendations:  Minimize layers of linen/pads under patient to optimize support surface. Turn/reposition approximately every 2 hours and offload heels. Manage incontinence;  SensiCare to buttocks with incont care    Discussed above plan with RN.     Transition of Care: Plan to follow as needed while admitted to hospital.    KALPANA RothmanN, RN, [facility]  Certified Wound, Ostomy, Continence Nurse  office [phone]  pager [phone] or call  to page

## 2018-02-08 NOTE — PROGRESS NOTES
Hospitalist Progress Note  Lion Aguillon MD  Answering service: [phone] OR [phone] from in house phone        Date of Service:  2018  NAME:  Rajat Dietz  :  1935  MRN:  [id number]      Reason for follow up:   sdh    Interval history / Subjective:     Still intubated, still off all sedation     Assessment & Plan:   SDH  Neurosurgery consulted on admission and eliquis held. On  underwent left frontotemporoparietal craniotomy. Placed on bp control with cardene drip and placed on keppra. EEG on  revealed no seizure activity. : per neurosurgery. : sutures/staples removed by ns on . Cont keppra and lacosamide. NS signed off on .       Expressive aphasia and dysarthria  Seen by speech and recommended alternate means of nutrition. : if extubated may need to consider peg.      Acute hypoxic respiratory failure  Intubated for surgery then extubated. Re-intubated on  and then bronched and found to have mucous clot. Extubated on  and subsequently re-intubated same day, rebronched on  and  that revealed severe tracheobronchomalacia. Extubated on  and then re-intubated on . Palliative care consulted given inability to remain extubated. Code changed to allow intubation but no cpr/shocks. : defer to [facility] on wean, trach etch.   : unable to extubate so far, family to decide on proceeding with trach or moving to [facility].      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 Placed on acyclovir on : defer to [facility] on length of treatment with acyclovir.       Thrombocytopenia  : recheck in am.   : stable.      Hx Afib on eliquis  Hx dvt/pe on eliquis  Taken off eliquis on admission. Unclear if he could ever be placed back on any blood thinners given recurrent sdh.        Code Status: can reintubate, no shocks.    DVT prophylaxis: scd    Disposition: tbd  Estimated date of d/c: tbd    Hospital Problems  Date Reviewed: 1/23/2018          Codes Class Noted POA    * (Principal)SDH (subdural hematoma) ([facility] [address] ICD-10-CM: I62.00  ICD-9-CM: 432.1  1/23/2018 Unknown                Review of Systems:   Review of Systems   Unable to perform ROS: Critical illness         Vital Signs:    Last 24hrs VS reviewed since prior progress note. Most recent are:  Visit Vitals    /68 (BP 1 Location: Right arm, BP Patient Position: At rest)    Pulse 62    Temp 98.7 °F (37.1 °C)    Resp 12    Ht 6' (1.829 m)    Wt 110 kg (242 lb 8 oz)    SpO2 100%    BMI 32.89 kg/m2         Intake/Output Summary (Last 24 hours) at 02/08/18 1728  Last data filed at 02/08/18 1600   Gross per 24 hour   Intake             2405 ml   Output             2530 ml   Net             -125 ml        Physical Examination:   Physical Exam   Constitutional: He appears unhealthy. He has a sickly appearance. HENT:   Intubated. Cardiovascular: Normal rate, regular rhythm and normal heart sounds. Pulmonary/Chest: Effort normal and breath sounds normal. No respiratory distress. Abdominal: Soft. Bowel sounds are normal. He exhibits no distension. Musculoskeletal: He exhibits no edema. Neurological:   Eyes open to voice, does not interact. Vitals reviewed.          Data Review:    Review and/or order of clinical lab test      Labs:     Recent Labs      02/08/18 0406  02/07/18 0316   WBC  5.0  4.9   HGB  8.1*  8.1*   HCT  25.7*  25.5*   PLT  137*  136*     Recent Labs      02/08/18   0406  02/07/18   0316  02/06/18   0337   NA  142  141  144   K  3.8  3.9  3.5   CL  108  109*  111*   CO2  25  25  26   BUN  44*  41*  43*   CREA  2.11*  2.13*  1.97*   GLU  162*  227*  217*   CA  8.5  8.2*  8.3*   MG  2.3  2.2  2.1   PHOS  4.0  3.5  2.8     Recent Labs      02/08/18   0406  02/07/18   0316  02/06/18   0337   SGOT  37  30  27   ALT  47  39  29   AP  77  69  56   TBILI  0.3  0.3  0.3   TP  5.4* 5. 2*  5.1*   ALB  1.6*  1.7*  1.8*   GLOB  3.8  3.5  3.3     No results for input(s): INR, PTP, APTT in the last 72 hours. No lab exists for component: INREXT   No results for input(s): FE, TIBC, PSAT, FERR in the last 72 hours. No results found for: FOL, RBCF   No results for input(s): PH, PCO2, PO2 in the last 72 hours. No results for input(s): CPK, CKNDX, TROIQ in the last 72 hours. No lab exists for component: CPKMB  Lab Results   Component Value Date/Time    Cholesterol, total 118 01/22/2018 11:28 PM    HDL Cholesterol 53 01/22/2018 11:28 PM    LDL, calculated 20 01/22/2018 11:28 PM    Triglyceride 225 (H) 01/22/2018 11:28 PM    CHOL/HDL Ratio 2.2 01/22/2018 11:28 PM     Lab Results   Component Value Date/Time    Glucose (POC) 165 (H) 02/08/2018 12:11 PM    Glucose (POC) 165 (H) 02/08/2018 05:38 AM    Glucose (POC) 190 (H) 02/07/2018 11:31 PM    Glucose (POC) 194 (H) 02/07/2018 06:42 PM    Glucose (POC) 234 (H) 02/07/2018 12:00 PM     Lab Results   Component Value Date/Time    Color Yellow 03/04/2013 09:14 AM    Appearance Cloudy (A) 03/04/2013 09:14 AM    Specific gravity 1.015 03/10/2010 09:45 AM    pH (UA) 7.0 03/04/2013 09:14 AM    Protein NEGATIVE  03/10/2010 09:45 AM    Glucose NEGATIVE  03/10/2010 09:45 AM    Ketone Negative 03/04/2013 09:14 AM    Bilirubin Negative 03/04/2013 09:14 AM    Urobilinogen 0.2 03/10/2010 09:45 AM    Nitrites Negative 03/04/2013 09:14 AM    Leukocyte Esterase Negative 03/04/2013 09:14 AM    Bacteria Few 03/04/2013 09:14 AM    WBC 0-5 03/04/2013 09:14 AM    RBC >30 (A) 03/04/2013 09:14 AM         Medications Reviewed:     Current Facility-Administered Medications   Medication Dose Route Frequency    [START ON 2/9/2018] Vancomycin random level due 2/9 @ 0800. Thanks!    Other ONCE    albuterol-ipratropium (DUO-NEB) 2.5 MG-0.5 MG/3 ML  3 mL Nebulization TID RT    balsam peru-castor oil (VENELEX)  mg/gram ointment   Topical BID    chlorhexidine (PERIDEX) 0.12 % mouthwash 15 mL  15 mL Oral BID    propofol (DIPRIVAN) infusion  0-50 mcg/kg/min IntraVENous TITRATE    Vancomycin - Pharmacy to Dose   Other Rx Dosing/Monitoring    insulin glargine (LANTUS) injection 40 Units  40 Units SubCUTAneous QHS    insulin lispro (HUMALOG) injection   SubCUTAneous Q6H    dextrose (D50W) injection syrg 12.5-25 g  12.5-25 g IntraVENous PRN    acyclovir (ZOVIRAX) 575 mg in 0.9% sodium chloride 100 mL IVPB  575 mg IntraVENous Q12H    budesonide (PULMICORT) 500 mcg/2 ml nebulizer suspension  500 mcg Nebulization BID RT    albuterol-ipratropium (DUO-NEB) 2.5 MG-0.5 MG/3 ML  3 mL Nebulization Q4H PRN    levETIRAcetam (KEPPRA) oral solution 1,000 mg  1,000 mg Per NG tube Q12H    lacosamide (VIMPAT) tablet 100 mg  100 mg Per NG tube Q12H    sodium chloride (NS) flush 5-10 mL  5-10 mL IntraVENous Q8H    sodium chloride (NS) flush 5-10 mL  5-10 mL IntraVENous PRN    midazolam (VERSED) injection 5 mg  5 mg IntraVENous Multiple    fentaNYL citrate (PF) injection 25 mcg  25 mcg IntraVENous Q1H PRN    famotidine (PEPCID) 40 mg/5 mL (8 mg/mL) oral suspension 20 mg  20 mg Per NG tube DAILY    bacitracin 500 unit/gram packet 1 Packet  1 Packet Topical PRN    polyvinyl alcohol (LIQUIFILM TEARS) 1.4 % ophthalmic solution 2 Drop  2 Drop Both Eyes Q8H    sodium chloride (NS) flush 10-30 mL  10-30 mL InterCATHeter PRN    sodium chloride (NS) flush 10 mL  10 mL InterCATHeter Q24H    sodium chloride (NS) flush 10 mL  10 mL InterCATHeter PRN    sodium chloride (NS) flush 10-40 mL  10-40 mL InterCATHeter Q8H    sodium chloride (NS) flush 20 mL  20 mL InterCATHeter Q24H    alteplase (CATHFLO) 1 mg in sterile water (preservative free) 1 mL injection  1 mg InterCATHeter PRN    atorvastatin (LIPITOR) tablet 40 mg  40 mg Per NG tube DAILY    naloxone (NARCAN) injection 0.4 mg  0.4 mg IntraVENous PRN    acetaminophen (TYLENOL) tablet 650 mg  650 mg Oral Q4H PRN    Or    acetaminophen (TYLENOL) solution 650 mg  650 mg Per NG tube Q4H PRN    Or    acetaminophen (TYLENOL) suppository 650 mg  650 mg Rectal Q4H PRN    glucose chewable tablet 16 g  4 Tab Oral PRN    glucagon (GLUCAGEN) injection 1 mg  1 mg IntraMUSCular PRN    hydrALAZINE (APRESOLINE) 20 mg/mL injection 10 mg  10 mg IntraVENous Q4H PRN    sodium chloride (NS) flush 5-10 mL  5-10 mL IntraVENous Q8H    sodium chloride (NS) flush 5-10 mL  5-10 mL IntraVENous PRN    acetaminophen (TYLENOL) tablet 650 mg  650 mg Oral Q4H PRN    HYDROcodone-acetaminophen (NORCO) 5-325 mg per tablet 1 Tab  1 Tab Oral Q4H PRN    ondansetron (ZOFRAN) injection 4 mg  4 mg IntraVENous Q4H PRN     ______________________________________________________________________  EXPECTED LENGTH OF STAY: 7d 0h  ACTUAL LENGTH OF STAY:          16                 Seble Martines MD

## 2018-02-08 NOTE — PROGRESS NOTES
Pulmonary / [address] Day: 25     S:    In ICU for resp failure following SDH post evacuation    :  Placed on SBT  Appears comfortable but no commands followed currently    :  Failed SBT with increased RR  TF  Palliative care following - Partial code - no shocks or CPR    :  Re-intubated on  for AMS / resp distress. Thick secretions in back of throat at time of intubation  Head CT without significant change  Abd CT for abd distension - Small bilateral pleural effusions with underlying consolidation. Gallstone. Sigmoid diverticulosis.  Lesion left hepatic lobe again demonstrated but cannot  be characterized given the lack of intravenous contrast. No acute  intra-abdominal abnormality  Remains sedate on vent  Tolerating TF    :  Was extubated at the end of last week  Currently tachypneic despite bipap with increased work of breathing and accessory muscle use  Mental status is poor  Abd distended with worsening resp distress  Has been having bowel movements     O:    Patient Vitals for the past 4 hrs:   BP Temp Pulse Resp SpO2 Height Weight   18 1100 136/68 - 65 21 100 % - -   18 1015 - - - - - 6' (1.829 m) 110 kg (242 lb 8 oz)   18 1014 - - 69 13 100 % - -   18 1000 138/73 - 69 13 100 % - -   18 0916 - - 69 14 100 % - -   18 0900 148/66 - 68 12 99 % - -   18 0800 159/78 99.4 °F (37.4 °C) 72 18 100 % - -     Temp (24hrs), Av.8 °F (37.1 °C), Min:98.5 °F (36.9 °C), Max:99.4 °F (37.4 °C)      Intake/Output Summary (Last 24 hours) at 18 1137  Last data filed at 18 1031   Gross per 24 hour   Intake             1745 ml   Output             2280 ml   Net             -535 ml     Exam:  No distress  Sedate RASS -2  Oral ett  MM dry  Anicteric  No commands followed  Coarse rhonchi bilaterally  RRR  Protuberant with mild distension, hypoactive bs  Warm and dry  Trace edema        Imaging: Images independently viewed  CXR - persistned L>R effusions and basilar atx    Labs:  Recent Results (from the past 12 hour(s))   CBC WITH AUTOMATED DIFF    Collection Time: 02/08/18  4:06 AM   Result Value Ref Range    WBC 5.0 4.1 - 11.1 K/uL    RBC 2.75 (L) 4.10 - 5.70 M/uL    HGB 8.1 (L) 12.1 - 17.0 g/dL    HCT 25.7 (L) 36.6 - 50.3 %    MCV 93.5 80.0 - 99.0 FL    MCH 29.5 26.0 - 34.0 PG    MCHC 31.5 30.0 - 36.5 g/dL    RDW 13.5 11.5 - 14.5 %    PLATELET 918 (L) 214 - 400 K/uL    MPV 12.9 8.9 - 12.9 FL    NRBC 0.0 0  WBC    ABSOLUTE NRBC 0.00 0.00 - 0.01 K/uL    NEUTROPHILS 72 32 - 75 %    LYMPHOCYTES 10 (L) 12 - 49 %    MONOCYTES 11 5 - 13 %    EOSINOPHILS 6 0 - 7 %    BASOPHILS 0 0 - 1 %    IMMATURE GRANULOCYTES 1 (H) 0.0 - 0.5 %    ABS. NEUTROPHILS 3.6 1.8 - 8.0 K/UL    ABS. LYMPHOCYTES 0.5 (L) 0.8 - 3.5 K/UL    ABS. MONOCYTES 0.6 0.0 - 1.0 K/UL    ABS. EOSINOPHILS 0.3 0.0 - 0.4 K/UL    ABS. BASOPHILS 0.0 0.0 - 0.1 K/UL    ABS. IMM. GRANS. 0.0 0.00 - 0.04 K/UL    DF AUTOMATED     METABOLIC PANEL, COMPREHENSIVE    Collection Time: 02/08/18  4:06 AM   Result Value Ref Range    Sodium 142 136 - 145 mmol/L    Potassium 3.8 3.5 - 5.1 mmol/L    Chloride 108 97 - 108 mmol/L    CO2 25 21 - 32 mmol/L    Anion gap 9 5 - 15 mmol/L    Glucose 162 (H) 65 - 100 mg/dL    BUN 44 (H) 6 - 20 MG/DL    Creatinine 2.11 (H) 0.70 - 1.30 MG/DL    BUN/Creatinine ratio 21 (H) 12 - 20      GFR est AA 37 (L) >60 ml/min/1.73m2    GFR est non-AA 30 (L) >60 ml/min/1.73m2    Calcium 8.5 8.5 - 10.1 MG/DL    Bilirubin, total 0.3 0.2 - 1.0 MG/DL    ALT (SGPT) 47 12 - 78 U/L    AST (SGOT) 37 15 - 37 U/L    Alk.  phosphatase 77 45 - 117 U/L    Protein, total 5.4 (L) 6.4 - 8.2 g/dL    Albumin 1.6 (L) 3.5 - 5.0 g/dL    Globulin 3.8 2.0 - 4.0 g/dL    A-G Ratio 0.4 (L) 1.1 - 2.2     MAGNESIUM    Collection Time: 02/08/18  4:06 AM   Result Value Ref Range    Magnesium 2.3 1.6 - 2.4 mg/dL   PHOSPHORUS    Collection Time: 02/08/18  4:06 AM   Result Value Ref Range    Phosphorus 4.0 2.6 - 4.7 MG/DL GLUCOSE, POC    Collection Time: 02/08/18  5:38 AM   Result Value Ref Range    Glucose (POC) 165 (H) 65 - 100 mg/dL    Performed by Ray Paige    POC G3 - PUL    Collection Time: 02/08/18  5:47 AM   Result Value Ref Range    FIO2 (POC) 0.40 %    pH (POC) 7.431 7.35 - 7.45      pCO2 (POC) 36.0 35.0 - 45.0 MMHG    pO2 (POC) 115 (H) 80 - 100 MMHG    HCO3 (POC) 24.0 22 - 26 MMOL/L    sO2 (POC) 99 (H) 92 - 97 %    Base excess (POC) 0 mmol/L    Site RIGHT RADIAL      Device: VENT      Mode ASSIST CONTROL      Tidal volume 600 ml    Set Rate 12 bpm    PEEP/CPAP (POC) 5 cmH2O    Allens test (POC) N/A      Specimen type (POC) ARTERIAL      Volume control YES     VANCOMYCIN, RANDOM    Collection Time: 02/08/18  6:56 AM   Result Value Ref Range    Vancomycin, random 21.2 UG/ML       A/P:   1. Acute hypoxemic respiratory failure with increased work of breathing despite bipap - secretions and bronchomalacia likely contributing to increased work of breathing - re-intubated 2/5 - failed SBT 2/7 - mental status limiting ability to extubate  2. SDH s/p craniotomy with evacuation on 1/23/18 after GLF  3. 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 MRSA in sputum - completing course of vancomycin  6. SZ  7. Hypernatremia  8. CKD  9.  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

## 2018-02-08 NOTE — PROGRESS NOTES
NUTRITION COMPLETE ASSESSMENT    RECOMMENDATIONS:   Adjust flush prn     Interventions/Plan:   Food/Nutrient Delivery:   Modify rate, concentration, composition, and schedule-see below    Assessment:   Reason for Assessment:   [x]Reassessment     Tube Feeding: Glucerna 1.2 @ 45 ml/hr with one packet Prosource tid and 200 ml water flush q 4 hr  Diet: NPO  Nutritionally Significant Medications: [x] Reviewed & Includes: Lantus, correction scale insulin  Meal Intake: No data found. Subjective:    Objective:  Chart reviewed for follow-up; discussed during interdisciplinary rounds. Noted: pt admitted for SDH, s/p craniotomy with evacuation of SDH; acute hypoxic respiratory failure, s/p failed extubations x 3; ?attempt today prior to decision to place trach and PEG tube. Mr Salty Adames has tolerated enteral feedings well. Currently has OGT. Recommend replacing with NGT vs DHT prior to extubating; do not anticipate pt will be able to resume oral diet soon after extubation. Sodium improved-now WNL. Continue with free water flushes. Blood sugars better today; D5 discontinued yesterday. Weight up 3 kg in the past week; edema noted. May be related to 3rd spacing 2/2 hypoalbuminemia. Diprivan now weaned off therefore need to adjust tube feeding to meet estimated energy needs. Suggested goal: Glucerna 1.2 @ 65 ml/hr with one packet of Prosource daily and 200 ml water flush q 4 hr. This will provide 1560 ml, 1930 calories, 109 gm protein and 2550 ml free water (tube feeding/flush) per day. Adjust flush prn. Estimated Nutrition Needs:   Kcals/day: 1943 Kcals/day  Protein: 112 g (1.2g/kg)  Fluid: 1900 ml (1 ml/jaret)  Based On: [place] (2003b)  Weight Used: Actual wt (94.5 kg)    Pt expected to meet estimated nutrient needs:  [x]   Yes     []  No  [] Unable to predict at this time    Nutrition Diagnosis:   1.  Less than optimal enteral nutrition related to change in formula and off Diprivan as evidenced by tube feeding meeting 76% estimated energy needs. Goals:     Tube feeding to meet 90% estimated needs for next 5-7 days. Monitoring & Evaluation:    - Enteral/parenteral nutrition intake   - Electrolyte and renal profile, Weight/weight change    Previous Nutrition Goals Met:  Yes  Previous Recommendations:      Yes    Education & Discharge Needs:   [x] None Identified   [] Identified and addressed    [x] Participated in care plan, discharge planning, and/or interdisciplinary rounds        Cultural, Che[place]ism and ethnic food preferences identified:   None    Skin Integrity: [x]Intact  []Other  Edema: []None [x] NP B/L upper and lower extremities  Last BM: 2/8  Food Allergies: []None []Other    Anthropometrics:    Weight Loss Metrics 2/8/2018 12/29/2017 3/6/2017 9/27/2016 8/1/2016 7/28/2016 6/18/2016   Today's Wt 242 lb 8 oz 216 lb 6.4 oz 202 lb 206 lb 208 lb 200 lb 203 lb 14.8 oz   BMI 32.89 kg/m2 29.35 kg/m2 27.4 kg/m2 27.94 kg/m2 28.2 kg/m2 27.12 kg/m2 27.65 kg/m2      Last 3 Recorded Weights in this Encounter    02/07/18 0400 02/08/18 0000 02/08/18 1015   Weight: 110.4 kg (243 lb 4.8 oz) 110 kg (242 lb 8 oz) 110 kg (242 lb 8 oz)      Weight Source: Bed  Height: 6' (182.9 cm),    Body mass index is 32.89 kg/(m^2).   IBW : 80.7 kg (178 lb), % IBW (Calculated): 136.24 %  Usual Body Weight: 98 kg (216 lb),      Labs:    Lab Results   Component Value Date/Time    Sodium 142 02/08/2018 04:06 AM    Potassium 3.8 02/08/2018 04:06 AM    Chloride 108 02/08/2018 04:06 AM    CO2 25 02/08/2018 04:06 AM    Glucose 162 (H) 02/08/2018 04:06 AM    BUN 44 (H) 02/08/2018 04:06 AM    Creatinine 2.11 (H) 02/08/2018 04:06 AM    Calcium 8.5 02/08/2018 04:06 AM    Magnesium 2.3 02/08/2018 04:06 AM    Phosphorus 4.0 02/08/2018 04:06 AM    Albumin 1.6 (L) 02/08/2018 04:06 AM     Lab Results   Component Value Date/Time    Hemoglobin A1c 6.4 (H) 01/23/2018 10:43 AM     Lab Results   Component Value Date/Time    Glucose (POC) 165 (H) 02/08/2018 05:38 AM      Lab Results   Component Value Date/Time    ALT (SGPT) 47 02/08/2018 04:06 AM    AST (SGOT) 37 02/08/2018 04:06 AM    Alk.  phosphatase 77 02/08/2018 04:06 AM    Bilirubin, total 0.3 02/08/2018 04:06 AM        Anthony Eddy RD [facility]

## 2018-02-08 NOTE — PROGRESS NOTES
Palliative Medicine Consult  [place]: [phone])    Patient Name: Aimee Gutierrez  YOB: 1935    Date of Initial Consult:2/6/18  Reason for Consult: Care decisions   Requesting Provider: Aleks  Primary Care Physician: Helena Tovar MD     SUMMARY:   Aimee Gutierrez is a 81 y.o. with a past history of bladder, prostate, and colon cancer (recently dx colon CA); DM; HTN; CKD; GERD; ischemic CVA 2016; neck pain; PE/DVT on Eliquis and ASA who was admitted on 1/22/2018 from home with aphasia, dysarthria x 2 days. Had a fall off a friend's porch about 2 months ago, CT head at OSH w/out acute findings. CT head upon admission w/ large L SDH w/ 8mm midline shift. S/p craniotomy and evacuation of SDH on 1/24/18. 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 Also w/ bronchomalacia. After surgery reintubated 1/26, extubated 1/30, reintubated 1/30, extubated on 2/2, reintubated 2/5. Bronchs 1/26, 1/31, 2/2. Has had waxing/waning mental status, notes indicate was awake and answering questions on 2/2. Witnessed seizure, none seen on EEG 1/31. Current medical issues leading to Palliative Medicine involvement include: care decisions. Children:Ford (primary mPOA), Anuja Velarde and Fer Gottlieb. Fer Gottlieb having the hardest time, the other two have been up to visit on regular basis. Girlfriend is Cale. PALLIATIVE DIAGNOSES:   1. Resp failure, required mult intubations, mucous plugging, HSV bronchitis   2. Debility due to medical issues  3. AMS/somnolence        PLAN:     1. Family called up to ICU this morning with mult questions about trach/PEG, if it's safe to extubate, if an EEG or other testing would help clarify mental status, if propofol has been off long enough. 2. Speak to primary mPOA, son Perez Comes.  Family is understanding of situation and still want all measures to ensure pt is given a chance to recover, but also that it is getting less likely he can truly make a meaningful recovery the longer he is here in ICU not making gains. 3. Kirsty Pinto expresses some frustration because he has heard from some teams that 'its not a neuro problem, it's a resp problem' and others that 'it's not a resp problem, it's a neuro problem.' Supportive listening provided. Pt's condition is a combination of these two systems as well as other chronic issues he has and his overall debility after being in the hospital for several weeks. In my opinion, would not gain from EEG right now, and propofol has been off long enough for accurate neuro exam.  4. While family certainly does still have option of trach/PEG if it comes to that, I am honest that another intubation (if we can successfully extubate this time) and/or a trach would be a very poor prognostic sign in pt given both resp/neuro issues. Unlike other situations, I don't think would be a bridge to recovery- likely would require LTAC and then NH, rather than ever living independently again- which is his benchmark for quality of life. Discus that pt did okay w/ SBT and his ABG looked okay, but not awake enough to extubate- mekhi w/out clear goals moving forwards. 5. Give recommendation of extubating when pulm feels safe, then trying to maintain pt w/ Nc O2 and even BIPAP but not re-intubating if fails, instead changing to comfort measures if does not make the gains we hope. 10. Kirsty Pinto to speak to his 2 siblings Masoud Foote having a very very hard time) and will let the ICU team know decision or wait to meet w/ our team. For now, if extubated- goal would still to be reintubated. No CPR./shocks. 7. Difficult situation because mental status   8. Communicated plan of care with: Palliative IDT; Monica MOSS; Dr Latha Hoffman and rest of ICU team        GOALS OF CARE / TREATMENT PREFERENCES:     GOALS OF CARE:  Patient/Health Care Proxy Stated Goals:  Other (comment)      TREATMENT PREFERENCES:   Code Status: Partial Code    Advance Care Planning:  Advance Care Planning 2/6/2018   Patient's Healthcare Decision Maker is: Named in scanned ACP document   Primary Decision Maker Name Silvana Noonan   Primary Decision Maker Phone Number [phone]   Primary Decision Maker Relationship to Patient Adult child   Secondary Decision Maker Name Nora Marmolejo    Secondary Decision Maker Phone Number [phone]   Secondary Decision Maker Relationship to Patient Adult child   Confirm Advance Directive -   Patient Would Like to Complete Advance Directive -   Does the patient have other document types Do Not Resuscitate; Power of        Medical Interventions: Full interventions           Other:    As far as possible, the palliative care team has discussed with patient / health care proxy about goals of care / treatment preferences for patient. HISTORY:         HPI/SUBJECTIVE:    The patient is:   [] Verbal and participatory  [x] Non-participatory due to:     Pt has been off propofol x 24h, still not doing much but opening his eyes. On SBT this morning. Nsgy not actively following anymore.     Clinical Pain Assessment (nonverbal scale for severity on nonverbal patients):   Clinical Pain Assessment  Severity: 0     Activity (Movement): Lying quietly, normal position    Duration: for how long has pt been experiencing pain (e.g., 2 days, 1 month, years)  Frequency: how often pain is an issue (e.g., several times per day, once every few days, constant)     FUNCTIONAL ASSESSMENT:     Palliative Performance Scale (PPS):  PPS: 20     PSYCHOSOCIAL/SPIRITUAL SCREENING:     Palliative IDT has assessed this patient for cultural preferences / practices and a referral made as appropriate to needs (Cultural Services, Patient Advocacy, Ethics, etc.)    Advance Care Planning:  Advance Care Planning 2/6/2018   Patient's Healthcare Decision Maker is: Named in scanned ACP document   Primary Decision Maker Name Silvana Noonan   Primary Decision Maker Phone Number [phone]   Primary Decision Maker Relationship to Patient Adult child   Secondary Decision Maker Name José Carmichael    Secondary Decision Maker Phone Number [phone]   Secondary Decision Maker Relationship to Patient Adult child   Confirm Advance Directive -   Patient Would Like to Complete Advance Directive -   Does the patient have other document types Do Not Resuscitate; Power of        Any spiritual / Restoration concerns:  [] Yes /  [x] No    Caregiver Burnout:  [] Yes /  [x] No /  [] No Caregiver Present      Anticipatory grief assessment:   [x] Normal  / [] Maladaptive       ESAS Anxiety:   Cannot obtain due to patient factors    ESAS Depression:   Cannot obtain due to patient factors          REVIEW OF SYSTEMS:     Positive and pertinent negative findings in ROS are noted above in HPI. The following systems were [] reviewed / [x] unable to be reviewed as noted in HPI  Other findings are noted below. Systems: constitutional, ears/nose/mouth/throat, respiratory, gastrointestinal, genitourinary, musculoskeletal, integumentary, neurologic, psychiatric, endocrine. Positive findings noted below. Modified ESAS Completed by: provider   Fatigue: 10 Drowsiness: 10     Pain: 0           Dyspnea: 0           Stool Occurrence(s): 1        PHYSICAL EXAM:     From RN flowsheet:  Wt Readings from Last 3 Encounters:   02/08/18 242 lb 8 oz (110 kg)   12/29/17 216 lb 6.4 oz (98.2 kg)   03/06/17 202 lb (91.6 kg)     Blood pressure 146/78, pulse 64, temperature 98.7 °F (37.1 °C), resp. rate 12, height 6' (1.829 m), weight 242 lb 8 oz (110 kg), SpO2 100 %.     Pain Scale 1: Adult Nonverbal Pain Scale  Pain Intensity 1: 0              Pain Intervention(s) 1: Repositioned       Constitutional: somnolent, eyes open but not tracking me  Eyes: pupils equal, anicteric  ENMT: no nasal discharge  Respiratory: breathing not labored  Gastrointestinal: soft non-tender, +bowel sounds  Musculoskeletal: no deformity, no tenderness to palpation  Skin: warm, dry  Neurologic: withdraws to pain, not following commands     HISTORY:     Principal Problem:    SDH (subdural hematoma) ([address] (1/23/2018)      Past Medical History:   Diagnosis Date    Arthritis     neck,chronic    Bladder cancer ([address] 3/13    bladder    Chronic pain     NECK    Colon cancer ([address] 3/16    COLON    Diabetes ([address]     GERD (gastroesophageal reflux disease) 2008    at times, NOT continous    Hypercholesterolemia     Hypertension     Kidney stones 1969    SEVERAL     Nephrolithiasis     Prostate cancer ([address] 9/2003    prostate cancer - radiation    PUD (peptic ulcer disease) 2008    Thromboembolus ([address] 2/8/16    2 RIGHT LEG, 3 IN LUNGS (STARTED ON XARELTO IN ER, 1 WEEK LATER WAS IN ER WITH INTERNAL BLEEDING)    Transient ischemic attack 2005    TIA - no deficits 2005 AND 2015    Unspecified sleep apnea     does not use CPAP/uses nasal strips      Past Surgical History:   Procedure Laterality Date    COLONOSCOPY N/A 3/6/2017    COLONOSCOPY performed by Tatum Lima MD at [facility]  2008    HX CERVICAL FUSION  2002    HX CYST REMOVAL      cyst removed from back, shoulder and scalp    HX HEENT  2006    sinus surgery    HX HEENT      STAS CATARACTS    HX LUMBAR LAMINECTOMY  2002    HX OTHER SURGICAL      nose surgery    HX OTHER SURGICAL  2008    LUMPS -BENIGN, REMOVED FROM SHOULDER, HEAD AND BACK    HX OTHER SURGICAL  3/2016    FILTER FOR BLOOD CLOTS    HX TONSILLECTOMY      HX UROLOGICAL  1969    for kidney stones      Family History   Problem Relation Age of Onset    Heart Disease Father     Lung Disease Father      [facility]    Cancer Brother      SKIN    Heart Attack Brother     Heart Disease Brother     Anesth Problems Neg Hx       History reviewed, no pertinent family history.   Social History   Substance Use Topics    Smoking status: Former Smoker     Packs/day: 1.00     Years: 36.00     Quit date: 11/2/1998    Smokeless tobacco: Never Used      Comment: former cigarette/cigar smoker  SMOKED 10 YRS FIRST TIME; QUIT FOR 10 YRS; THEN SMOKED FOR ABOUT 26 YRS    Alcohol use 3.5 oz/week     7 Glasses of wine per week      Comment: DAILY 2 DRINKS     Allergies   Allergen Reactions    Penicillins Rash    Metoprolol Rash    Tramadol Other (comments)     Unsure of reaction.  Lodine [Etodolac] Vertigo      Current Facility-Administered Medications   Medication Dose Route Frequency    [START ON 2/9/2018] Vancomycin random level due 2/9 @ 0800. Thanks!    Other ONCE    albuterol-ipratropium (DUO-NEB) 2.5 MG-0.5 MG/3 ML  3 mL Nebulization TID RT    balsam peru-castor oil (VENELEX)  mg/gram ointment   Topical BID    chlorhexidine (PERIDEX) 0.12 % mouthwash 15 mL  15 mL Oral BID    propofol (DIPRIVAN) infusion  0-50 mcg/kg/min IntraVENous TITRATE    Vancomycin - Pharmacy to Dose   Other Rx Dosing/Monitoring    insulin glargine (LANTUS) injection 40 Units  40 Units SubCUTAneous QHS    insulin lispro (HUMALOG) injection   SubCUTAneous Q6H    dextrose (D50W) injection syrg 12.5-25 g  12.5-25 g IntraVENous PRN    acyclovir (ZOVIRAX) 575 mg in 0.9% sodium chloride 100 mL IVPB  575 mg IntraVENous Q12H    budesonide (PULMICORT) 500 mcg/2 ml nebulizer suspension  500 mcg Nebulization BID RT    albuterol-ipratropium (DUO-NEB) 2.5 MG-0.5 MG/3 ML  3 mL Nebulization Q4H PRN    levETIRAcetam (KEPPRA) oral solution 1,000 mg  1,000 mg Per NG tube Q12H    lacosamide (VIMPAT) tablet 100 mg  100 mg Per NG tube Q12H    sodium chloride (NS) flush 5-10 mL  5-10 mL IntraVENous Q8H    sodium chloride (NS) flush 5-10 mL  5-10 mL IntraVENous PRN    midazolam (VERSED) injection 5 mg  5 mg IntraVENous Multiple    fentaNYL citrate (PF) injection 25 mcg  25 mcg IntraVENous Q1H PRN    famotidine (PEPCID) 40 mg/5 mL (8 mg/mL) oral suspension 20 mg  20 mg Per NG tube DAILY    bacitracin 500 unit/gram packet 1 Packet  1 Packet Topical PRN    polyvinyl alcohol (LIQUIFILM TEARS) 1.4 % ophthalmic solution 2 Drop  2 Drop Both Eyes Q8H    sodium chloride (NS) flush 10-30 mL  10-30 mL InterCATHeter PRN    sodium chloride (NS) flush 10 mL  10 mL InterCATHeter Q24H    sodium chloride (NS) flush 10 mL  10 mL InterCATHeter PRN    sodium chloride (NS) flush 10-40 mL  10-40 mL InterCATHeter Q8H    sodium chloride (NS) flush 20 mL  20 mL InterCATHeter Q24H    alteplase (CATHFLO) 1 mg in sterile water (preservative free) 1 mL injection  1 mg InterCATHeter PRN    atorvastatin (LIPITOR) tablet 40 mg  40 mg Per NG tube DAILY    naloxone (NARCAN) injection 0.4 mg  0.4 mg IntraVENous PRN    acetaminophen (TYLENOL) tablet 650 mg  650 mg Oral Q4H PRN    Or    acetaminophen (TYLENOL) solution 650 mg  650 mg Per NG tube Q4H PRN    Or    acetaminophen (TYLENOL) suppository 650 mg  650 mg Rectal Q4H PRN    glucose chewable tablet 16 g  4 Tab Oral PRN    glucagon (GLUCAGEN) injection 1 mg  1 mg IntraMUSCular PRN    hydrALAZINE (APRESOLINE) 20 mg/mL injection 10 mg  10 mg IntraVENous Q4H PRN    sodium chloride (NS) flush 5-10 mL  5-10 mL IntraVENous Q8H    sodium chloride (NS) flush 5-10 mL  5-10 mL IntraVENous PRN    acetaminophen (TYLENOL) tablet 650 mg  650 mg Oral Q4H PRN    HYDROcodone-acetaminophen (NORCO) 5-325 mg per tablet 1 Tab  1 Tab Oral Q4H PRN    ondansetron (ZOFRAN) injection 4 mg  4 mg IntraVENous Q4H PRN          LAB AND IMAGING FINDINGS:     Lab Results   Component Value Date/Time    WBC 5.0 02/08/2018 04:06 AM    HGB 8.1 (L) 02/08/2018 04:06 AM    PLATELET 639 (L) 93/53/4907 04:06 AM     Lab Results   Component Value Date/Time    Sodium 142 02/08/2018 04:06 AM    Potassium 3.8 02/08/2018 04:06 AM    Chloride 108 02/08/2018 04:06 AM    CO2 25 02/08/2018 04:06 AM    BUN 44 (H) 02/08/2018 04:06 AM    Creatinine 2.11 (H) 02/08/2018 04:06 AM    Calcium 8.5 02/08/2018 04:06 AM    Magnesium 2.3 02/08/2018 04:06 AM    Phosphorus 4.0 02/08/2018 04:06 AM      Lab Results   Component Value Date/Time    AST (SGOT) 37 02/08/2018 04:06 AM    Alk. phosphatase 77 02/08/2018 04:06 AM    Protein, total 5.4 (L) 02/08/2018 04:06 AM    Albumin 1.6 (L) 02/08/2018 04:06 AM    Globulin 3.8 02/08/2018 04:06 AM     Lab Results   Component Value Date/Time    INR 1.0 01/23/2018 12:42 AM    Prothrombin time 10.6 01/23/2018 12:42 AM    aPTT 28.6 01/23/2018 12:42 AM      No results found for: IRON, FE, TIBC, IBCT, PSAT, FERR   No results found for: PH, PCO2, PO2  No components found for: [place]   Lab Results   Component Value Date/Time     01/25/2018 05:01 PM    CK - MB 2.3 01/25/2018 05:01 PM                Total time: 35min   Counseling / coordination time, spent as noted above:30 min   > 50% counseling / coordination?: yes    Prolonged service was provided for  []30 min   []75 min in face to face time in the presence of the patient, spent as noted above. Time Start:   Time End:   Note: this can only be billed with 36690 (initial) or 78583 (follow up). If multiple start / stop times, list each separately.

## 2018-02-08 NOTE — DIABETES MGMT
DTC Progress Note    Recommendations/ Comments: POC glucoses have improved with the d/c of IV dextrose at 100cc/hr at noon yesterday. Pt has required 11 units of correction insulin in past 24 hours, down from 23 units in previous 24 hours      Current hospital DM medication: Lantus 40 units daily and lispro insulin correction scale-resistant    Chart reviewed on [address]. Patient is a 81 y.o. male with known  Type 2 Diabetes on oral agent (monotherapy): metformin (generic)1000 mg bid  at home. A1c:   Lab Results   Component Value Date/Time    Hemoglobin A1c 6.4 (H) 01/23/2018 10:43 AM    Hemoglobin A1c 6.9 (H) 06/17/2016 02:24 AM       Recent Glucose Results:   Lab Results   Component Value Date/Time     (H) 02/08/2018 04:06 AM    GLUCPOC 165 (H) 02/08/2018 12:11 PM    GLUCPOC 165 (H) 02/08/2018 05:38 AM    GLUCPOC 190 (H) 02/07/2018 11:31 PM        Lab Results   Component Value Date/Time    Creatinine 2.11 (H) 02/08/2018 04:06 AM     Estimated Creatinine Clearance: 35.2 mL/min (based on Cr of 2.11). Active Orders   Diet    DIET NPO With Tube Feedings        PO intake: No data found. Will continue to follow as needed.     Thank you

## 2018-02-08 NOTE — PROGRESS NOTES
Physical Therapy  2/8/18    Patient chart reviewed. Continues to be intubated with no command following. Has not been able to participate in PT for some time. Will complete orders at this time. Please reconsult if change in status occurs. Recommend continued PROM and elevated HOB with nursing assist daily. Petrona Moreno, PT, DPT

## 2018-02-08 NOTE — PROGRESS NOTES
1930: Bedside and Verbal shift change report given to Pilar Montgomery RN (oncoming nurse) by Bert Munoz RN (offgoing nurse). Report included the following information SBAR, Kardex, Intake/Output, MAR, Recent Results, Cardiac Rhythm SR and Alarm Parameters . 2000: Assessment completed. Patient opens eyes to voice, no command following, pupils equal and reactive, withdraws in all 4 extremities. VSS, afebrile, on ventilator without sedation. Has OGT with tube feeds infusing, condom cath in place and draining. Left crani site staples removed, site is clean, dry, and intact. Bilateral soft-wrist restraints in place for safety. 0000: Reassessment completed, no changes. 0400: Reassessment completed, no changes. Shift Summary: Patient remains the same neurologically. VSS, afebrile, on ventilator without sedation. OGT with tube feed infusing, condom cath with adequate UOP. Left crani site remains clean, dry, and intact. Bilateral soft wrist restraint remain in place. PRN hydralazine given for SBP > 160    0730: Bedside and Verbal shift change report given to Gabrielle Yanes RN (oncoming nurse) by Pilar Montgomery RN (offgoing nurse). Report included the following information SBAR, Kardex, Intake/Output, MAR, Recent Results, Cardiac Rhythm SR and Alarm Parameters .

## 2018-02-08 NOTE — PROGRESS NOTES
Pharmacist Note - Vancomycin Dosing  Therapy day #5  Indication: HCAP/MRSA  Current regimen: On hold, last dose: 1.5 gm on 2/6 @ 1344    A Random Level resulted at 21.2 mcg/mL which was obtained ~41 hrs post-dose. Goal trough: 15 - 20 mcg/mL     Plan: Will continue to hold the vancomycin for now as the level only dropped from 26.6 to 21.2 in 16 hours. Will order another random level tomorrow morning at 0800. Pharmacy will continue to monitor this patient daily for changes in clinical status and renal function.

## 2018-02-09 NOTE — PROGRESS NOTES
Problem: Falls - Risk of  Goal: *Absence of Falls  Document Arleen Fall Risk and appropriate interventions in the flowsheet.    Outcome: Progressing Towards Goal  Fall Risk Interventions:       Mentation Interventions: Adequate sleep, hydration, pain control, Bed/chair exit alarm, Door open when patient unattended, Increase mobility, More frequent rounding, Reorient patient, Room close to nurse's station, Toileting rounds, Update white board    Medication Interventions: Assess postural VS orthostatic hypotension, Bed/chair exit alarm, Evaluate medications/consider consulting pharmacy, Patient to call before getting OOB, Teach patient to arise slowly    Elimination Interventions: Bed/chair exit alarm, Call light in reach, Toileting schedule/hourly rounds    History of Falls Interventions: Bed/chair exit alarm, Consult care management for discharge planning, Door open when patient unattended, Evaluate medications/consider consulting pharmacy, Investigate reason for fall, Room close to nurse's station

## 2018-02-09 NOTE — PROGRESS NOTES
1930: Bedside and Verbal shift change report given to ISA Russ (oncoming nurse) by Brent Horan RN (offgoing nurse). Report included the following information SBAR, Kardex, Intake/Output, MAR, Recent Results, Cardiac Rhythm SR and Alarm Parameters . Diana Segura 2000: Assessment completed. Patient on ventilator, opens eyes to voice, no command following, pupils equal and reactive, moves BUE spontaneously, withdraws in all extremities. VSS, afebrile, has urostomy bag/urinary catheter draining, PICC line. Bilateral soft-wrist restraints in place for safety. 0000: Reassessment completed, no changes. 0400: Reassessment completed, no changes. Shift Summary: Remains the same neurologically. On ventilator without sedation, VSS, afebrile, urinary catheter with adequate UOP. PICC and PIV, OGT with tube feeds running. 0730: Bedside and Verbal shift change report given to Brent Horan RN (oncoming nurse) by Ashley Johnson RN (offgoing nurse). Report included the following information SBAR, Kardex, Procedure Summary, Intake/Output, MAR, Recent Results and Alarm Parameters .

## 2018-02-09 NOTE — PROGRESS NOTES
Pharmacist Note - Vancomycin Dosing  Therapy day #6  Indication: HCAP/MRSA  Current regimen: On hold, last dose: 1.5 gm on 2/6 @ 1344    A Random Level resulted at 16.7 mcg/mL which was obtained ~66.5 hrs post-dose. Goal trough: 15 - 20 mcg/mL     Plan: The vancomycin level is now down to 16.7 mcg/ml. Will give a one-time dose of 1250 mg IV today at noon which should last him several days at the rate he is currently clearing. Pharmacy will continue to monitor this patient daily for changes in clinical status and renal function.

## 2018-02-09 NOTE — PROGRESS NOTES
Hospitalist Progress Note  Palmer Hashimoto, MD  Answering service: [phone] from in house phone        Date of Service:  2018  NAME:  Bob Head  :  1935  MRN:  [id number]      Reason for follow up:   sdh    Interval history / Subjective:     Not able to extubate     Assessment & Plan:   SDH  Neurosurgery consulted on admission and eliquis held. Pierre Ruiz  underwent left frontotemporoparietal craniotomy.  Placed on bp control with cardene drip and placed on keppra.  EEG on  revealed no seizure activity.    : per neurosurgery. : sutures/staples removed by ns on . Cont keppra and lacosamide. NS signed off on .    : cont keprra/lacosamide      Expressive aphasia and dysarthria  Seen by speech and recommended alternate means of nutrition. : if extubated may need to consider peg.   : family to meet with palliative care on Monday to decide.       Acute hypoxic respiratory failure  Intubated for surgery then extubated.  Re-intubated on  and then bronched and found to have mucous clot.  Extubated on  and subsequently re-intubated same day, rebronched on  and  that revealed severe tracheobronchomalacia.  Extubated on  and then re-intubated on .  Palliative care consulted given inability to remain extubated.  Code changed to allow intubation but no cpr/shocks.    : defer to [facility] on wean, trach etch.   : unable to extubate so far, family to decide on proceeding with trach or moving to [facility].   : can not be extubated, [facility] following.       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  Placed on acyclovir on : defer to [facility] on length of treatment with acyclovir.        Thrombocytopenia  : recheck in am.   : stable.       Hx Afib on eliquis  Hx dvt/pe on eliquis  Taken off eliquis on admission. Batsheva Francisca if he could ever be placed back on any blood thinners given recurrent sdh.       Code Status: can reintubate, no shocks. DVT prophylaxis: scd    Care Plan discussed with : nurse  Disposition: tbd  Estimated date of d/c: tbd    Hospital Problems  Date Reviewed: 1/23/2018          Codes Class Noted POA    * (Principal)SDH (subdural hematoma) ([facility] [address] ICD-10-CM: I62.00  ICD-9-CM: 432.1  1/23/2018 Unknown                Review of Systems:   Review of Systems   Unable to perform ROS: Medical condition         Vital Signs:    Last 24hrs VS reviewed since prior progress note. Most recent are:  Visit Vitals    /80    Pulse 78    Temp 98.7 °F (37.1 °C)    Resp 18    Ht 6' (1.829 m)    Wt 109.5 kg (241 lb 4.8 oz)    SpO2 99%    BMI 32.73 kg/m2         Intake/Output Summary (Last 24 hours) at 02/09/18 1629  Last data filed at 02/09/18 1600   Gross per 24 hour   Intake             2520 ml   Output             2275 ml   Net              245 ml        Physical Examination:   Physical Exam   Constitutional: He appears unhealthy. He has a sickly appearance. HENT:   intubated   Neck: Normal range of motion. Neck supple. Cardiovascular: Normal rate, regular rhythm and normal heart sounds. Pulmonary/Chest: Effort normal and breath sounds normal.   Abdominal: Soft. Bowel sounds are normal. He exhibits no distension. Neurological:   Right sided paralysis   Skin: Skin is warm and dry. Psychiatric:   Eyes opens, not interactive. Vitals reviewed.          Data Review:    Review and/or order of clinical lab test      Labs:     Recent Labs      02/09/18   0337  02/08/18   0406   WBC  5.2  5.0   HGB  8.3*  8.1*   HCT  26.0*  25.7*   PLT  142*  137*     Recent Labs      02/09/18   0337  02/08/18   0406  02/07/18   0316   NA  141  142  141   K  3.4*  3.8  3.9   CL  107  108  109*   CO2  28  25  25   BUN  45*  44*  41*   CREA  2.03*  2.11*  2.13*   GLU  168*  162*  227*   CA  8.2*  8.5  8.2*   MG  2.2  2.3  2.2   PHOS  2.8  4.0  3.5     Recent Labs      02/09/18 2667  02/08/18   0406  02/07/18   0316   SGOT  42*  37  30   ALT  57  47  39   AP  91  77  69   TBILI  0.3  0.3  0.3   TP  5.4*  5.4*  5.2*   ALB  1.7*  1.6*  1.7*   GLOB  3.7  3.8  3.5     No results for input(s): INR, PTP, APTT in the last 72 hours. No lab exists for component: INREXT   No results for input(s): FE, TIBC, PSAT, FERR in the last 72 hours. No results found for: FOL, RBCF   No results for input(s): PH, PCO2, PO2 in the last 72 hours. No results for input(s): CPK, CKNDX, TROIQ in the last 72 hours.     No lab exists for component: CPKMB  Lab Results   Component Value Date/Time    Cholesterol, total 118 01/22/2018 11:28 PM    HDL Cholesterol 53 01/22/2018 11:28 PM    LDL, calculated 20 01/22/2018 11:28 PM    Triglyceride 225 (H) 01/22/2018 11:28 PM    CHOL/HDL Ratio 2.2 01/22/2018 11:28 PM     Lab Results   Component Value Date/Time    Glucose (POC) 168 (H) 02/09/2018 11:01 AM    Glucose (POC) 172 (H) 02/09/2018 05:25 AM    Glucose (POC) 201 (H) 02/08/2018 11:10 PM    Glucose (POC) 198 (H) 02/08/2018 05:53 PM    Glucose (POC) 165 (H) 02/08/2018 12:11 PM     Lab Results   Component Value Date/Time    Color Yellow 03/04/2013 09:14 AM    Appearance Cloudy (A) 03/04/2013 09:14 AM    Specific gravity 1.015 03/10/2010 09:45 AM    pH (UA) 7.0 03/04/2013 09:14 AM    Protein NEGATIVE  03/10/2010 09:45 AM    Glucose NEGATIVE  03/10/2010 09:45 AM    Ketone Negative 03/04/2013 09:14 AM    Bilirubin Negative 03/04/2013 09:14 AM    Urobilinogen 0.2 03/10/2010 09:45 AM    Nitrites Negative 03/04/2013 09:14 AM    Leukocyte Esterase Negative 03/04/2013 09:14 AM    Bacteria Few 03/04/2013 09:14 AM    WBC 0-5 03/04/2013 09:14 AM    RBC >30 (A) 03/04/2013 09:14 AM         Medications Reviewed:     Current Facility-Administered Medications   Medication Dose Route Frequency    insulin glargine (LANTUS) injection 45 Units  45 Units SubCUTAneous QHS    albuterol-ipratropium (DUO-NEB) 2.5 MG-0.5 MG/3 ML  3 mL Nebulization TID RT    balsam peru-castor oil (VENELEX)  mg/gram ointment   Topical BID    chlorhexidine (PERIDEX) 0.12 % mouthwash 15 mL  15 mL Oral BID    propofol (DIPRIVAN) infusion  0-50 mcg/kg/min IntraVENous TITRATE    Vancomycin - Pharmacy to Dose   Other Rx Dosing/Monitoring    insulin lispro (HUMALOG) injection   SubCUTAneous Q6H    dextrose (D50W) injection syrg 12.5-25 g  12.5-25 g IntraVENous PRN    acyclovir (ZOVIRAX) 575 mg in 0.9% sodium chloride 100 mL IVPB  575 mg IntraVENous Q12H    budesonide (PULMICORT) 500 mcg/2 ml nebulizer suspension  500 mcg Nebulization BID RT    albuterol-ipratropium (DUO-NEB) 2.5 MG-0.5 MG/3 ML  3 mL Nebulization Q4H PRN    levETIRAcetam (KEPPRA) oral solution 1,000 mg  1,000 mg Per NG tube Q12H    lacosamide (VIMPAT) tablet 100 mg  100 mg Per NG tube Q12H    sodium chloride (NS) flush 5-10 mL  5-10 mL IntraVENous Q8H    sodium chloride (NS) flush 5-10 mL  5-10 mL IntraVENous PRN    midazolam (VERSED) injection 5 mg  5 mg IntraVENous Multiple    fentaNYL citrate (PF) injection 25 mcg  25 mcg IntraVENous Q1H PRN    famotidine (PEPCID) 40 mg/5 mL (8 mg/mL) oral suspension 20 mg  20 mg Per NG tube DAILY    bacitracin 500 unit/gram packet 1 Packet  1 Packet Topical PRN    polyvinyl alcohol (LIQUIFILM TEARS) 1.4 % ophthalmic solution 2 Drop  2 Drop Both Eyes Q8H    sodium chloride (NS) flush 10-30 mL  10-30 mL InterCATHeter PRN    sodium chloride (NS) flush 10 mL  10 mL InterCATHeter Q24H    sodium chloride (NS) flush 10 mL  10 mL InterCATHeter PRN    sodium chloride (NS) flush 10-40 mL  10-40 mL InterCATHeter Q8H    sodium chloride (NS) flush 20 mL  20 mL InterCATHeter Q24H    alteplase (CATHFLO) 1 mg in sterile water (preservative free) 1 mL injection  1 mg InterCATHeter PRN    atorvastatin (LIPITOR) tablet 40 mg  40 mg Per NG tube DAILY    naloxone (NARCAN) injection 0.4 mg  0.4 mg IntraVENous PRN    acetaminophen (TYLENOL) tablet 650 mg 650 mg Oral Q4H PRN    Or    acetaminophen (TYLENOL) solution 650 mg  650 mg Per NG tube Q4H PRN    Or    acetaminophen (TYLENOL) suppository 650 mg  650 mg Rectal Q4H PRN    glucose chewable tablet 16 g  4 Tab Oral PRN    glucagon (GLUCAGEN) injection 1 mg  1 mg IntraMUSCular PRN    hydrALAZINE (APRESOLINE) 20 mg/mL injection 10 mg  10 mg IntraVENous Q4H PRN    sodium chloride (NS) flush 5-10 mL  5-10 mL IntraVENous Q8H    sodium chloride (NS) flush 5-10 mL  5-10 mL IntraVENous PRN    acetaminophen (TYLENOL) tablet 650 mg  650 mg Oral Q4H PRN    HYDROcodone-acetaminophen (NORCO) 5-325 mg per tablet 1 Tab  1 Tab Oral Q4H PRN    ondansetron (ZOFRAN) injection 4 mg  4 mg IntraVENous Q4H PRN     ______________________________________________________________________  EXPECTED LENGTH OF STAY: 7d 0h  ACTUAL LENGTH OF STAY:          17                 Madai Nelson MD

## 2018-02-09 NOTE — PROGRESS NOTES
0800 Bedside and Verbal shift change report given to Gabrielle Yanes RN (oncoming nurse) by Pilar Montgomery RN (offgoing nurse). Report included the following information SBAR, Kardex, ED Summary, OR Summary, Procedure Summary, Intake/Output, MAR and Recent Results. 0800 Assumed care of pt. Pt resting comfortably on vent. Opens eyes to voice, no command following, moves BUE spontaneously; BLE withdraw to stimuli. PERRLA. 1039 Pt placed on SBT by RT.  1100 RT at bedside for ABG. 200 MD provided with ABG results. Pt to remain intubated. Pt failed SBT; AMS and hypoxic on ABG. 1257 KUB obtained. 1352 OGT advanced to 65cm per KUB results. 2000 Bedside and Verbal shift change report given to Pilar Montgomery RN (oncoming nurse) by Gabrielle Yanes RN (offgoing nurse). Report included the following information SBAR, Kardex, ED Summary, OR Summary, Procedure Summary, Intake/Output, MAR and Recent Results.

## 2018-02-09 NOTE — PROGRESS NOTES
Pulmonary / [address] Day: 23     S:    In ICU for resp failure following SDH post evacuation    :  Again unable to extubate secondary to mental status  Family deciding on trach and peg   Palliative care following    :  Placed on SBT  Appears comfortable but no commands followed currently    :  Failed SBT with increased RR  TF  Palliative care following - Partial code - no shocks or CPR    :  Re-intubated on  for AMS / resp distress. Thick secretions in back of throat at time of intubation  Head CT without significant change  Abd CT for abd distension - Small bilateral pleural effusions with underlying consolidation. Gallstone. Sigmoid diverticulosis.  Lesion left hepatic lobe again demonstrated but cannot  be characterized given the lack of intravenous contrast. No acute  intra-abdominal abnormality  Remains sedate on vent  Tolerating TF    :  Was extubated at the end of last week  Currently tachypneic despite bipap with increased work of breathing and accessory muscle use  Mental status is poor  Abd distended with worsening resp distress  Has been having bowel movements     O:    Patient Vitals for the past 4 hrs:   BP Temp Pulse Resp SpO2   18 1100 151/61 - 84 20 95 %   18 1000 161/67 - 72 15 100 %   18 0900 150/71 - 69 16 100 %   18 0819 - - 67 13 98 %   18 0818 - - - - 98 %   18 0800 149/73 99.3 °F (37.4 °C) 68 14 100 %     Temp (24hrs), Av.7 °F (37.1 °C), Min:97.8 °F (36.6 °C), Max:99.3 °F (37.4 °C)      Intake/Output Summary (Last 24 hours) at 18 1129  Last data filed at 18 0935   Gross per 24 hour   Intake             2665 ml   Output             2075 ml   Net              590 ml     Exam:  No distress  Sedate RASS -2  Oral ett  MM dry  Anicteric  No commands followed  Coarse rhonchi bilaterally  RRR  Protuberant with mild distension, hypoactive bs  Warm and dry  Trace edema        Imaging: Images independently viewed  CXR - persistned L>R effusions and basilar atx    Labs:  Recent Results (from the past 12 hour(s))   CBC WITH AUTOMATED DIFF    Collection Time: 02/09/18  3:37 AM   Result Value Ref Range    WBC 5.2 4.1 - 11.1 K/uL    RBC 2.81 (L) 4.10 - 5.70 M/uL    HGB 8.3 (L) 12.1 - 17.0 g/dL    HCT 26.0 (L) 36.6 - 50.3 %    MCV 92.5 80.0 - 99.0 FL    MCH 29.5 26.0 - 34.0 PG    MCHC 31.9 30.0 - 36.5 g/dL    RDW 13.2 11.5 - 14.5 %    PLATELET 191 (L) 413 - 400 K/uL    MPV 12.5 8.9 - 12.9 FL    NRBC 0.0 0  WBC    ABSOLUTE NRBC 0.00 0.00 - 0.01 K/uL    NEUTROPHILS 73 32 - 75 %    LYMPHOCYTES 12 12 - 49 %    MONOCYTES 10 5 - 13 %    EOSINOPHILS 5 0 - 7 %    BASOPHILS 0 0 - 1 %    IMMATURE GRANULOCYTES 1 (H) 0.0 - 0.5 %    ABS. NEUTROPHILS 3.8 1.8 - 8.0 K/UL    ABS. LYMPHOCYTES 0.6 (L) 0.8 - 3.5 K/UL    ABS. MONOCYTES 0.5 0.0 - 1.0 K/UL    ABS. EOSINOPHILS 0.3 0.0 - 0.4 K/UL    ABS. BASOPHILS 0.0 0.0 - 0.1 K/UL    ABS. IMM. GRANS. 0.0 0.00 - 0.04 K/UL    DF AUTOMATED     MAGNESIUM    Collection Time: 02/09/18  3:37 AM   Result Value Ref Range    Magnesium 2.2 1.6 - 2.4 mg/dL   METABOLIC PANEL, COMPREHENSIVE    Collection Time: 02/09/18  3:37 AM   Result Value Ref Range    Sodium 141 136 - 145 mmol/L    Potassium 3.4 (L) 3.5 - 5.1 mmol/L    Chloride 107 97 - 108 mmol/L    CO2 28 21 - 32 mmol/L    Anion gap 6 5 - 15 mmol/L    Glucose 168 (H) 65 - 100 mg/dL    BUN 45 (H) 6 - 20 MG/DL    Creatinine 2.03 (H) 0.70 - 1.30 MG/DL    BUN/Creatinine ratio 22 (H) 12 - 20      GFR est AA 38 (L) >60 ml/min/1.73m2    GFR est non-AA 32 (L) >60 ml/min/1.73m2    Calcium 8.2 (L) 8.5 - 10.1 MG/DL    Bilirubin, total 0.3 0.2 - 1.0 MG/DL    ALT (SGPT) 57 12 - 78 U/L    AST (SGOT) 42 (H) 15 - 37 U/L    Alk.  phosphatase 91 45 - 117 U/L    Protein, total 5.4 (L) 6.4 - 8.2 g/dL    Albumin 1.7 (L) 3.5 - 5.0 g/dL    Globulin 3.7 2.0 - 4.0 g/dL    A-G Ratio 0.5 (L) 1.1 - 2.2     PHOSPHORUS    Collection Time: 02/09/18  3:37 AM   Result Value Ref Range Phosphorus 2.8 2.6 - 4.7 MG/DL   POC G3 - PUL    Collection Time: 02/09/18  5:03 AM   Result Value Ref Range    FIO2 (POC) 30 %    pH (POC) 7.456 (H) 7.35 - 7.45      pCO2 (POC) 36.0 35.0 - 45.0 MMHG    pO2 (POC) 63 (L) 80 - 100 MMHG    HCO3 (POC) 25.4 22 - 26 MMOL/L    sO2 (POC) 93 92 - 97 %    Base excess (POC) 1 mmol/L    Site LEFT RADIAL      Device: VENT      Mode ASSIST CONTROL      Tidal volume 600 ml    Set Rate 12 bpm    PEEP/CPAP (POC) 5 cmH2O    Allens test (POC) YES      Specimen type (POC) ARTERIAL     GLUCOSE, POC    Collection Time: 02/09/18  5:25 AM   Result Value Ref Range    Glucose (POC) 172 (H) 65 - 100 mg/dL    Performed by Devyn Guido, IESHA    Collection Time: 02/09/18  8:14 AM   Result Value Ref Range    Vancomycin, random 16.7 UG/ML   GLUCOSE, POC    Collection Time: 02/09/18 11:01 AM   Result Value Ref Range    Glucose (POC) 168 (H) 65 - 100 mg/dL    Performed by Ashely Amos    POC G3 - PUL    Collection Time: 02/09/18 11:09 AM   Result Value Ref Range    FIO2 (POC) 30 %    pH (POC) 7.481 (H) 7.35 - 7.45      pCO2 (POC) 34.2 (L) 35.0 - 45.0 MMHG    pO2 (POC) 52 (L) 80 - 100 MMHG    HCO3 (POC) 25.5 22 - 26 MMOL/L    sO2 (POC) 89 (L) 92 - 97 %    Base excess (POC) 2 mmol/L    Site DRAWN FROM ARTERIAL LINE      Device: VENT      Mode CPAP/SPON      PEEP/CPAP (POC) 5 cmH2O    Pressure support 5 cmH2O    Allens test (POC) NO      Specimen type (POC) ARTERIAL      Total resp. rate 29         A/P:   1. Acute hypoxemic respiratory failure with increased work of breathing despite bipap - secretions and bronchomalacia likely contributing to increased work of breathing - re-intubated 2/5 - failed SBT 2/7 - mental status limiting ability to extubate  2. SDH s/p craniotomy with evacuation on 1/23/18 after GLF  3. Encephalopathy - off all sedatives  4. 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  MRSA in sputum - completing course of vancomycin  7. SZ  8. Hypernatremia  9. CKD  10. DM  11.  H/o DVT /PE    --Vent support  --Hypernatremia improved   --on cefepime vanc and acyclovir - sputum cx with MRSA - stopped cefepime 2/7 continue vanc to complete course  --Palliative care following - 3rd time re-intubated - now a partial code - will try to clarify family wishes and expectations and plan for another trial of extubation vs going ahead with trach and peg placement  --family to meet again with palliative care on Monday  --continue daily SBT but unless mental status improves markedly doubt he can be extubated    Anne Laughlin MD

## 2018-02-10 NOTE — PROGRESS NOTES
Hospitalist Progress Note  Rosie Cruz MD  Answering service: [phone] from in house phone        Date of Service:  2/10/2018  NAME:  Mya Strauss  :  1935  MRN:  [id number]      Reason for follow up:   sdh    Interval history / Subjective:     No change in mental status     Assessment & Plan:   SDH  Neurosurgery consulted on admission and eliquis held. Mariano Kincaid  underwent left frontotemporoparietal craniotomy.  Placed on bp control with cardene drip and placed on keppra.  EEG on  revealed no seizure activity.    : per neurosurgery. : sutures/staples removed by ns on .  Cont keppra and lacosamide.  NS signed off on .    : cont keprra/lacosamide  02/10: cont keppra/lacosamide, both via peg.       Expressive aphasia and dysarthria  Seen by speech and recommended alternate means of nutrition. : if extubated may need to consider peg.   : family to meet with palliative care on Monday to decide. 02/10: family to meed with palliative care on Monday to decide on peg.       Acute hypoxic respiratory failure  Intubated for surgery then extubated.  Re-intubated on  and then bronched and found to have mucous clot.  Extubated on  and subsequently re-intubated same day, rebronched on  and  that revealed severe tracheobronchomalacia.  Extubated on  and then re-intubated on .  Palliative care consulted given inability to remain extubated.  Code changed to allow intubation but no cpr/shocks.    : defer to [facility] on wean, trach etch.   : unable to extubate so far, family to decide on proceeding with trach or moving to cmo.   : can not be extubated, [facility] following.   02/10: family meeting on Monday for family to decide on further interventions.       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  Placed on acyclovir on : defer to [facility] on length of treatment with acyclovir.        Thrombocytopenia  02/07: recheck in am.   02/08: stable. 02/10: resolving.       Hx Afib on eliquis  Hx dvt/pe on eliquis  Taken off eliquis on admission. Dhaval Leija if he could ever be placed back on any blood thinners given recurrent sdh.       Code Status: can reintubate, no shocks. DVT prophylaxis: scd    Care Plan discussed with : nurse  Disposition: tbd  Estimated date of d/c: tbd    Hospital Problems  Date Reviewed: 1/23/2018          Codes Class Noted POA    * (Principal[facility] ICD-10-CM: I62.00  ICD-9-CM: 432.1  1/23/2018 Unknown                Review of Systems:   Review of Systems   Unable to perform ROS: Medical condition         Vital Signs:    Last 24hrs VS reviewed since prior progress note. Most recent are:  Visit Vitals    /72 (BP 1 Location: Right arm, BP Patient Position: At rest)    Pulse 69    Temp 98.8 °F (37.1 °C)    Resp 14    Ht 6' (1.829 m)    Wt 107.5 kg (237 lb)    SpO2 98%    BMI 32.14 kg/m2         Intake/Output Summary (Last 24 hours) at 02/10/18 1355  Last data filed at 02/10/18 1200   Gross per 24 hour   Intake             3220 ml   Output             1700 ml   Net             1520 ml        Physical Examination:   Physical Exam   Constitutional: He appears unhealthy. No distress. Cardiovascular: Normal rate, regular rhythm and normal heart sounds. No murmur heard. Pulmonary/Chest: Effort normal. He has decreased breath sounds in the right middle field, the right lower field, the left middle field and the left lower field. Abdominal: Soft. Bowel sounds are normal. He exhibits no distension. Musculoskeletal: He exhibits edema. He exhibits no tenderness. Neurological:   Not interactive or alert   Skin: He is not diaphoretic. Vitals reviewed.          Data Review:    Review and/or order of clinical lab test      Labs:     Recent Labs      02/10/18   0347  02/09/18   0337   WBC  6.9  5.2   HGB  8.3*  8.3*   HCT  26.3* 26.0*   PLT  146*  142*     Recent Labs      02/10/18   0347  02/09/18   0337  02/08/18   0406   NA  142  141  142   K  3.4*  3.4*  3.8   CL  108  107  108   CO2  26  28  25   BUN  46*  45*  44*   CREA  2.00*  2.03*  2.11*   GLU  118*  168*  162*   CA  8.6  8.2*  8.5   MG  2.1  2.2  2.3   PHOS  3.1  2.8  4.0     Recent Labs      02/10/18   0347  02/09/18   0337  02/08/18   0406   SGOT  30  42*  37   ALT  53  57  47   AP  86  91  77   TBILI  0.4  0.3  0.3   TP  5.4*  5.4*  5.4*   ALB  1.8*  1.7*  1.6*   GLOB  3.6  3.7  3.8     No results for input(s): INR, PTP, APTT in the last 72 hours. No lab exists for component: INREXT   No results for input(s): FE, TIBC, PSAT, FERR in the last 72 hours. No results found for: FOL, RBCF   No results for input(s): PH, PCO2, PO2 in the last 72 hours. No results for input(s): CPK, CKNDX, TROIQ in the last 72 hours.     No lab exists for component: CPKMB  Lab Results   Component Value Date/Time    Cholesterol, total 118 01/22/2018 11:28 PM    HDL Cholesterol 53 01/22/2018 11:28 PM    LDL, calculated 20 01/22/2018 11:28 PM    Triglyceride 225 (H) 01/22/2018 11:28 PM    CHOL/HDL Ratio 2.2 01/22/2018 11:28 PM     Lab Results   Component Value Date/Time    Glucose (POC) 143 (H) 02/10/2018 11:35 AM    Glucose (POC) 125 (H) 02/10/2018 05:15 AM    Glucose (POC) 188 (H) 02/09/2018 11:08 PM    Glucose (POC) 197 (H) 02/09/2018 05:42 PM    Glucose (POC) 168 (H) 02/09/2018 11:01 AM     Lab Results   Component Value Date/Time    Color Yellow 03/04/2013 09:14 AM    Appearance Cloudy (A) 03/04/2013 09:14 AM    Specific gravity 1.015 03/10/2010 09:45 AM    pH (UA) 7.0 03/04/2013 09:14 AM    Protein NEGATIVE  03/10/2010 09:45 AM    Glucose NEGATIVE  03/10/2010 09:45 AM    Ketone Negative 03/04/2013 09:14 AM    Bilirubin Negative 03/04/2013 09:14 AM    Urobilinogen 0.2 03/10/2010 09:45 AM    Nitrites Negative 03/04/2013 09:14 AM    Leukocyte Esterase Negative 03/04/2013 09:14 AM    Bacteria Few 03/04/2013 09:14 AM    WBC 0-5 03/04/2013 09:14 AM    RBC >30 (A) 03/04/2013 09:14 AM         Medications Reviewed:     Current Facility-Administered Medications   Medication Dose Route Frequency    [START ON 2/11/2018] vancomycin random level Sunday 2/11 with morning labs   Other ONCE    insulin glargine (LANTUS) injection 45 Units  45 Units SubCUTAneous QHS    albuterol-ipratropium (DUO-NEB) 2.5 MG-0.5 MG/3 ML  3 mL Nebulization TID RT    balsam peru-castor oil (VENELEX)  mg/gram ointment   Topical BID    chlorhexidine (PERIDEX) 0.12 % mouthwash 15 mL  15 mL Oral BID    propofol (DIPRIVAN) infusion  0-50 mcg/kg/min IntraVENous TITRATE    Vancomycin - Pharmacy to Dose   Other Rx Dosing/Monitoring    insulin lispro (HUMALOG) injection   SubCUTAneous Q6H    dextrose (D50W) injection syrg 12.5-25 g  12.5-25 g IntraVENous PRN    acyclovir (ZOVIRAX) 575 mg in 0.9% sodium chloride 100 mL IVPB  575 mg IntraVENous Q12H    budesonide (PULMICORT) 500 mcg/2 ml nebulizer suspension  500 mcg Nebulization BID RT    albuterol-ipratropium (DUO-NEB) 2.5 MG-0.5 MG/3 ML  3 mL Nebulization Q4H PRN    levETIRAcetam (KEPPRA) oral solution 1,000 mg  1,000 mg Per NG tube Q12H    lacosamide (VIMPAT) tablet 100 mg  100 mg Per NG tube Q12H    sodium chloride (NS) flush 5-10 mL  5-10 mL IntraVENous Q8H    sodium chloride (NS) flush 5-10 mL  5-10 mL IntraVENous PRN    midazolam (VERSED) injection 5 mg  5 mg IntraVENous Multiple    fentaNYL citrate (PF) injection 25 mcg  25 mcg IntraVENous Q1H PRN    famotidine (PEPCID) 40 mg/5 mL (8 mg/mL) oral suspension 20 mg  20 mg Per NG tube DAILY    bacitracin 500 unit/gram packet 1 Packet  1 Packet Topical PRN    polyvinyl alcohol (LIQUIFILM TEARS) 1.4 % ophthalmic solution 2 Drop  2 Drop Both Eyes Q8H    sodium chloride (NS) flush 10-30 mL  10-30 mL InterCATHeter PRN    sodium chloride (NS) flush 10 mL  10 mL InterCATHeter Q24H    sodium chloride (NS) flush 10 mL  10 mL InterCATHeter PRN    sodium chloride (NS) flush 10-40 mL  10-40 mL InterCATHeter Q8H    sodium chloride (NS) flush 20 mL  20 mL InterCATHeter Q24H    alteplase (CATHFLO) 1 mg in sterile water (preservative free) 1 mL injection  1 mg InterCATHeter PRN    atorvastatin (LIPITOR) tablet 40 mg  40 mg Per NG tube DAILY    naloxone (NARCAN) injection 0.4 mg  0.4 mg IntraVENous PRN    acetaminophen (TYLENOL) tablet 650 mg  650 mg Oral Q4H PRN    Or    acetaminophen (TYLENOL) solution 650 mg  650 mg Per NG tube Q4H PRN    Or    acetaminophen (TYLENOL) suppository 650 mg  650 mg Rectal Q4H PRN    glucose chewable tablet 16 g  4 Tab Oral PRN    glucagon (GLUCAGEN) injection 1 mg  1 mg IntraMUSCular PRN    hydrALAZINE (APRESOLINE) 20 mg/mL injection 10 mg  10 mg IntraVENous Q4H PRN    sodium chloride (NS) flush 5-10 mL  5-10 mL IntraVENous Q8H    sodium chloride (NS) flush 5-10 mL  5-10 mL IntraVENous PRN    acetaminophen (TYLENOL) tablet 650 mg  650 mg Oral Q4H PRN    HYDROcodone-acetaminophen (NORCO) 5-325 mg per tablet 1 Tab  1 Tab Oral Q4H PRN    ondansetron (ZOFRAN) injection 4 mg  4 mg IntraVENous Q4H PRN     ______________________________________________________________________  EXPECTED LENGTH OF STAY: 7d 0h  ACTUAL LENGTH OF STAY:          18                 Elina Mccullough MD

## 2018-02-10 NOTE — PROGRESS NOTES
Problem: Falls - Risk of  Goal: *Absence of Falls  Document Arleen Fall Risk and appropriate interventions in the flowsheet.    Outcome: Progressing Towards Goal  Fall Risk Interventions:       Mentation Interventions: Adequate sleep, hydration, pain control, Bed/chair exit alarm, Door open when patient unattended, Evaluate medications/consider consulting pharmacy, Increase mobility, More frequent rounding, Reorient patient, Room close to nurse's station, Toileting rounds, Update white board    Medication Interventions: Assess postural VS orthostatic hypotension, Evaluate medications/consider consulting pharmacy, Bed/chair exit alarm, Patient to call before getting OOB, Teach patient to arise slowly    Elimination Interventions: Bed/chair exit alarm, Call light in reach, Toileting schedule/hourly rounds    History of Falls Interventions: Bed/chair exit alarm, Consult care management for discharge planning, Door open when patient unattended, Evaluate medications/consider consulting pharmacy, Investigate reason for fall, Room close to nurse's station

## 2018-02-10 NOTE — PROGRESS NOTES
Pulmonary / [address] Day: 20     S:    In ICU for resp failure following SDH post evacuation  2/10  Patient was seen and examined earlier today. He continues to be encephalopathic. Chart reviewed. Noted plans to meet with palliative care for goals of care discussion. He is at high risk for extubation failure given presence of bronchomalacia    :  Again unable to extubate secondary to mental status  Family deciding on trach and peg   Palliative care following    :  Placed on SBT  Appears comfortable but no commands followed currently    :  Failed SBT with increased RR  TF  Palliative care following - Partial code - no shocks or CPR    :  Re-intubated on  for AMS / resp distress. Thick secretions in back of throat at time of intubation  Head CT without significant change  Abd CT for abd distension - Small bilateral pleural effusions with underlying consolidation. Gallstone. Sigmoid diverticulosis.  Lesion left hepatic lobe again demonstrated but cannot  be characterized given the lack of intravenous contrast. No acute  intra-abdominal abnormality  Remains sedate on vent  Tolerating TF    :  Was extubated at the end of last week  Currently tachypneic despite bipap with increased work of breathing and accessory muscle use  Mental status is poor  Abd distended with worsening resp distress  Has been having bowel movements     O:    Patient Vitals for the past 4 hrs:   BP Temp Pulse Resp SpO2   02/10/18 1800 - - 70 14 95 %   02/10/18 1700 151/68 - 69 16 100 %   02/10/18 1600 147/68 99.8 °F (37.7 °C) 68 15 97 %   02/10/18 1542 - - 63 13 99 %   02/10/18 1500 136/61 - 66 12 100 %     Temp (24hrs), Av.1 °F (37.3 °C), Min:98.4 °F (36.9 °C), Max:99.8 °F (37.7 °C)      Intake/Output Summary (Last 24 hours) at 02/10/18 1824  Last data filed at 02/10/18 1800   Gross per 24 hour   Intake             3085 ml   Output             1800 ml   Net             1285 ml     Exam:  No distress  Sedate RASS -2  Oral ett  MM dry  Anicteric  No commands followed  Coarse rhonchi bilaterally  RRR  Protuberant with mild distension, hypoactive bs  Warm and dry  Trace edema        Imaging: Images independently viewed  CXR - persistned L>R effusions and basilar atx    Labs:  Recent Results (from the past 12 hour(s))   GLUCOSE, POC    Collection Time: 02/10/18 11:35 AM   Result Value Ref Range    Glucose (POC) 143 (H) 65 - 100 mg/dL    Performed by [address] [place] [address], POC    Collection Time: 02/10/18  5:30 PM   Result Value Ref Range    Glucose (POC) 147 (H) 65 - 100 mg/dL    Performed by Anthony Hu        A/P:   1. Acute hypoxemic respiratory failure with increased work of breathing despite bipap - secretions and bronchomalacia likely contributing to increased work of breathing - re-intubated 2/5 - failed SBT 2/7 - mental status limiting ability to extubate  2. SDH s/p craniotomy with evacuation on 1/23/18 after GLF  3. Encephalopathy - off all sedatives  4. 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 MRSA in sputum - completing course of vancomycin  7. SZ  8. Hypernatremia  9. CKD  10. DM  11.  H/o DVT /PE    --Vent support  --Hypernatremia improved   --on cefepime vanc and acyclovir - sputum cx with MRSA - stopped cefepime 2/7 continue vanc to complete course  --Palliative care following - 3rd time re-intubated - now a partial code - will try to clarify family wishes and expectations and plan for another trial of extubation vs going ahead with trach and peg placement  --family to meet again with palliative care on Monday  --continue daily SBT but unless mental status improves markedly doubt he can be extubated: given high risk of extubation failure with underlying bronchomalacia, would prefer decision about of goals of care first    Medical decision making:   I have reviewed the flowsheet and previous day's notes  Acute or chronic illness that poses a threat to life or bodily function  Review and order of Clinical lab tests  Review and Order of Radiology tests  Independent visualization of Image  Reviewed Ventilator / NiPPV  Reviewed high risk medications/sedatives    Elzbieta Lema MD

## 2018-02-10 NOTE — PROGRESS NOTES
1930: Bedside and Verbal shift change report given to [address] [place] [address] ISA Malave (oncoming nurse) by Juve Hewitt RN (offgoing nurse). Report included the following information SBAR, Kardex, Intake/Output, MAR, Recent Results, Cardiac Rhythm SR and Alarm Parameters . 2000: Assessment completed. Patient on ventilator, eyes open to voice, no command following, pupils equal and reactive, moves BUE spontaneously, BLE withdraw to stimuli. VSS, afebrile, OGT with tube feed infusing, PICC, PIV, urinary catheter draining. 0000: Urostomy bag leaking and Mepelex on posterior scrotum soiled, both areas cleaned and new dressings placed. Reassessment completed, no changes. 0400: Labs drawn per order. Reassessment completed, no changes. Shift Summary: Remains the same neurologically, on ventilator, VSS, afebrile. OGT infusing tube feeds, PICC, PIV, urinary catheter draining adequate UOP.     0730: Bedside and Verbal shift change report given to Juve Hewitt RN (oncoming nurse) by [address] [place] [address] ISA Malave (offgoing nurse). Report included the following information SBAR, Kardex, Intake/Output, MAR, Recent Results, Cardiac Rhythm SR and Alarm Parameters .

## 2018-02-10 NOTE — PROGRESS NOTES
0800 Bedside and Verbal shift change report given to Keith Bain RN (oncoming nurse) by Ra Talamantes RN (offgoing nurse). Report included the following information SBAR, Kardex, ED Summary, OR Summary, Procedure Summary, Intake/Output, MAR and Recent Results. 0800 Assumed care of pt. Pt opens eyes to voice, moves upper extremities spontaneously; lower extremities withdraw to stimuli. No command following. 1000 Dr. Chandrakant Monroe updated on pt status. No SBT today due to neuro status and pending care decisions. 1400 Dr. Celia Hernandez at bedside to evaluate pt.  1415 Pt found with OGT at 20cm, readvanced by RN. KUB ordered to confirm placement. 1530 OGT placement confirmed TF restarted. 1600 Pt following commands on L side. Pt squeezes RN's hand to command, holds up two fingers to command, wiggles toes to command. Pt withdraws on R; has some moderate weak, spontaneous, nonpurposeful movement to RUE.    2000 Bedside and Verbal shift change report given to Ashwini La RN (oncoming nurse) by Keith Bain RN (offgoing nurse). Report included the following information SBAR, Kardex, ED Summary, OR Summary, Procedure Summary, Intake/Output, MAR and Recent Results.

## 2018-02-11 NOTE — PROGRESS NOTES
Hospitalist Progress Note  Val Clarke MD  Answering service: [phone] from in house phone        Date of Service:  2018  NAME:  Duc Fatima  :  1935  MRN:  [id number]      Reason for follow up:   sdh    Interval history / Subjective:     Fevers noted overnight     Assessment & Plan:   SDH  Neurosurgery consulted on admission and elisvetlanais held. Wing Walker  underwent left frontotemporoparietal craniotomy.  Placed on bp control with cardene drip and placed on keppra.  EEG on  revealed no seizure activity.    : per neurosurgery. : sutures/staples removed by ns on .  Cont keppra and lacosamide.  NS signed off on .    : cont keprra/lacosamide  02/10: cont keppra/lacosamide, both via peg.   : no change in management      Expressive aphasia and dysarthria  Seen by speech and recommended alternate means of nutrition. : if extubated may need to consider peg.   : family to meet with palliative care on Monday to decide. 02/10: family to meet with palliative care on Monday to decide on peg.       Acute hypoxic respiratory failure  Intubated for surgery then extubated.  Re-intubated on  and then bronched and found to have mucous clot.  Extubated on  and subsequently re-intubated same day, rebronched on  and  that revealed severe tracheobronchomalacia.  Extubated on  and then re-intubated on .  Palliative care consulted given inability to remain extubated.  Code changed to allow intubation but no cpr/shocks.    : defer to [facility] on wean, trach etch.   : unable to extubate so far, family to decide on proceeding with trach or moving to cmo.   : can not be extubated, [facility] following.   02/10: family meeting on Monday for family to decide on further interventions.       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  Placed on acyclovir on : defer to [facility] on length of treatment with acyclovir.        Thrombocytopenia  02/07: recheck in am.   02/08: stable. 02/10: resolving.       Hx Afib on eliquis  Hx dvt/pe on eliquis  Taken off eliquis on admission. Ramila Wisam if he could ever be placed back on any blood thinners given recurrent sdh.       Code Status: can reintubate, no shocks. DVT prophylaxis: scd    Care Plan discussed with : nursing  Disposition: tbd  Estimated date of d/c: tbd    Hospital Problems  Date Reviewed: 1/23/2018          Codes Class Noted POA    * (Principal[facility] ICD-10-CM: I62.00  ICD-9-CM: 432.1  1/23/2018 Unknown                Review of Systems:   Review of Systems   Unable to perform ROS: Medical condition         Vital Signs:    Last 24hrs VS reviewed since prior progress note. Most recent are:  Visit Vitals    /63 (BP 1 Location: Right arm, BP Patient Position: During activity)    Pulse 70    Temp 100.1 °F (37.8 °C)    Resp 20    Ht 6' (1.829 m)    Wt 105.6 kg (232 lb 14.4 oz)    SpO2 98%    BMI 31.59 kg/m2         Intake/Output Summary (Last 24 hours) at 02/11/18 1255  Last data filed at 02/11/18 1200   Gross per 24 hour   Intake             2815 ml   Output             2250 ml   Net              565 ml        Physical Examination:   Physical Exam   Constitutional: He appears unhealthy. He has a sickly appearance. No distress. HENT:   Intubated     Cardiovascular: Normal rate, regular rhythm and normal heart sounds. Pulmonary/Chest: Effort normal. He has decreased breath sounds in the right lower field and the left lower field. Abdominal: Soft. Bowel sounds are normal. He exhibits no distension. Musculoskeletal: He exhibits edema. Neurological:   Opens eyes but does not track or interact     Skin: He is not diaphoretic. Vitals reviewed.          Data Review:    Review and/or order of clinical lab test      Labs:     Recent Labs      02/11/18   0405  02/10/18   0347   WBC  7.3  6.9   HGB 9. 3*  8.3*   HCT  28.6*  26.3*   PLT  148*  146*     Recent Labs      02/11/18   0405  02/10/18   0347  02/09/18   0337   NA  144  142  141   K  3.7  3.4*  3.4*   CL  109*  108  107   CO2  28  26  28   BUN  45*  46*  45*   CREA  2.07*  2.00*  2.03*   GLU  87  118*  168*   CA  8.7  8.6  8.2*   MG  2.2  2.1  2.2   PHOS  2.8  3.1  2.8     Recent Labs      02/11/18   0405  02/10/18   0347  02/09/18   0337   SGOT  44*  30  42*   ALT  60  53  57   AP  102  86  91   TBILI  0.3  0.4  0.3   TP  5.8*  5.4*  5.4*   ALB  2.1*  1.8*  1.7*   GLOB  3.7  3.6  3.7     No results for input(s): INR, PTP, APTT in the last 72 hours. No lab exists for component: INREXT   No results for input(s): FE, TIBC, PSAT, FERR in the last 72 hours. No results found for: FOL, RBCF   No results for input(s): PH, PCO2, PO2 in the last 72 hours. No results for input(s): CPK, CKNDX, TROIQ in the last 72 hours.     No lab exists for component: CPKMB  Lab Results   Component Value Date/Time    Cholesterol, total 118 01/22/2018 11:28 PM    HDL Cholesterol 53 01/22/2018 11:28 PM    LDL, calculated 20 01/22/2018 11:28 PM    Triglyceride 225 (H) 01/22/2018 11:28 PM    CHOL/HDL Ratio 2.2 01/22/2018 11:28 PM     Lab Results   Component Value Date/Time    Glucose (POC) 117 (H) 02/11/2018 12:08 PM    Glucose (POC) 81 02/11/2018 05:45 AM    Glucose (POC) 145 (H) 02/11/2018 12:45 AM    Glucose (POC) 147 (H) 02/10/2018 05:30 PM    Glucose (POC) 143 (H) 02/10/2018 11:35 AM     Lab Results   Component Value Date/Time    Color Yellow 03/04/2013 09:14 AM    Appearance Cloudy (A) 03/04/2013 09:14 AM    Specific gravity 1.015 03/10/2010 09:45 AM    pH (UA) 7.0 03/04/2013 09:14 AM    Protein NEGATIVE  03/10/2010 09:45 AM    Glucose NEGATIVE  03/10/2010 09:45 AM    Ketone Negative 03/04/2013 09:14 AM    Bilirubin Negative 03/04/2013 09:14 AM    Urobilinogen 0.2 03/10/2010 09:45 AM    Nitrites Negative 03/04/2013 09:14 AM    Leukocyte Esterase Negative 03/04/2013 09:14 AM    Bacteria Few 03/04/2013 09:14 AM    WBC 0-5 03/04/2013 09:14 AM    RBC >30 (A) 03/04/2013 09:14 AM         Medications Reviewed:     Current Facility-Administered Medications   Medication Dose Route Frequency    [START ON 2/12/2018] Vancomycin- Please draw random vancomycin level 2/12 with AM labs, thanks   Other ONCE    insulin glargine (LANTUS) injection 45 Units  45 Units SubCUTAneous QHS    albuterol-ipratropium (DUO-NEB) 2.5 MG-0.5 MG/3 ML  3 mL Nebulization TID RT    balsam peru-castor oil (VENELEX)  mg/gram ointment   Topical BID    chlorhexidine (PERIDEX) 0.12 % mouthwash 15 mL  15 mL Oral BID    propofol (DIPRIVAN) infusion  0-50 mcg/kg/min IntraVENous TITRATE    Vancomycin - Pharmacy to Dose   Other Rx Dosing/Monitoring    insulin lispro (HUMALOG) injection   SubCUTAneous Q6H    dextrose (D50W) injection syrg 12.5-25 g  12.5-25 g IntraVENous PRN    acyclovir (ZOVIRAX) 575 mg in 0.9% sodium chloride 100 mL IVPB  575 mg IntraVENous Q12H    budesonide (PULMICORT) 500 mcg/2 ml nebulizer suspension  500 mcg Nebulization BID RT    albuterol-ipratropium (DUO-NEB) 2.5 MG-0.5 MG/3 ML  3 mL Nebulization Q4H PRN    levETIRAcetam (KEPPRA) oral solution 1,000 mg  1,000 mg Per NG tube Q12H    lacosamide (VIMPAT) tablet 100 mg  100 mg Per NG tube Q12H    sodium chloride (NS) flush 5-10 mL  5-10 mL IntraVENous Q8H    sodium chloride (NS) flush 5-10 mL  5-10 mL IntraVENous PRN    midazolam (VERSED) injection 5 mg  5 mg IntraVENous Multiple    fentaNYL citrate (PF) injection 25 mcg  25 mcg IntraVENous Q1H PRN    famotidine (PEPCID) 40 mg/5 mL (8 mg/mL) oral suspension 20 mg  20 mg Per NG tube DAILY    bacitracin 500 unit/gram packet 1 Packet  1 Packet Topical PRN    polyvinyl alcohol (LIQUIFILM TEARS) 1.4 % ophthalmic solution 2 Drop  2 Drop Both Eyes Q8H    sodium chloride (NS) flush 10-30 mL  10-30 mL InterCATHeter PRN    sodium chloride (NS) flush 10 mL  10 mL InterCATHeter Q24H    sodium chloride (NS) flush 10 mL  10 mL InterCATHeter PRN    sodium chloride (NS) flush 10-40 mL  10-40 mL InterCATHeter Q8H    sodium chloride (NS) flush 20 mL  20 mL InterCATHeter Q24H    alteplase (CATHFLO) 1 mg in sterile water (preservative free) 1 mL injection  1 mg InterCATHeter PRN    atorvastatin (LIPITOR) tablet 40 mg  40 mg Per NG tube DAILY    naloxone (NARCAN) injection 0.4 mg  0.4 mg IntraVENous PRN    acetaminophen (TYLENOL) tablet 650 mg  650 mg Oral Q4H PRN    Or    acetaminophen (TYLENOL) solution 650 mg  650 mg Per NG tube Q4H PRN    Or    acetaminophen (TYLENOL) suppository 650 mg  650 mg Rectal Q4H PRN    glucose chewable tablet 16 g  4 Tab Oral PRN    glucagon (GLUCAGEN) injection 1 mg  1 mg IntraMUSCular PRN    hydrALAZINE (APRESOLINE) 20 mg/mL injection 10 mg  10 mg IntraVENous Q4H PRN    sodium chloride (NS) flush 5-10 mL  5-10 mL IntraVENous Q8H    sodium chloride (NS) flush 5-10 mL  5-10 mL IntraVENous PRN    acetaminophen (TYLENOL) tablet 650 mg  650 mg Oral Q4H PRN    HYDROcodone-acetaminophen (NORCO) 5-325 mg per tablet 1 Tab  1 Tab Oral Q4H PRN    ondansetron (ZOFRAN) injection 4 mg  4 mg IntraVENous Q4H PRN     ______________________________________________________________________  EXPECTED LENGTH OF STAY: 7d 0h  ACTUAL LENGTH OF STAY:          19                 Carley Lovelace MD

## 2018-02-11 NOTE — PROGRESS NOTES
0730: Bedside and Verbal shift change report given to Talya Alvarez RN (oncoming nurse) by Delana Lefort, RN (offgoing nurse). Report included the following information SBAR, Kardex, Intake, Output, MAR and Recent Results. 0800:  Assessment complete. Patient opens eyes to voice, but no command following, withdraws on all 4 extremities, pupils brisk at 3. Patient ventilated, but not sedated. 1200:  Reassessment complete. No new issues. Neurologically the same. 1600:  Reassessment complete. No new issues. Neurologically the same. Fever of 102, tylenol given. 1930:  Bedside and Verbal shift change report given to Vianey Travis RN (oncoming nurse) by Talya Alvarez RN (offgoing nurse). Report included the following information SBAR, Kardex, Intake/Output, MAR, Recent Results and Cardiac Rhythm NSR. Shift Summary:  Uneventful shift. Patient remains obtunded, off sedation. No command following, opens eyes to voice with no tracking or focusing. Adequate urine output, tube feeds at goal and tolerated. Patient febrile, tylenol given. Family updated on patient condition. Plan for palliative meeting Monday, time to be determined.

## 2018-02-11 NOTE — ROUTINE PROCESS
1930:  Bedside and Verbal shift change report given to Carmel Bernard RN (oncoming nurse) by Elin Sierra RN (offgoing nurse). Report included the following information SBAR, Kardex, ED Summary, OR Summary, Procedure Summary, Intake/Output, MAR, Accordion, Recent Results, Med Rec Status and Cardiac Rhythm sinus ty. 0730: Bedside and Verbal shift change report given to Kayden Joshi RN (oncoming nurse) by Carmel Bernard RN (offgoing nurse).  Report included the following information SBAR, Kardex, ED Summary, OR Summary, Procedure Summary, Intake/Output, MAR, Accordion, Recent Results, Med Rec Status and Cardiac Rhythm sinus rhythm. '

## 2018-02-11 NOTE — PROGRESS NOTES
Pulmonary / [place] [address] Day: 24     S:    In ICU for resp failure following SDH post evacuation      Patient was seen and examined earlier today. He continues to be encephalopathic. Palliative care meeting with the family is planned tomorrow. He has failed extubation several times and if continued care is planned, he will need a trach and a PEG    2/10  Patient was seen and examined earlier today. He continues to be encephalopathic. Chart reviewed. Noted plans to meet with palliative care for goals of care discussion. He is at high risk for extubation failure given presence of bronchomalacia    :  Again unable to extubate secondary to mental status  Family deciding on trach and peg   Palliative care following    :  Placed on SBT  Appears comfortable but no commands followed currently    :  Failed SBT with increased RR  TF  Palliative care following - Partial code - no shocks or CPR    :  Re-intubated on  for AMS / resp distress. Thick secretions in back of throat at time of intubation  Head CT without significant change  Abd CT for abd distension - Small bilateral pleural effusions with underlying consolidation. Gallstone. Sigmoid diverticulosis.  Lesion left hepatic lobe again demonstrated but cannot  be characterized given the lack of intravenous contrast. No acute  intra-abdominal abnormality  Remains sedate on vent  Tolerating TF    :  Was extubated at the end of last week  Currently tachypneic despite bipap with increased work of breathing and accessory muscle use  Mental status is poor  Abd distended with worsening resp distress  Has been having bowel movements     O:    Patient Vitals for the past 4 hrs:   BP Temp Pulse Resp SpO2   18 1400 155/70 - 80 17 98 %   18 1353 - - - - 100 %   18 1300 149/60 - 71 14 97 %   18 1200 143/63 100.1 °F (37.8 °C) 70 20 98 %   18 1148 - - 62 14 98 %     Temp (24hrs), Av.3 °F (37.9 °C), Min:99.8 °F (37.7 °C), Max:101 °F (38.3 °C)      Intake/Output Summary (Last 24 hours) at 02/11/18 1506  Last data filed at 02/11/18 1400   Gross per 24 hour   Intake             2915 ml   Output             2250 ml   Net              665 ml     Exam:  No distress  Sedate RASS -2  Oral ett  MM dry  Anicteric  No commands followed  Coarse rhonchi bilaterally  RRR  Protuberant with mild distension, hypoactive bs  Warm and dry  Trace edema        Imaging: Images independently viewed  CXR - persistned L>R effusions and basilar atx    Labs:  Recent Results (from the past 12 hour(s))   VANCOMYCIN, RANDOM    Collection Time: 02/11/18  4:05 AM   Result Value Ref Range    Vancomycin, random 16.6 UG/ML   CBC WITH AUTOMATED DIFF    Collection Time: 02/11/18  4:05 AM   Result Value Ref Range    WBC 7.3 4.1 - 11.1 K/uL    RBC 3.12 (L) 4.10 - 5.70 M/uL    HGB 9.3 (L) 12.1 - 17.0 g/dL    HCT 28.6 (L) 36.6 - 50.3 %    MCV 91.7 80.0 - 99.0 FL    MCH 29.8 26.0 - 34.0 PG    MCHC 32.5 30.0 - 36.5 g/dL    RDW 13.5 11.5 - 14.5 %    PLATELET 002 (L) 465 - 400 K/uL    MPV 12.7 8.9 - 12.9 FL    NRBC 0.0 0  WBC    ABSOLUTE NRBC 0.00 0.00 - 0.01 K/uL    NEUTROPHILS 78 (H) 32 - 75 %    LYMPHOCYTES 9 (L) 12 - 49 %    MONOCYTES 8 5 - 13 %    EOSINOPHILS 5 0 - 7 %    BASOPHILS 0 0 - 1 %    IMMATURE GRANULOCYTES 1 (H) 0.0 - 0.5 %    ABS. NEUTROPHILS 5.7 1.8 - 8.0 K/UL    ABS. LYMPHOCYTES 0.7 (L) 0.8 - 3.5 K/UL    ABS. MONOCYTES 0.6 0.0 - 1.0 K/UL    ABS. EOSINOPHILS 0.3 0.0 - 0.4 K/UL    ABS. BASOPHILS 0.0 0.0 - 0.1 K/UL    ABS. IMM.  GRANS. 0.0 0.00 - 0.04 K/UL    DF AUTOMATED     METABOLIC PANEL, COMPREHENSIVE    Collection Time: 02/11/18  4:05 AM   Result Value Ref Range    Sodium 144 136 - 145 mmol/L    Potassium 3.7 3.5 - 5.1 mmol/L    Chloride 109 (H) 97 - 108 mmol/L    CO2 28 21 - 32 mmol/L    Anion gap 7 5 - 15 mmol/L    Glucose 87 65 - 100 mg/dL    BUN 45 (H) 6 - 20 MG/DL    Creatinine 2.07 (H) 0.70 - 1.30 MG/DL    BUN/Creatinine ratio 22 (H) 12 - 20 GFR est AA 38 (L) >60 ml/min/1.73m2    GFR est non-AA 31 (L) >60 ml/min/1.73m2    Calcium 8.7 8.5 - 10.1 MG/DL    Bilirubin, total 0.3 0.2 - 1.0 MG/DL    ALT (SGPT) 60 12 - 78 U/L    AST (SGOT) 44 (H) 15 - 37 U/L    Alk. phosphatase 102 45 - 117 U/L    Protein, total 5.8 (L) 6.4 - 8.2 g/dL    Albumin 2.1 (L) 3.5 - 5.0 g/dL    Globulin 3.7 2.0 - 4.0 g/dL    A-G Ratio 0.6 (L) 1.1 - 2.2     MAGNESIUM    Collection Time: 02/11/18  4:05 AM   Result Value Ref Range    Magnesium 2.2 1.6 - 2.4 mg/dL   PHOSPHORUS    Collection Time: 02/11/18  4:05 AM   Result Value Ref Range    Phosphorus 2.8 2.6 - 4.7 MG/DL   GLUCOSE, POC    Collection Time: 02/11/18  5:45 AM   Result Value Ref Range    Glucose (POC) 81 65 - 100 mg/dL    Performed by [address], POC    Collection Time: 02/11/18 12:08 PM   Result Value Ref Range    Glucose (POC) 117 (H) 65 - 100 mg/dL    Performed by Renie Leventhal        A/P:   1. Acute hypoxemic respiratory failure with increased work of breathing despite bipap - secretions and bronchomalacia likely contributing to increased work of breathing - re-intubated 2/5 - failed SBT 2/7 - mental status limiting ability to extubate  2. SDH s/p craniotomy with evacuation on 1/23/18 after GLF  3. Encephalopathy - off all sedatives  4. 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 MRSA in sputum - completing course of vancomycin  7. SZ  8. Hypernatremia  9. CKD  10. DM  11.  H/o DVT /PE    --Vent support  --Hypernatremia improved   --on cefepime vanc and acyclovir - sputum cx with MRSA - stopped cefepime 2/7 continue vanc to complete course  --Palliative care following   --family to meet again with palliative care on Monday  --continue daily SBT but unless mental status improves markedly doubt he can be extubated: given high risk of extubation failure with underlying bronchomalacia, would prefer decision about of goals of care first    Medical decision making:   I have reviewed the flowsheet and previous day's notes  Acute or chronic illness that poses a threat to life or bodily function  Review and order of Clinical lab tests  Review and Order of Radiology tests  Independent visualization of Image  Reviewed Ventilator / NiPPV  Reviewed high risk medications/sedatives    Sintia Hummel MD

## 2018-02-12 NOTE — PROGRESS NOTES
0730: Bedside and Verbal shift change report given to Jaqueline Edge RN (oncoming nurse) by Irene Quigley RN (offgoing nurse). Report included the following information SBAR, Kardex, Intake/Output, MAR, Recent Results and Cardiac Rhythm NSR.     0800:  Assessment complete. Patient not interactive, not sedated, on the ventilator. Opens eyes to voice, no command following, no tracking, no focusing. 0900: Son and daughter updated with palliative care meeting time of 1330. They will attend along with patient's girlfriend and best friend. 2185:  Dr. Aguila Beat at bedside and updated on patient condition. 1200:  Reassessment complete. No new issues. Patient remains neurologically unchanged. 1345:  Participated in palliative meeting with palliative team and family. 1500:  EEG tech at bedside for procedure. 1600:  Reassessment complete. No new issues. Patient remains neurologically unchanged. 1930:  Bedside and Verbal shift change report given to Antonette Ontiveros RN (oncoming nurse) by Jaquelnie Edge RN (offgoing nurse). Report included the following information SBAR, Kardex, Intake/Output, MAR, Recent Results and Cardiac Rhythm NSR. Shift Summary:  Uneventful shift. Palliative meeting with family, care decisions pending.

## 2018-02-12 NOTE — PROGRESS NOTES
Palliative Medicine Consult  Stephon: [phone] ([phone]    Patient Name: Franck Young  YOB: 1935    Date of Initial Consult:2/6/18  Reason for Consult: Care decisions   Requesting Provider: Aleks  Primary Care Physician: Juma Allen MD      ** Please note that my original note from 2-12-14 was addended today to today's note. I cannot retrieve original note per [facility] unfortunately **   SUMMARY:   Franck Young is a 81 y.o. with a past history of bladder, prostate, and colon cancer (recently dx colon CA); DM; HTN; CKD; GERD; ischemic CVA 2016; neck pain; PE/DVT on Eliquis and ASA who was admitted on 1/22/2018 from home with aphasia, dysarthria x 2 days. Had a fall off a friend's porch about 2 months ago, CT head at OSH w/out acute findings. CT head upon admission w/ large L SDH w/ 8mm midline shift. S/p craniotomy and evacuation of SDH on 1/24/18. 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 Also w/ bronchomalacia. After surgery reintubated 1/26, extubated 1/30, reintubated 1/30, extubated on 2/2, reintubated 2/5. Bronchs 1/26, 1/31, 2/2. Has had waxing/waning mental status, notes indicate was awake and answering questions on 2/2. Witnessed seizure, none seen on EEG 1/31. Off propofol since 2/7, no sedating meds either since that time. Current medical issues leading to Palliative Medicine involvement include: care decisions. Children:Ford (primary mPOA), Minh Sentdeven and Maxwell Pretty. Maxwell Pretty having the hardest time, the other two have been up to visit on regular basis. Girlfriend is Cale. Best friend is Nicole Ennis. PALLIATIVE DIAGNOSES:   1. Resp failure, required mult intubations, mucous plugging, HSV bronchitis   2. Debility due to medical issues  3. AMS/somnolence        PLAN:     1. Pt more awake today!  Still only intermittently awake and is somnolent, but yest night and this morning w/ family and w/ staff- pt awakens to voice, squeezes hand on command and tracking. 2. Meet w/ dtr Rose Marie Rica who just talked to her brother Monet Valerio. Pleased that they are seeing these neuro findings, but cautiously optimistic since they have seen him this alert before. Could have been very slow clearance of meds, delirium, and combination of everything. 3. Family still very glad that we spoke about all the \"what ifs\" yesterday incl trach/PEG versus compassionate extubation. They also are still worried that a trach/PEG will lead to long term care forever , and pt would not be happy about this. 4. Do recommend trach/PEG as we see pt more awake and can pass his SBTs-  PCCM thinks that trach should be next step rather than extubation at this point. Certainly pt not out of the woods, so to speak. Still has high risk of not being able to ever live on his own again, or out of a facility- but glad he's more alert. 5. Also share w/ family that if they are not seeing these interventions as a bridge to recovery (eg if has acute decline, if plateaus, if in/out of the hospital)- that they can always decide to shift to comfort measures/hospice. Just because a patient has a trach, for example, doesn't mean you have to put back on the vent. 6. PCCM to speak to family about these next interventions in more detail. 7. Following. 8. Plan: Pt more awake, plan for tracheostomy after PCCM speaks w/ family,   9. Communicated plan of care with: Palliative IDT; Flynn Gomez RN; Dr Liliana Muse; Tyrone Rosales     GOALS OF CARE / TREATMENT PREFERENCES:     GOALS OF CARE:  Patient/Health Care Proxy Stated Goals:  Other (comment) Recovery as possible       TREATMENT PREFERENCES:   Code Status: Partial Code    Advance Care Planning:  Advance Care Planning 2/6/2018   Patient's Healthcare Decision Maker is: Named in scanned ACP document   Primary Decision Maker Name Molly Jefferson   Primary Decision Maker Phone Number [phone]   Primary Decision Maker Relationship to Patient Adult child   Secondary Decision Maker Name [facility]    Secondary Decision Maker Phone Number [phone]   Secondary Decision Maker Relationship to Patient Adult child   Confirm Advance Directive -   Patient Would Like to Complete Advance Directive -   Does the patient have other document types Do Not Resuscitate; Power of        Medical Interventions: Full interventions           Other:    As far as possible, the palliative care team has discussed with patient / health care proxy about goals of care / treatment preferences for patient. HISTORY:         HPI/SUBJECTIVE:    The patient is:   [] Verbal and participatory  [x] Non-participatory due to:     Pt is awake to voice, tracking w/ eyes, squeezing hand. Cannot speak due to intubation.       Clinical Pain Assessment (nonverbal scale for severity on nonverbal patients):   Clinical Pain Assessment  Severity: 0     Activity (Movement): Lying quietly, normal position    Duration: for how long has pt been experiencing pain (e.g., 2 days, 1 month, years)  Frequency: how often pain is an issue (e.g., several times per day, once every few days, constant)     FUNCTIONAL ASSESSMENT:     Palliative Performance Scale (PPS):  PPS: 20     PSYCHOSOCIAL/SPIRITUAL SCREENING:     Palliative IDT has assessed this patient for cultural preferences / practices and a referral made as appropriate to needs (Cultural Services, Patient Advocacy, Ethics, etc.)    Advance Care Planning:  Advance Care Planning 2/6/2018   Patient's Healthcare Decision Maker is: Named in scanned ACP document   Primary Decision Maker Name Molly Jefferson   Primary Decision Maker Phone Number [phone]   Primary Decision Maker Relationship to Patient Adult child   Secondary Decision Maker Name Lamar Alexander    Secondary Decision Maker Phone Number [phone]   Secondary Decision Maker Relationship to Patient Adult child   Confirm Advance Directive -   Patient Would Like to Complete Advance Directive -   Does the patient have other document types Do Not Resuscitate; Power of        Any spiritual / Sikh concerns:  [] Yes /  [x] No    Caregiver Burnout:  [] Yes /  [x] No /  [] No Caregiver Present      Anticipatory grief assessment:   [x] Normal  / [] Maladaptive       ESAS Anxiety:   Cannot obtain due to patient factors    ESAS Depression:   Cannot obtain due to patient factors          REVIEW OF SYSTEMS:     Positive and pertinent negative findings in ROS are noted above in HPI. The following systems were [] reviewed / [x] unable to be reviewed as noted in HPI  Other findings are noted below. Systems: constitutional, ears/nose/mouth/throat, respiratory, gastrointestinal, genitourinary, musculoskeletal, integumentary, neurologic, psychiatric, endocrine. Positive findings noted below. Modified ESAS Completed by: provider   Fatigue: 10 Drowsiness: 10     Pain: 0           Dyspnea: 0           Stool Occurrence(s): 1        PHYSICAL EXAM:     From RN flowsheet:  Wt Readings from Last 3 Encounters:   02/12/18 228 lb 9.9 oz (103.7 kg)   12/29/17 216 lb 6.4 oz (98.2 kg)   03/06/17 202 lb (91.6 kg)     Blood pressure 147/74, pulse 66, temperature (!) 101.5 °F (38.6 °C), resp. rate 27, height 6' (1.829 m), weight 228 lb 9.9 oz (103.7 kg), SpO2 95 %.     Pain Scale 1: Adult Nonverbal Pain Scale  Pain Intensity 1: 0              Pain Intervention(s) 1: Repositioned       Constitutional: awake, trying to nod/shake head a bit  Eyes: pupils equal, anicteric, not tracking   ENMT: no nasal discharge  Respiratory: breathing not labored  Gastrointestinal: soft non-tender, +bowel sounds  Musculoskeletal: no deformity   Skin: warm, dry  Neurologic: withdraws to pain, squeezes hand     HISTORY:     Principal Problem:    SDH (subdural hematoma) (HCC) (1/23/2018)      Past Medical History:   Diagnosis Date    Arthritis     neck,chronic    Bladder cancer ([facility] [address] 3/13    bladder    Chronic pain     NECK    Colon cancer ([facility] [address] 3/16    COLON    Diabetes ([address]     GERD (gastroesophageal reflux disease) 2008    at times, NOT continous    Hypercholesterolemia     Hypertension     Kidney stones 1969    SEVERAL     Nephrolithiasis     Prostate cancer ([facility] [address] 9/2003    prostate cancer - radiation    PUD (peptic ulcer disease) 2008    Thromboembolus ([facility] [address] 2/8/16    2 RIGHT LEG, 3 IN LUNGS (STARTED ON XARELTO IN ER, 1 WEEK LATER WAS IN ER WITH INTERNAL BLEEDING)    Transient ischemic attack 2005    TIA - no deficits 2005 AND 2015    Unspecified sleep apnea     does not use CPAP/uses nasal strips      Past Surgical History:   Procedure Laterality Date    COLONOSCOPY N/A 3/6/2017    COLONOSCOPY performed by Meka Kramer MD at [facility]  2008    HX CERVICAL FUSION  2002    HX CYST REMOVAL      cyst removed from back, shoulder and scalp    HX HEENT  2006    sinus surgery    HX HEENT      STAS CATARACTS    HX LUMBAR LAMINECTOMY  2002    HX OTHER SURGICAL      nose surgery    HX OTHER SURGICAL  2008    LUMPS -BENIGN, REMOVED FROM SHOULDER, HEAD AND BACK    HX OTHER SURGICAL  3/2016    FILTER FOR BLOOD CLOTS    HX TONSILLECTOMY      HX UROLOGICAL  1969    for kidney stones      Family History   Problem Relation Age of Onset    Heart Disease Father     Lung Disease Father      [facility]    Cancer Brother      SKIN    Heart Attack Brother     Heart Disease Brother     Anesth Problems Neg Hx       History reviewed, no pertinent family history.   Social History   Substance Use Topics    Smoking status: Former Smoker     Packs/day: 1.00     Years: 36.00     Quit date: 11/2/1998    Smokeless tobacco: Never Used      Comment: former cigarette/cigar smoker  SMOKED 10 YRS FIRST TIME; QUIT FOR 10 YRS; THEN SMOKED FOR ABOUT 26 YRS    Alcohol use 3.5 oz/week     7 Glasses of wine per week      Comment: DAILY 2 DRINKS     Allergies   Allergen Reactions    Penicillins Rash    Metoprolol Rash    Tramadol Other (comments) Unsure of reaction.     Lodine [Etodolac] Vertigo      Current Facility-Administered Medications   Medication Dose Route Frequency    insulin glargine (LANTUS) injection 45 Units  45 Units SubCUTAneous QHS    albuterol-ipratropium (DUO-NEB) 2.5 MG-0.5 MG/3 ML  3 mL Nebulization TID RT    balsam peru-castor oil (VENELEX)  mg/gram ointment   Topical BID    chlorhexidine (PERIDEX) 0.12 % mouthwash 15 mL  15 mL Oral BID    propofol (DIPRIVAN) infusion  0-50 mcg/kg/min IntraVENous TITRATE    Vancomycin - Pharmacy to Dose   Other Rx Dosing/Monitoring    insulin lispro (HUMALOG) injection   SubCUTAneous Q6H    dextrose (D50W) injection syrg 12.5-25 g  12.5-25 g IntraVENous PRN    acyclovir (ZOVIRAX) 575 mg in 0.9% sodium chloride 100 mL IVPB  575 mg IntraVENous Q12H    budesonide (PULMICORT) 500 mcg/2 ml nebulizer suspension  500 mcg Nebulization BID RT    albuterol-ipratropium (DUO-NEB) 2.5 MG-0.5 MG/3 ML  3 mL Nebulization Q4H PRN    levETIRAcetam (KEPPRA) oral solution 1,000 mg  1,000 mg Per NG tube Q12H    lacosamide (VIMPAT) tablet 100 mg  100 mg Per NG tube Q12H    sodium chloride (NS) flush 5-10 mL  5-10 mL IntraVENous Q8H    sodium chloride (NS) flush 5-10 mL  5-10 mL IntraVENous PRN    midazolam (VERSED) injection 5 mg  5 mg IntraVENous Multiple    fentaNYL citrate (PF) injection 25 mcg  25 mcg IntraVENous Q1H PRN    famotidine (PEPCID) 40 mg/5 mL (8 mg/mL) oral suspension 20 mg  20 mg Per NG tube DAILY    bacitracin 500 unit/gram packet 1 Packet  1 Packet Topical PRN    polyvinyl alcohol (LIQUIFILM TEARS) 1.4 % ophthalmic solution 2 Drop  2 Drop Both Eyes Q8H    sodium chloride (NS) flush 10-30 mL  10-30 mL InterCATHeter PRN    sodium chloride (NS) flush 10 mL  10 mL InterCATHeter Q24H    sodium chloride (NS) flush 10 mL  10 mL InterCATHeter PRN    sodium chloride (NS) flush 10-40 mL  10-40 mL InterCATHeter Q8H    sodium chloride (NS) flush 20 mL  20 mL InterCATHeter Q24H    alteplase (CATHFLO) 1 mg in sterile water (preservative free) 1 mL injection  1 mg InterCATHeter PRN    atorvastatin (LIPITOR) tablet 40 mg  40 mg Per NG tube DAILY    naloxone (NARCAN) injection 0.4 mg  0.4 mg IntraVENous PRN    acetaminophen (TYLENOL) tablet 650 mg  650 mg Oral Q4H PRN    Or    acetaminophen (TYLENOL) solution 650 mg  650 mg Per NG tube Q4H PRN    Or    acetaminophen (TYLENOL) suppository 650 mg  650 mg Rectal Q4H PRN    glucose chewable tablet 16 g  4 Tab Oral PRN    glucagon (GLUCAGEN) injection 1 mg  1 mg IntraMUSCular PRN    hydrALAZINE (APRESOLINE) 20 mg/mL injection 10 mg  10 mg IntraVENous Q4H PRN    sodium chloride (NS) flush 5-10 mL  5-10 mL IntraVENous Q8H    sodium chloride (NS) flush 5-10 mL  5-10 mL IntraVENous PRN    acetaminophen (TYLENOL) tablet 650 mg  650 mg Oral Q4H PRN    HYDROcodone-acetaminophen (NORCO) 5-325 mg per tablet 1 Tab  1 Tab Oral Q4H PRN    ondansetron (ZOFRAN) injection 4 mg  4 mg IntraVENous Q4H PRN          LAB AND IMAGING FINDINGS:     Lab Results   Component Value Date/Time    WBC 8.3 02/12/2018 04:40 AM    HGB 8.2 (L) 02/12/2018 04:40 AM    PLATELET 666 09/28/6844 04:40 AM     Lab Results   Component Value Date/Time    Sodium 141 02/12/2018 04:40 AM    Potassium 4.0 02/12/2018 04:40 AM    Chloride 106 02/12/2018 04:40 AM    CO2 27 02/12/2018 04:40 AM    BUN 48 (H) 02/12/2018 04:40 AM    Creatinine 2.12 (H) 02/12/2018 04:40 AM    Calcium 8.7 02/12/2018 04:40 AM    Magnesium 2.2 02/12/2018 04:40 AM    Phosphorus 3.1 02/12/2018 04:40 AM      Lab Results   Component Value Date/Time    AST (SGOT) 29 02/12/2018 04:40 AM    Alk.  phosphatase 109 02/12/2018 04:40 AM    Protein, total 5.5 (L) 02/12/2018 04:40 AM    Albumin 1.8 (L) 02/12/2018 04:40 AM    Globulin 3.7 02/12/2018 04:40 AM     Lab Results   Component Value Date/Time    INR 1.0 01/23/2018 12:42 AM    Prothrombin time 10.6 01/23/2018 12:42 AM    aPTT 28.6 01/23/2018 12:42 AM      No results found for: IRON, FE, TIBC, IBCT, PSAT, FERR   No results found for: PH, PCO2, PO2  No components found for: [place]   Lab Results   Component Value Date/Time     01/25/2018 05:01 PM    CK - MB 2.3 01/25/2018 05:01 PM                Total time: 40min   Counseling / coordination time, spent as noted above:35 min   > 50% counseling / coordination?: yes    Prolonged service was provided for  []30 min   []75 min in face to face time in the presence of the patient, spent as noted above. Note: this can only be billed with 56602 (initial) or 52119 (follow up). If multiple start / stop times, list each separately.

## 2018-02-12 NOTE — DIABETES MGMT
Progress Note     Chart reviewed for elevated blood glucose ( > 180 mg/dL x 2 in the past 24 hours) . Recommendations/ Comments: If appropriate, please consider increasing Lantus from 45 units to 50 units. This is a ~10% increase, and may aid in keeping glucose consistently < 180 mg/dL. Fasting glucose today: 187 mg/dL (per POCT Glucose). Required 13 units of correction in the last 24 hours. Inpatient medications for glucose management:  1. Correction Scale: Lispro (Humalog) Insulin Resistant   scale to cover for glucose > 139 mg/dL Every 6 hours     2. Lantus 45 units at bedtime     POC Glucose last 24hrs:   Lab Results   Component Value Date/Time     (H) 02/12/2018 04:40 AM    GLUCPOC 199 (H) 02/12/2018 11:49 AM    GLUCPOC 187 (H) 02/12/2018 06:50 AM    GLUCPOC 230 (H) 02/12/2018 12:26 AM        Estimated Creatinine Clearance: 34 mL/min (based on Cr of 2.12). Diet order:   Active Orders   Diet    DIET NPO With Tube Feedings        PO intake: No data found. History of Diabetes:   Maren Winston is a 81 y.o. male with a past medical history significant for DM per  Zeny Crain MD's H&P dated 1/23/2018. Prior to admission medications for glucose management: per past medical records  -Metformin 1000mg daily     A1C:   Lab Results   Component Value Date/Time    Hemoglobin A1c 6.4 (H) 01/23/2018 10:43 AM    Hemoglobin A1c 6.9 (H) 06/17/2016 02:24 AM       Reference range*:  Increased risk for diabetes: 5.7 - 6.4%  Diabetes: >6.4%  Glycemic control for adults with diabetes: <7.0 %    *GURVINDER HOLT (2014). Diagnosis and classification of diabetes mellitus. Diabetes care, 37, S81. Thank you. Kenneth Hernandez. Kim MPH, RN, BSN, [address]   [phone]    -For most hospitalized persons with hyperglycemia in the intensive care unit (ICU), a glucose range of 140 to 180 mg/dL is recommended, provided this target can be safely achieved. *  - For general medicine and surgery patients in non-ICU settings, a premeal glucose target <140 mg/dL and a random blood glucose <180 mg/dL are recommended. *    JACQUELYN Michel, Zhanna Christie., Nohemy Jenkins., ... & Carlton Petty ([id number]). [employer]ICAN [employer]OC[employer] OF [employer]ICAL ENDOCRINOLOGISTS AND [employer]ICAN COLLEGE OF ENDOCRINOLOG[employer]-CLINICAL PRACTICE GUIDELINES FOR DEVELOPING A DIABETES MELLITUS COMPREHENSIVE CARE PLAN-2015-EXECUTIVE SUMMARY: Complete guidelines are available at https://www. aace. com/publications/guidelines. Endocrine Practice, 21(4), [id number].

## 2018-02-12 NOTE — PROGRESS NOTES
Hospitalist Progress Note  Yossi Sims MD  Answering service: [phone] from in house phone        Date of Service:  2018  NAME:  Gabriela Blood  :  1935  MRN:  [id number]      Reason for follow up:   sdh    Interval history / Subjective:     Still intubated off sedation     Assessment & Plan:   SDH  Neurosurgery consulted on admission and eliquis held. Mandi Mcleod  underwent left frontotemporoparietal craniotomy.  Placed on bp control with cardene drip and placed on keppra.  EEG on  revealed no seizure activity.  Sutures/staples removed by ns on . Continued on keppra and lacosamide.  On  NS signed off.     Keprra/lacosamide chanced to via peg.    : cont keppra and laconsamide. Palliative care to meet with family today as not progressing. Repeat EEG today, per family request, appears unchanged.       Expressive aphasia and dysarthria  Seen by speech and recommended alternate means of nutrition. Received fees via [employer].    : family to met with palliative care today. Still waiting on decision for peg.        Acute hypoxic respiratory failure  Intubated for surgery then extubated.  Re-intubated on  and then bronched and found to have mucous clot.  Extubated on  and subsequently re-intubated same day, rebronched on  and  that revealed severe tracheobronchomalacia.  Extubated on  and then re-intubated on .  Palliative care consulted given inability to remain extubated. Code changed to allow intubation but no cpr/shocks.     : family met today with palliative care. Decision on trach pending.        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  Placed on acyclovir on : defer to pcc on length of treatment with acyclovir.        Thrombocytopenia  Noted after admission. Monitor and resolved.        Hx Afib on eliquis  Hx dvt/pe on eliquis  Taken off eliquis on admission.  Unclear if he could ever be placed back on any blood thinners given recurrent sdh.       Code Status: can reintubate, no shocks. DVT prophylaxis: scd    Disposition: snf  Estimated date of d/c: tbd    Hospital Problems  Date Reviewed: 1/23/2018          Codes Class Noted POA    * (Principal)SDH (subdural hematoma) ([facility] [address] ICD-10-CM: I62.00  ICD-9-CM: 432.1  1/23/2018 Unknown                Review of Systems:   Review of Systems   Unable to perform ROS: Medical condition         Vital Signs:    Last 24hrs VS reviewed since prior progress note. Most recent are:  Visit Vitals    /67 (BP 1 Location: Right arm, BP Patient Position: At rest)    Pulse 66    Temp (!) 101.5 °F (38.6 °C)    Resp 27    Ht 6' (1.829 m)    Wt 103.7 kg (228 lb 9.9 oz)    SpO2 96%    BMI 31.01 kg/m2         Intake/Output Summary (Last 24 hours) at 02/12/18 0831  Last data filed at 02/12/18 0800   Gross per 24 hour   Intake             2925 ml   Output             1725 ml   Net             1200 ml        Physical Examination:   Physical Exam   Constitutional: He appears unhealthy. No distress. HENT:   Head: Normocephalic. Cardiovascular: Normal rate, regular rhythm and normal heart sounds. No murmur heard. Pulmonary/Chest: Effort normal and breath sounds normal. No respiratory distress. Abdominal: Soft. Bowel sounds are normal. He exhibits no distension. Musculoskeletal: He exhibits edema. Neurological:   Opens eyes to voice, does not interact. Skin: He is not diaphoretic. Vitals reviewed.          Data Review:    Review and/or order of clinical lab test      Labs:     Recent Labs      02/12/18   0440  02/11/18   0405   WBC  8.3  7.3   HGB  8.2*  9.3*   HCT  26.2*  28.6*   PLT  151  148*     Recent Labs      02/12/18   0440  02/11/18   0405  02/10/18   0347   NA  141  144  142   K  4.0  3.7  3.4*   CL  106  109*  108   CO2  27  28  26   BUN  48*  45*  46*   CREA  2.12*  2.07*  2.00*   GLU  190*  87  118*   CA  8.7 8.7  8.6   MG  2.2  2.2  2.1   PHOS  3.1  2.8  3.1     Recent Labs      02/12/18   0440  02/11/18   0405  02/10/18   0347   SGOT  29  44*  30   ALT  60  60  53   AP  109  102  86   TBILI  0.4  0.3  0.4   TP  5.5*  5.8*  5.4*   ALB  1.8*  2.1*  1.8*   GLOB  3.7  3.7  3.6     No results for input(s): INR, PTP, APTT in the last 72 hours. No lab exists for component: INREXT   No results for input(s): FE, TIBC, PSAT, FERR in the last 72 hours. No results found for: FOL, RBCF   No results for input(s): PH, PCO2, PO2 in the last 72 hours. No results for input(s): CPK, CKNDX, TROIQ in the last 72 hours.     No lab exists for component: CPKMB  Lab Results   Component Value Date/Time    Cholesterol, total 118 01/22/2018 11:28 PM    HDL Cholesterol 53 01/22/2018 11:28 PM    LDL, calculated 20 01/22/2018 11:28 PM    Triglyceride 225 (H) 01/22/2018 11:28 PM    CHOL/HDL Ratio 2.2 01/22/2018 11:28 PM     Lab Results   Component Value Date/Time    Glucose (POC) 187 (H) 02/12/2018 06:50 AM    Glucose (POC) 230 (H) 02/12/2018 12:26 AM    Glucose (POC) 196 (H) 02/11/2018 06:30 PM    Glucose (POC) 117 (H) 02/11/2018 12:08 PM    Glucose (POC) 81 02/11/2018 05:45 AM     Lab Results   Component Value Date/Time    Color Yellow 03/04/2013 09:14 AM    Appearance Cloudy (A) 03/04/2013 09:14 AM    Specific gravity 1.015 03/10/2010 09:45 AM    pH (UA) 7.0 03/04/2013 09:14 AM    Protein NEGATIVE  03/10/2010 09:45 AM    Glucose NEGATIVE  03/10/2010 09:45 AM    Ketone Negative 03/04/2013 09:14 AM    Bilirubin Negative 03/04/2013 09:14 AM    Urobilinogen 0.2 03/10/2010 09:45 AM    Nitrites Negative 03/04/2013 09:14 AM    Leukocyte Esterase Negative 03/04/2013 09:14 AM    Bacteria Few 03/04/2013 09:14 AM    WBC 0-5 03/04/2013 09:14 AM    RBC >30 (A) 03/04/2013 09:14 AM         Medications Reviewed:     Current Facility-Administered Medications   Medication Dose Route Frequency    insulin glargine (LANTUS) injection 45 Units  45 Units SubCUTAneous QHS    albuterol-ipratropium (DUO-NEB) 2.5 MG-0.5 MG/3 ML  3 mL Nebulization TID RT    balsam peru-castor oil (VENELEX)  mg/gram ointment   Topical BID    chlorhexidine (PERIDEX) 0.12 % mouthwash 15 mL  15 mL Oral BID    propofol (DIPRIVAN) infusion  0-50 mcg/kg/min IntraVENous TITRATE    Vancomycin - Pharmacy to Dose   Other Rx Dosing/Monitoring    insulin lispro (HUMALOG) injection   SubCUTAneous Q6H    dextrose (D50W) injection syrg 12.5-25 g  12.5-25 g IntraVENous PRN    acyclovir (ZOVIRAX) 575 mg in 0.9% sodium chloride 100 mL IVPB  575 mg IntraVENous Q12H    budesonide (PULMICORT) 500 mcg/2 ml nebulizer suspension  500 mcg Nebulization BID RT    albuterol-ipratropium (DUO-NEB) 2.5 MG-0.5 MG/3 ML  3 mL Nebulization Q4H PRN    levETIRAcetam (KEPPRA) oral solution 1,000 mg  1,000 mg Per NG tube Q12H    lacosamide (VIMPAT) tablet 100 mg  100 mg Per NG tube Q12H    sodium chloride (NS) flush 5-10 mL  5-10 mL IntraVENous Q8H    sodium chloride (NS) flush 5-10 mL  5-10 mL IntraVENous PRN    midazolam (VERSED) injection 5 mg  5 mg IntraVENous Multiple    fentaNYL citrate (PF) injection 25 mcg  25 mcg IntraVENous Q1H PRN    famotidine (PEPCID) 40 mg/5 mL (8 mg/mL) oral suspension 20 mg  20 mg Per NG tube DAILY    bacitracin 500 unit/gram packet 1 Packet  1 Packet Topical PRN    polyvinyl alcohol (LIQUIFILM TEARS) 1.4 % ophthalmic solution 2 Drop  2 Drop Both Eyes Q8H    sodium chloride (NS) flush 10-30 mL  10-30 mL InterCATHeter PRN    sodium chloride (NS) flush 10 mL  10 mL InterCATHeter Q24H    sodium chloride (NS) flush 10 mL  10 mL InterCATHeter PRN    sodium chloride (NS) flush 10-40 mL  10-40 mL InterCATHeter Q8H    sodium chloride (NS) flush 20 mL  20 mL InterCATHeter Q24H    alteplase (CATHFLO) 1 mg in sterile water (preservative free) 1 mL injection  1 mg InterCATHeter PRN    atorvastatin (LIPITOR) tablet 40 mg  40 mg Per NG tube DAILY    naloxone (NARCAN) injection 0.4 mg  0.4 mg IntraVENous PRN    acetaminophen (TYLENOL) tablet 650 mg  650 mg Oral Q4H PRN    Or    acetaminophen (TYLENOL) solution 650 mg  650 mg Per NG tube Q4H PRN    Or    acetaminophen (TYLENOL) suppository 650 mg  650 mg Rectal Q4H PRN    glucose chewable tablet 16 g  4 Tab Oral PRN    glucagon (GLUCAGEN) injection 1 mg  1 mg IntraMUSCular PRN    hydrALAZINE (APRESOLINE) 20 mg/mL injection 10 mg  10 mg IntraVENous Q4H PRN    sodium chloride (NS) flush 5-10 mL  5-10 mL IntraVENous Q8H    sodium chloride (NS) flush 5-10 mL  5-10 mL IntraVENous PRN    acetaminophen (TYLENOL) tablet 650 mg  650 mg Oral Q4H PRN    HYDROcodone-acetaminophen (NORCO) 5-325 mg per tablet 1 Tab  1 Tab Oral Q4H PRN    ondansetron (ZOFRAN) injection 4 mg  4 mg IntraVENous Q4H PRN     ______________________________________________________________________  EXPECTED LENGTH OF STAY: 7d 0h  ACTUAL LENGTH OF STAY:          20                 Yossi Sims MD

## 2018-02-12 NOTE — WOUND CARE
WOCN Note:     Follow-up visit for external urinary pouch and right thigh. Chart shows:  Admitted for subdural hematoma with craniotomy; history of DM, colon and bladder cancer, DVT & PE, TIA, CVA 2016      Assessment:   Patient is intubated but unresponsive, eyes are open. In wrist restraints and requires full assist of 2 for turning - incont of loose stool & assisted RN to clean  Previously placed urostomy pouch has been successful but is reaching the end of it's life. Replaced using skin prep and Deysi's ring -to bedside bag.    Bed: [facility]   Diet: tube feeding via NG  Wearing Prevalon boots     Buttocks and sacral skin intact with light diffuse pink that is fully blanching. Drying dyschromia to right medial buttock. Cleaned of small amount of mushy stool and applied Sensi Care cream.      Nose abrasion is resolved. Right inner thigh trauma wound ?from cath secure? Tape? Lindquist port? 2 areas:  Lateral = 2 x 1 x 0.1 cm  10% Dark krysten red 90% moist bright red with some bleeding, irregular shape/margins  Medial = 2.2 x 2 x 0.1 cm  80% central yellow/tan 20% red rim  No periwound erythema to either site. Cleaned with saline and Duoderm applied.      Posterior scrotal wound with unknown etiology but appears trauma related = 3 x 1.2 c 0.1 cm 100% moist krysten red base with burgundy tissue sliding off. No bleeding. Edges are ragged. No periwound erythema. New areas of MASD to scrotum with moist blanching pink bases - skip areas of partial thickness    Patient repositioned on left side. Wound Recommendations:    Continue Duoderm as ordered to right thigh  Continue SensiCare cream to buttocks and scrotum. Maintain external urine pouch as needed - supplies in the room    Skin Care & Pressure Relief Recommendations:  Minimize layers of linen/pads under patient to optimize support surface.     Turn/reposition approximately every 2 hours and offload heels. Manage incontinence    Discussed above plan with RNAlphonso.     Transition of Care: Plan to follow weekly and as needed while admitted to hospital.    KALPANA SargentN, RN, [facility]  Certified Wound, Ostomy, Continence Nurse  office [phone]  pager [phone] or call  to page

## 2018-02-12 NOTE — PROCEDURES
PROCEDURE: ROUTINE INPATIENT EEG  NAME:   Vandana Correa  ACCOUNT NUMBER : [de-identified]  MRN:   [id number]  DATE OF SERVICE: 2/12/2018     HISTORY/INDICATION: Pt is an 80yo male admitted 1/22/18 with SDH s/p crani with seizure shortly after surgery, on Keppra and Vimpat, without subsequent seizure seen. Pt remains poorly responsive on ventilator, EEG is performed to assess for evidence of seizure.     MEDICATIONS:   Current Facility-Administered Medications   Medication Dose Route Frequency Provider Last Rate Last Dose    vancomycin (VANCOCIN) 1250 mg in  ml infusion  1,250 mg IntraVENous Q24H Shailesh Yeboah  mL/hr at 02/12/18 1339 1,250 mg at 02/12/18 1339    insulin glargine (LANTUS) injection 45 Units  45 Units SubCUTAneous QHS Cesar Caruso MD   45 Units at 02/11/18 2150    albuterol-ipratropium (DUO-NEB) 2.5 MG-0.5 MG/3 ML  3 mL Nebulization TID RT Cesar Caruso MD   3 mL at 02/12/18 1402    balsam peru-castor oil (VENELEX)  mg/gram ointment   Topical BID Jeanine Suggs MD        chlorhexidine (PERIDEX) 0.12 % mouthwash 15 mL  15 mL Oral BID Cesar Caruso MD   15 mL at 02/12/18 0800    Vancomycin - Pharmacy to Dose   Other Rx Dosing/Monitoring Jamar Vu MD        insulin lispro (HUMALOG) injection   SubCUTAneous Q6H Jeanine Suggs MD   3 Units at 02/12/18 0659    dextrose (D50W) injection syrg 12.5-25 g  12.5-25 g IntraVENous PRN Jeanine Suggs MD        acyclovir (ZOVIRAX) 575 mg in 0.9% sodium chloride 100 mL IVPB  575 mg IntraVENous Q12H Jamar Vu MD   575 mg at 02/12/18 1050    budesonide (PULMICORT) 500 mcg/2 ml nebulizer suspension  500 mcg Nebulization BID RT Jamar Vu MD   500 mcg at 02/12/18 0816    albuterol-ipratropium (DUO-NEB) 2.5 MG-0.5 MG/3 ML  3 mL Nebulization Q4H PRN Jamar Vu MD        levETIRAcetam (KEPPRA) oral solution 1,000 mg  1,000 mg Per NG tube Q12H Davin Bartholomew NP   1,000 mg at 02/12/18 0800    lacosamide (VIMPAT) tablet 100 mg  100 mg Per NG tube Q12H Shelia Phelps NP   100 mg at 02/12/18 0800    sodium chloride (NS) flush 5-10 mL  5-10 mL IntraVENous Q8H Diana Lopez MD   10 mL at 02/12/18 1341    sodium chloride (NS) flush 5-10 mL  5-10 mL IntraVENous PRN Diana Lopez MD        midazolam (VERSED) injection 5 mg  5 mg IntraVENous Multiple Diana Lopez MD   2 mg at 01/31/18 1130    fentaNYL citrate (PF) injection 25 mcg  25 mcg IntraVENous Q1H PRN Diana Lopez MD   25 mcg at 02/03/18 1636    famotidine (PEPCID) 40 mg/5 mL (8 mg/mL) oral suspension 20 mg  20 mg Per NG tube DAILY Diana Lopez MD   20 mg at 02/12/18 0800    bacitracin 500 unit/gram packet 1 Packet  1 Packet Topical PRN Cynthia Ormond, MD        polyvinyl alcohol (LIQUIFILM TEARS) 1.4 % ophthalmic solution 2 Drop  2 Drop Both Eyes Q8H Bharathi Thomas MD   2 Drop at 02/12/18 1400    sodium chloride (NS) flush 10-30 mL  10-30 mL InterCATHeter PRN Bharathi Thomas MD        sodium chloride (NS) flush 10 mL  10 mL InterCATHeter Q24H Bharathi Thomas MD   10 mL at 02/12/18 1201    sodium chloride (NS) flush 10 mL  10 mL InterCATHeter PRN Bharathi Thomas MD        sodium chloride (NS) flush 10-40 mL  10-40 mL InterCATHeter Q8H Bharathi Thomas MD   10 mL at 02/12/18 1341    sodium chloride (NS) flush 20 mL  20 mL InterCATHeter Q24H Bharathi Thomas MD   20 mL at 02/12/18 1202    alteplase (CATHFLO) 1 mg in sterile water (preservative free) 1 mL injection  1 mg InterCATHeter PRN Bharathi Thomas MD   1 mg at 02/11/18 1424    atorvastatin (LIPITOR) tablet 40 mg  40 mg Per NG tube 214 Beatrice Rachel NP   40 mg at 02/12/18 0800    naloxone (NARCAN) injection 0.4 mg  0.4 mg IntraVENous PRN Daljit Sultana MD        acetaminophen (TYLENOL) tablet 650 mg  650 mg Oral Q4H PRN Daljit Sultana MD        Or   [facility] acetaminophen (TYLENOL) solution 650 mg  650 mg Per NG tube Q4H PRN Sofi Mccall MD   650 mg at 02/12/18 4651    Or    acetaminophen (TYLENOL) suppository 650 mg  650 mg Rectal Q4H PRN Sofi Mccall MD        glucose chewable tablet 16 g  4 Tab Oral PRN Sofi Mccall MD        glucagon (GLUCAGEN) injection 1 mg  1 mg IntraMUSCular PRN Sofi Mccall MD        hydrALAZINE (APRESOLINE) 20 mg/mL injection 10 mg  10 mg IntraVENous Q4H PRN Rasta Anna NP   10 mg at 02/09/18 1604    sodium chloride (NS) flush 5-10 mL  5-10 mL IntraVENous Q8H Dhaval Arboleda MD   10 mL at 02/12/18 1341    sodium chloride (NS) flush 5-10 mL  5-10 mL IntraVENous PRN Dhaval Arboleda MD        acetaminophen (TYLENOL) tablet 650 mg  650 mg Oral Q4H PRN Dhaval Arboleda MD        HYDROcodone-acetaminophen [facility]) 5-325 mg per tablet 1 Tab  1 Tab Oral Q4H PRN Dhaval Arboleda MD        ondansetron [facility]) injection 4 mg  4 mg IntraVENous Q4H PRN Dhaval Arboleda MD           CONDITIONS OF RECORDING: This is a routine 21-channel EEG recording performed in accordance with the international 10-20 system with one channel devoted to limited EKG. This study was done during a poorly responsive state. No activating procedures were performed. DESCRIPTION:  Upon maximal arousal the posterior dominant rhythm has a frequency of 6Hz with an amplitude of 20uV. This activity is symmetric over the bilateral posterior derivations, but not well sustained. Slower delta frequencies are seen in the anterior regions. No normal sleep architecture is seen. There are no focal abnormalities, epileptiform discharges, or electrographic seizures seen. INTERPRETATION: This is an abnormal EEG due to diffuse generalized mod to severe slowing. CLINICAL CORRELATION:  Finding is c/w mod to severe encephalopathy. EEG appears relatively unchanged compared to EEG from 1/30/18.       Beau Mario MD

## 2018-02-12 NOTE — PROGRESS NOTES
Pulmonary / [address] Day: 25     S:    In ICU for resp failure following SDH post evacuation    2/12  Seen and examined earlier today. Unresponsive. Plan for family meeting today    2/11  Patient was seen and examined earlier today. He continues to be encephalopathic. Palliative care meeting with the family is planned tomorrow. He has failed extubation several times and if continued care is planned, he will need a trach and a PEG    2/10  Patient was seen and examined earlier today. He continues to be encephalopathic. Chart reviewed. Noted plans to meet with palliative care for goals of care discussion. He is at high risk for extubation failure given presence of bronchomalacia    2/9:  Again unable to extubate secondary to mental status  Family deciding on trach and peg   Palliative care following    2/8:  Placed on SBT  Appears comfortable but no commands followed currently    2/7:  Failed SBT with increased RR  TF  Palliative care following - Partial code - no shocks or CPR    2/6:  Re-intubated on 2/5 for AMS / resp distress. Thick secretions in back of throat at time of intubation  Head CT without significant change  Abd CT for abd distension - Small bilateral pleural effusions with underlying consolidation. Gallstone. Sigmoid diverticulosis.  Lesion left hepatic lobe again demonstrated but cannot  be characterized given the lack of intravenous contrast. No acute  intra-abdominal abnormality  Remains sedate on vent  Tolerating TF    2/5:  Was extubated at the end of last week  Currently tachypneic despite bipap with increased work of breathing and accessory muscle use  Mental status is poor  Abd distended with worsening resp distress  Has been having bowel movements     O:    Patient Vitals for the past 4 hrs:   BP Temp Pulse Resp SpO2   02/12/18 1200 125/55 99.2 °F (37.3 °C) 65 18 97 %   02/12/18 1100 129/56 - 66 14 97 %   02/12/18 1000 115/50 99.6 °F (37.6 °C) 66 25 96 %   02/12/18 0900 147/74 - - - 95 %     Temp (24hrs), Av.5 °F (38.1 °C), Min:99.2 °F (37.3 °C), Max:102 °F (38.9 °C)      Intake/Output Summary (Last 24 hours) at 18 1240  Last data filed at 18 1200   Gross per 24 hour   Intake             2925 ml   Output             1625 ml   Net             1300 ml     Exam:  No distress  Sedate RASS -2  Oral ett  MM dry  Anicteric  No commands followed  Coarse rhonchi bilaterally  RRR  Protuberant with mild distension, hypoactive bs  Warm and dry  Trace edema        Imaging: Images independently viewed  CXR - persistned L>R effusions and basilar atx    Labs:  Recent Results (from the past 12 hour(s))   VANCOMYCIN, RANDOM    Collection Time: 18  4:40 AM   Result Value Ref Range    Vancomycin, random 20.0 UG/ML   CBC WITH AUTOMATED DIFF    Collection Time: 18  4:40 AM   Result Value Ref Range    WBC 8.3 4.1 - 11.1 K/uL    RBC 2.83 (L) 4.10 - 5.70 M/uL    HGB 8.2 (L) 12.1 - 17.0 g/dL    HCT 26.2 (L) 36.6 - 50.3 %    MCV 92.6 80.0 - 99.0 FL    MCH 29.0 26.0 - 34.0 PG    MCHC 31.3 30.0 - 36.5 g/dL    RDW 13.6 11.5 - 14.5 %    PLATELET 485 292 - 874 K/uL    MPV 12.7 8.9 - 12.9 FL    NRBC 0.0 0  WBC    ABSOLUTE NRBC 0.00 0.00 - 0.01 K/uL    NEUTROPHILS 80 (H) 32 - 75 %    LYMPHOCYTES 8 (L) 12 - 49 %    MONOCYTES 7 5 - 13 %    EOSINOPHILS 4 0 - 7 %    BASOPHILS 0 0 - 1 %    IMMATURE GRANULOCYTES 0 0.0 - 0.5 %    ABS. NEUTROPHILS 6.7 1.8 - 8.0 K/UL    ABS. LYMPHOCYTES 0.7 (L) 0.8 - 3.5 K/UL    ABS. MONOCYTES 0.6 0.0 - 1.0 K/UL    ABS. EOSINOPHILS 0.4 0.0 - 0.4 K/UL    ABS. BASOPHILS 0.0 0.0 - 0.1 K/UL    ABS. IMM.  GRANS. 0.0 0.00 - 0.04 K/UL    DF AUTOMATED     METABOLIC PANEL, COMPREHENSIVE    Collection Time: 18  4:40 AM   Result Value Ref Range    Sodium 141 136 - 145 mmol/L    Potassium 4.0 3.5 - 5.1 mmol/L    Chloride 106 97 - 108 mmol/L    CO2 27 21 - 32 mmol/L    Anion gap 8 5 - 15 mmol/L    Glucose 190 (H) 65 - 100 mg/dL    BUN 48 (H) 6 - 20 MG/DL    Creatinine 2.12 (H) 0.70 - 1.30 MG/DL    BUN/Creatinine ratio 23 (H) 12 - 20      GFR est AA 37 (L) >60 ml/min/1.73m2    GFR est non-AA 30 (L) >60 ml/min/1.73m2    Calcium 8.7 8.5 - 10.1 MG/DL    Bilirubin, total 0.4 0.2 - 1.0 MG/DL    ALT (SGPT) 60 12 - 78 U/L    AST (SGOT) 29 15 - 37 U/L    Alk. phosphatase 109 45 - 117 U/L    Protein, total 5.5 (L) 6.4 - 8.2 g/dL    Albumin 1.8 (L) 3.5 - 5.0 g/dL    Globulin 3.7 2.0 - 4.0 g/dL    A-G Ratio 0.5 (L) 1.1 - 2.2     MAGNESIUM    Collection Time: 02/12/18  4:40 AM   Result Value Ref Range    Magnesium 2.2 1.6 - 2.4 mg/dL   PHOSPHORUS    Collection Time: 02/12/18  4:40 AM   Result Value Ref Range    Phosphorus 3.1 2.6 - 4.7 MG/DL   GLUCOSE, POC    Collection Time: 02/12/18  6:50 AM   Result Value Ref Range    Glucose (POC) 187 (H) 65 - 100 mg/dL    Performed by [facility]    GLUCOSE, POC    Collection Time: 02/12/18 11:49 AM   Result Value Ref Range    Glucose (POC) 199 (H) 65 - 100 mg/dL    Performed by Carlos Murrell        A/P:   1. Acute hypoxemic respiratory failure with increased work of breathing despite bipap - secretions and bronchomalacia likely contributing to increased work of breathing - re-intubated 2/5 - failed SBT 2/7 - mental status limiting ability to extubate  2. SDH s/p craniotomy with evacuation on 1/23/18 after GLF  3. Encephalopathy - off all sedatives  4. 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 MRSA in sputum - completing course of vancomycin  7. SZ  8. Hypernatremia  9. CKD  10. DM  11.  H/o DVT /PE    --Vent support  --Hypernatremia improved   --on cefepime vanc and acyclovir - sputum cx with MRSA - stopped cefepime 2/7 continue vanc to complete course  --Palliative care following   --family to meet again with palliative care today: if decision is to continue aggressive care>> Recommend trach and peg  -- D/W RN, RT and Dr. Evelyn Dimas decision making:   I have reviewed the flowsheet and previous day's notes  Acute or chronic illness that poses a threat to life or bodily function  Review and order of Clinical lab tests  Review and Order of Radiology tests  Independent visualization of Image  Reviewed Ventilator / NiPPV  Reviewed high risk medications/sedatives    Sonia Nash MD

## 2018-02-12 NOTE — PROGRESS NOTES
Pharmacist Note - Vancomycin Dosing  Therapy day #9  Indication: HCAP/MRSA  Current regimen: Based on levels, last dose: 1.5 gm on 2/11 @ 0901    A Random Level resulted at 20 mcg/mL which was obtained ~20.5 hrs post-dose. Goal trough: 15 - 20 mcg/mL     Plan: Will start a maintenance dose of 1250 mg IV Q 24 hr starting this afternoon. Pharmacy will continue to monitor this patient daily for changes in clinical status and renal function.

## 2018-02-13 NOTE — PROGRESS NOTES
Hospitalist Progress Note                               Gerald Bright MD                                     Answering service: [phone]                               OR [phone] from in house phone                               Cell: [phone]              Date of Service:  2018  NAME:  Sandy Baires  :  1935  MRN:  [id number]      Admission Summary:   Herman eVra came to the ED on  complaining of \"For the last 2-3 days I have been having trouble talking. I can't get my words out. \" and was found to have moderate to large acute on chronic subdural hemorrhage on the left. Left to right midline shift approximately 8 mm with effacement of the left lateral ventricle. The ambient cisterns remain patent. He underwent left frontotemporoparietal craniotomy evacuation, subacute, and chronic subdural hematoma on 18. Interval history / Subjective: Intubated. Awake. Not following commands. Moves his left arm. No family in the room. Assessment & Plan:   Acute on chronic SDH on the left  -S/p  underwent left frontotemporoparietal craniotomy  -Cont keppra and laconsamide. Expressive aphasia and dysarthria. Intubaed  -Receiving feeds via OGT. Likley to need PEG    Acute hypoxic respiratory failure  -Intubated for surgery then extubated. - Re-intubated on  and then bronched and found to have mucous clot. Extubated on  and subsequently re-intubated same day, rebronched on  and  that revealed severe tracheobronchomalacia.   - Extubated on  and then re-intubated on .   -Palliative care team met with family on ,noted family decided for tracheostomy. May need PEG      HSV tracheobronchitis  Noted on viral cultures from . Placed on acyclovir on     Possible MRSA pneumonia,sputum cx on  grew scant MRSA and other Staph species on vancomycin.       Thrombocytopenia  Noted after admission. Monitor and resolved. Hx Afib on eliquis  Hx dvt/pe on eliquis  Taken off eliquis on admission. Unclear if he could ever be placed back on any blood thinners given recurrent sdh. Code Status: can reintubate, no shocks. DVT prophylaxis: scd     Disposition: rehabilitation. Estimated date of d/c: tbd      Hospital Problems  Date Reviewed: 1/23/2018          Codes Class Noted POA    * (Principal)SDH (subdural hematoma) ([facility] [address] ICD-10-CM: I62.00  ICD-9-CM: 432.1  1/23/2018 Unknown                Review of Systems:   A comprehensive review of systems was negative except for that written in the HPI. Physical Examination:      Last 24hrs VS reviewed since prior progress note. Most recent are:  Visit Vitals    /71    Pulse 73    Temp 98.6 °F (37 °C)    Resp 14    Ht 6' (1.829 m)    Wt 102.9 kg (226 lb 13.7 oz)    SpO2 99%    BMI 30.77 kg/m2           Constitutional: Intubated. Awake. Does not follow commands. Eyes: Non icteric,non pallor,no erythema,discharge,PERRLA   ENT:  ETT and OGT in place. ,    Resp:  CTA bilaterally. No wheezing/rhonchi/rales. No accessory muscle use   CV:  Regular rhythm, normal rate, no murmurs, gallops, rubs    GI:  Soft, non distended, non tender. normoactive bowel sounds, no hepatosplenomegaly    :  No CVA or suprapubic tenderness   Skin  :  No erythema,rash,bullae,dipigmentation     Musculoskeletal:  No edema, warm, 2+ pulses throughout    Neurologic: Awake,does not follow command. Moves left side.             Intake/Output Summary (Last 24 hours) at 02/13/18 1635  Last data filed at 02/13/18 1500   Gross per 24 hour   Intake             1445 ml   Output             1950 ml   Net             -505 ml          Data Review:    Review and/or order of clinical lab test  Review and/or order of tests in the radiology section of CPT  Review and/or order of tests in the medicine section of CPT      Labs:     Recent Labs      02/12/18   0440  02/11/18   9321 WBC  8.3  7.3   HGB  8.2*  9.3*   HCT  26.2*  28.6*   PLT  151  148*     Recent Labs      02/13/18   0535  02/12/18   0440  02/11/18   0405   NA  142  141  144   K  4.0  4.0  3.7   CL  107  106  109*   CO2  26  27  28   BUN  48*  48*  45*   CREA  2.03*  2.12*  2.07*   GLU  174*  190*  87   CA  8.3*  8.7  8.7   MG   --   2.2  2.2   PHOS   --   3.1  2.8     Recent Labs      02/12/18   0440  02/11/18   0405   SGOT  29  44*   ALT  60  60   AP  109  102   TBILI  0.4  0.3   TP  5.5*  5.8*   ALB  1.8*  2.1*   GLOB  3.7  3.7     No results for input(s): INR, PTP, APTT in the last 72 hours. No lab exists for component: INREXT   No results for input(s): FE, TIBC, PSAT, FERR in the last 72 hours. No results found for: FOL, RBCF   No results for input(s): PH, PCO2, PO2 in the last 72 hours. No results for input(s): CPK, CKNDX, TROIQ in the last 72 hours.     No lab exists for component: CPKMB  Lab Results   Component Value Date/Time    Cholesterol, total 118 01/22/2018 11:28 PM    HDL Cholesterol 53 01/22/2018 11:28 PM    LDL, calculated 20 01/22/2018 11:28 PM    Triglyceride 225 (H) 01/22/2018 11:28 PM    CHOL/HDL Ratio 2.2 01/22/2018 11:28 PM     Lab Results   Component Value Date/Time    Glucose (POC) 87 02/13/2018 12:57 PM    Glucose (POC) 192 (H) 02/13/2018 05:29 AM    Glucose (POC) 235 (H) 02/13/2018 01:29 AM    Glucose (POC) 206 (H) 02/12/2018 09:42 PM    Glucose (POC) 195 (H) 02/12/2018 05:03 PM     Lab Results   Component Value Date/Time    Color Yellow 03/04/2013 09:14 AM    Appearance Cloudy (A) 03/04/2013 09:14 AM    Specific gravity 1.015 03/10/2010 09:45 AM    pH (UA) 7.0 03/04/2013 09:14 AM    Protein NEGATIVE  03/10/2010 09:45 AM    Glucose NEGATIVE  03/10/2010 09:45 AM    Ketone Negative 03/04/2013 09:14 AM    Bilirubin Negative 03/04/2013 09:14 AM    Urobilinogen 0.2 03/10/2010 09:45 AM    Nitrites Negative 03/04/2013 09:14 AM    Leukocyte Esterase Negative 03/04/2013 09:14 AM    Bacteria Few 03/04/2013 09:14 AM    WBC 0-5 03/04/2013 09:14 AM    RBC >30 (A) 03/04/2013 09:14 AM         Medications Reviewed:     Current Facility-Administered Medications   Medication Dose Route Frequency    insulin glargine (LANTUS) injection 50 Units  50 Units SubCUTAneous QHS    vancomycin (VANCOCIN) 1250 mg in  ml infusion  1,250 mg IntraVENous Q24H    albuterol-ipratropium (DUO-NEB) 2.5 MG-0.5 MG/3 ML  3 mL Nebulization TID RT    balsam peru-castor oil (VENELEX)  mg/gram ointment   Topical BID    chlorhexidine (PERIDEX) 0.12 % mouthwash 15 mL  15 mL Oral BID    Vancomycin - Pharmacy to Dose   Other Rx Dosing/Monitoring    insulin lispro (HUMALOG) injection   SubCUTAneous Q6H    dextrose (D50W) injection syrg 12.5-25 g  12.5-25 g IntraVENous PRN    acyclovir (ZOVIRAX) 575 mg in 0.9% sodium chloride 100 mL IVPB  575 mg IntraVENous Q12H    budesonide (PULMICORT) 500 mcg/2 ml nebulizer suspension  500 mcg Nebulization BID RT    albuterol-ipratropium (DUO-NEB) 2.5 MG-0.5 MG/3 ML  3 mL Nebulization Q4H PRN    levETIRAcetam (KEPPRA) oral solution 1,000 mg  1,000 mg Per NG tube Q12H    lacosamide (VIMPAT) tablet 100 mg  100 mg Per NG tube Q12H    sodium chloride (NS) flush 5-10 mL  5-10 mL IntraVENous Q8H    sodium chloride (NS) flush 5-10 mL  5-10 mL IntraVENous PRN    midazolam (VERSED) injection 5 mg  5 mg IntraVENous Multiple    fentaNYL citrate (PF) injection 25 mcg  25 mcg IntraVENous Q1H PRN    famotidine (PEPCID) 40 mg/5 mL (8 mg/mL) oral suspension 20 mg  20 mg Per NG tube DAILY    bacitracin 500 unit/gram packet 1 Packet  1 Packet Topical PRN    polyvinyl alcohol (LIQUIFILM TEARS) 1.4 % ophthalmic solution 2 Drop  2 Drop Both Eyes Q8H    sodium chloride (NS) flush 10-30 mL  10-30 mL InterCATHeter PRN    sodium chloride (NS) flush 10 mL  10 mL InterCATHeter Q24H    sodium chloride (NS) flush 10 mL  10 mL InterCATHeter PRN    sodium chloride (NS) flush 10-40 mL  10-40 mL InterCATHeter Q8H    sodium chloride (NS) flush 20 mL  20 mL InterCATHeter Q24H    alteplase (CATHFLO) 1 mg in sterile water (preservative free) 1 mL injection  1 mg InterCATHeter PRN    atorvastatin (LIPITOR) tablet 40 mg  40 mg Per NG tube DAILY    naloxone (NARCAN) injection 0.4 mg  0.4 mg IntraVENous PRN    acetaminophen (TYLENOL) tablet 650 mg  650 mg Oral Q4H PRN    Or    acetaminophen (TYLENOL) solution 650 mg  650 mg Per NG tube Q4H PRN    Or    acetaminophen (TYLENOL) suppository 650 mg  650 mg Rectal Q4H PRN    glucose chewable tablet 16 g  4 Tab Oral PRN    glucagon (GLUCAGEN) injection 1 mg  1 mg IntraMUSCular PRN    hydrALAZINE (APRESOLINE) 20 mg/mL injection 10 mg  10 mg IntraVENous Q4H PRN    sodium chloride (NS) flush 5-10 mL  5-10 mL IntraVENous Q8H    sodium chloride (NS) flush 5-10 mL  5-10 mL IntraVENous PRN    acetaminophen (TYLENOL) tablet 650 mg  650 mg Oral Q4H PRN    HYDROcodone-acetaminophen (NORCO) 5-325 mg per tablet 1 Tab  1 Tab Oral Q4H PRN    ondansetron (ZOFRAN) injection 4 mg  4 mg IntraVENous Q4H PRN     ______________________________________________________________________  EXPECTED LENGTH OF STAY: 7d 0h  ACTUAL LENGTH OF STAY:          21                 Salvador Beaulieu MD

## 2018-02-13 NOTE — PROGRESS NOTES
0730: Bedside and Verbal shift change report given to Brooke Pinedo RN (oncoming nurse) by Albino Tsang RN (offgoing nurse). Report included the following information SBAR, Kardex, Intake/Output, MAR, Recent Results, Cardiac Rhythm NSR and Alarm Parameters . Shift Summary: Uneventful shift, VSS, eyes open spontaneously, PERRL, follows commands, moves left extremity, withdraws BLE, OGT with Tube Feeding infusing, incontinent, left PICC. Palliative Care following patient. Family support provided. 1930: Bedside and Verbal shift change report given to Albino Tsang RN (oncoming nurse) by Brooke Pinedo RN (offgoing nurse). Report included the following information SBAR, Kardex, Intake/Output, MAR, Recent Results, Cardiac Rhythm NSR and Alarm Parameters .

## 2018-02-13 NOTE — PROGRESS NOTES
Pulmonary / [address] Day: 21     S:    In ICU for resp failure following SDH post evacuation  2/13  Seen and examined earlier today. Awake and interactive! 2/12  Seen and examined earlier today. Unresponsive. Plan for family meeting today    2/11  Patient was seen and examined earlier today. He continues to be encephalopathic. Palliative care meeting with the family is planned tomorrow. He has failed extubation several times and if continued care is planned, he will need a trach and a PEG    2/10  Patient was seen and examined earlier today. He continues to be encephalopathic. Chart reviewed. Noted plans to meet with palliative care for goals of care discussion. He is at high risk for extubation failure given presence of bronchomalacia    2/9:  Again unable to extubate secondary to mental status  Family deciding on trach and peg   Palliative care following    2/8:  Placed on SBT  Appears comfortable but no commands followed currently    2/7:  Failed SBT with increased RR  TF  Palliative care following - Partial code - no shocks or CPR    2/6:  Re-intubated on 2/5 for AMS / resp distress. Thick secretions in back of throat at time of intubation  Head CT without significant change  Abd CT for abd distension - Small bilateral pleural effusions with underlying consolidation. Gallstone. Sigmoid diverticulosis.  Lesion left hepatic lobe again demonstrated but cannot  be characterized given the lack of intravenous contrast. No acute  intra-abdominal abnormality  Remains sedate on vent  Tolerating TF    2/5:  Was extubated at the end of last week  Currently tachypneic despite bipap with increased work of breathing and accessory muscle use  Mental status is poor  Abd distended with worsening resp distress  Has been having bowel movements     O:    Patient Vitals for the past 4 hrs:   BP Pulse Resp SpO2   02/13/18 1414 - 83 19 100 %   02/13/18 1400 147/67 65 14 98 %   02/13/18 1300 133/70 (!) 59 12 98 % 18 1200 138/69 64 12 98 %   18 1100 133/64 62 12 100 %     Temp (24hrs), Av.1 °F (37.8 °C), Min:98.8 °F (37.1 °C), Max:100.8 °F (38.2 °C)      Intake/Output Summary (Last 24 hours) at 18 1439  Last data filed at 18 1000   Gross per 24 hour   Intake             1660 ml   Output             1975 ml   Net             -315 ml     Exam:  No distress  Sedate RASS -2  Oral ett  MM dry  Anicteric  No commands followed  Coarse rhonchi bilaterally  RRR  Protuberant with mild distension, hypoactive bs  Warm and dry  Trace edema        Imaging: Images independently viewed  CXR - persistned L>R effusions and basilar atx    Labs:  Recent Results (from the past 12 hour(s))   GLUCOSE, POC    Collection Time: 18  5:29 AM   Result Value Ref Range    Glucose (POC) 192 (H) 65 - 100 mg/dL    Performed by Andrea Hung    METABOLIC PANEL, BASIC    Collection Time: 18  5:35 AM   Result Value Ref Range    Sodium 142 136 - 145 mmol/L    Potassium 4.0 3.5 - 5.1 mmol/L    Chloride 107 97 - 108 mmol/L    CO2 26 21 - 32 mmol/L    Anion gap 9 5 - 15 mmol/L    Glucose 174 (H) 65 - 100 mg/dL    BUN 48 (H) 6 - 20 MG/DL    Creatinine 2.03 (H) 0.70 - 1.30 MG/DL    BUN/Creatinine ratio 24 (H) 12 - 20      GFR est AA 38 (L) >60 ml/min/1.73m2    GFR est non-AA 32 (L) >60 ml/min/1.73m2    Calcium 8.3 (L) 8.5 - 10.1 MG/DL   POC G3 - PUL    Collection Time: 18  9:15 AM   Result Value Ref Range    FIO2 (POC) 30 %    pH (POC) 7.447 7.35 - 7.45      pCO2 (POC) 40.3 35.0 - 45.0 MMHG    pO2 (POC) 60 (L) 80 - 100 MMHG    HCO3 (POC) 27.9 (H) 22 - 26 MMOL/L    sO2 (POC) 92 92 - 97 %    Base excess (POC) 4 mmol/L    Site LEFT RADIAL      Device: VENT      Mode CPAP/SPON      PEEP/CPAP (POC) 5 cmH2O    Pressure support 5 cmH2O    Allens test (POC) YES      Specimen type (POC) ARTERIAL      Total resp.  rate 24     GLUCOSE, POC    Collection Time: 18 12:57 PM   Result Value Ref Range    Glucose (POC) 87 65 - 100 mg/dL    Performed by Aaron Howard        A/P:   1. Acute hypoxemic respiratory failure with increased work of breathing despite bipap - secretions and bronchomalacia likely contributing to increased work of breathing - re-intubated 2/5 - failed SBT 2/7 - mental status limiting ability to extubate  2. SDH s/p craniotomy with evacuation on 1/23/18 after GLF  3. Encephalopathy - off all sedatives  4. 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 MRSA in sputum - completing course of vancomycin  7. SZ  8. Hypernatremia  9. CKD  10. DM  11.  H/o DVT /PE    --Vent support  --Hypernatremia improved   --on cefepime vanc and acyclovir - sputum cx with MRSA - stopped cefepime 2/7 continue vanc to complete course  --Palliative care following   --Recommend trach and peg: discussed this with family  -- D/W RN, RT and Dr. Amadeo Hernandez decision making:   I have reviewed the flowsheet and previous day's notes  Acute or chronic illness that poses a threat to life or bodily function  Review and order of Clinical lab tests  Review and Order of Radiology tests  Independent visualization of Image  Reviewed Ventilator / NiPPV  Reviewed high risk medications/sedatives    Viridiana Shah MD

## 2018-02-14 NOTE — PROGRESS NOTES
Problem: Ventilator Management  Goal: *Patient maintains clear airway/free of aspiration  Outcome: Not Progressing Towards Goal  Copious amount of secretions

## 2018-02-14 NOTE — PROGRESS NOTES
Pharmacist Note - Vancomycin Dosing  Therapy day #11  Indication: HCAP/MRSA  Current regimen: 1250 mg IV Q 24 hr    A Trough Level resulted at 22.4 mcg/mL which was obtained ~23.5 hrs post-dose. Goal trough: 15 - 20 mcg/mL     Plan: The vancomycin trough drawn this afternoon was supratherapeutic at 22.4 mcg/ml. The RN had already started the infusion, however I asked her to stop it immediately (~1/4 the dose was infused). Will adjust the dose to 1500 mg IV Q 36 hr starting tomorrow morning at 0800 to allow the drug to clear. Pharmacy will continue to monitor this patient daily for changes in clinical status and renal function.

## 2018-02-14 NOTE — WOUND CARE
Follow-up for new urinary pouch. RN reports is began leaking and she removed. No skin trauma associated with pouch - all skin touching pouch is intact and without erythema. External urinary pouch applied with skin prep and Eakins ring. Connected to bedside bag. Posterior scrotum with skip areas of burgundy and purple. Flat and without induration. Does not appear pressure related - soft mobile tissue. Venelex to psoterior scrotum twice daily. Discussed with RN. Will continue to follow.   JENNIFER CantuN

## 2018-02-14 NOTE — PROGRESS NOTES
Hospitalist Progress Note                               Lindsey Del Cid MD                                     Answering service: [phone]                               OR [phone] from in house phone                               Cell: [phone]              Date of Service:  2018  NAME:  Alison Uriostegui  :  1935  MRN:  [id number]      Admission Summary:   Lidya Moreno came to the ED on  complaining of \"For the last 2-3 days I have been having trouble talking. I can't get my words out. \" and was found to have moderate to large acute on chronic subdural hemorrhage on the left. Left to right midline shift approximately 8 mm with effacement of the left lateral ventricle. The ambient cisterns remain patent. He underwent left frontotemporoparietal craniotomy evacuation, subacute, and chronic subdural hematoma on 18. Interval history / Subjective: Intubated. Awake. Responsive. Assessment & Plan:   Acute on chronic SDH on the left  -S/p  underwent left frontotemporoparietal craniotomy  -Cont keppra and laconsamide. Expressive aphasia and dysarthria. Intubaed  -Receiving feeds via OGT. Likley to need PEG    Acute hypoxic respiratory failure  -Intubated for surgery then extubated. - Re-intubated on  and then bronched and found to have mucous clot. Extubated on  and subsequently re-intubated same day, rebronched on  and  that revealed severe tracheobronchomalacia.   - Extubated on  and then re-intubated on .   -Palliative care team met with family on ,noted family decided for tracheostomy. May need PEG      HSV tracheobronchitis  Noted on viral cultures from . Placed on acyclovir on     Possible MRSA pneumonia,sputum cx on  grew scant MRSA and other Staph species on vancomycin. Thrombocytopenia  Noted after admission. Monitor and resolved.         Hx Afib on eliquis  Hx dvt/pe on eliquis  Taken off eliquis on admission. Unclear if he could ever be placed back on any blood thinners given recurrent sdh. Code Status: can reintubate, no shocks. DVT prophylaxis: scd     Disposition: rehabilitation. Hospital Problems  Date Reviewed: 1/23/2018          Codes Class Noted POA    * (Principal)SDH (subdural hematoma) ([facility] [address] ICD-10-CM: I62.00  ICD-9-CM: 432.1  1/23/2018 Unknown                Review of Systems:   A comprehensive review of systems was negative except for that written in the HPI. Physical Examination:      Last 24hrs VS reviewed since prior progress note. Most recent are:  Visit Vitals    /68    Pulse 74    Temp 98.9 °F (37.2 °C)    Resp 20    Ht 6' (1.829 m)    Wt 103.5 kg (228 lb 1.6 oz)    SpO2 96%    BMI 30.94 kg/m2           Constitutional: Intubated. Awake. Responsive. Eyes: Non icteric,non pallor,no erythema,discharge,PERRLA   ENT:  ETT and OGT in place. ,    Resp:  CTA bilaterally. No wheezing/rhonchi/rales. No accessory muscle use   CV:  Regular rhythm, normal rate, no murmurs, gallops, rubs    GI:  Soft, non distended, non tender. normoactive bowel sounds, no hepatosplenomegaly    :  No CVA or suprapubic tenderness   Skin  :  No erythema,rash,bullae,dipigmentation     Musculoskeletal:  No edema, warm, 2+ pulses throughout    Neurologic: Awake,responsive. Moves left side.               Intake/Output Summary (Last 24 hours) at 02/14/18 1428  Last data filed at 02/14/18 1200   Gross per 24 hour   Intake             1955 ml   Output              350 ml   Net             1605 ml          Data Review:    Review and/or order of clinical lab test  Review and/or order of tests in the radiology section of CPT  Review and/or order of tests in the medicine section of CPT      Labs:     Recent Labs      02/12/18   0440   WBC  8.3   HGB  8.2*   HCT  26.2*   PLT  151     Recent Labs      02/14/18   0453  02/13/18   0535  02/12/18   0440 NA  145  142  141   K  4.0  4.0  4.0   CL  109*  107  106   CO2  28  26  27   BUN  52*  48*  48*   CREA  1.88*  2.03*  2.12*   GLU  160*  174*  190*   CA  8.6  8.3*  8.7   MG  2.2   --   2.2   PHOS  3.5   --   3.1     Recent Labs      02/12/18   0440   SGOT  29   ALT  60   AP  109   TBILI  0.4   TP  5.5*   ALB  1.8*   GLOB  3.7     No results for input(s): INR, PTP, APTT in the last 72 hours. No lab exists for component: INREXT, INREXT   No results for input(s): FE, TIBC, PSAT, FERR in the last 72 hours. No results found for: FOL, RBCF   No results for input(s): PH, PCO2, PO2 in the last 72 hours. No results for input(s): CPK, CKNDX, TROIQ in the last 72 hours.     No lab exists for component: CPKMB  Lab Results   Component Value Date/Time    Cholesterol, total 118 01/22/2018 11:28 PM    HDL Cholesterol 53 01/22/2018 11:28 PM    LDL, calculated 20 01/22/2018 11:28 PM    Triglyceride 225 (H) 01/22/2018 11:28 PM    CHOL/HDL Ratio 2.2 01/22/2018 11:28 PM     Lab Results   Component Value Date/Time    Glucose (POC) 133 (H) 02/14/2018 12:59 PM    Glucose (POC) 137 (H) 02/14/2018 06:50 AM    Glucose (POC) 202 (H) 02/14/2018 12:43 AM    Glucose (POC) 175 (H) 02/13/2018 09:38 PM    Glucose (POC) 127 (H) 02/13/2018 05:41 PM     Lab Results   Component Value Date/Time    Color Yellow 03/04/2013 09:14 AM    Appearance Cloudy (A) 03/04/2013 09:14 AM    Specific gravity 1.015 03/10/2010 09:45 AM    pH (UA) 7.0 03/04/2013 09:14 AM    Protein NEGATIVE  03/10/2010 09:45 AM    Glucose NEGATIVE  03/10/2010 09:45 AM    Ketone Negative 03/04/2013 09:14 AM    Bilirubin Negative 03/04/2013 09:14 AM    Urobilinogen 0.2 03/10/2010 09:45 AM    Nitrites Negative 03/04/2013 09:14 AM    Leukocyte Esterase Negative 03/04/2013 09:14 AM    Bacteria Few 03/04/2013 09:14 AM    WBC 0-5 03/04/2013 09:14 AM    RBC >30 (A) 03/04/2013 09:14 AM         Medications Reviewed:     Current Facility-Administered Medications   Medication Dose Route Frequency    insulin glargine (LANTUS) injection 50 Units  50 Units SubCUTAneous QHS    vancomycin (VANCOCIN) 1250 mg in  ml infusion  1,250 mg IntraVENous Q24H    albuterol-ipratropium (DUO-NEB) 2.5 MG-0.5 MG/3 ML  3 mL Nebulization TID RT    balsam peru-castor oil (VENELEX)  mg/gram ointment   Topical BID    chlorhexidine (PERIDEX) 0.12 % mouthwash 15 mL  15 mL Oral BID    Vancomycin - Pharmacy to Dose   Other Rx Dosing/Monitoring    insulin lispro (HUMALOG) injection   SubCUTAneous Q6H    dextrose (D50W) injection syrg 12.5-25 g  12.5-25 g IntraVENous PRN    acyclovir (ZOVIRAX) 575 mg in 0.9% sodium chloride 100 mL IVPB  575 mg IntraVENous Q12H    budesonide (PULMICORT) 500 mcg/2 ml nebulizer suspension  500 mcg Nebulization BID RT    albuterol-ipratropium (DUO-NEB) 2.5 MG-0.5 MG/3 ML  3 mL Nebulization Q4H PRN    levETIRAcetam (KEPPRA) oral solution 1,000 mg  1,000 mg Per NG tube Q12H    lacosamide (VIMPAT) tablet 100 mg  100 mg Per NG tube Q12H    sodium chloride (NS) flush 5-10 mL  5-10 mL IntraVENous Q8H    sodium chloride (NS) flush 5-10 mL  5-10 mL IntraVENous PRN    midazolam (VERSED) injection 5 mg  5 mg IntraVENous Multiple    fentaNYL citrate (PF) injection 25 mcg  25 mcg IntraVENous Q1H PRN    famotidine (PEPCID) 40 mg/5 mL (8 mg/mL) oral suspension 20 mg  20 mg Per NG tube DAILY    bacitracin 500 unit/gram packet 1 Packet  1 Packet Topical PRN    polyvinyl alcohol (LIQUIFILM TEARS) 1.4 % ophthalmic solution 2 Drop  2 Drop Both Eyes Q8H    sodium chloride (NS) flush 10-30 mL  10-30 mL InterCATHeter PRN    sodium chloride (NS) flush 10 mL  10 mL InterCATHeter Q24H    sodium chloride (NS) flush 10 mL  10 mL InterCATHeter PRN    sodium chloride (NS) flush 10-40 mL  10-40 mL InterCATHeter Q8H    sodium chloride (NS) flush 20 mL  20 mL InterCATHeter Q24H    alteplase (CATHFLO) 1 mg in sterile water (preservative free) 1 mL injection  1 mg InterCATHeter PRN    atorvastatin (LIPITOR) tablet 40 mg  40 mg Per NG tube DAILY    naloxone (NARCAN) injection 0.4 mg  0.4 mg IntraVENous PRN    acetaminophen (TYLENOL) tablet 650 mg  650 mg Oral Q4H PRN    Or    acetaminophen (TYLENOL) solution 650 mg  650 mg Per NG tube Q4H PRN    Or    acetaminophen (TYLENOL) suppository 650 mg  650 mg Rectal Q4H PRN    glucose chewable tablet 16 g  4 Tab Oral PRN    glucagon (GLUCAGEN) injection 1 mg  1 mg IntraMUSCular PRN    hydrALAZINE (APRESOLINE) 20 mg/mL injection 10 mg  10 mg IntraVENous Q4H PRN    sodium chloride (NS) flush 5-10 mL  5-10 mL IntraVENous Q8H    sodium chloride (NS) flush 5-10 mL  5-10 mL IntraVENous PRN    acetaminophen (TYLENOL) tablet 650 mg  650 mg Oral Q4H PRN    HYDROcodone-acetaminophen (NORCO) 5-325 mg per tablet 1 Tab  1 Tab Oral Q4H PRN    ondansetron (ZOFRAN) injection 4 mg  4 mg IntraVENous Q4H PRN     ______________________________________________________________________  EXPECTED LENGTH OF STAY: 7d 0h  ACTUAL LENGTH OF STAY:          22                 Wilfredo Mckeon MD

## 2018-02-14 NOTE — PROGRESS NOTES
Pulmonary / [address] Day: 25     S:    In ICU for resp failure following SDH post evacuation    2/14  Seen and examined earlier today. Sedation minimized- awake. Copious secretions and tracheo/bronchomalacia with failed extubation X 3 or more this admission portend high risk for extubation failure. Family deciding upon trach and peg-- if decision if to not pursue trach peg, will need DNI order prior to extubation    2/13  Seen and examined earlier today. Awake and interactive! 2/12  Seen and examined earlier today. Unresponsive. Plan for family meeting today    2/11  Patient was seen and examined earlier today. He continues to be encephalopathic. Palliative care meeting with the family is planned tomorrow. He has failed extubation several times and if continued care is planned, he will need a trach and a PEG    2/10  Patient was seen and examined earlier today. He continues to be encephalopathic. Chart reviewed. Noted plans to meet with palliative care for goals of care discussion. He is at high risk for extubation failure given presence of bronchomalacia    2/9:  Again unable to extubate secondary to mental status  Family deciding on trach and peg   Palliative care following    2/8:  Placed on SBT  Appears comfortable but no commands followed currently    2/7:  Failed SBT with increased RR  TF  Palliative care following - Partial code - no shocks or CPR    2/6:  Re-intubated on 2/5 for AMS / resp distress. Thick secretions in back of throat at time of intubation  Head CT without significant change  Abd CT for abd distension - Small bilateral pleural effusions with underlying consolidation. Gallstone. Sigmoid diverticulosis.  Lesion left hepatic lobe again demonstrated but cannot  be characterized given the lack of intravenous contrast. No acute  intra-abdominal abnormality  Remains sedate on vent  Tolerating TF    2/5:  Was extubated at the end of last week  Currently tachypneic despite bipap with increased work of breathing and accessory muscle use  Mental status is poor  Abd distended with worsening resp distress  Has been having bowel movements     O:    Patient Vitals for the past 4 hrs:   BP Temp Pulse Resp SpO2   18 1300 153/68 - 76 14 95 %   18 1200 149/77 98.9 °F (37.2 °C) 81 27 94 %   18 1100 151/77 - 74 15 95 %   18 1037 - - 71 12 96 %   18 1000 156/73 - 77 27 96 %     Temp (24hrs), Av.3 °F (37.4 °C), Min:98.6 °F (37 °C), Max:100.9 °F (38.3 °C)      Intake/Output Summary (Last 24 hours) at 18 1330  Last data filed at 18 0300   Gross per 24 hour   Intake             1980 ml   Output              350 ml   Net             1630 ml     Exam:  No distress  Sedate RASS -2  Oral ett  MM dry  Anicteric  No commands followed  Coarse rhonchi bilaterally  RRR  Protuberant with mild distension, hypoactive bs  Warm and dry  Trace edema        Imaging: Images independently viewed  CXR - persistned L>R effusions and basilar atx    Labs:  Recent Results (from the past 12 hour(s))   METABOLIC PANEL, BASIC    Collection Time: 18  4:53 AM   Result Value Ref Range    Sodium 145 136 - 145 mmol/L    Potassium 4.0 3.5 - 5.1 mmol/L    Chloride 109 (H) 97 - 108 mmol/L    CO2 28 21 - 32 mmol/L    Anion gap 8 5 - 15 mmol/L    Glucose 160 (H) 65 - 100 mg/dL    BUN 52 (H) 6 - 20 MG/DL    Creatinine 1.88 (H) 0.70 - 1.30 MG/DL    BUN/Creatinine ratio 28 (H) 12 - 20      GFR est AA 42 (L) >60 ml/min/1.73m2    GFR est non-AA 35 (L) >60 ml/min/1.73m2    Calcium 8.6 8.5 - 10.1 MG/DL   MAGNESIUM    Collection Time: 18  4:53 AM   Result Value Ref Range    Magnesium 2.2 1.6 - 2.4 mg/dL   PHOSPHORUS    Collection Time: 18  4:53 AM   Result Value Ref Range    Phosphorus 3.5 2.6 - 4.7 MG/DL   GLUCOSE, POC    Collection Time: 18  6:50 AM   Result Value Ref Range    Glucose (POC) 137 (H) 65 - 100 mg/dL    Performed by Geoffrey Marion    POC G3 - PUL    Collection Time: 02/14/18 10:31 AM   Result Value Ref Range    FIO2 (POC) 30 %    pH (POC) 7.449 7.35 - 7.45      pCO2 (POC) 42.9 35.0 - 45.0 MMHG    pO2 (POC) 207 (H) 80 - 100 MMHG    HCO3 (POC) 29.7 (H) 22 - 26 MMOL/L    sO2 (POC) 100 (H) 92 - 97 %    Base excess (POC) 6 mmol/L    Site RIGHT BRACHIAL      Device: VENT      Mode CPAP/SPON      PEEP/CPAP (POC) 5 cmH2O    Pressure support 5 cmH2O    Allens test (POC) N/A      Specimen type (POC) ARTERIAL      Total resp. rate 26     GLUCOSE, POC    Collection Time: 02/14/18 12:59 PM   Result Value Ref Range    Glucose (POC) 133 (H) 65 - 100 mg/dL    Performed by Cristobal Junior        A/P:   1. Acute hypoxemic respiratory failure with increased work of breathing despite bipap - secretions and bronchomalacia likely contributing to increased work of breathing - re-intubated 2/5 - failed SBT 2/7 - mental status limiting ability to extubate  2. SDH s/p craniotomy with evacuation on 1/23/18 after GLF  3. Encephalopathy - off all sedatives  4. 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 MRSA in sputum - completing course of vancomycin  7. SZ  8. Hypernatremia  9. CKD  10. DM  11.  H/o DVT /PE    --Vent support  --Hypernatremia improved   --on cefepime vanc and acyclovir - sputum cx with MRSA - stopped cefepime 2/7 continue vanc to complete course  --Palliative care following for goals of care discussions  --Recommend trach and peg: discussed this with family 2/13  -- D/W RN, RT and Dr. Liliya Lam decision making:   I have reviewed the flowsheet and previous day's notes  Acute or chronic illness that poses a threat to life or bodily function  Review and order of Clinical lab tests  Review and Order of Radiology tests  Independent visualization of Image  Reviewed Ventilator / NiPPV  Reviewed high risk medications/sedatives    Elly Frost MD

## 2018-02-14 NOTE — PROGRESS NOTES
Palliative Medicine:    Speak to son/mPOA Jimmy Lopez. Family still indecisive about decisions - however as they want to make sure they give pt every chance they can to recover, plan is to consult ENT and GI to place trach and PEG. They can then see if pt making gains consistently, and place safeguards in place (eg DDNR) . They have questions about [employer] and insurance- will direct to care management tmrw. Have touched base w/ Dr Emmy Riggs and Radha Garcia RN. I have not yet placed the above consults, but plan is to move ahead.

## 2018-02-14 NOTE — PROGRESS NOTES
0730: Bedside and Verbal shift change report given to Marcia Lofton RN (oncoming nurse) by Myron Hayes RN (offgoing nurse). Report included the following information SBAR, Kardex, Intake/Output, MAR, Recent Results, Cardiac Rhythm NSR and Alarm Parameters . Shift Summary: Uneventful shift, VSS, eyes open spontaneously, PERRL, follows commands, moves left extremity & BLE, OGT with Tube Feeding infusing, incontinent, left PICC. Palliative Care following patient. Family support provided. 1930: Bedside and Verbal shift change report given to Caroline Woods RN (oncoming nurse) by Marcia Lofton RN (offgoing nurse). Report included the following information SBAR, Kardex, Procedure Summary, Intake/Output, MAR, Recent Results, Med Rec Status, Cardiac Rhythm NSR and Alarm Parameters .

## 2018-02-14 NOTE — PROGRESS NOTES
1930:Bedside and Verbal shift change report given to Sunshine Morrow RN (oncoming nurse) by Loyda Hanson RN (offgoing nurse). Report included the following information SBAR, Kardex, Intake/Output, MAR, Accordion, Recent Results, Med Rec Status and Cardiac Rhythm NSR. Shift Summary: Uneventful shift. Pt remains intubated. Follows commands, tracks and focuses with eyes. Withdraws on RUE and RLE, moves LUE and LLE purposefully and spontaneously.

## 2018-02-14 NOTE — PALLIATIVE CARE
Palliative Medicine Social Work      Spoke with nursing and Dr. Romy Rodriguez. Apparently family is still considering trach/PEG. Recommendation to family was to make a decision quickly, as patient at higher risk for complications the longer intubated. Nursing to speak with son when he comes in this evening to hopefully clarify/expedite decision. Will need to clarify order for DNI if family decides against moving forward with tracheostomy. Thank you for the opportunity to be involved in the care of Mr. Simona Mckinley. Miranda Sanabria, DANNY, [facility]-  Palliative Medicine   [employer] ® ACP Facilitator   [phone]

## 2018-02-15 NOTE — PROGRESS NOTES
Problem: Falls - Risk of  Goal: *Absence of Falls  Document Arleen Fall Risk and appropriate interventions in the flowsheet. Outcome: Progressing Towards Goal  Fall Risk Interventions:  Mobility Interventions: Communicate number of staff needed for ambulation/transfer    Mentation Interventions: Evaluate medications/consider consulting pharmacy    Medication Interventions: Evaluate medications/consider consulting pharmacy    Elimination Interventions:  Toileting schedule/hourly rounds    History of Falls Interventions: Evaluate medications/consider consulting pharmacy

## 2018-02-15 NOTE — PROGRESS NOTES
Palliative Medicine Consult  Stephon: [phone])    Patient Name: Esperanza Freed  YOB: 1935    Date of Initial Consult:2/6/18  Reason for Consult: Care decisions   Requesting Provider: Aleks  Primary Care Physician: Staci Gutiérrez MD     SUMMARY:   Esperanza Freed is a 81 y.o. with a past history of bladder, prostate, and colon cancer (recently dx colon CA); DM; HTN; CKD; GERD; ischemic CVA 2016; neck pain; PE/DVT on Eliquis and ASA who was admitted on 1/22/2018 from home with aphasia, dysarthria x 2 days. Had a fall off a friend's porch about 2 months ago, CT head at OSH w/out acute findings. CT head upon admission w/ large L SDH w/ 8mm midline shift. S/p craniotomy and evacuation of SDH on 1/24/18. 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 Also w/ bronchomalacia. After surgery reintubated 1/26, extubated 1/30, reintubated 1/30, extubated on 2/2, reintubated 2/5. Bronchs 1/26, 1/31, 2/2. Has had waxing/waning mental status, notes indicate was awake and answering questions on 2/2. Witnessed seizure, none seen on EEG 1/31. Off propofol since 2/7, no sedating meds either since that time. EEG 2/12/18 w/out much change from 1/31- generalized slowing. Current medical issues leading to Palliative Medicine involvement include: care decisions. Children:Ford (primary mPOA), Angeline Schwab and [place]. beltran. St[place] beltran having the hardest time, the other two have been up to visit on regular basis. Girlfriend is Cale. Best friend is Sherley Garcia. PALLIATIVE DIAGNOSES:   1. Resp failure, required mult intubations, mucous plugging, HSV bronchitis   2. Debility due to medical issues  3. AMS/somnolence        PLAN:     1. Family incl primary mPOSUZETTE Vasquez Ramón have been going back/forth on the trach/PEG and not. They really are trying to make the right decisions for their dad.  Was a bit confusing to them, because on Tues pt was more alert, however has not made much consistent gains. Is able to squeeze hand on left and open, track eyes. 2. They want to honor pt as he outlined in AMD and today he shares w/ me that pt's father or grandfather committed suicide when he was [de-identified] yo and debilitated. Pt, while never saying he would do the exact same thing, told his kids that he was proud of what he did- that he \"went out on his own terms. \"  3. Once again, Perez Du asks me if pt is ever going to live on his own again. Once again, I say I cannot say anything w/ 092% certainty, but given everything we know- everyone involved thinks that it is extremely unlikely. Pt may make some improvements, a trach may even make coming off the vent and staying off the vent easier. May be able to speak w/ a valve/cap. However would still be at risk for future decline. 4. Perez Du and Anuja Velarde have decided NOT to pursue trach/PEG. They have been thinking about this constantly and I want them to feel some peace around the fact they really are trying to honor him. Even girlfriend Zachary River leaning against aggressive interventions. 5. Pt had been passing SBTs but not yet extubated because there was no good plan afterwards. 6. Talk in detail about plan w/ Perez Du. Decision made to:  1. Extubate today, check in w/ pulm and RT. Had done okay for a while w/ previous extubations. 2. Will cont all meds incl AEDs and abx. Will have to take out the OGT, but will hold on TFs for at least a day before putting in an NG (and likely I'll recommend against this). 3. Goal is to see how he does and if he can improve on his own, but if he declines family does not want escalation of care. Does not want BIPAP, NRB or reintubation. Does not want pressors. If we start to see that pt is declining, then would switch to true comfort measures. I have added prn Ativan and Dilaudid for anxiety and SOB. 4. Family does not wish to be here during extubation, but plan is for ICU staff to call Perez Du beforehand.    7. Communicated plan of care with: Palliative IDT; Yi Russell RN; Dr Earl Spaulding; Jessica Mo NP; Dejah Fuentes RN; Dr Jennifer Bolanos / TREATMENT PREFERENCES:     GOALS OF CARE:  Patient/Health Care Proxy Stated Goals: Other (comment) Recovery as possible       TREATMENT PREFERENCES:   Code Status: DNR    Advance Care Planning:  Advance Care Planning 2/6/2018   Patient's Healthcare Decision Maker is: Named in scanned ACP document   Primary Decision Maker Name Julius Lazo   Primary Decision Maker Phone Number [phone]   Primary Decision Maker Relationship to Patient Adult child   Secondary Decision Maker Name José Carmichael    Secondary Decision Maker Phone Number [phone]   Secondary Decision Maker Relationship to Patient Adult child   Confirm Advance Directive -   Patient Would Like to Complete Advance Directive -   Does the patient have other document types Do Not Resuscitate; Power of        Medical Interventions: Full interventions           Other:    As far as possible, the palliative care team has discussed with patient / health care proxy about goals of care / treatment preferences for patient. HISTORY:         HPI/SUBJECTIVE:    The patient is:   [] Verbal and participatory  [x] Non-participatory due to:     Pt is awake to voice, tracking w/ eyes, squeezing hand right now. Earlier was very drowsy.      Clinical Pain Assessment (nonverbal scale for severity on nonverbal patients):   Clinical Pain Assessment  Severity: 0     Activity (Movement): Lying quietly, normal position    Duration: for how long has pt been experiencing pain (e.g., 2 days, 1 month, years)  Frequency: how often pain is an issue (e.g., several times per day, once every few days, constant)     FUNCTIONAL ASSESSMENT:     Palliative Performance Scale (PPS):  PPS: 20     PSYCHOSOCIAL/SPIRITUAL SCREENING:     Palliative IDT has assessed this patient for cultural preferences / practices and a referral made as appropriate to needs (Cultural Services, Patient Advocacy, Ethics, etc.)    Advance Care Planning:  Advance Care Planning 2/6/2018   Patient's Healthcare Decision Maker is: Named in scanned ACP document   Primary Decision Maker Name Julius Lazo   Primary Decision Maker Phone Number [phone]   Primary Decision Maker Relationship to Patient Adult child   Secondary Decision Maker Name José Carmichael    Secondary Decision Maker Phone Number [phone]   Secondary Decision Maker Relationship to Patient Adult child   Confirm Advance Directive -   Patient Would Like to Complete Advance Directive -   Does the patient have other document types Do Not Resuscitate; Power of        Any spiritual / Baptist concerns:  [] Yes /  [x] No    Caregiver Burnout:  [] Yes /  [x] No /  [] No Caregiver Present      Anticipatory grief assessment:   [x] Normal  / [] Maladaptive       ESAS Anxiety:   Cannot obtain due to patient factors    ESAS Depression:   Cannot obtain due to patient factors          REVIEW OF SYSTEMS:     Positive and pertinent negative findings in ROS are noted above in HPI. The following systems were [] reviewed / [x] unable to be reviewed as noted in HPI  Other findings are noted below. Systems: constitutional, ears/nose/mouth/throat, respiratory, gastrointestinal, genitourinary, musculoskeletal, integumentary, neurologic, psychiatric, endocrine. Positive findings noted below. Modified ESAS Completed by: provider   Fatigue: 10 Drowsiness: 10     Pain: 0           Dyspnea: 0           Stool Occurrence(s): 1        PHYSICAL EXAM:     From RN flowsheet:  Wt Readings from Last 3 Encounters:   02/15/18 240 lb 11.2 oz (109.2 kg)   12/29/17 216 lb 6.4 oz (98.2 kg)   03/06/17 202 lb (91.6 kg)     Blood pressure (!) 148/96, pulse 66, temperature 99.1 °F (37.3 °C), resp. rate 12, height 6' (1.829 m), weight 240 lb 11.2 oz (109.2 kg), SpO2 100 %.     Pain Scale 1: Adult Nonverbal Pain Scale  Pain Intensity 1: 0              Pain Intervention(s) 1: Repositioned Constitutional: awake, trying to nod/shake head a bit  Eyes: pupils equal  ENMT: no nasal discharge  Respiratory: breathing not labored  Gastrointestinal: soft non-tender, +bowel sounds  Musculoskeletal: no deformity   Skin: warm, dry  Neurologic: withdraws to pain, squeezes hand on L     HISTORY:     Principal Problem:    SDH (subdural hematoma) ([address] (1/23/2018)      Past Medical History:   Diagnosis Date    Arthritis     neck,chronic    Bladder cancer ([address] 3/13    bladder    Chronic pain     NECK    Colon cancer ([address] 3/16    COLON    Diabetes ([address]     GERD (gastroesophageal reflux disease) 2008    at times, NOT continous    Hypercholesterolemia     Hypertension     Kidney stones 1969    SEVERAL     Nephrolithiasis     Prostate cancer ([address] 9/2003    prostate cancer - radiation    PUD (peptic ulcer disease) 2008    Thromboembolus ([address] 2/8/16    2 RIGHT LEG, 3 IN LUNGS (STARTED ON XARELTO IN ER, 1 WEEK LATER WAS IN ER WITH INTERNAL BLEEDING)    Transient ischemic attack 2005    TIA - no deficits 2005 AND 2015    Unspecified sleep apnea     does not use CPAP/uses nasal strips      Past Surgical History:   Procedure Laterality Date    COLONOSCOPY N/A 3/6/2017    COLONOSCOPY performed by Kamini Judd MD at [facility]. [address], COLON, DIAGNOSTIC  2008    HX CERVICAL FUSION  2002    HX CYST REMOVAL      cyst removed from back, shoulder and scalp    HX HEENT  2006    sinus surgery    HX HEENT      STAS CATARACTS    HX LUMBAR LAMINECTOMY  2002    HX OTHER SURGICAL      nose surgery    HX OTHER SURGICAL  2008    LUMPS -BENIGN, REMOVED FROM SHOULDER, HEAD AND BACK    HX OTHER SURGICAL  3/2016    FILTER FOR BLOOD CLOTS    HX TONSILLECTOMY      HX UROLOGICAL  1969    for kidney stones      Family History   Problem Relation Age of Onset    Heart Disease Father     Lung Disease Father      [facility]    Cancer Brother      SKIN    Heart Attack Brother     Heart Disease Brother  Anesth Problems Neg Hx       History reviewed, no pertinent family history. Social History   Substance Use Topics    Smoking status: Former Smoker     Packs/day: 1.00     Years: 36.00     Quit date: 11/2/1998    Smokeless tobacco: Never Used      Comment: former cigarette/cigar smoker  SMOKED 10 YRS FIRST TIME; QUIT FOR 10 YRS; THEN SMOKED FOR ABOUT 26 YRS    Alcohol use 3.5 oz/week     7 Glasses of wine per week      Comment: DAILY 2 DRINKS     Allergies   Allergen Reactions    Penicillins Rash    Metoprolol Rash    Tramadol Other (comments)     Unsure of reaction.     Lodine [Etodolac] Vertigo      Current Facility-Administered Medications   Medication Dose Route Frequency    glycopyrrolate (ROBINUL) injection 0.2 mg  0.2 mg IntraVENous Q4H PRN    LORazepam (ATIVAN) injection 1 mg  1 mg IntraVENous Q15MIN PRN    HYDROmorphone (PF) (DILAUDID) injection 0.5-1 mg  0.5-1 mg IntraVENous Q15MIN PRN    vancomycin (VANCOCIN) 1500 mg in  ml infusion  1,500 mg IntraVENous Q36H    insulin glargine (LANTUS) injection 50 Units  50 Units SubCUTAneous QHS    albuterol-ipratropium (DUO-NEB) 2.5 MG-0.5 MG/3 ML  3 mL Nebulization TID RT    balsam peru-castor oil (VENELEX)  mg/gram ointment   Topical BID    chlorhexidine (PERIDEX) 0.12 % mouthwash 15 mL  15 mL Oral BID    Vancomycin - Pharmacy to Dose   Other Rx Dosing/Monitoring    insulin lispro (HUMALOG) injection   SubCUTAneous Q6H    dextrose (D50W) injection syrg 12.5-25 g  12.5-25 g IntraVENous PRN    acyclovir (ZOVIRAX) 575 mg in 0.9% sodium chloride 100 mL IVPB  575 mg IntraVENous Q12H    budesonide (PULMICORT) 500 mcg/2 ml nebulizer suspension  500 mcg Nebulization BID RT    albuterol-ipratropium (DUO-NEB) 2.5 MG-0.5 MG/3 ML  3 mL Nebulization Q4H PRN    levETIRAcetam (KEPPRA) oral solution 1,000 mg  1,000 mg Per NG tube Q12H    lacosamide (VIMPAT) tablet 100 mg  100 mg Per NG tube Q12H    sodium chloride (NS) flush 5-10 mL  5-10 mL IntraVENous Q8H    sodium chloride (NS) flush 5-10 mL  5-10 mL IntraVENous PRN    midazolam (VERSED) injection 5 mg  5 mg IntraVENous Multiple    famotidine (PEPCID) 40 mg/5 mL (8 mg/mL) oral suspension 20 mg  20 mg Per NG tube DAILY    bacitracin 500 unit/gram packet 1 Packet  1 Packet Topical PRN    polyvinyl alcohol (LIQUIFILM TEARS) 1.4 % ophthalmic solution 2 Drop  2 Drop Both Eyes Q8H    sodium chloride (NS) flush 10-30 mL  10-30 mL InterCATHeter PRN    sodium chloride (NS) flush 10 mL  10 mL InterCATHeter Q24H    sodium chloride (NS) flush 10 mL  10 mL InterCATHeter PRN    sodium chloride (NS) flush 10-40 mL  10-40 mL InterCATHeter Q8H    sodium chloride (NS) flush 20 mL  20 mL InterCATHeter Q24H    alteplase (CATHFLO) 1 mg in sterile water (preservative free) 1 mL injection  1 mg InterCATHeter PRN    atorvastatin (LIPITOR) tablet 40 mg  40 mg Per NG tube DAILY    naloxone (NARCAN) injection 0.4 mg  0.4 mg IntraVENous PRN    acetaminophen (TYLENOL) tablet 650 mg  650 mg Oral Q4H PRN    Or    acetaminophen (TYLENOL) solution 650 mg  650 mg Per NG tube Q4H PRN    Or    acetaminophen (TYLENOL) suppository 650 mg  650 mg Rectal Q4H PRN    glucose chewable tablet 16 g  4 Tab Oral PRN    glucagon (GLUCAGEN) injection 1 mg  1 mg IntraMUSCular PRN    hydrALAZINE (APRESOLINE) 20 mg/mL injection 10 mg  10 mg IntraVENous Q4H PRN    sodium chloride (NS) flush 5-10 mL  5-10 mL IntraVENous Q8H    sodium chloride (NS) flush 5-10 mL  5-10 mL IntraVENous PRN    acetaminophen (TYLENOL) tablet 650 mg  650 mg Oral Q4H PRN    HYDROcodone-acetaminophen (NORCO) 5-325 mg per tablet 1 Tab  1 Tab Oral Q4H PRN    ondansetron (ZOFRAN) injection 4 mg  4 mg IntraVENous Q4H PRN          LAB AND IMAGING FINDINGS:     Lab Results   Component Value Date/Time    WBC 8.3 02/12/2018 04:40 AM    HGB 8.2 (L) 02/12/2018 04:40 AM    PLATELET 176 54/82/9791 04:40 AM     Lab Results   Component Value Date/Time    Sodium 142 02/15/2018 05:04 AM    Potassium 3.8 02/15/2018 05:04 AM    Chloride 107 02/15/2018 05:04 AM    CO2 30 02/15/2018 05:04 AM    BUN 47 (H) 02/15/2018 05:04 AM    Creatinine 1.81 (H) 02/15/2018 05:04 AM    Calcium 8.9 02/15/2018 05:04 AM    Magnesium 2.2 02/14/2018 04:53 AM    Phosphorus 3.5 02/14/2018 04:53 AM      Lab Results   Component Value Date/Time    AST (SGOT) 29 02/12/2018 04:40 AM    Alk. phosphatase 109 02/12/2018 04:40 AM    Protein, total 5.5 (L) 02/12/2018 04:40 AM    Albumin 1.8 (L) 02/12/2018 04:40 AM    Globulin 3.7 02/12/2018 04:40 AM     Lab Results   Component Value Date/Time    INR 1.0 01/23/2018 12:42 AM    Prothrombin time 10.6 01/23/2018 12:42 AM    aPTT 28.6 01/23/2018 12:42 AM      No results found for: IRON, FE, TIBC, IBCT, PSAT, FERR   No results found for: PH, PCO2, PO2  No components found for: [place]   Lab Results   Component Value Date/Time     01/25/2018 05:01 PM    CK - MB 2.3 01/25/2018 05:01 PM                Total time: 40min   Counseling / coordination time, spent as noted above:35 min   > 50% counseling / coordination?: yes    Prolonged service was provided for  []30 min   []75 min in face to face time in the presence of the patient, spent as noted above. Note: this can only be billed with 31468 (initial) or 70123 (follow up). If multiple start / stop times, list each separately.

## 2018-02-15 NOTE — CONSULTS
[address]   [address]  [address] [id number]        GASTROENTEROLOGY CONSULTATION NOTE  Will Nguyễn Morales  [phone] office  [phone] NP/PA in-hospital cell phone M-F until 4:30PM  After 5PM or on weekends, please call  for physician on call        NAME:  Natividad Armas   :   1935   MRN:   [id number]       Referring Physician: Dr. Partha Sawyer Date: 2/15/2018 12:14 PM    Chief Complaint: unable to obtain     History of Present Illness:  Patient is a 81 y.o. who is seen in consultation at the request of Dr. Carmela Carpio for PEG placement. Patient has a past medical history significant for  of arthritis, bladder cancer, prostate cancer, chronic neck pain, colon cancer, type II diabetes mellitus, GERD, hypertension, hyperlipidemia, kidney stones, PUD, DVT, PE, TIA, and sleep apnea. He presented to the ED with dysarthria and expressive aphasia. Patient was admitted to the hospital on 18 for subdural hemorrhage. Patient underwent left frontotemproparietal craniotomy evacuation for subacute and chronic subdural hematoma by Dr. Xavier Todd on 18. Patient is intubated and unable to provide any history. There is a friend at the bedside. No family members are present. Patient is receiving tube feeds through OGT. Patient has been extubated and re-intubated. Plan for tracheostomy. No anticoagulation. Colonoscopy (3/6/17) by Dr. Sarah Adrian showed moderate sigmoid diverticulosis, mild descending colon diverticulosis, mild transverse colon diverticulosis, 3 polyps around 9 mm in the transverse colon, normal ascending colon, and surgically absent cecum. EGD (3/15/16) by Dr. Leila Primrose showed mild gastritis in the body and antrum of the stomach. History of right hemicolectomy by Dr. Abrahan Moralez on 16 for ascending colon cancer.      I have reviewed the emergency room note, hospital admission note, notes by all other clinicians who have seen the patient during this hospitalization to date. I have reviewed the problem list and the reason for this hospitalization. I have reviewed the allergies and the medications the patient was taking at home prior to this hospitalization. PMH:  Past Medical History:   Diagnosis Date    Arthritis     neck,chronic    Bladder cancer ([address] 3/13    bladder    Chronic pain     NECK    Colon cancer ([address] 3/16    COLON    [facility]     GERD (gastroesophageal reflux disease) 2008    at times, NOT continous    Hypercholesterolemia     Hypertension     Kidney stones 1969    SEVERAL     Nephrolithiasis     Prostate cancer ([address] 9/2003    prostate cancer - radiation    PUD (peptic ulcer disease) 2008    Thromboembolus ([address] 2/8/16    2 RIGHT LEG, 3 IN LUNGS (STARTED ON XARELTO IN ER, 1 WEEK LATER WAS IN ER WITH INTERNAL BLEEDING)    Transient ischemic attack 2005    TIA - no deficits 2005 AND 2015    Unspecified sleep apnea     does not use CPAP/uses nasal strips       PSH:  Past Surgical History:   Procedure Laterality Date    COLONOSCOPY N/A 3/6/2017    COLONOSCOPY performed by Shivani Baez MD at [address] [place] [address]  2008    HX CERVICAL FUSION  2002    HX CYST REMOVAL      cyst removed from back, shoulder and scalp    HX HEENT  2006    sinus surgery    HX HEENT      STAS CATARACTS    HX LUMBAR LAMINECTOMY  2002    HX OTHER SURGICAL      nose surgery    HX OTHER SURGICAL  2008    LUMPS -BENIGN, REMOVED FROM SHOULDER, HEAD AND BACK    HX OTHER SURGICAL  3/2016    FILTER FOR BLOOD CLOTS    HX TONSILLECTOMY      HX UROLOGICAL  1969    for kidney stones       Allergies: Allergies   Allergen Reactions    Penicillins Rash    Metoprolol Rash    Tramadol Other (comments)     Unsure of reaction.  Lodine [Etodolac] Vertigo       Home Medications:  Prior to Admission Medications   Prescriptions Last Dose Informant Patient Reported?  Taking?   acetaminophen (TYLENOL EXTRA STRENGTH) 500 mg tablet 1/22/2018  Yes Yes   Sig: Take 1,000 mg by mouth daily. amLODIPine (NORVASC) 2.5 mg tablet 1/22/2018 Self Yes Yes   Sig: Take 2.5 mg by mouth nightly. apixaban (ELIQUIS) 2.5 mg tablet 1/22/2018  Yes Yes   Sig: Take 2.5 mg by mouth two (2) times a day. aspirin delayed-release 81 mg tablet 1/22/2018  Yes Yes   Sig: Take 1 Tab by mouth daily. atorvastatin (LIPITOR) 40 mg tablet 1/22/2018  Yes Yes   Sig: Take 40 mg by mouth daily. losartan (COZAAR) 50 mg tablet 1/22/2018  Yes Yes   Sig: Take 50 mg by mouth daily. metFORMIN (GLUCOPHAGE) 1,000 mg tablet 1/22/2018  Yes Yes   Sig: Take 1,000 mg by mouth daily. triamterene-hydrochlorothiazide (MAXZIDE) 37.5-25 mg per tablet 1/22/2018  Yes Yes   Sig: Take 1 Tab by mouth daily.       Facility-Administered Medications: None       Hospital Medications:  Current Facility-Administered Medications   Medication Dose Route Frequency    vancomycin (VANCOCIN) 1500 mg in  ml infusion  1,500 mg IntraVENous Q36H    insulin glargine (LANTUS) injection 50 Units  50 Units SubCUTAneous QHS    albuterol-ipratropium (DUO-NEB) 2.5 MG-0.5 MG/3 ML  3 mL Nebulization TID RT    balsam peru-castor oil (VENELEX)  mg/gram ointment   Topical BID    chlorhexidine (PERIDEX) 0.12 % mouthwash 15 mL  15 mL Oral BID    Vancomycin - Pharmacy to Dose   Other Rx Dosing/Monitoring    insulin lispro (HUMALOG) injection   SubCUTAneous Q6H    dextrose (D50W) injection syrg 12.5-25 g  12.5-25 g IntraVENous PRN    acyclovir (ZOVIRAX) 575 mg in 0.9% sodium chloride 100 mL IVPB  575 mg IntraVENous Q12H    budesonide (PULMICORT) 500 mcg/2 ml nebulizer suspension  500 mcg Nebulization BID RT    albuterol-ipratropium (DUO-NEB) 2.5 MG-0.5 MG/3 ML  3 mL Nebulization Q4H PRN    levETIRAcetam (KEPPRA) oral solution 1,000 mg  1,000 mg Per NG tube Q12H    lacosamide (VIMPAT) tablet 100 mg  100 mg Per NG tube Q12H    sodium chloride (NS) flush 5-10 mL  5-10 mL IntraVENous Q8H  sodium chloride (NS) flush 5-10 mL  5-10 mL IntraVENous PRN    midazolam (VERSED) injection 5 mg  5 mg IntraVENous Multiple    fentaNYL citrate (PF) injection 25 mcg  25 mcg IntraVENous Q1H PRN    famotidine (PEPCID) 40 mg/5 mL (8 mg/mL) oral suspension 20 mg  20 mg Per NG tube DAILY    bacitracin 500 unit/gram packet 1 Packet  1 Packet Topical PRN    polyvinyl alcohol (LIQUIFILM TEARS) 1.4 % ophthalmic solution 2 Drop  2 Drop Both Eyes Q8H    sodium chloride (NS) flush 10-30 mL  10-30 mL InterCATHeter PRN    sodium chloride (NS) flush 10 mL  10 mL InterCATHeter Q24H    sodium chloride (NS) flush 10 mL  10 mL InterCATHeter PRN    sodium chloride (NS) flush 10-40 mL  10-40 mL InterCATHeter Q8H    sodium chloride (NS) flush 20 mL  20 mL InterCATHeter Q24H    alteplase (CATHFLO) 1 mg in sterile water (preservative free) 1 mL injection  1 mg InterCATHeter PRN    atorvastatin (LIPITOR) tablet 40 mg  40 mg Per NG tube DAILY    naloxone (NARCAN) injection 0.4 mg  0.4 mg IntraVENous PRN    acetaminophen (TYLENOL) tablet 650 mg  650 mg Oral Q4H PRN    Or    acetaminophen (TYLENOL) solution 650 mg  650 mg Per NG tube Q4H PRN    Or    acetaminophen (TYLENOL) suppository 650 mg  650 mg Rectal Q4H PRN    glucose chewable tablet 16 g  4 Tab Oral PRN    glucagon (GLUCAGEN) injection 1 mg  1 mg IntraMUSCular PRN    hydrALAZINE (APRESOLINE) 20 mg/mL injection 10 mg  10 mg IntraVENous Q4H PRN    sodium chloride (NS) flush 5-10 mL  5-10 mL IntraVENous Q8H    sodium chloride (NS) flush 5-10 mL  5-10 mL IntraVENous PRN    acetaminophen (TYLENOL) tablet 650 mg  650 mg Oral Q4H PRN    HYDROcodone-acetaminophen (NORCO) 5-325 mg per tablet 1 Tab  1 Tab Oral Q4H PRN    ondansetron (ZOFRAN) injection 4 mg  4 mg IntraVENous Q4H PRN       Social History:  Social History   Substance Use Topics    Smoking status: Former Smoker     Packs/day: 1.00     Years: 36.00     Quit date: 11/2/1998    Smokeless tobacco: Never Used      Comment: former cigarette/cigar smoker  SMOKED 10 YRS FIRST TIME; QUIT FOR 10 YRS; THEN SMOKED FOR ABOUT 26 YRS    Alcohol use 3.5 oz/week     7 Glasses of wine per week      Comment: DAILY 2 DRINKS       Family History:  Family History   Problem Relation Age of Onset    Heart Disease Father     Lung Disease Father      EMPHASEMA    Cancer Brother      SKIN    Heart Attack Brother     Heart Disease Brother     Anesth Problems Neg Hx        Review of Systems:  Unable to obtain due to patient's condition. Intubated. Objective:   Patient Vitals for the past 8 hrs:   BP Temp Pulse Resp SpO2   02/15/18 1100 - - 81 20 98 %   02/15/18 1000 143/79 - 70 19 98 %   02/15/18 0900 145/63 - 77 14 96 %   02/15/18 0800 154/76 98.3 °F (36.8 °C) 75 13 96 %   02/15/18 0700 149/71 - 71 13 96 %   02/15/18 0600 137/78 - 72 14 94 %   02/15/18 0500 156/73 - 71 14 97 %   02/15/18 0422 - - 77 15 97 %     02/15 0701 - 02/15 1900  In: 960 [I.V.:500]  Out: -   02/13 1901 - 02/15 0700  In: 0432 [I.V.:300]  Out: 815 [Urine:815]    EXAM:     CONST:  Intubated   NEURO:  Intermittent eye opening, does not follow commands   HEENT: no scleral icterus   LUNGS: Intubated, clear to ausculation bilaterally anteriorly   CARD:  regular rate and rhythm, S1 S2   ABD:  soft, mildly distended, no tenderness, no rebound, no guarding, bowel sounds (+) all 4 quadrants, no masses   EXT:  warm   PSYCH: unable to assess     Data Review     No results for input(s): WBC, HGB, HCT, PLT, HGBEXT, HCTEXT, PLTEXT in the last 72 hours. Recent Labs      02/15/18   0504  02/14/18   0453   NA  142  145   K  3.8  4.0   CL  107  109*   CO2  30  28   BUN  47*  52*   CREA  1.81*  1.88*   GLU  124*  160*   PHOS   --   3.5   CA  8.9  8.6     No results for input(s): SGOT, GPT, AP, TBIL, TP, ALB, GLOB, GGT, AML, LPSE in the last 72 hours. No lab exists for component: AMYP, HLPSE  No results for input(s): INR, PTP, APTT in the last 72 hours.     No lab exists for component: INREXT       Assessment:   · PEG tube evaluation: Hgb 8.2 (2/12/18), platelets: 490 (2/35/68). No anticoagulation. · Acute on chronic subdural hemorrhage: s/p left frontotemporoparietal craniotomy evacuation (1/23/18)  · Acute hypoxic respiratory failure: intubated with plan for tracheostomy. Patient Active Problem List   Diagnosis Code    Diabetes mellitus ([facility] [address] E11.9    Sinusitis J32.9    Hypertension I10    Balance disorder R26.89    Cervical spondylosis M47.812    History of prostate cancer Z85.46    Cerebral thrombosis with cerebral infarction (HCC) I63.30    Uncontrolled hypertension I10    Pre-op evaluation Z01.818    Colon cancer ([facility] [address] C18.9    CVA (cerebral vascular accident) ([facility] [address] I63.9    Chronic deep vein thrombosis (DVT) of distal vein of right lower extremity (HCC) I82.5Z1    History of GI bleed Z87.19    Hyperlipidemia E78.5    Diabetes mellitus type 2, controlled (HCC) E11.9    SDH (subdural hematoma) ([facility] I62.00     Plan:     · Continue tube feeds through OGT  · Will plan for PEG tube placement if within family's goals of care. Will discuss with the patient's son, POA. · Palliative medicine following  · Thank you for allowing me to participate in care of Medina Fleming       Signed By: Evan Wlikerson     2/15/2018  12:14 PM       I have examined the patient. I have reviewed the chart and agree with the documentation recorded by the NP, including the assessment, treatment plan, and disposition.     Pt's family decided on comfort care measures  Will sign off    Shiloh Yoo MD

## 2018-02-15 NOTE — PROGRESS NOTES
1200 Patient received on vent, without sedation. Opens eyes to verbal command, follows simple commands with left upper, left lower, and weak right lower extremities. Right upper extremity w/d to painful stimuli. Nods head appropriately. Vitals stable. 1400 plan updated by Dr. Shanique Franco. Tube feedings d/jay. ENT and GI consults canceled. Dr. Victor M Alvarado made aware of plan. 1430 Spoke with pt son Hanna Collins. Hanna Collins would like to wait until 330PM to extubate patient. 1615 Patient extubated and placed on 6L nasal cannula    Shift Summary:  Patient extubated to 6L nasal cannula, DNR now in place and transition to comfort care if patient displays any s/s of un-comfort, OG tube removed with extubation. Neuro waxes and wanes with patient intermittently following commands, but lethargic and sleepy most of day. Vitals stable.

## 2018-02-15 NOTE — PROGRESS NOTES
0730: Bedside and Verbal shift change report given to Mikel Liu RN (oncoming nurse) by Afsaneh Og RN (offgoing nurse). Report included the following information SBAR, Kardex, Intake/Output, MAR, Recent Results, Cardiac Rhythm NSR and Alarm Parameters .

## 2018-02-15 NOTE — PROGRESS NOTES
1930: Bedside shift change report given to Debbie Baker RN (oncoming nurse) by Sepideh Pereira RN (offgoing nurse). Report included the following information SBAR, Kardex, ED Summary, OR Summary, Intake/Output, MAR, Recent Results, Med Rec Status, Cardiac Rhythm SR and Alarm Parameters . 0730: Bedside shift change report given to Sepideh Pereira RN (oncoming nurse) by Debbie Baker RN (offgoing nurse). Report included the following information SBAR, Kardex, ED Summary, OR Summary, Intake/Output, MAR, Recent Results, Med Rec Status, Cardiac Rhythm SR and Alarm Parameters . Shift Summary: Patient alert but periodically drowsy, PERRL, L side > R side, L side moves spontaneously, R side withdraws, decrease attention/command following, focuses and track, afebrile, HR 70s-80s, BP 130s-150s, lung sounds coarse, TF tolerated well, UOP adequate.

## 2018-02-15 NOTE — PROGRESS NOTES
Hospitalist Progress Note                               Wilfredo Mckeon MD                                     Answering service: [phone]                               OR [phone] from in house phone                               Cell: [phone]              Date of Service:  2/15/2018  NAME:  Kaci Perez  :  1935  MRN:  [id number]      Admission Summary:   Daniel Patrick came to the ED on  complaining of \"For the last 2-3 days I have been having trouble talking. I can't get my words out. \" and was found to have moderate to large acute on chronic subdural hemorrhage on the left. Left to right midline shift approximately 8 mm with effacement of the left lateral ventricle. The ambient cisterns remain patent. He underwent left frontotemporoparietal craniotomy evacuation, subacute, and chronic subdural hematoma on 18. Interval history / Subjective:   Extubated per family request.Not responsive. Assessment & Plan:   Acute on chronic SDH on the left  -S/p  underwent left frontotemporoparietal craniotomy        Expressive aphasia and dysarthria. Intubaed  -Receiving feeds via OGT. Likley to need PEG    Acute hypoxic respiratory failure  -Intubated for surgery then extubated. - Re-intubated on  and then bronched and found to have mucous clot. Extubated on  and subsequently re-intubated same day, rebronched on  and  that revealed severe tracheobronchomalacia.   - Extubated on  and then re-intubated on . -Family requested withdrawal of support and he was on extubated 2/15      HSV tracheobronchitis  Noted on viral cultures from . Placed on acyclovir on     Possible MRSA pneumonia,sputum cx on  grew scant MRSA and other Staph species on vancomycin. Thrombocytopenia  Noted after admission. Hx Afib on eliquis  Hx dvt/pe on eliquis  Taken off eliquis on admission.   Unclear if he could ever be placed back on any blood thinners given recurrent sdh. Code Status: can reintubate, no shocks. DVT prophylaxis: scd     Family, after meeting with palliative care team decided to withdraw support and he was extubated and is now comfort care. DNR/DNI        Hospital Problems  Date Reviewed: 1/23/2018          Codes Class Noted POA    * (Principal)SDH (subdural hematoma) ([facility] [address] ICD-10-CM: I62.00  ICD-9-CM: 432.1  1/23/2018 Unknown                Review of Systems:   A comprehensive review of systems was negative except for that written in the HPI. Physical Examination:      Last 24hrs VS reviewed since prior progress note. Most recent are:  Visit Vitals    /80    Pulse 89    Temp 98.5 °F (36.9 °C)    Resp 23    Ht 6' (1.829 m)    Wt 109.2 kg (240 lb 11.2 oz)    SpO2 93%    BMI 32.64 kg/m2           Constitutional: Extubated. Unreseponsive. Eyes: Non icteric,non pallor,no erythema,discharge,PERRLA   ENT:  ETT and OGT in place. ,    Resp:  CTA bilaterally. No wheezing/rhonchi/rales. No accessory muscle use   CV:  Regular rhythm, normal rate, no murmurs, gallops, rubs    GI:  Soft, non distended, non tender. normoactive bowel sounds, no hepatosplenomegaly    :  No CVA or suprapubic tenderness   Skin  :  No erythema,rash,bullae,dipigmentation     Musculoskeletal:  No edema, warm, 2+ pulses throughout    Neurologic: Extubated. Unresponsive. Intake/Output Summary (Last 24 hours) at 02/15/18 1809  Last data filed at 02/15/18 1300   Gross per 24 hour   Intake             2900 ml   Output              510 ml   Net             2390 ml          Data Review:    Review and/or order of clinical lab test  Review and/or order of tests in the radiology section of CPT  Review and/or order of tests in the medicine section of CPT      Labs:     No results for input(s): WBC, HGB, HCT, PLT, HGBEXT, HCTEXT, PLTEXT, HGBEXT, HCTEXT, PLTEXT in the last 72 hours.   Recent Labs      02/15/18 8740  02/14/18   0453  02/13/18   0535   NA  142  145  142   K  3.8  4.0  4.0   CL  107  109*  107   CO2  30  28  26   BUN  47*  52*  48*   CREA  1.81*  1.88*  2.03*   GLU  124*  160*  174*   CA  8.9  8.6  8.3*   MG   --   2.2   --    PHOS   --   3.5   --      No results for input(s): SGOT, GPT, ALT, AP, TBIL, TBILI, TP, ALB, GLOB, GGT, AML, LPSE in the last 72 hours. No lab exists for component: AMYP, HLPSE  No results for input(s): INR, PTP, APTT in the last 72 hours. No lab exists for component: INREXT, INREXT   No results for input(s): FE, TIBC, PSAT, FERR in the last 72 hours. No results found for: FOL, RBCF   No results for input(s): PH, PCO2, PO2 in the last 72 hours. No results for input(s): CPK, CKNDX, TROIQ in the last 72 hours.     No lab exists for component: CPKMB  Lab Results   Component Value Date/Time    Cholesterol, total 118 01/22/2018 11:28 PM    HDL Cholesterol 53 01/22/2018 11:28 PM    LDL, calculated 20 01/22/2018 11:28 PM    Triglyceride 225 (H) 01/22/2018 11:28 PM    CHOL/HDL Ratio 2.2 01/22/2018 11:28 PM     Lab Results   Component Value Date/Time    Glucose (POC) 125 (H) 02/15/2018 06:06 PM    Glucose (POC) 167 (H) 02/15/2018 11:31 AM    Glucose (POC) 135 (H) 02/15/2018 05:58 AM    Glucose (POC) 210 (H) 02/15/2018 12:23 AM    Glucose (POC) 167 (H) 02/14/2018 06:00 PM     Lab Results   Component Value Date/Time    Color Yellow 03/04/2013 09:14 AM    Appearance Cloudy (A) 03/04/2013 09:14 AM    Specific gravity 1.015 03/10/2010 09:45 AM    pH (UA) 7.0 03/04/2013 09:14 AM    Protein NEGATIVE  03/10/2010 09:45 AM    Glucose NEGATIVE  03/10/2010 09:45 AM    Ketone Negative 03/04/2013 09:14 AM    Bilirubin Negative 03/04/2013 09:14 AM    Urobilinogen 0.2 03/10/2010 09:45 AM    Nitrites Negative 03/04/2013 09:14 AM    Leukocyte Esterase Negative 03/04/2013 09:14 AM    Bacteria Few 03/04/2013 09:14 AM    WBC 0-5 03/04/2013 09:14 AM    RBC >30 (A) 03/04/2013 09:14 AM         Medications Reviewed:     Current Facility-Administered Medications   Medication Dose Route Frequency    glycopyrrolate (ROBINUL) injection 0.2 mg  0.2 mg IntraVENous Q4H PRN    LORazepam (ATIVAN) injection 1 mg  1 mg IntraVENous Q15MIN PRN    HYDROmorphone (PF) (DILAUDID) injection 0.5-1 mg  0.5-1 mg IntraVENous Q15MIN PRN    levETIRAcetam (KEPPRA) 1,000 mg in 0.9% sodium chloride 100 mL IVPB  1,000 mg IntraVENous Q12H    lacosamide (VIMPAT) 100 mg in 0.9% sodium chloride IVPB  100 mg IntraVENous Q12H    vancomycin (VANCOCIN) 1500 mg in  ml infusion  1,500 mg IntraVENous Q36H    insulin glargine (LANTUS) injection 50 Units  50 Units SubCUTAneous QHS    albuterol-ipratropium (DUO-NEB) 2.5 MG-0.5 MG/3 ML  3 mL Nebulization TID RT    balsam peru-castor oil (VENELEX)  mg/gram ointment   Topical BID    chlorhexidine (PERIDEX) 0.12 % mouthwash 15 mL  15 mL Oral BID    Vancomycin - Pharmacy to Dose   Other Rx Dosing/Monitoring    insulin lispro (HUMALOG) injection   SubCUTAneous Q6H    dextrose (D50W) injection syrg 12.5-25 g  12.5-25 g IntraVENous PRN    acyclovir (ZOVIRAX) 575 mg in 0.9% sodium chloride 100 mL IVPB  575 mg IntraVENous Q12H    budesonide (PULMICORT) 500 mcg/2 ml nebulizer suspension  500 mcg Nebulization BID RT    albuterol-ipratropium (DUO-NEB) 2.5 MG-0.5 MG/3 ML  3 mL Nebulization Q4H PRN    sodium chloride (NS) flush 5-10 mL  5-10 mL IntraVENous Q8H    sodium chloride (NS) flush 5-10 mL  5-10 mL IntraVENous PRN    midazolam (VERSED) injection 5 mg  5 mg IntraVENous Multiple    famotidine (PEPCID) 40 mg/5 mL (8 mg/mL) oral suspension 20 mg  20 mg Per NG tube DAILY    bacitracin 500 unit/gram packet 1 Packet  1 Packet Topical PRN    polyvinyl alcohol (LIQUIFILM TEARS) 1.4 % ophthalmic solution 2 Drop  2 Drop Both Eyes Q8H    sodium chloride (NS) flush 10-30 mL  10-30 mL InterCATHeter PRN    sodium chloride (NS) flush 10 mL  10 mL InterCATHeter Q24H    sodium chloride (NS) flush 10 mL  10 mL InterCATHeter PRN    sodium chloride (NS) flush 10-40 mL  10-40 mL InterCATHeter Q8H    sodium chloride (NS) flush 20 mL  20 mL InterCATHeter Q24H    alteplase (CATHFLO) 1 mg in sterile water (preservative free) 1 mL injection  1 mg InterCATHeter PRN    atorvastatin (LIPITOR) tablet 40 mg  40 mg Per NG tube DAILY    naloxone (NARCAN) injection 0.4 mg  0.4 mg IntraVENous PRN    acetaminophen (TYLENOL) tablet 650 mg  650 mg Oral Q4H PRN    Or    acetaminophen (TYLENOL) solution 650 mg  650 mg Per NG tube Q4H PRN    Or    acetaminophen (TYLENOL) suppository 650 mg  650 mg Rectal Q4H PRN    glucose chewable tablet 16 g  4 Tab Oral PRN    glucagon (GLUCAGEN) injection 1 mg  1 mg IntraMUSCular PRN    hydrALAZINE (APRESOLINE) 20 mg/mL injection 10 mg  10 mg IntraVENous Q4H PRN    sodium chloride (NS) flush 5-10 mL  5-10 mL IntraVENous Q8H    sodium chloride (NS) flush 5-10 mL  5-10 mL IntraVENous PRN    acetaminophen (TYLENOL) tablet 650 mg  650 mg Oral Q4H PRN    HYDROcodone-acetaminophen (NORCO) 5-325 mg per tablet 1 Tab  1 Tab Oral Q4H PRN    ondansetron (ZOFRAN) injection 4 mg  4 mg IntraVENous Q4H PRN     ______________________________________________________________________  EXPECTED LENGTH OF STAY: 7d 0h  ACTUAL LENGTH OF STAY:          23                 Marcellus Morales MD

## 2018-02-15 NOTE — ROUTINE PROCESS
1130 Bedside and Verbal shift change report given to Lauren Ernst RN (oncoming nurse) by Linda Hahn RN (offgoing nurse). Report included the following information SBAR, Kardex, Intake/Output, MAR, Recent Results, Med Rec Status and Cardiac Rhythm A fib.

## 2018-02-15 NOTE — PROGRESS NOTES
0730: Bedside and Verbal shift change report given to Blaise Ro RN (oncoming nurse) by Erlin Mehta RN (offgoing nurse). Report included the following information SBAR, Kardex, Intake/Output, MAR, Recent Results, Cardiac Rhythm NSR and Alarm Parameters . 1930: Bedside and Verbal shift change report given to Regine Pitt RN (oncoming nurse) by Blaise Ro RN (offgoing nurse).  Report included the following information SBAR, Kardex, Procedure Summary, Intake/Output, MAR, Recent Results, Med Rec Status, Cardiac Rhythm NSR and Alarm Parameters .

## 2018-02-15 NOTE — PROGRESS NOTES
Pulmonary / [address] Day: 22     S:    In ICU for resp failure following SDH post evacuation  2/15  Seen and examined earlier today. Family has decided not to proceed with trach and peg. Plan is for extubation this after noon and transition to full comfort measures if he declines. He is now DNR/DNI    2/14  Seen and examined earlier today. Sedation minimized- awake. Copious secretions and tracheo/bronchomalacia with failed extubation X 3 or more this admission portend high risk for extubation failure. Family deciding upon trach and peg-- if decision if to not pursue trach peg, will need DNI order prior to extubation    2/13  Seen and examined earlier today. Awake and interactive! 2/12  Seen and examined earlier today. Unresponsive. Plan for family meeting today    2/11  Patient was seen and examined earlier today. He continues to be encephalopathic. Palliative care meeting with the family is planned tomorrow. He has failed extubation several times and if continued care is planned, he will need a trach and a PEG    2/10  Patient was seen and examined earlier today. He continues to be encephalopathic. Chart reviewed. Noted plans to meet with palliative care for goals of care discussion. He is at high risk for extubation failure given presence of bronchomalacia    2/9:  Again unable to extubate secondary to mental status  Family deciding on trach and peg   Palliative care following    2/8:  Placed on SBT  Appears comfortable but no commands followed currently    2/7:  Failed SBT with increased RR  TF  Palliative care following - Partial code - no shocks or CPR    2/6:  Re-intubated on 2/5 for AMS / resp distress. Thick secretions in back of throat at time of intubation  Head CT without significant change  Abd CT for abd distension - Small bilateral pleural effusions with underlying consolidation. Gallstone. Sigmoid diverticulosis.  Lesion left hepatic lobe again demonstrated but cannot  be characterized given the lack of intravenous contrast. No acute  intra-abdominal abnormality  Remains sedate on vent  Tolerating TF    2/:  Was extubated at the end of last week  Currently tachypneic despite bipap with increased work of breathing and accessory muscle use  Mental status is poor  Abd distended with worsening resp distress  Has been having bowel movements     O:    Patient Vitals for the past 4 hrs:   BP Temp Pulse Resp SpO2   02/15/18 1500 125/66 - (!) 59 12 98 %   02/15/18 1450 - - - - 96 %   02/15/18 1400 132/65 - 62 14 94 %   02/15/18 1300 (!) 148/96 - - - 100 %   02/15/18 1200 143/69 99.1 °F (37.3 °C) 66 12 99 %     Temp (24hrs), Av °F (37.2 °C), Min:98.3 °F (36.8 °C), Max:99.6 °F (37.6 °C)      Intake/Output Summary (Last 24 hours) at 02/15/18 1526  Last data filed at 02/15/18 1300   Gross per 24 hour   Intake             3295 ml   Output              835 ml   Net             2460 ml     Exam:  No distress  Sedate RASS -2  Oral ett  MM dry  Anicteric  No commands followed  Coarse rhonchi bilaterally  RRR  Protuberant with mild distension, hypoactive bs  Warm and dry  Trace edema        Imaging: Images independently viewed  CXR - persistned L>R effusions and basilar atx    Labs:  Recent Results (from the past 12 hour(s))   METABOLIC PANEL, BASIC    Collection Time: 02/15/18  5:04 AM   Result Value Ref Range    Sodium 142 136 - 145 mmol/L    Potassium 3.8 3.5 - 5.1 mmol/L    Chloride 107 97 - 108 mmol/L    CO2 30 21 - 32 mmol/L    Anion gap 5 5 - 15 mmol/L    Glucose 124 (H) 65 - 100 mg/dL    BUN 47 (H) 6 - 20 MG/DL    Creatinine 1.81 (H) 0.70 - 1.30 MG/DL    BUN/Creatinine ratio 26 (H) 12 - 20      GFR est AA 44 (L) >60 ml/min/1.73m2    GFR est non-AA 36 (L) >60 ml/min/1.73m2    Calcium 8.9 8.5 - 10.1 MG/DL   GLUCOSE, POC    Collection Time: 02/15/18  5:58 AM   Result Value Ref Range    Glucose (POC) 135 (H) 65 - 100 mg/dL    Performed by [address], POC    Collection Time: 02/15/18 11:31 AM   Result Value Ref Range    Glucose (POC) 167 (H) 65 - 100 mg/dL    Performed by Marni Verma        A/P:   1. Acute hypoxemic respiratory failure with increased work of breathing despite bipap - secretions and bronchomalacia likely contributing to increased work of breathing - re-intubated 2/5 - compassionate extubation planned  2. SDH s/p craniotomy with evacuation on 1/23/18 after GLF  3. Encephalopathy - off all sedatives  4. 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 MRSA in sputum - completing course of vancomycin  7. SZ  8. Hypernatremia  9. CKD  10. DM  11.  H/o DVT /PE    --Palliative Medicine coordinating with family about timing of compassionate extubation  --Hypernatremia improved   --on cefepime vanc and acyclovir - sputum cx with MRSA - stopped cefepime 2/7 continue vanc to complete course  --Palliative care following for goals of care discussions  -- D/W RN, RT and Dr. Landon Gaffney decision making:   I have reviewed the flowsheet and previous day's notes  Acute or chronic illness that poses a threat to life or bodily function  Review and order of Clinical lab tests  Review and Order of Radiology tests  Independent visualization of Image  Reviewed Ventilator / NiPPV  Reviewed high risk medications/sedatives    Natalie Dimas MD

## 2018-02-16 NOTE — DISCHARGE SUMMARY
Death/Discharge Summary       PATIENT ID: Breanna Laughlin  MRN: [id number]   YOB: 1935    DATE OF ADMISSION: 1/22/2018 10:30 PM    DATE OF DISCHARGE: 2/16/2018  PRIMARY CARE PROVIDER: Megan Eduardo MD     ATTENDING PHYSICIAN: Hazel Yoo MD  DISCHARGING PROVIDER: Hazel Yoo MD    To contact this individual call [phone] and ask the  to page. If unavailable ask to be transferred the Adult Hospitalist Department. CONSULTATIONS: IP CONSULT TO NEUROSURGERY  IP CONSULT TO PALLIATIVE CARE - PROVIDER  IP CONSULT TO GASTROENTEROLOGY  IP CONSULT TO GASTROENTEROLOGY    PROCEDURES/SURGERIES: Procedure(s):  CRANIOTOMY, LEFT FOR EVACUATION OF SUBDURAL HEMATOMA    ADMITTING [address] [place] [address] COURSE:        Admission Summary:   Slava Ruiz came to the ED on 1/22 complaining of \"For the last 2-3 days I have been having trouble talking. I can't get my words out. \" and was found to have moderate to large acute on chronic subdural hemorrhage on the left. Left to right midline shift approximately 8 mm with effacement of the left lateral ventricle. The ambient cisterns remain patent. He underwent left frontotemporoparietal craniotomy evacuation, subacute, and chronic subdural hematoma on 1/23/18. Assessment & Plan:   Acute on chronic SDH on the left  -S/p 1/23 underwent left frontotemporoparietal craniotomy        Expressive aphasia and dysarthria. Intubaed       Acute hypoxic respiratory failure  -Intubated for surgery then extubated. - Re-intubated on 01/26 and then bronched and found to have mucous clot. Extubated on 01/30 and subsequently re-intubated same day, rebronched on 01/31 and 02/02 that revealed severe tracheobronchomalacia.   - Extubated on 02/02 and then re-intubated on 02/05. -Family requested withdrawal of support and he was on extubated 2/15     HSV tracheobronchitis  Noted on viral cultures from 01/31.  He was placed on acyclovir on 02/02 Possible MRSA pneumonia,sputum cx on 2/2 grew scant MRSA and other Staph species. he was on vancomycin. Thrombocytopenia       Hx Afib  Hx dvt/pe on eliquis  Taken off eliquis on admission. Unclear if he could ever be placed back on any blood thinners given recurrent sdh. Family, after meeting with palliative care team decided to withdraw support and he was extubated and is now comfort care. DNR/DNI    Patient diet on 2/16/2018.            Hospital Problems              FOLLOW UP APPOINTMENTS:    Follow-up Information     None               DISCHARGE MEDICATIONS:  Discharge Medication List as of 2/16/2018  7:44 AM          CHRONIC MEDICAL DIAGNOSES:  Problem List as of 2/16/2018  Date Reviewed: 1/23/2018          Codes Class Noted - Resolved    * (Principal)SDH (subdural hematoma) ([facility] ICD-10-CM: I62.00  ICD-9-CM: 432.1  1/23/2018 - Present        Hyperlipidemia ICD-10-CM: E78.5  ICD-9-CM: 272.4  8/1/2016 - Present        Diabetes mellitus type 2, controlled ([facility] ICD-10-CM: E11.9  ICD-9-CM: 250.00  8/1/2016 - Present        CVA (cerebral vascular accident) ([facility] ICD-10-CM: I63.9  ICD-9-CM: 434.91  6/16/2016 - Present        Chronic deep vein thrombosis (DVT) of distal vein of right lower extremity (HCC) (Chronic) ICD-10-CM: T49.2I8  ICD-9-CM: 453.52  6/16/2016 - Present        History of GI bleed (Chronic) ICD-10-CM: Z87.19  ICD-9-CM: V12.79  6/16/2016 - Present        Colon cancer ([facility] ICD-10-CM: C18.9  ICD-9-CM: 153.9  5/16/2016 - Present        Pre-op evaluation ICD-10-CM: A19.381  ICD-9-CM: V72.84  4/8/2016 - Present        Cerebral thrombosis with cerebral infarction [facility]) ICD-10-CM: I63.30  ICD-9-CM: 434.01  1/22/2015 - Present        Uncontrolled hypertension ICD-10-CM: I10  ICD-9-CM: 401.9  1/22/2015 - Present        History of prostate cancer ICD-10-CM: Z85.46  ICD-9-CM: V10.46  3/7/2013 - Present        Cervical spondylosis ICD-10-CM: V73.442  ICD-9-CM: 721.0  7/11/2012 - Present Balance disorder ICD-10-CM: R26.89  ICD-9-CM: 781.99  3/7/2012 - Present        Diabetes mellitus ([facility] ICD-10-CM: E11.9  ICD-9-CM: 250.00  11/2/2011 - Present        Sinusitis ICD-10-CM: J32.9  ICD-9-CM: 473.9  11/2/2011 - Present        Hypertension ICD-10-CM: I10  ICD-9-CM: 401.9  11/2/2011 - Present    Overview Addendum 8/1/2016  7:03 AM by Damian Farooq MD     4/16 abnormal lexiscan cardiolyte with fixed inf defect, lvef 37%  4/16 echo normal lvef, lae, mild mr, mild tr with pa pressure 37mm. Lipomatous hypertrophy intraatrial septum without defect             RESOLVED: GI bleed ICD-10-CM: K92.2  ICD-9-CM: 578.9  3/15/2016 - 3/17/2016        RESOLVED: Acute post-hemorrhagic anemia ICD-10-CM: D62  ICD-9-CM: 285.1  3/15/2016 - 3/17/2016                  Signed:    Fredy Dailey MD  2/16/2018  8:04 AM

## 2018-02-16 NOTE — PROGRESS NOTES
I was called to examine patient who  at 02:23 hours. On examination, patient had:   No response to verbal and tactile stimuli. No respiratory effort. Absent heart sounds and pulses. Pupils fixed and dilated. Patient was pronounced dead at 02:56 hours.      Clemencia Anderson MD   Hospitalist

## 2018-02-16 NOTE — PROGRESS NOTES
1930: Bedside shift change report given to Debbie Baker RN (oncoming nurse) by Brionna Bermeo RN (offgoing nurse). Report included the following information SBAR, Kardex, ED Summary, Intake/Output, MAR, Recent Results, Med Rec Status, Cardiac Rhythm SR/ST and Alarm Parameters . 2320: Pt displaying increased work of breathing, RR 30s, and discomfort, prn ativan given. 2341: Pt steadily deteriorating, O2 b/w 50%-80% inconsistently, BP 170s-180s. Restlessness, irregular breathing, moderate secretions and discomfort noted. PRN rubinol given to provide comfort. Monitored BP for 45 mins. - 1 hr, unable to maintain SBP < 160, prn hydralazine given. 2350: Called family, to let them know about declining status. Pt experiencing labored breathing, increased work of breathing/respiratory effort, RR 30s-35s, prn dilaudid given. 0009: Spoke with Dr. Collins Salomon to provide updates on pt current status and decline. 0030: Family at bedside. 0130: Pt frequently alarming monitor, HR 40s-50s, O2 50%-70%. At bedside to assess and monitor declining status. 4693: Monitor reading asystole, no audible breath sounds. Paged hospitalist to confirm. Also paged pastoral care. 0230: Pastoral care present.     0301: [facility] notified.

## 2018-02-16 NOTE — PROGRESS NOTES
Responded to call from nursing staff at time of patient's death. Family has already left the hospital and no other family is expected to visit or return this evening. Please page chaplains as needed or desired. Chaplain Yasmin Bartlett M.Div.    Paging Service [phone])

## 2018-02-26 LAB
BACTERIA SPEC CULT: NORMAL
SERVICE CMNT-IMP: NORMAL

## 2018-03-04 LAB
BACTERIA SPEC CULT: NORMAL
SERVICE CMNT-IMP: NORMAL

## 2018-03-12 LAB
ACID FAST STN SPEC: NEGATIVE
MYCOBACTERIUM SPEC QL CULT: NEGATIVE
SPECIMEN PREPARATION: NORMAL
SPECIMEN SOURCE: NORMAL

## 2022-08-16 NOTE — PROGRESS NOTES
7:30-Bedside and Verbal shift change report given to Zenaida Valdivia (oncoming nurse) by Prasad Matthews (offgoing nurse). Report included the following information SBAR, Kardex, ED Summary, Procedure Summary, Intake/Output, MAR, Accordion, Recent Results, Med Rec Status, Cardiac Rhythm SR/SB and Alarm Parameters . 19:30-Bedside and Verbal shift change report given to Prasad Matthews (oncoming nurse) by Zenaida Valdivia (offgoing nurse). Report included the following information SBAR, Kardex, ED Summary, OR Summary, Procedure Summary, Intake/Output, MAR, Accordion, Recent Results, Med Rec Status, Cardiac Rhythm SR and Alarm Parameters . [Negative] : Heme/Lymph

## 2025-04-21 NOTE — PROGRESS NOTES
Bedside and Verbal shift change report given to Anthony Boas, RN (oncoming nurse) by Juliano Villalba RN (offgoing nurse). Report included the following information SBAR, Kardex, Intake/Output, MAR, Accordion, Recent Results, Med Rec Status and Cardiac Rhythm NSR. 18 No

## (undated) DEVICE — TOOL 8TA11 LEGEND 8CM 1.1MM TA: Brand: MIDAS REX ™

## (undated) DEVICE — CONNECTOR TBNG AUX H2O JET DISP FOR OLY 160/180 SER

## (undated) DEVICE — KENDALL RADIOLUCENT FOAM MONITORING ELECTRODE -RECTANGULAR SHAPE: Brand: KENDALL

## (undated) DEVICE — INFECTION CONTROL KIT SYS

## (undated) DEVICE — CODMAN® SURGICAL PATTIES 1/2" X 3" (1.27CM X 7.62CM): Brand: CODMAN®

## (undated) DEVICE — SURGICAL PROCEDURE KIT CRANIOTOMY

## (undated) DEVICE — KIT IV STRT W CHLORAPREP PD 1ML

## (undated) DEVICE — SCALPFIX STERILE: Brand: AESCULAP

## (undated) DEVICE — SUTURE VCRL SZ 2-0 L18IN ABSRB UD L26MM CP-2 1/2 CIR REV J762D

## (undated) DEVICE — TOOL 9AC90 LEGEND 9CM 9MM AC: Brand: MIDAS REX

## (undated) DEVICE — SYRINGE MED 20ML STD CLR PLAS LUERLOCK TIP N CTRL DISP

## (undated) DEVICE — STERILE POLYISOPRENE POWDER-FREE SURGICAL GLOVES: Brand: PROTEXIS

## (undated) DEVICE — SET ADMIN 16ML TBNG L100IN 2 Y INJ SITE IV PIGGY BK DISP

## (undated) DEVICE — FLOSEAL MATRIX IS INDICATED IN SURGICAL PROCEDURES (OTHER THAN IN OPHTHALMIC) AS AN ADJUNCT TO HEMOSTASIS WHEN CONTROL OF BLEEDING BY LIGATURE OR CONVENTIONALPROCEDURES IS INEFFECTIVE OR IMPRACTICAL.: Brand: FLOSEAL HEMOSTATIC MATRIX

## (undated) DEVICE — DRAPE FLD WRM W44XL66IN C6L FOR INTRATEMP SYS THERMABASIN

## (undated) DEVICE — ENDO CARRY-ON PROCEDURE KIT INCLUDES ENZYMATIC SPONGE, GAUZE, BIOHAZARD LABEL, TRAY, LUBRICANT, DIRTY SCOPE LABEL, WATER LABEL, TRAY, DRAWSTRING PAD, AND DEFENDO 4-PIECE KIT.: Brand: ENDO CARRY-ON PROCEDURE KIT

## (undated) DEVICE — BAG BELONG PT PERS CLEAR HANDL

## (undated) DEVICE — CATH IV AUTOGRD BC BLU 22GA 25 -- INSYTE

## (undated) DEVICE — SUTURE VCRL SZ 0 L18IN ABSRB VLT L36MM CT-1 1/2 CIR J740D

## (undated) DEVICE — SUTURE NRLN SZ 4-0 L18IN NONABSORBABLE BLK L13MM TF 1/2 CIR C584D

## (undated) DEVICE — AGENT HEMSTAT W2XL14IN OXIDIZED REGENERATED CELOS ABSRB FOR

## (undated) DEVICE — BW-412T DISP COMBO CLEANING BRUSH: Brand: SINGLE USE COMBINATION CLEANING BRUSH

## (undated) DEVICE — KENDALL SCD EXPRESS SLEEVES, KNEE LENGTH, MEDIUM: Brand: KENDALL SCD

## (undated) DEVICE — SUT ETHLN 2-0 18IN FS BLK --

## (undated) DEVICE — Z DISCONTINUED USE 2751540 TUBING IRRIG L10IN DISP PMP ENDOGATOR

## (undated) DEVICE — BAG SPEC BIOHZD LF 2MIL 6X10IN -- CONVERT TO ITEM 357326

## (undated) DEVICE — SET EXTN TBNG L BOR 4 W STPCOCK ST 32IN PRIMING VOL 6ML

## (undated) DEVICE — 3000CC GUARDIAN II: Brand: GUARDIAN

## (undated) DEVICE — FORCEPS BX L240CM JAW DIA2.8MM L CAP W/ NDL MIC MESH TOOTH

## (undated) DEVICE — 1200 GUARD II KIT W/5MM TUBE W/O VAC TUBE: Brand: GUARDIAN

## (undated) DEVICE — Device

## (undated) DEVICE — SOLUTION IV 250ML 0.9% SOD CHL CLR INJ FLX BG CONT PRT CLSR

## (undated) DEVICE — SOLIDIFIER FLUID 3000 CC ABSORB

## (undated) DEVICE — AIRLIFE™ U/CONNECT-IT OXYGEN TUBING 7 FEET (2.1 M) CRUSH-RESISTANT OXYGEN TUBING, VINYL TIPPED: Brand: AIRLIFE™

## (undated) DEVICE — QUILTED PREMIUM COMFORT UNDERPAD,EXTRA HEAVY: Brand: WINGS

## (undated) DEVICE — CONTAINER SPEC 20 ML LID NEUT BUFF FORMALIN 10 % POLYPR STS

## (undated) DEVICE — GOWN,SIRUS,NONRNF,SETINSLV,2XL,18/CS: Brand: MEDLINE

## (undated) DEVICE — DRAIN SURG W7MMXL20CM SIL FULL PERF HUBLESS FLAT RADPQ STRP

## (undated) DEVICE — TRAP SURG QUAD PARABOLA SLOT DSGN SFTY SCRN TRAPEASE

## (undated) DEVICE — NEEDLE HYPO 18GA L1.5IN PNK S STL HUB POLYPR SHLD REG BVL

## (undated) DEVICE — SOLUTION IV 1000ML 0.9% SOD CHL

## (undated) DEVICE — TOOL F2/8TA23 LEGEND 8CM 2.3MM TAPER: Brand: MIDAS REX™

## (undated) DEVICE — DEVON™ KNEE AND BODY STRAP 60" X 3" (1.5 M X 7.6 CM): Brand: DEVON